# Patient Record
Sex: MALE | Race: WHITE | NOT HISPANIC OR LATINO | Employment: OTHER | URBAN - METROPOLITAN AREA
[De-identification: names, ages, dates, MRNs, and addresses within clinical notes are randomized per-mention and may not be internally consistent; named-entity substitution may affect disease eponyms.]

---

## 2017-02-15 ENCOUNTER — HOSPITAL ENCOUNTER (OUTPATIENT)
Dept: FAMILY MEDICINE CLINIC | Facility: CLINIC | Age: 72
Setting detail: SPECIMEN
Discharge: HOME | End: 2017-02-15
Attending: PREVENTIVE MEDICINE | Admitting: PREVENTIVE MEDICINE

## 2017-02-15 LAB
ALBUMIN SERPL-MCNC: 3.7 G/DL (ref 3.5–4.8)
ALBUMIN/GLOB SERPL: 1.2 {RATIO} (ref 1–1.7)
ALP SERPL-CCNC: 52 IU/L (ref 32–91)
ALT SERPL-CCNC: 22 IU/L (ref 17–63)
ANION GAP SERPL CALC-SCNC: 13.1 MMOL/L (ref 10–20)
AST SERPL-CCNC: 30 IU/L (ref 15–41)
BASOPHILS # BLD AUTO: 0.1 10*3/UL (ref 0–0.2)
BASOPHILS NFR BLD AUTO: 1 % (ref 0–2)
BILIRUB SERPL-MCNC: 0.5 MG/DL (ref 0.3–1.2)
BUN SERPL-MCNC: 14 MG/DL (ref 8–20)
BUN/CREAT SERPL: 15.6 (ref 6.2–20.3)
CALCIUM SERPL-MCNC: 9.6 MG/DL (ref 8.9–10.3)
CHLORIDE SERPL-SCNC: 104 MMOL/L (ref 101–111)
CONV CO2: 28 MMOL/L (ref 22–32)
CONV TOTAL PROTEIN: 6.8 G/DL (ref 6.1–7.9)
CREAT UR-MCNC: 0.9 MG/DL (ref 0.7–1.2)
DIFFERENTIAL METHOD BLD: (no result)
EOSINOPHIL # BLD AUTO: 0.3 10*3/UL (ref 0–0.3)
EOSINOPHIL # BLD AUTO: 5 % (ref 0–3)
ERYTHROCYTE [DISTWIDTH] IN BLOOD BY AUTOMATED COUNT: 14.4 % (ref 11.5–14.5)
GLOBULIN UR ELPH-MCNC: 3.1 G/DL (ref 2.5–3.8)
GLUCOSE SERPL-MCNC: 88 MG/DL (ref 65–99)
HCT VFR BLD AUTO: 41.8 % (ref 40–54)
HGB BLD-MCNC: 14.2 G/DL (ref 14–18)
LYMPHOCYTES # BLD AUTO: 1.4 10*3/UL (ref 0.8–4.8)
LYMPHOCYTES NFR BLD AUTO: 22 % (ref 18–42)
MCH RBC QN AUTO: 30.2 PG (ref 26–32)
MCHC RBC AUTO-ENTMCNC: 33.9 G/DL (ref 32–36)
MCV RBC AUTO: 88.9 FL (ref 80–94)
MONOCYTES # BLD AUTO: 0.9 10*3/UL (ref 0.1–1.3)
MONOCYTES NFR BLD AUTO: 14 % (ref 2–11)
NEUTROPHILS # BLD AUTO: 3.8 10*3/UL (ref 2.3–8.6)
NEUTROPHILS NFR BLD AUTO: 58 % (ref 50–75)
NRBC BLD AUTO-RTO: 0 /100{WBCS}
NRBC/RBC NFR BLD MANUAL: 0 10*3/UL
PLATELET # BLD AUTO: 230 10*3/UL (ref 150–450)
PMV BLD AUTO: 10.9 FL (ref 7.4–10.4)
POTASSIUM SERPL-SCNC: 4.1 MMOL/L (ref 3.6–5.1)
RBC # BLD AUTO: 4.7 10*6/UL (ref 4.6–6)
SODIUM SERPL-SCNC: 141 MMOL/L (ref 136–144)
VIT B12 SERPL-MCNC: 553 PG/ML (ref 180–914)
WBC # BLD AUTO: 6.4 10*3/UL (ref 4.5–11.5)

## 2017-11-17 ENCOUNTER — HOSPITAL ENCOUNTER (OUTPATIENT)
Dept: FAMILY MEDICINE CLINIC | Facility: CLINIC | Age: 72
Setting detail: SPECIMEN
Discharge: HOME | End: 2017-11-17
Attending: PREVENTIVE MEDICINE | Admitting: PREVENTIVE MEDICINE

## 2017-11-17 LAB — PSA SERPL-MCNC: 1.69 NG/ML (ref 0–4)

## 2018-03-20 ENCOUNTER — HOSPITAL ENCOUNTER (OUTPATIENT)
Dept: FAMILY MEDICINE CLINIC | Facility: CLINIC | Age: 73
Setting detail: SPECIMEN
Discharge: HOME | End: 2018-03-20
Attending: PREVENTIVE MEDICINE | Admitting: PREVENTIVE MEDICINE

## 2018-03-20 LAB
ALBUMIN SERPL-MCNC: 3.8 G/DL (ref 3.5–4.8)
ALBUMIN/GLOB SERPL: 1.2 {RATIO} (ref 1–1.7)
ALP SERPL-CCNC: 45 IU/L (ref 32–91)
ALT SERPL-CCNC: 25 IU/L (ref 17–63)
ANION GAP SERPL CALC-SCNC: 12 MMOL/L (ref 10–20)
AST SERPL-CCNC: 27 IU/L (ref 15–41)
BASOPHILS # BLD AUTO: 0.1 10*3/UL (ref 0–0.2)
BASOPHILS NFR BLD AUTO: 1 % (ref 0–2)
BILIRUB SERPL-MCNC: 0.6 MG/DL (ref 0.3–1.2)
BUN SERPL-MCNC: 17 MG/DL (ref 8–20)
BUN/CREAT SERPL: 18.9 (ref 6.2–20.3)
CALCIUM SERPL-MCNC: 9.5 MG/DL (ref 8.9–10.3)
CHLORIDE SERPL-SCNC: 102 MMOL/L (ref 101–111)
CONV CO2: 30 MMOL/L (ref 22–32)
CONV TOTAL PROTEIN: 6.9 G/DL (ref 6.1–7.9)
CREAT UR-MCNC: 0.9 MG/DL (ref 0.7–1.2)
DIFFERENTIAL METHOD BLD: (no result)
EOSINOPHIL # BLD AUTO: 0.2 10*3/UL (ref 0–0.3)
EOSINOPHIL # BLD AUTO: 3 % (ref 0–3)
ERYTHROCYTE [DISTWIDTH] IN BLOOD BY AUTOMATED COUNT: 14.2 % (ref 11.5–14.5)
GLOBULIN UR ELPH-MCNC: 3.1 G/DL (ref 2.5–3.8)
GLUCOSE SERPL-MCNC: 94 MG/DL (ref 65–99)
HCT VFR BLD AUTO: 46.5 % (ref 40–54)
HGB BLD-MCNC: 15.5 G/DL (ref 14–18)
LYMPHOCYTES # BLD AUTO: 1.5 10*3/UL (ref 0.8–4.8)
LYMPHOCYTES NFR BLD AUTO: 19 % (ref 18–42)
MCH RBC QN AUTO: 29.7 PG (ref 26–32)
MCHC RBC AUTO-ENTMCNC: 33.3 G/DL (ref 32–36)
MCV RBC AUTO: 89.2 FL (ref 80–94)
MONOCYTES # BLD AUTO: 1.1 10*3/UL (ref 0.1–1.3)
MONOCYTES NFR BLD AUTO: 14 % (ref 2–11)
NEUTROPHILS # BLD AUTO: 5 10*3/UL (ref 2.3–8.6)
NEUTROPHILS NFR BLD AUTO: 63 % (ref 50–75)
NRBC BLD AUTO-RTO: 0 /100{WBCS}
NRBC/RBC NFR BLD MANUAL: 0 10*3/UL
PLATELET # BLD AUTO: 315 10*3/UL (ref 150–450)
PMV BLD AUTO: 9.8 FL (ref 7.4–10.4)
POTASSIUM SERPL-SCNC: 4 MMOL/L (ref 3.6–5.1)
RBC # BLD AUTO: 5.22 10*6/UL (ref 4.6–6)
SODIUM SERPL-SCNC: 140 MMOL/L (ref 136–144)
WBC # BLD AUTO: 7.9 10*3/UL (ref 4.5–11.5)

## 2018-11-12 ENCOUNTER — HOSPITAL ENCOUNTER (OUTPATIENT)
Dept: FAMILY MEDICINE CLINIC | Facility: CLINIC | Age: 73
Setting detail: SPECIMEN
Discharge: HOME | End: 2018-11-12
Attending: UROLOGY | Admitting: UROLOGY

## 2019-02-11 ENCOUNTER — HOSPITAL ENCOUNTER (OUTPATIENT)
Dept: CARDIOLOGY | Facility: HOSPITAL | Age: 74
Discharge: HOME | End: 2019-02-11
Attending: INTERNAL MEDICINE | Admitting: INTERNAL MEDICINE

## 2019-04-02 ENCOUNTER — HOSPITAL ENCOUNTER (OUTPATIENT)
Dept: PREADMISSION TESTING | Facility: HOSPITAL | Age: 74
Discharge: HOME | End: 2019-04-02
Attending: INTERNAL MEDICINE | Admitting: INTERNAL MEDICINE

## 2019-04-02 LAB
ANION GAP SERPL CALC-SCNC: 14.5 MMOL/L (ref 10–20)
BASOPHILS # BLD AUTO: 0 10*3/UL (ref 0–0.2)
BASOPHILS NFR BLD AUTO: 1 % (ref 0–2)
BUN SERPL-MCNC: 14 MG/DL (ref 8–20)
BUN/CREAT SERPL: 17.5 (ref 6.2–20.3)
CALCIUM SERPL-MCNC: 9.5 MG/DL (ref 8.9–10.3)
CHLORIDE SERPL-SCNC: 101 MMOL/L (ref 101–111)
CONV CO2: 28 MMOL/L (ref 22–32)
CREAT UR-MCNC: 0.8 MG/DL (ref 0.7–1.2)
DIFFERENTIAL METHOD BLD: (no result)
EOSINOPHIL # BLD AUTO: 0.2 10*3/UL (ref 0–0.3)
EOSINOPHIL # BLD AUTO: 2 % (ref 0–3)
ERYTHROCYTE [DISTWIDTH] IN BLOOD BY AUTOMATED COUNT: 14.3 % (ref 11.5–14.5)
GLUCOSE SERPL-MCNC: 101 MG/DL (ref 65–99)
HCT VFR BLD AUTO: 46.1 % (ref 40–54)
HGB BLD-MCNC: 15.2 G/DL (ref 14–18)
INR PPP: 1
LYMPHOCYTES # BLD AUTO: 1.1 10*3/UL (ref 0.8–4.8)
LYMPHOCYTES NFR BLD AUTO: 12 % (ref 18–42)
MCH RBC QN AUTO: 30.2 PG (ref 26–32)
MCHC RBC AUTO-ENTMCNC: 33 G/DL (ref 32–36)
MCV RBC AUTO: 91.6 FL (ref 80–94)
MONOCYTES # BLD AUTO: 1 10*3/UL (ref 0.1–1.3)
MONOCYTES NFR BLD AUTO: 11 % (ref 2–11)
NEUTROPHILS # BLD AUTO: 6.6 10*3/UL (ref 2.3–8.6)
NEUTROPHILS NFR BLD AUTO: 74 % (ref 50–75)
NRBC BLD AUTO-RTO: 0 /100{WBCS}
NRBC/RBC NFR BLD MANUAL: 0 10*3/UL
PLATELET # BLD AUTO: 245 10*3/UL (ref 150–450)
PMV BLD AUTO: 9.5 FL (ref 7.4–10.4)
POTASSIUM SERPL-SCNC: 4.5 MMOL/L (ref 3.6–5.1)
PROTHROMBIN TIME: 10.4 SEC (ref 9.6–11.7)
RBC # BLD AUTO: 5.04 10*6/UL (ref 4.6–6)
SODIUM SERPL-SCNC: 139 MMOL/L (ref 136–144)
WBC # BLD AUTO: 8.9 10*3/UL (ref 4.5–11.5)

## 2019-07-17 ENCOUNTER — OFFICE VISIT (OUTPATIENT)
Dept: PODIATRY | Facility: CLINIC | Age: 74
End: 2019-07-17

## 2019-07-17 ENCOUNTER — OFFICE VISIT (OUTPATIENT)
Dept: CARDIOLOGY | Facility: CLINIC | Age: 74
End: 2019-07-17

## 2019-07-17 VITALS
HEART RATE: 70 BPM | DIASTOLIC BLOOD PRESSURE: 77 MMHG | RESPIRATION RATE: 18 BRPM | OXYGEN SATURATION: 97 % | WEIGHT: 215 LBS | BODY MASS INDEX: 30.1 KG/M2 | HEIGHT: 71 IN | SYSTOLIC BLOOD PRESSURE: 119 MMHG

## 2019-07-17 VITALS — WEIGHT: 219 LBS | HEIGHT: 71 IN | BODY MASS INDEX: 30.66 KG/M2

## 2019-07-17 DIAGNOSIS — I48.20 CHRONIC ATRIAL FIBRILLATION (HCC): Primary | ICD-10-CM

## 2019-07-17 DIAGNOSIS — I10 ESSENTIAL HYPERTENSION: ICD-10-CM

## 2019-07-17 DIAGNOSIS — L60.0 INGROWN NAIL OF GREAT TOE OF LEFT FOOT: Primary | ICD-10-CM

## 2019-07-17 PROCEDURE — 99203 OFFICE O/P NEW LOW 30 MIN: CPT | Performed by: PODIATRIST

## 2019-07-17 PROCEDURE — 99214 OFFICE O/P EST MOD 30 MIN: CPT | Performed by: INTERNAL MEDICINE

## 2019-07-17 RX ORDER — LOSARTAN POTASSIUM 50 MG/1
50 TABLET ORAL DAILY
Refills: 2 | COMMUNITY
Start: 2019-06-20 | End: 2019-12-03 | Stop reason: SDUPTHER

## 2019-07-17 RX ORDER — CHOLECALCIFEROL (VITAMIN D3) 125 MCG
5 CAPSULE ORAL NIGHTLY
COMMUNITY
Start: 2018-03-16

## 2019-07-17 RX ORDER — CHLORAL HYDRATE 500 MG
1000 CAPSULE ORAL
COMMUNITY
End: 2021-01-11

## 2019-07-17 RX ORDER — TRIAMTERENE AND HYDROCHLOROTHIAZIDE 37.5; 25 MG/1; MG/1
TABLET ORAL
COMMUNITY
Start: 2019-04-15 | End: 2019-09-03 | Stop reason: SDUPTHER

## 2019-07-17 RX ORDER — SILDENAFIL CITRATE 20 MG/1
20 TABLET ORAL DAILY
Status: ON HOLD | COMMUNITY
Start: 2017-02-09 | End: 2021-11-11

## 2019-07-17 RX ORDER — DILTIAZEM HYDROCHLORIDE 120 MG/1
120 CAPSULE, EXTENDED RELEASE ORAL DAILY
Refills: 0 | COMMUNITY
Start: 2019-05-24 | End: 2020-01-22

## 2019-07-17 RX ORDER — MULTIVIT-MIN/IRON/FOLIC ACID/K 18-600-40
50000 CAPSULE ORAL WEEKLY
COMMUNITY
Start: 2012-08-20

## 2019-07-17 NOTE — PROGRESS NOTES
07/17/2019  Foot and Ankle Surgery - New Patient   Provider: Dr. Hunter Davila DPM  Location: [facility]    Subjective:  Salinas Dill is a 73 y.o. male.     Chief Complaint   Patient presents with   • Ingrown Toenail     Left foot great toe       HPI: Patient is a 73-year-old male that presents with intermittent discomfort affecting the medial border of the left great toenail.  He states that the symptoms have been present for approximately a month.  He denies any injury to the area.  He has not noticed any redness, drainage, or swelling.  He rates his pain at a 1 out of 10 and states that he is only here because his wife requested that he come.  He is unaware of any other issues.  Today, he states that he has no pain.    Allergies   Allergen Reactions   • Lisinopril Cough       Past Medical History:   Diagnosis Date   • Arthritis    • Bladder infection    • Hyperglycemia    • Hypertension    • Skin mole    • Snoring    • Vitamin D deficiency        Past Surgical History:   Procedure Laterality Date   • COLONOSCOPY     • FINGER SURGERY Right     repair right pointer finger   • TOTAL SHOULDER ARTHROPLASTY      shoulder replacement       Family History   Problem Relation Age of Onset   • Diabetes Mother    • Heart disease Mother    • Hypertension Sister    • Hyperlipidemia Sister    • Kidney disease Sister    • Cancer Sister    • Other Sister         weight disorder   • Diabetes Sister    • Stroke Brother    • Hypertension Other        Social History     Socioeconomic History   • Marital status:      Spouse name: Not on file   • Number of children: Not on file   • Years of education: Not on file   • Highest education level: Not on file   Tobacco Use   • Smoking status: Former Smoker   Substance and Sexual Activity   • Alcohol use: No     Frequency: Never   • Drug use: No   • Sexual activity: Defer        Current Outpatient Medications on File Prior to Visit   Medication Sig Dispense Refill   •  "apixaban (ELIQUIS) 5 MG tablet tablet Take 1 tablet by mouth 2 (Two) Times a Day. 60 tablet 6   • B COMPLEX VITAMINS ER PO VITAMIN B COMPLEX CAPS     • Cholecalciferol (VITAMIN D) 2000 units capsule VITAMIN D 2000 UNIT CAPS     • DILT- MG 24 hr capsule Take 120 mg by mouth Daily.  0   • losartan (COZAAR) 50 MG tablet Take 50 mg by mouth Daily.  2   • melatonin 3 MG tablet MELATONIN 3 MG TABS     • Omega-3 Fatty Acids (FISH OIL) 1000 MG capsule capsule Take  by mouth.     • sildenafil (REVATIO) 20 MG tablet SILDENAFIL CITRATE 20 MG TABS     • triamterene-hydrochlorothiazide (MAXZIDE-25) 37.5-25 MG per tablet        No current facility-administered medications on file prior to visit.        Review of Systems:  General: Denies fever, chills, fatigue, and weakness.  Eyes: Denies vision loss, blurry vision, and excessive redness.  ENT: Denies hearing issues and difficulty swallowing.  Cardiovascular: Denies palpitations, chest pain, or syncopal episodes.  Respiratory: Denies shortness of breath, wheezing, and coughing.  GI: Denies abdominal pain, nausea, and vomiting.   : Denies frequency, hematuria, and urgency.  Musculoskeletal: Denies muscle cramps, joint pains, and stiffness.  Derm:+ Left great toenail pain  Neuro: Denies headaches, numbness, loss of coordination, and tremors.  Psych: Denies anxiety and depression.  Endocrine: Denies temperature intolerance and changes in appetite.  Heme: Denies bleeding disorders or abnormal bruising.     Objective   Ht 180.3 cm (71\")   Wt 99.3 kg (219 lb)   BMI 30.54 kg/m²     General Exam  Appearance: appears stated age and healthy   Orientation: alert and oriented to person, place, and time   Affect: appropriate   Gait: unimpaired     Foot/Ankle Exam  Inspection and Palpation  Ecchymosis - Left: none  Tenderness - Left: none   Swelling - Left: none    Arch - Left: normal  Skin - Left: skin intact   Nails -  Left: normal (Mild discomfort with palpation to the medial " border of the left great toe with no signs of infection or inflammation)    Vascular  Dorsalis Pedis - Left: 3+  Posterior Tibial - Left: 3+  Skin Temperature -  Left: warm    CFT - Left: < 3 seconds    Neurologic  Saphenous Nerve Sensation  - Left: normal  Tibial Nerve Sensation - Left: normal  Superficial Peroneal Nerve Sensation - Left: normal  Deep Peroneal Nerve Sensation - Left: normal  Sural Nerve Sensation - Left: normal  Achilles Reflex - Left: 2+    Muscle Strength  Dorsiflexion - Left: 5  Plantar Flexion - Left: 5  Eversion - Left: 5  Inversion - Left: 5  Great Toe Extension - Left: 5  Great Toe Flexion - Left: 5    Range of Motion  Normal left ankle ROM          Assessment/Plan     Salinas was seen today for ingrown toenail.    Diagnoses and all orders for this visit:    Ingrown nail of great toe of left foot      Patient presents with intermittent discomfort affecting the medial border of the left great toenail.  Today, he has no significant discomfort.  On exam, he has normal features involving the nail plate as well as the toe.  He has no signs of infection or inflammation and only minimal discomfort with increased palpation.  I did discuss that his symptoms could be secondary to an ingrowing nail however I do feel that he should proceed with observation and Epsom salt soaks if needed.  I would like him to monitor his symptoms and call with any increase in pain or further concerns.  I will see him on an as-needed basis at this time.    No orders of the defined types were placed in this encounter.       Note is dictated utilizing voice recognition software. Unfortunately this leads to occasional typographical errors. I apologize in advance if the situation occurs. If questions occur please do not hesitate to call our office.

## 2019-07-17 NOTE — PROGRESS NOTES
Subjective:     Encounter Date:07/17/2019      Patient ID: Salinas Dill is a 73 y.o. male.    Chief Complaint:      Dear Dr. Ferrera,    It is my pleasure seeing patient Salinas Dill  in the office today.  patient is a pleasant 73-year-old male with past medical history significant for history of hypertension and benign prostatic hypertrophy was seen in the office yesterday for routine physical exam for [employer] inpatient noted to be in atrial fibrillation.     patient had no prior history of atrial fibrillation   patient denies any symptoms of palpitations chest pain shortness of breath dizziness or syncope   no orthopnea no PND   no loss of consciousness  Patient stayed in atrial fibrillation   stress test with no evidence of any inducible ischemia   echocardiogram with normal LV systolic function and moderate mitral regurgitation  Failed cardioversion patient is back in atrial fibrillation with controlled ventricular response   continue current dose of anticoagulation   continue current Cardizem for rate control   continued risk factor modification  Risk benefits of long-term Brilinta correlation discussed the patient  Continue risk factor modification   follow-up in office in 6 months        The following portions of the patient's history were reviewed and updated as appropriate: allergies, current medications, past family history, past medical history, past social history, past surgical history and problem list.    Allergies   Allergen Reactions   • Lisinopril Cough         Current Outpatient Medications:   •  apixaban (ELIQUIS) 5 MG tablet tablet, Take 1 tablet by mouth 2 (Two) Times a Day., Disp: 60 tablet, Rfl: 6  •  B COMPLEX VITAMINS ER PO, VITAMIN B COMPLEX CAPS, Disp: , Rfl:   •  Cholecalciferol (VITAMIN D) 2000 units capsule, VITAMIN D 2000 UNIT CAPS, Disp: , Rfl:   •  DILT- MG 24 hr capsule, Take 120 mg by mouth Daily., Disp: , Rfl: 0  •  losartan (COZAAR) 50 MG tablet, Take 50 mg by mouth  Daily., Disp: , Rfl: 2  •  melatonin 3 MG tablet, MELATONIN 3 MG TABS, Disp: , Rfl:   •  Omega-3 Fatty Acids (FISH OIL) 1000 MG capsule capsule, Take  by mouth., Disp: , Rfl:   •  sildenafil (REVATIO) 20 MG tablet, SILDENAFIL CITRATE 20 MG TABS, Disp: , Rfl:   •  triamterene-hydrochlorothiazide (MAXZIDE-25) 37.5-25 MG per tablet, , Disp: , Rfl:     Family History   Problem Relation Age of Onset   • Diabetes Mother    • Heart disease Mother    • Hypertension Sister    • Hyperlipidemia Sister    • Kidney disease Sister    • Cancer Sister    • Other Sister         weight disorder   • Diabetes Sister    • Stroke Brother    • Hypertension Other        Past Medical History:   Diagnosis Date   • Arthritis    • Atrial fibrillation (CMS/HCC)    • Bladder infection    • Hyperglycemia    • Hypertension    • Skin mole    • Snoring    • Vitamin D deficiency        Past Surgical History:   Procedure Laterality Date   • COLONOSCOPY     • FINGER SURGERY Right     repair right pointer finger   • TOTAL SHOULDER ARTHROPLASTY      shoulder replacement       Social History     Socioeconomic History   • Marital status:      Spouse name: Not on file   • Number of children: Not on file   • Years of education: Not on file   • Highest education level: Not on file   Tobacco Use   • Smoking status: Former Smoker   • Smokeless tobacco: Never Used   Substance and Sexual Activity   • Alcohol use: Yes     Frequency: Never     Comment: 1 drink weekly   • Drug use: No   • Sexual activity: Defer       Review of Systems   Constitution: Negative for chills, decreased appetite and malaise/fatigue.   HENT: Negative for congestion.    Eyes: Negative for blurred vision and double vision.   Cardiovascular: Negative for chest pain, dyspnea on exertion, leg swelling, near-syncope and syncope.   Respiratory: Negative for cough and shortness of breath.    Hematologic/Lymphatic: Negative for adenopathy. Does not bruise/bleed easily.   Skin: Negative for  rash.   Gastrointestinal: Negative for bloating and abdominal pain.   Neurological: Negative for dizziness and focal weakness.            Objective:         Vitals:    07/17/19 1339   BP: 119/77   Pulse: 70   Resp: 18   SpO2: 97%       Physical Exam   Constitutional: He is oriented to person, place, and time. He appears well-developed and well-nourished.   HENT:   Head: Normocephalic and atraumatic.   Eyes: Conjunctivae are normal. Pupils are equal, round, and reactive to light.   Neck: Normal range of motion. Neck supple. No thyromegaly present.   Cardiovascular: Normal rate, regular rhythm, S1 normal, S2 normal and intact distal pulses.   Pulmonary/Chest: Effort normal and breath sounds normal.   Abdominal: Soft. Bowel sounds are normal.   Musculoskeletal: He exhibits no edema.   Neurological: He is alert and oriented to person, place, and time.   Skin: Skin is warm.   Nursing note and vitals reviewed.      Lab/EKG/Echo Review:   EKG: unchanged from previous tracings, nonspecific ST and T waves changes, atrial fibrillation, rate 72.

## 2019-07-17 NOTE — PATIENT INSTRUCTIONS
Ingrown Toenail  An ingrown toenail occurs when the corner or sides of a toenail grow into the surrounding skin. This causes discomfort and pain. The big toe is most commonly affected, but any of the toes can be affected. If an ingrown toenail is not treated, it can become infected.  What are the causes?  This condition may be caused by:  · Wearing shoes that are too small or tight.  · An injury, such as stubbing your toe or having your toe stepped on.  · Improper cutting or care of your toenails.  · Having nail or foot abnormalities that were present from birth (congenital abnormalities), such as having a nail that is too big for your toe.    What increases the risk?  The following factors may make you more likely to develop ingrown toenails:  · Age. Nails tend to get thicker with age, so ingrown nails are more common among older people.  · Cutting your toenails incorrectly, such as cutting them very short or cutting them unevenly.    An ingrown toenail is more likely to get infected if you have:  · Diabetes.  · Blood flow (circulation) problems.    What are the signs or symptoms?  Symptoms of an ingrown toenail may include:  · Pain, soreness, or tenderness.  · Redness.  · Swelling.  · Hardening of the skin that surrounds the toenail.    Signs that an ingrown toenail may be infected include:  · Fluid or pus.  · Symptoms that get worse instead of better.    How is this diagnosed?  An ingrown toenail may be diagnosed based on your medical history, your symptoms, and a physical exam. If you have fluid or blood coming from your toenail, a sample may be collected to test for the specific type of bacteria that is causing the infection.  How is this treated?  Treatment depends on how severe your ingrown toenail is. You may be able to care for your toenail at home.  · If you have an infection, you may be prescribed antibiotic medicines.  · If you have fluid or pus draining from your toenail, your health care provider may  drain it.  · If you have trouble walking, you may be given crutches to use.  · If you have a severe or infected ingrown toenail, you may need a procedure to remove part or all of the nail.    Follow these instructions at home:  Foot care  · Do not pick at your toenail or try to remove it yourself.  · Soak your foot in warm, soapy water. Do this for 20 minutes, 3 times a day, or as often as told by your health care provider. This helps to keep your toe clean and keep your skin soft.  · Wear shoes that fit well and are not too tight. Your health care provider may recommend that you wear open-toed shoes while you heal.  · Trim your toenails regularly and carefully. Cut your toenails straight across to prevent injury to the skin at the corners of the toenail. Do not cut your nails in a curved shape.  · Keep your feet clean and dry to help prevent infection.  Medicines  · Take over-the-counter and prescription medicines only as told by your health care provider.  · If you were prescribed an antibiotic, take it as told by your health care provider. Do not stop taking the antibiotic even if you start to feel better.  Activity  · Return to your normal activities as told by your health care provider. Ask your health care provider what activities are safe for you.  · Avoid activities that cause pain.  General instructions  · If your health care provider told you to use crutches to help you move around, use them as instructed.  · Keep all follow-up visits as told by your health care provider. This is important.  Contact a health care provider if:  · You have more redness, swelling, pain, or other symptoms that do not improve with treatment.  · You have fluid, blood, or pus coming from your toenail.  Get help right away if:  · You have a red streak on your skin that starts at your foot and spreads up your leg.  · You have a fever.  Summary  · An ingrown toenail occurs when the corner or sides of a toenail grow into the  surrounding skin. This causes discomfort and pain. The big toe is most commonly affected, but any of the toes can be affected.  · If an ingrown toenail is not treated, it can become infected.  · Fluid or pus draining from your toenail is a sign of infection. Your health care provider may need to drain it. You may be given antibiotics to treat the infection.  · Trimming your toenails regularly and properly can help you prevent an ingrown toenail.  This information is not intended to replace advice given to you by your health care provider. Make sure you discuss any questions you have with your health care provider.  Document Released: 12/15/2001 Document Revised: 09/05/2018 Document Reviewed: 09/05/2018  [employer] Interactive Patient Education © 2019 [employer]

## 2019-09-03 RX ORDER — TRIAMTERENE AND HYDROCHLOROTHIAZIDE 37.5; 25 MG/1; MG/1
TABLET ORAL
Qty: 45 TABLET | Refills: 1 | Status: SHIPPED | OUTPATIENT
Start: 2019-09-03 | End: 2019-12-05 | Stop reason: SDUPTHER

## 2019-11-26 RX ORDER — DILTIAZEM HYDROCHLORIDE 120 MG/1
CAPSULE, COATED, EXTENDED RELEASE ORAL
Qty: 90 CAPSULE | Refills: 3 | Status: SHIPPED | OUTPATIENT
Start: 2019-11-26 | End: 2020-12-16

## 2019-12-03 RX ORDER — LOSARTAN POTASSIUM 50 MG/1
TABLET ORAL
Qty: 30 TABLET | Refills: 2 | Status: SHIPPED | OUTPATIENT
Start: 2019-12-03 | End: 2019-12-05 | Stop reason: SDUPTHER

## 2019-12-04 ENCOUNTER — TRANSCRIBE ORDERS (OUTPATIENT)
Dept: FAMILY MEDICINE CLINIC | Facility: CLINIC | Age: 74
End: 2019-12-04

## 2019-12-04 ENCOUNTER — CLINICAL SUPPORT (OUTPATIENT)
Dept: FAMILY MEDICINE CLINIC | Facility: CLINIC | Age: 74
End: 2019-12-04

## 2019-12-04 DIAGNOSIS — N13.8 ENLARGED PROSTATE WITH URINARY OBSTRUCTION: ICD-10-CM

## 2019-12-04 DIAGNOSIS — N40.1 ENLARGED PROSTATE WITH URINARY OBSTRUCTION: ICD-10-CM

## 2019-12-04 DIAGNOSIS — N13.8 ENLARGED PROSTATE WITH URINARY OBSTRUCTION: Primary | ICD-10-CM

## 2019-12-04 DIAGNOSIS — N40.1 ENLARGED PROSTATE WITH URINARY OBSTRUCTION: Primary | ICD-10-CM

## 2019-12-04 DIAGNOSIS — Z12.5 ENCOUNTER FOR SCREENING FOR MALIGNANT NEOPLASM OF PROSTATE: ICD-10-CM

## 2019-12-04 LAB — PSA SERPL-MCNC: 1.87 NG/ML (ref 0–4)

## 2019-12-04 PROCEDURE — 36415 COLL VENOUS BLD VENIPUNCTURE: CPT | Performed by: PREVENTIVE MEDICINE

## 2019-12-04 PROCEDURE — 84153 ASSAY OF PSA TOTAL: CPT | Performed by: NURSE PRACTITIONER

## 2019-12-04 PROCEDURE — G0103 PSA SCREENING: HCPCS | Performed by: NURSE PRACTITIONER

## 2019-12-06 RX ORDER — TRIAMTERENE AND HYDROCHLOROTHIAZIDE 37.5; 25 MG/1; MG/1
TABLET ORAL
Qty: 45 TABLET | Refills: 1 | Status: SHIPPED | OUTPATIENT
Start: 2019-12-06 | End: 2020-08-10

## 2019-12-06 RX ORDER — LOSARTAN POTASSIUM 50 MG/1
TABLET ORAL
Qty: 30 TABLET | Refills: 2 | Status: SHIPPED | OUTPATIENT
Start: 2019-12-06 | End: 2020-03-23

## 2020-01-22 ENCOUNTER — OFFICE VISIT (OUTPATIENT)
Dept: CARDIOLOGY | Facility: CLINIC | Age: 75
End: 2020-01-22

## 2020-01-22 VITALS
HEIGHT: 71 IN | HEART RATE: 78 BPM | DIASTOLIC BLOOD PRESSURE: 78 MMHG | BODY MASS INDEX: 30.1 KG/M2 | RESPIRATION RATE: 18 BRPM | WEIGHT: 215 LBS | OXYGEN SATURATION: 98 % | SYSTOLIC BLOOD PRESSURE: 133 MMHG

## 2020-01-22 DIAGNOSIS — I48.11 LONGSTANDING PERSISTENT ATRIAL FIBRILLATION (HCC): ICD-10-CM

## 2020-01-22 DIAGNOSIS — I34.0 NONRHEUMATIC MITRAL VALVE REGURGITATION: Primary | ICD-10-CM

## 2020-01-22 DIAGNOSIS — I10 ESSENTIAL HYPERTENSION: ICD-10-CM

## 2020-01-22 PROCEDURE — 99214 OFFICE O/P EST MOD 30 MIN: CPT | Performed by: INTERNAL MEDICINE

## 2020-01-22 PROCEDURE — 93000 ELECTROCARDIOGRAM COMPLETE: CPT | Performed by: INTERNAL MEDICINE

## 2020-01-22 NOTE — PROGRESS NOTES
"Cardiology Office Visit      Encounter Date:  01/22/2020    Patient ID:   Salinas Dill is a 74 y.o. male.    Reason For Followup:  Chronic atrial fibrillation  Mitral regurgitation  Hypertension    Brief Clinical History:  Dear Kortney Garcia MD    I had the pleasure of seeing Salinas Dill today. As you are well aware, this is a 74 y.o. 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     patient had no prior history of atrial fibrillation   patient denies any symptoms of palpitations chest pain shortness of breath dizziness or syncope   no orthopnea no PND   no loss of consciousness  Patient stayed in atrial fibrillation   stress test with no evidence of any inducible ischemia   echocardiogram with normal LV systolic function and moderate mitral regurgitation  Failed cardioversion patient is back in atrial fibrillation with controlled ventricular response    Interval History:  Patient denies any new symptoms of chest pain shortness of breath dizziness or syncope  Compliant with medical therapy  Good functional status      Assessment & Plan    Impressions:  Chronic atrial fibrillation  Mitral regurgitation  Hypertension  Ilan Vascor of 2   stress test with no evidence of any inducible ischemia   echocardiogram with normal LV systolic function and moderate mitral regurgitation  Recommendations:   continue current dose Eliquis for anticoagulation   continue current Cardizem for rate control   continued risk factor modification  Risk benefits of long-term Brilinta correlation discussed the patient  Continue risk factor modification   follow-up in office in 6 months    Objective:    Vitals:  Vitals:    01/22/20 1045   BP: 133/78   BP Location: Left arm   Pulse: 78   Resp: 18   SpO2: 98%   Weight: 97.5 kg (215 lb)   Height: 180.3 cm (71\") "       Physical Exam:    General: Alert, cooperative, no distress, appears stated age  Head:  Normocephalic, atraumatic, mucous membranes moist  Eyes:  Conjunctiva/corneas clear, EOM's intact     Neck:  Supple,  no adenopathy;      Lungs: Clear to auscultation bilaterally, no wheezes rhonchi rales are noted  Chest wall: No tenderness  Heart::  Regular rate and rhythm, S1 and S2 normal, no murmur, rub or gallop  Abdomen: Soft, non-tender, nondistended bowel sounds active  Extremities: No cyanosis, clubbing, or edema  Pulses: 2+ and symmetric all extremities  Skin:  No rashes or lesions  Neuro/psych: A&O x3. CN II through XII are grossly intact with appropriate affect      Allergies:  Allergies   Allergen Reactions   • Lisinopril Cough       Medication Review:     Current Outpatient Medications:   •  apixaban (ELIQUIS) 5 MG tablet tablet, Take 1 tablet by mouth 2 (Two) Times a Day., Disp: 180 tablet, Rfl: 1  •  B COMPLEX VITAMINS ER PO, VITAMIN B COMPLEX CAPS, Disp: , Rfl:   •  Cholecalciferol (VITAMIN D) 2000 units capsule, VITAMIN D 2000 UNIT CAPS, Disp: , Rfl:   •  dilTIAZem CD (CARDIZEM CD) 120 MG 24 hr capsule, TAKE 1 TABLET BY MOUTH DAILY, Disp: 90 capsule, Rfl: 3  •  losartan (COZAAR) 50 MG tablet, TAKE ONE BY MOUTH DAILY, Disp: 30 tablet, Rfl: 2  •  melatonin 3 MG tablet, MELATONIN 3 MG TABS, Disp: , Rfl:   •  Omega-3 Fatty Acids (FISH OIL) 1000 MG capsule capsule, Take  by mouth., Disp: , Rfl:   •  sildenafil (REVATIO) 20 MG tablet, SILDENAFIL CITRATE 20 MG TABS, Disp: , Rfl:   •  triamterene-hydrochlorothiazide (MAXZIDE-25) 37.5-25 MG per tablet, TAKE 1/2 TABLET DAILY (Patient taking differently: Take 1 tablet by mouth Daily.), Disp: 45 tablet, Rfl: 1    Family History:  Family History   Problem Relation Age of Onset   • Diabetes Mother    • Heart disease Mother    • Hypertension Sister    • Hyperlipidemia Sister    • Kidney disease Sister    • Cancer Sister    • Other Sister         weight disorder   •  Diabetes Sister    • Stroke Brother    • Hypertension Other        Past Medical History:  Past Medical History:   Diagnosis Date   • Arthritis    • Atrial fibrillation (CMS/HCC)    • Bladder infection    • Hyperglycemia    • Hypertension    • Skin mole    • Snoring    • Vitamin D deficiency        Past surgical History:  Past Surgical History:   Procedure Laterality Date   • COLONOSCOPY     • FINGER SURGERY Right     repair right pointer finger   • TOTAL SHOULDER ARTHROPLASTY      shoulder replacement       Social History:  Social History     Socioeconomic History   • Marital status:      Spouse name: Not on file   • Number of children: Not on file   • Years of education: Not on file   • Highest education level: Not on file   Tobacco Use   • Smoking status: Former Smoker   • Smokeless tobacco: Never Used   Substance and Sexual Activity   • Alcohol use: Yes     Frequency: Never     Comment: 1 drink weekly   • Drug use: No   • Sexual activity: Defer       Review of Systems:  The following systems were reviewed as they relate to the cardiovascular system: Constitutional, Eyes, ENT, Cardiovascular, Respiratory, Gastrointestinal, Integumentary, Neurological, Psychiatric, Hematologic, Endocrine, Musculoskeletal, and Genitourinary. The pertinent cardiovascular findings are reported above with all other cardiovascular points within those systems being negative.    Diagnostic Study Review:     Current Electrocardiogram:    ECG 12 Lead  Date/Time: 1/22/2020 8:00 PM  Performed by: Renato Knight MD  Authorized by: Renato Knight MD   Comparison: compared with previous ECG   Similar to previous ECG  Rhythm: atrial fibrillation  Rate: normal  BPM: 78  Conduction: conduction normal  QRS axis: normal  Other findings: non-specific ST-T wave changes    Clinical impression: abnormal EKG              NOTE: The following portions of the patient's history were reviewed and updated this visit as appropriate:  allergies, current medications, past family history, past medical history, past social history, past surgical history and problem list.

## 2020-02-27 PROBLEM — R20.2 PARESTHESIA: Status: ACTIVE | Noted: 2017-02-09

## 2020-02-27 PROBLEM — I48.91 ATRIAL FIBRILLATION (HCC): Status: ACTIVE | Noted: 2019-02-05

## 2020-02-27 PROBLEM — N30.90 BLADDER INFECTION: Status: RESOLVED | Noted: 2020-02-27 | Resolved: 2020-02-27

## 2020-02-27 PROBLEM — I10 HYPERTENSION: Status: ACTIVE | Noted: 2020-02-27

## 2020-02-27 PROBLEM — D22.9 SKIN MOLE: Status: ACTIVE | Noted: 2020-02-27

## 2020-02-27 PROBLEM — N52.2: Status: ACTIVE | Noted: 2018-11-12

## 2020-02-27 PROBLEM — N13.8 BENIGN PROSTATIC HYPERPLASIA WITH URINARY OBSTRUCTION: Status: ACTIVE | Noted: 2018-11-12

## 2020-02-27 PROBLEM — M54.50 LOW BACK PAIN: Status: ACTIVE | Noted: 2017-02-09

## 2020-02-27 PROBLEM — N40.1 BENIGN PROSTATIC HYPERPLASIA WITH URINARY OBSTRUCTION: Status: ACTIVE | Noted: 2018-11-12

## 2020-02-28 ENCOUNTER — OFFICE VISIT (OUTPATIENT)
Dept: FAMILY MEDICINE CLINIC | Facility: CLINIC | Age: 75
End: 2020-02-28

## 2020-02-28 VITALS
DIASTOLIC BLOOD PRESSURE: 81 MMHG | HEIGHT: 71 IN | WEIGHT: 221.4 LBS | HEART RATE: 79 BPM | OXYGEN SATURATION: 95 % | RESPIRATION RATE: 16 BRPM | TEMPERATURE: 97.7 F | BODY MASS INDEX: 31 KG/M2 | SYSTOLIC BLOOD PRESSURE: 137 MMHG

## 2020-02-28 DIAGNOSIS — D22.9 SKIN MOLE: ICD-10-CM

## 2020-02-28 DIAGNOSIS — Z91.89 ENCOUNTER FOR HEPATITIS C VIRUS SCREENING TEST FOR HIGH RISK PATIENT: ICD-10-CM

## 2020-02-28 DIAGNOSIS — R06.83 SNORING: ICD-10-CM

## 2020-02-28 DIAGNOSIS — E55.9 VITAMIN D DEFICIENCY: ICD-10-CM

## 2020-02-28 DIAGNOSIS — Z00.01 ENCOUNTER FOR ANNUAL GENERAL MEDICAL EXAMINATION WITH ABNORMAL FINDINGS IN ADULT: Primary | ICD-10-CM

## 2020-02-28 DIAGNOSIS — Z11.59 ENCOUNTER FOR HEPATITIS C VIRUS SCREENING TEST FOR HIGH RISK PATIENT: ICD-10-CM

## 2020-02-28 DIAGNOSIS — E53.8 B12 DEFICIENCY: ICD-10-CM

## 2020-02-28 DIAGNOSIS — Z11.4 SCREENING FOR HIV (HUMAN IMMUNODEFICIENCY VIRUS): ICD-10-CM

## 2020-02-28 DIAGNOSIS — Z12.11 SCREENING FOR MALIGNANT NEOPLASM OF COLON: ICD-10-CM

## 2020-02-28 DIAGNOSIS — Z13.220 SCREENING CHOLESTEROL LEVEL: ICD-10-CM

## 2020-02-28 DIAGNOSIS — I10 ESSENTIAL HYPERTENSION: ICD-10-CM

## 2020-02-28 DIAGNOSIS — I48.91 ATRIAL FIBRILLATION, UNSPECIFIED TYPE (HCC): ICD-10-CM

## 2020-02-28 DIAGNOSIS — R73.9 HYPERGLYCEMIA: ICD-10-CM

## 2020-02-28 DIAGNOSIS — Z13.6 SCREENING FOR AAA (ABDOMINAL AORTIC ANEURYSM): ICD-10-CM

## 2020-02-28 PROCEDURE — 99213 OFFICE O/P EST LOW 20 MIN: CPT | Performed by: PREVENTIVE MEDICINE

## 2020-02-28 PROCEDURE — G0439 PPPS, SUBSEQ VISIT: HCPCS | Performed by: PREVENTIVE MEDICINE

## 2020-02-28 NOTE — PROGRESS NOTES
The ABCs of the Annual Wellness Visit  Subsequent Medicare Wellness Visit    Chief Complaint   Patient presents with   • Medicare Wellness-subsequent     not fasting        Subjective   History of Present Illness:  Salinas Dill is a 74 y.o. male who presents for a Subsequent Medicare Wellness Visit.    HEALTH RISK ASSESSMENT    Recent Hospitalizations:  No hospitalization(s) within the last year.    Current Medical Providers:  Patient Care Team:  Kortney Ferrera MD as PCP - General  Kortney Ferrera MD as PCP - Family Medicine  Renato Knihgt MD as PCP - Claims Attributed    Smoking Status:  Social History     Tobacco Use   Smoking Status Former Smoker   • Packs/day: 1.00   • Types: Cigarettes   • Last attempt to quit:    • Years since quittin.1   Smokeless Tobacco Never Used       Alcohol Consumption:  Social History     Substance and Sexual Activity   Alcohol Use Yes   • Frequency: Never    Comment: 1 drink weekly       Depression Screen:   PHQ-2/PHQ-9 Depression Screening 2020   Little interest or pleasure in doing things 0   Feeling down, depressed, or hopeless 0   Total Score 0       Fall Risk Screen:  ALIE Fall Risk Assessment was completed, and patient is at LOW risk for falls.Assessment completed on:2020    Health Habits and Functional and Cognitive Screening:  Functional & Cognitive Status 2020   Do you have difficulty preparing food and eating? No   Do you have difficulty bathing yourself, getting dressed or grooming yourself? No   Do you have difficulty using the toilet? No   Do you have difficulty moving around from place to place? No   Do you have trouble with steps or getting out of a bed or a chair? No   Current Diet Well Balanced Diet   Dental Exam Up to date   Eye Exam Up to date   Exercise (times per week) 6 times per week   Current Exercise Activities Include Walking   Do you need help using the phone?  No   Are you deaf or do you have serious  difficulty hearing?  No   Do you need help with transportation? No   Do you need help shopping? No   Do you need help preparing meals?  No   Do you need help with housework?  No   Do you need help with laundry? No   Do you need help taking your medications? No   Do you need help managing money? No   Do you ever drive or ride in a car without wearing a seat belt? No   Have you felt unusual stress, anger or loneliness in the last month? No   Who do you live with? Spouse   If you need help, do you have trouble finding someone available to you? No   Have you been bothered in the last four weeks by sexual problems? No   Do you have difficulty concentrating, remembering or making decisions? No         Does the patient have evidence of cognitive impairment? Yes    Asprin use counseling:Taking ASA appropriately as indicated    Age-appropriate Screening Schedule:  Refer to the list below for future screening recommendations based on patient's age, sex and/or medical conditions. Orders for these recommended tests are listed in the plan section. The patient has been provided with a written plan.    Health Maintenance   Topic Date Due   • COLONOSCOPY  06/24/2019   • ZOSTER VACCINE (3 of 3) 01/02/2020   • TDAP/TD VACCINES (1 - Tdap) 10/24/2021 (Originally 12/30/1956)   • PROSTATE CANCER SCREENING  12/04/2020   • INFLUENZA VACCINE  Completed          The following portions of the patient's history were reviewed and updated as appropriate: allergies, current medications, past family history, past medical history, past social history, past surgical history and problem list.    Outpatient Medications Prior to Visit   Medication Sig Dispense Refill   • apixaban (ELIQUIS) 5 MG tablet tablet Take 1 tablet by mouth 2 (Two) Times a Day. 180 tablet 3   • B COMPLEX VITAMINS ER PO VITAMIN B COMPLEX CAPS     • Cholecalciferol (VITAMIN D) 2000 units capsule VITAMIN D 2000 UNIT CAPS     • dilTIAZem CD (CARDIZEM CD) 120 MG 24 hr capsule TAKE 1  TABLET BY MOUTH DAILY 90 capsule 3   • losartan (COZAAR) 50 MG tablet TAKE ONE BY MOUTH DAILY 30 tablet 2   • melatonin 5 MG tablet tablet Take 5 mg by mouth Every Night.     • Naproxen Sod-diphenhydrAMINE (ALEVE PM PO) Take 1 tablet by mouth As Needed.     • Omega-3 Fatty Acids (FISH OIL) 1000 MG capsule capsule Take  by mouth.     • sildenafil (REVATIO) 20 MG tablet SILDENAFIL CITRATE 20 MG TABS     • triamterene-hydrochlorothiazide (MAXZIDE-25) 37.5-25 MG per tablet TAKE 1/2 TABLET DAILY (Patient taking differently: Take 1 tablet by mouth Daily.) 45 tablet 1   • TURMERIC CURCUMIN PO Take 2,182 mg by mouth Daily.       No facility-administered medications prior to visit.        Patient Active Problem List   Diagnosis   • Plantar fasciitis   • Arthritis of both shoulder regions   • Atrial fibrillation (CMS/HCC)   • B12 deficiency   • Benign prostatic hyperplasia with urinary obstruction   • Body mass index (BMI) of 30.0-30.9 in adult   • Drug-induced impotence   • Hyperglycemia   • Hypertension   • Increased frequency of urination   • Low back pain   • Paresthesia   • Presence of artificial shoulder joint   • Snoring   • Vitamin D deficiency   • Skin mole       Advanced Care Planning:  ACP discussion was held with the patient during this visit. Patient has an advance directive that is valid in EMR.     Review of Systems    Compared to one year ago, the patient feels his physical health is the same.  Compared to one year ago, the patient feels his mental health is the same.    Reviewed chart for potential of high risk medication in the elderly: yes  Reviewed chart for potential of harmful drug interactions in the elderly:yes    Objective         Vitals:    02/28/20 0823 02/28/20 0831   BP: 123/75 137/81   BP Location: Right arm Left arm   Patient Position: Sitting Sitting   Cuff Size: Adult Adult   Pulse: 63 79   Resp: 16    Temp: 97.7 °F (36.5 °C)    TempSrc: Oral    SpO2: 95%    Weight: 100 kg (221 lb 6.4 oz)   "  Height: 180.3 cm (71\")    PainSc: 0-No pain        Body mass index is 30.88 kg/m².  Discussed the patient's BMI with him. The BMI is above average; BMI management plan is completed.    Physical Exam          Assessment/Plan   Medicare Risks and Personalized Health Plan  CMS Preventative Services Quick Reference  Obesity/Overweight     The above risks/problems have been discussed with the patient.  Pertinent information has been shared with the patient in the After Visit Summary.  Follow up plans and orders are seen below in the Assessment/Plan Section.    Diagnoses and all orders for this visit:    1. Encounter for annual general medical examination with abnormal findings in adult (Primary)    2. Screening for HIV (human immunodeficiency virus)  -     HIV-1 & HIV-2 Antibodies; Future    3. Encounter for hepatitis C virus screening test for high risk patient  -     Hepatitis C Antibody; Future    4. Screening for malignant neoplasm of colon    5. Screening for AAA (abdominal aortic aneurysm)  -      aaa screen limited; Future    6. Atrial fibrillation, unspecified type (CMS/HCC)  -     CBC Auto Differential; Future  -     Magnesium; Future  -     TSH; Future  -     Vitamin B12; Future    7. B12 deficiency  -     Vitamin B12; Future    8. Body mass index (BMI) of 30.0-30.9 in adult    9. Hyperglycemia  -     Comprehensive Metabolic Panel; Future  -     Hemoglobin A1c; Future    10. Essential hypertension    11. Snoring    12. Skin mole    13. Vitamin D deficiency  -     Vitamin D 25 Hydroxy; Future    14. Screening cholesterol level  -     Lipid Panel; Future    Other orders  -     Cancel: Cologuard - Stool, Per Rectum; Future      Follow Up:  No follow-ups on file.     An After Visit Summary and PPPS were given to the patient.             "

## 2020-02-28 NOTE — PROGRESS NOTES
"Subjective   Salinas Dill is a 74 y.o. male presents for   Chief Complaint   Patient presents with   • Medicare Wellness-subsequent     not fasting        Health Maintenance Due   Topic Date Due   • COLONOSCOPY  06/24/2019   • ZOSTER VACCINE (3 of 3) 01/02/2020   • AAA SCREEN (ONE-TIME)  02/27/2020       Patient has noticed no chest pain, palpitations of SOA.  Is exercising regularly.  Sees Dr. Solis every 6 months for A fib.  Weight is above advised and will try healthy eating habits.  Eye and dental UTD.       Vitals:    02/28/20 0823 02/28/20 0831   BP: 123/75 137/81   BP Location: Right arm Left arm   Patient Position: Sitting Sitting   Cuff Size: Adult Adult   Pulse: 63 79   Resp: 16    Temp: 97.7 °F (36.5 °C)    TempSrc: Oral    SpO2: 95%    Weight: 100 kg (221 lb 6.4 oz)    Height: 180.3 cm (71\")      Body mass index is 30.88 kg/m².    Current Outpatient Medications on File Prior to Visit   Medication Sig Dispense Refill   • apixaban (ELIQUIS) 5 MG tablet tablet Take 1 tablet by mouth 2 (Two) Times a Day. 180 tablet 3   • B COMPLEX VITAMINS ER PO VITAMIN B COMPLEX CAPS     • Cholecalciferol (VITAMIN D) 2000 units capsule VITAMIN D 2000 UNIT CAPS     • dilTIAZem CD (CARDIZEM CD) 120 MG 24 hr capsule TAKE 1 TABLET BY MOUTH DAILY 90 capsule 3   • losartan (COZAAR) 50 MG tablet TAKE ONE BY MOUTH DAILY 30 tablet 2   • melatonin 5 MG tablet tablet Take 5 mg by mouth Every Night.     • Naproxen Sod-diphenhydrAMINE (ALEVE PM PO) Take 1 tablet by mouth As Needed.     • Omega-3 Fatty Acids (FISH OIL) 1000 MG capsule capsule Take  by mouth.     • sildenafil (REVATIO) 20 MG tablet SILDENAFIL CITRATE 20 MG TABS     • triamterene-hydrochlorothiazide (MAXZIDE-25) 37.5-25 MG per tablet TAKE 1/2 TABLET DAILY (Patient taking differently: Take 1 tablet by mouth Daily.) 45 tablet 1   • TURMERIC CURCUMIN PO Take 2,182 mg by mouth Daily.       No current facility-administered medications on file prior to visit.        The following " portions of the patient's history were reviewed and updated as appropriate: allergies, current medications, past family history, past medical history, past social history, past surgical history and problem list.    Review of Systems   Constitutional: Negative.    HENT: Negative.  Negative for sinus pressure and sore throat.    Eyes: Negative.    Respiratory: Negative.  Negative for cough.    Cardiovascular: Negative.    Gastrointestinal: Negative.    Endocrine: Negative.    Genitourinary: Positive for erectile dysfunction.   Musculoskeletal: Negative.    Skin: Negative.    Allergic/Immunologic: Positive for environmental allergies.   Neurological: Negative.    Hematological: Negative.    Psychiatric/Behavioral: Negative.        Objective   Physical Exam   Constitutional: He is oriented to person, place, and time. He appears well-developed and well-nourished.   obese   HENT:   Head: Normocephalic and atraumatic.   Eyes: Pupils are equal, round, and reactive to light. Conjunctivae and EOM are normal.   Neck: Normal range of motion. Neck supple.   Cardiovascular: Normal rate, regular rhythm, normal heart sounds and intact distal pulses.   irreg irreg   Pulmonary/Chest: Effort normal and breath sounds normal.   Abdominal: Soft. Bowel sounds are normal.   Musculoskeletal: Normal range of motion.   Neurological: He is alert and oriented to person, place, and time.   Skin: Skin is warm and dry.   Psychiatric: He has a normal mood and affect.   Nursing note and vitals reviewed.    PHQ-9 Total Score: 0    Assessment/Plan   Salinas was seen today for medicare wellness-subsequent.    Diagnoses and all orders for this visit:    Encounter for annual general medical examination with abnormal findings in adult  Comments:  Knows to use sun screen and wear seatbelt    Screening for HIV (human immunodeficiency virus)  -     HIV-1 & HIV-2 Antibodies; Future    Encounter for hepatitis C virus screening test for high risk patient  -      Hepatitis C Antibody; Future    Screening for malignant neoplasm of colon  Comments:  Will speak with son re: cologuar or colonoscopy    Screening for AAA (abdominal aortic aneurysm)  Comments:  us Aorta ordered as smoked 100 cig life  Orders:  -     [place] aaa screen limited; Future    Atrial fibrillation, unspecified type (CMS/HCC)  Comments:  F/U Dr. Solis  Orders:  -     CBC Auto Differential; Future  -     Magnesium; Future  -     TSH; Future  -     Vitamin B12; Future    B12 deficiency  -     Vitamin B12; Future    Body mass index (BMI) of 30.0-30.9 in adult  Comments:  diet dioscussed    Hyperglycemia  -     Comprehensive Metabolic Panel; Future  -     Hemoglobin A1c; Future    Essential hypertension  Comments:  controlled    Snoring  Comments:  discussed next visit    Skin mole  Comments:  Discussed next visit    Vitamin D deficiency  -     Vitamin D 25 Hydroxy; Future    Screening cholesterol level  -     Lipid Panel; Future        Patient Instructions   Calorie Counting for Weight Loss  Calories are units of energy. Your body needs a certain amount of calories from food to keep you going throughout the day. When you eat more calories than your body needs, your body stores the extra calories as fat. When you eat fewer calories than your body needs, your body burns fat to get the energy it needs.  Calorie counting means keeping track of how many calories you eat and drink each day. Calorie counting can be helpful if you need to lose weight. If you make sure to eat fewer calories than your body needs, you should lose weight. Ask your health care provider what a healthy weight is for you.  For calorie counting to work, you will need to eat the right number of calories in a day in order to lose a healthy amount of weight per week. A dietitian can help you determine how many calories you need in a day and will give you suggestions on how to reach your calorie goal.  · A healthy amount of weight to lose per week is  usually 1-2 lb (0.5-0.9 kg). This usually means that your daily calorie intake should be reduced by 500-750 calories.  · Eating 1,200 - 1,500 calories per day can help most women lose weight.  · Eating 1,500 - 1,800 calories per day can help most men lose weight.  What is my plan?  My goal is to have __________ calories per day.  If I have this many calories per day, I should lose around __________ pounds per week.  What do I need to know about calorie counting?  In order to meet your daily calorie goal, you will need to:  · Find out how many calories are in each food you would like to eat. Try to do this before you eat.  · Decide how much of the food you plan to eat.  · Write down what you ate and how many calories it had. Doing this is called keeping a food log.  To successfully lose weight, it is important to balance calorie counting with a healthy lifestyle that includes regular activity. Aim for 150 minutes of moderate exercise (such as walking) or 75 minutes of vigorous exercise (such as running) each week.  Where do I find calorie information?      The number of calories in a food can be found on a Nutrition Facts label. If a food does not have a Nutrition Facts label, try to look up the calories online or ask your dietitian for help.  Remember that calories are listed per serving. If you choose to have more than one serving of a food, you will have to multiply the calories per serving by the amount of servings you plan to eat. For example, the label on a package of bread might say that a serving size is 1 slice and that there are 90 calories in a serving. If you eat 1 slice, you will have eaten 90 calories. If you eat 2 slices, you will have eaten 180 calories.  How do I keep a food log?  Immediately after each meal, record the following information in your food log:  · What you ate. Don't forget to include toppings, sauces, and other extras on the food.  · How much you ate. This can be measured in cups,  "ounces, or number of items.  · How many calories each food and drink had.  · The total number of calories in the meal.  Keep your food log near you, such as in a small notebook in your pocket, or use a mobile uma or website. Some programs will calculate calories for you and show you how many calories you have left for the day to meet your goal.  What are some calorie counting tips?      · Use your calories on foods and drinks that will fill you up and not leave you hungry:  ? Some examples of foods that fill you up are nuts and nut butters, vegetables, lean proteins, and high-fiber foods like whole grains. High-fiber foods are foods with more than 5 g fiber per serving.  ? Drinks such as sodas, specialty coffee drinks, alcohol, and juices have a lot of calories, yet do not fill you up.  · Eat nutritious foods and avoid empty calories. Empty calories are calories you get from foods or beverages that do not have many vitamins or protein, such as candy, sweets, and soda. It is better to have a nutritious high-calorie food (such as an avocado) than a food with few nutrients (such as a bag of chips).  · Know how many calories are in the foods you eat most often. This will help you calculate calorie counts faster.  · Pay attention to calories in drinks. Low-calorie drinks include water and unsweetened drinks.  · Pay attention to nutrition labels for \"low fat\" or \"fat free\" foods. These foods sometimes have the same amount of calories or more calories than the full fat versions. They also often have added sugar, starch, or salt, to make up for flavor that was removed with the fat.  · Find a way of tracking calories that works for you. Get creative. Try different apps or programs if writing down calories does not work for you.  What are some portion control tips?  · Know how many calories are in a serving. This will help you know how many servings of a certain food you can have.  · Use a measuring cup to measure serving " sizes. You could also try weighing out portions on a kitchen scale. With time, you will be able to estimate serving sizes for some foods.  · Take some time to put servings of different foods on your favorite plates, bowls, and cups so you know what a serving looks like.  · Try not to eat straight from a bag or box. Doing this can lead to overeating. Put the amount you would like to eat in a cup or on a plate to make sure you are eating the right portion.  · Use smaller plates, glasses, and bowls to prevent overeating.  · Try not to multitask (for example, watch TV or use your computer) while eating. If it is time to eat, sit down at a table and enjoy your food. This will help you to know when you are full. It will also help you to be aware of what you are eating and how much you are eating.  What are tips for following this plan?  Reading food labels  · Check the calorie count compared to the serving size. The serving size may be smaller than what you are used to eating.  · Check the source of the calories. Make sure the food you are eating is high in vitamins and protein and low in saturated and trans fats.  Shopping  · Read nutrition labels while you shop. This will help you make healthy decisions before you decide to purchase your food.  · Make a grocery list and stick to it.  Cooking  · Try to cook your favorite foods in a healthier way. For example, try baking instead of frying.  · Use low-fat dairy products.  Meal planning  · Use more fruits and vegetables. Half of your plate should be fruits and vegetables.  · Include lean proteins like poultry and fish.  How do I count calories when eating out?  · Ask for smaller portion sizes.  · Consider sharing an entree and sides instead of getting your own entree.  · If you get your own entree, eat only half. Ask for a box at the beginning of your meal and put the rest of your entree in it so you are not tempted to eat it.  · If calories are listed on the menu, choose  "the lower calorie options.  · Choose dishes that include vegetables, fruits, whole grains, low-fat dairy products, and lean protein.  · Choose items that are boiled, broiled, grilled, or steamed. Stay away from items that are buttered, battered, fried, or served with cream sauce. Items labeled \"crispy\" are usually fried, unless stated otherwise.  · Choose water, low-fat milk, unsweetened iced tea, or other drinks without added sugar. If you want an alcoholic beverage, choose a lower calorie option such as a glass of wine or light beer.  · Ask for dressings, sauces, and syrups on the side. These are usually high in calories, so you should limit the amount you eat.  · If you want a salad, choose a garden salad and ask for grilled meats. Avoid extra toppings like gilliland, cheese, or fried items. Ask for the dressing on the side, or ask for olive oil and vinegar or lemon to use as dressing.  · Estimate how many servings of a food you are given. For example, a serving of cooked rice is ½ cup or about the size of half a baseball. Knowing serving sizes will help you be aware of how much food you are eating at restaurants. The list below tells you how big or small some common portion sizes are based on everyday objects:  ? 1 oz--4 stacked dice.  ? 3 oz--1 deck of cards.  ? 1 tsp--1 die.  ? 1 Tbsp--½ a ping-pong ball.  ? 2 Tbsp--1 ping-pong ball.  ? ½ cup--½ baseball.  ? 1 cup--1 baseball.  Summary  · Calorie counting means keeping track of how many calories you eat and drink each day. If you eat fewer calories than your body needs, you should lose weight.  · A healthy amount of weight to lose per week is usually 1-2 lb (0.5-0.9 kg). This usually means reducing your daily calorie intake by 500-750 calories.  · The number of calories in a food can be found on a Nutrition Facts label. If a food does not have a Nutrition Facts label, try to look up the calories online or ask your dietitian for help.  · Use your calories on foods " and drinks that will fill you up, and not on foods and drinks that will leave you hungry.  · Use smaller plates, glasses, and bowls to prevent overeating.  This information is not intended to replace advice given to you by your health care provider. Make sure you discuss any questions you have with your health care provider.  Document Released: 12/18/2006 Document Revised: 09/06/2019 Document Reviewed: 11/17/2017  [employer] Patient Education © 2019 [employer]

## 2020-02-28 NOTE — PATIENT INSTRUCTIONS

## 2020-03-03 ENCOUNTER — TELEPHONE (OUTPATIENT)
Dept: FAMILY MEDICINE CLINIC | Facility: CLINIC | Age: 75
End: 2020-03-03

## 2020-03-03 ENCOUNTER — CLINICAL SUPPORT (OUTPATIENT)
Dept: FAMILY MEDICINE CLINIC | Facility: CLINIC | Age: 75
End: 2020-03-03

## 2020-03-03 DIAGNOSIS — Z91.89 ENCOUNTER FOR HEPATITIS C VIRUS SCREENING TEST FOR HIGH RISK PATIENT: ICD-10-CM

## 2020-03-03 DIAGNOSIS — R73.9 HYPERGLYCEMIA: ICD-10-CM

## 2020-03-03 DIAGNOSIS — Z11.59 ENCOUNTER FOR HEPATITIS C VIRUS SCREENING TEST FOR HIGH RISK PATIENT: ICD-10-CM

## 2020-03-03 DIAGNOSIS — Z12.11 COLON CANCER SCREENING: Primary | ICD-10-CM

## 2020-03-03 DIAGNOSIS — I48.91 ATRIAL FIBRILLATION, UNSPECIFIED TYPE (HCC): ICD-10-CM

## 2020-03-03 DIAGNOSIS — Z11.4 SCREENING FOR HIV (HUMAN IMMUNODEFICIENCY VIRUS): ICD-10-CM

## 2020-03-03 DIAGNOSIS — E55.9 VITAMIN D DEFICIENCY: ICD-10-CM

## 2020-03-03 DIAGNOSIS — E53.8 B12 DEFICIENCY: ICD-10-CM

## 2020-03-03 DIAGNOSIS — Z13.220 SCREENING CHOLESTEROL LEVEL: ICD-10-CM

## 2020-03-03 DIAGNOSIS — I10 ESSENTIAL HYPERTENSION: ICD-10-CM

## 2020-03-03 LAB — HBA1C MFR BLD: 5.5 % (ref 3.5–5.6)

## 2020-03-03 PROCEDURE — 84443 ASSAY THYROID STIM HORMONE: CPT | Performed by: PREVENTIVE MEDICINE

## 2020-03-03 PROCEDURE — 36415 COLL VENOUS BLD VENIPUNCTURE: CPT | Performed by: PREVENTIVE MEDICINE

## 2020-03-03 PROCEDURE — 80061 LIPID PANEL: CPT | Performed by: PREVENTIVE MEDICINE

## 2020-03-03 PROCEDURE — 82607 VITAMIN B-12: CPT | Performed by: PREVENTIVE MEDICINE

## 2020-03-03 PROCEDURE — 82306 VITAMIN D 25 HYDROXY: CPT | Performed by: PREVENTIVE MEDICINE

## 2020-03-03 PROCEDURE — 83735 ASSAY OF MAGNESIUM: CPT | Performed by: PREVENTIVE MEDICINE

## 2020-03-03 PROCEDURE — 85025 COMPLETE CBC W/AUTO DIFF WBC: CPT | Performed by: PREVENTIVE MEDICINE

## 2020-03-03 PROCEDURE — G0432 EIA HIV-1/HIV-2 SCREEN: HCPCS | Performed by: PREVENTIVE MEDICINE

## 2020-03-03 PROCEDURE — 86803 HEPATITIS C AB TEST: CPT | Performed by: PREVENTIVE MEDICINE

## 2020-03-03 PROCEDURE — 80053 COMPREHEN METABOLIC PANEL: CPT | Performed by: PREVENTIVE MEDICINE

## 2020-03-03 PROCEDURE — 83036 HEMOGLOBIN GLYCOSYLATED A1C: CPT | Performed by: PREVENTIVE MEDICINE

## 2020-03-04 ENCOUNTER — APPOINTMENT (OUTPATIENT)
Dept: ULTRASOUND IMAGING | Facility: HOSPITAL | Age: 75
End: 2020-03-04

## 2020-03-04 LAB
25(OH)D3 SERPL-MCNC: 38.5 NG/ML (ref 30–100)
ALBUMIN SERPL-MCNC: 4.2 G/DL (ref 3.5–5.2)
ALBUMIN/GLOB SERPL: 1.5 G/DL
ALP SERPL-CCNC: 45 U/L (ref 39–117)
ALT SERPL W P-5'-P-CCNC: 16 U/L (ref 1–41)
ANION GAP SERPL CALCULATED.3IONS-SCNC: 11.8 MMOL/L (ref 5–15)
AST SERPL-CCNC: 21 U/L (ref 1–40)
BASOPHILS # BLD AUTO: 0.06 10*3/MM3 (ref 0–0.2)
BASOPHILS NFR BLD AUTO: 0.8 % (ref 0–1.5)
BILIRUB SERPL-MCNC: 0.3 MG/DL (ref 0.2–1.2)
BUN BLD-MCNC: 23 MG/DL (ref 8–23)
BUN/CREAT SERPL: 27.4 (ref 7–25)
CALCIUM SPEC-SCNC: 9.6 MG/DL (ref 8.6–10.5)
CHLORIDE SERPL-SCNC: 100 MMOL/L (ref 98–107)
CHOLEST SERPL-MCNC: 171 MG/DL (ref 0–200)
CO2 SERPL-SCNC: 28.2 MMOL/L (ref 22–29)
CREAT BLD-MCNC: 0.84 MG/DL (ref 0.76–1.27)
DEPRECATED RDW RBC AUTO: 44.1 FL (ref 37–54)
EOSINOPHIL # BLD AUTO: 0.2 10*3/MM3 (ref 0–0.4)
EOSINOPHIL NFR BLD AUTO: 2.6 % (ref 0.3–6.2)
ERYTHROCYTE [DISTWIDTH] IN BLOOD BY AUTOMATED COUNT: 13.1 % (ref 12.3–15.4)
GFR SERPL CREATININE-BSD FRML MDRD: 90 ML/MIN/1.73
GLOBULIN UR ELPH-MCNC: 2.8 GM/DL
GLUCOSE BLD-MCNC: 100 MG/DL (ref 65–99)
HCT VFR BLD AUTO: 43.1 % (ref 37.5–51)
HCV AB SER DONR QL: NORMAL
HDLC SERPL-MCNC: 50 MG/DL (ref 40–60)
HGB BLD-MCNC: 14.4 G/DL (ref 13–17.7)
HIV1+2 AB SER QL: NORMAL
IMM GRANULOCYTES # BLD AUTO: 0.06 10*3/MM3 (ref 0–0.05)
IMM GRANULOCYTES NFR BLD AUTO: 0.8 % (ref 0–0.5)
LDLC SERPL CALC-MCNC: 107 MG/DL (ref 0–100)
LDLC/HDLC SERPL: 2.13 {RATIO}
LYMPHOCYTES # BLD AUTO: 1.5 10*3/MM3 (ref 0.7–3.1)
LYMPHOCYTES NFR BLD AUTO: 19.7 % (ref 19.6–45.3)
MAGNESIUM SERPL-MCNC: 2.2 MG/DL (ref 1.6–2.4)
MCH RBC QN AUTO: 30.6 PG (ref 26.6–33)
MCHC RBC AUTO-ENTMCNC: 33.4 G/DL (ref 31.5–35.7)
MCV RBC AUTO: 91.5 FL (ref 79–97)
MONOCYTES # BLD AUTO: 0.89 10*3/MM3 (ref 0.1–0.9)
MONOCYTES NFR BLD AUTO: 11.7 % (ref 5–12)
NEUTROPHILS # BLD AUTO: 4.9 10*3/MM3 (ref 1.7–7)
NEUTROPHILS NFR BLD AUTO: 64.4 % (ref 42.7–76)
NRBC BLD AUTO-RTO: 0 /100 WBC (ref 0–0.2)
PLATELET # BLD AUTO: 289 10*3/MM3 (ref 140–450)
PMV BLD AUTO: 13.3 FL (ref 6–12)
POTASSIUM BLD-SCNC: 4.4 MMOL/L (ref 3.5–5.2)
PROT SERPL-MCNC: 7 G/DL (ref 6–8.5)
RBC # BLD AUTO: 4.71 10*6/MM3 (ref 4.14–5.8)
SODIUM BLD-SCNC: 140 MMOL/L (ref 136–145)
TRIGL SERPL-MCNC: 72 MG/DL (ref 0–150)
TSH SERPL DL<=0.05 MIU/L-ACNC: 2.85 UIU/ML (ref 0.27–4.2)
VIT B12 BLD-MCNC: 669 PG/ML (ref 211–946)
VLDLC SERPL-MCNC: 14.4 MG/DL (ref 5–40)
WBC NRBC COR # BLD: 7.61 10*3/MM3 (ref 3.4–10.8)

## 2020-03-05 PROBLEM — E66.9 OBESITY (BMI 30-39.9): Status: ACTIVE | Noted: 2018-03-16

## 2020-03-10 ENCOUNTER — HOSPITAL ENCOUNTER (OUTPATIENT)
Dept: ULTRASOUND IMAGING | Facility: HOSPITAL | Age: 75
Discharge: HOME | End: 2020-03-10
Admitting: PREVENTIVE MEDICINE

## 2020-03-10 DIAGNOSIS — Z13.6 SCREENING FOR AAA (ABDOMINAL AORTIC ANEURYSM): ICD-10-CM

## 2020-03-10 PROCEDURE — 76706 US ABDL AORTA SCREEN AAA: CPT

## 2020-03-11 ENCOUNTER — TELEPHONE (OUTPATIENT)
Dept: FAMILY MEDICINE CLINIC | Facility: CLINIC | Age: 75
End: 2020-03-11

## 2020-03-13 DIAGNOSIS — Z12.11 COLON CANCER SCREENING: ICD-10-CM

## 2020-03-23 RX ORDER — LOSARTAN POTASSIUM 50 MG/1
TABLET ORAL
Qty: 30 TABLET | Refills: 2 | Status: SHIPPED | OUTPATIENT
Start: 2020-03-23 | End: 2020-09-22

## 2020-07-14 ENCOUNTER — HOSPITAL ENCOUNTER (OUTPATIENT)
Dept: CARDIOLOGY | Facility: HOSPITAL | Age: 75
Discharge: HOME | End: 2020-07-14
Admitting: INTERNAL MEDICINE

## 2020-07-14 VITALS — WEIGHT: 221 LBS | HEIGHT: 71 IN | BODY MASS INDEX: 30.94 KG/M2

## 2020-07-14 DIAGNOSIS — I34.0 NONRHEUMATIC MITRAL VALVE REGURGITATION: ICD-10-CM

## 2020-07-14 LAB
BH CV ECHO MEAS - ACS: 2 CM
BH CV ECHO MEAS - AO MAX PG (FULL): 1.4 MMHG
BH CV ECHO MEAS - AO MAX PG: 4.4 MMHG
BH CV ECHO MEAS - AO MEAN PG (FULL): 0.62 MMHG
BH CV ECHO MEAS - AO MEAN PG: 2.2 MMHG
BH CV ECHO MEAS - AO ROOT AREA (BSA CORRECTED): 1.5
BH CV ECHO MEAS - AO ROOT AREA: 8.8 CM^2
BH CV ECHO MEAS - AO ROOT DIAM: 3.4 CM
BH CV ECHO MEAS - AO V2 MAX: 104.5 CM/SEC
BH CV ECHO MEAS - AO V2 MEAN: 69.1 CM/SEC
BH CV ECHO MEAS - AO V2 VTI: 19.5 CM
BH CV ECHO MEAS - AVA(I,A): 3.5 CM^2
BH CV ECHO MEAS - AVA(I,D): 3.5 CM^2
BH CV ECHO MEAS - AVA(V,A): 3.4 CM^2
BH CV ECHO MEAS - AVA(V,D): 3.4 CM^2
BH CV ECHO MEAS - BSA(HAYCOCK): 2.3 M^2
BH CV ECHO MEAS - BSA: 2.2 M^2
BH CV ECHO MEAS - BZI_BMI: 30.8 KILOGRAMS/M^2
BH CV ECHO MEAS - BZI_METRIC_HEIGHT: 180.3 CM
BH CV ECHO MEAS - BZI_METRIC_WEIGHT: 100.2 KG
BH CV ECHO MEAS - EDV(CUBED): 117.6 ML
BH CV ECHO MEAS - EDV(MOD-SP4): 107.4 ML
BH CV ECHO MEAS - EDV(TEICH): 112.8 ML
BH CV ECHO MEAS - EF(CUBED): 63.9 %
BH CV ECHO MEAS - EF(MOD-BP): 55 %
BH CV ECHO MEAS - EF(MOD-SP4): 55.6 %
BH CV ECHO MEAS - EF(TEICH): 55.3 %
BH CV ECHO MEAS - ESV(CUBED): 42.5 ML
BH CV ECHO MEAS - ESV(MOD-SP4): 47.7 ML
BH CV ECHO MEAS - ESV(TEICH): 50.5 ML
BH CV ECHO MEAS - FS: 28.8 %
BH CV ECHO MEAS - IVC DIAM: 2.8 CM
BH CV ECHO MEAS - IVS/LVPW: 0.94
BH CV ECHO MEAS - IVSD: 1.1 CM
BH CV ECHO MEAS - LA DIMENSION: 4.6 CM
BH CV ECHO MEAS - LA/AO: 1.4
BH CV ECHO MEAS - LV DIASTOLIC VOL/BSA (35-75): 48.8 ML/M^2
BH CV ECHO MEAS - LV MASS(C)D: 216.7 GRAMS
BH CV ECHO MEAS - LV MASS(C)DI: 98.5 GRAMS/M^2
BH CV ECHO MEAS - LV MAX PG: 2.9 MMHG
BH CV ECHO MEAS - LV MEAN PG: 1.6 MMHG
BH CV ECHO MEAS - LV SYSTOLIC VOL/BSA (12-30): 21.7 ML/M^2
BH CV ECHO MEAS - LV V1 MAX: 85.7 CM/SEC
BH CV ECHO MEAS - LV V1 MEAN: 59.5 CM/SEC
BH CV ECHO MEAS - LV V1 VTI: 16.7 CM
BH CV ECHO MEAS - LVIDD: 4.9 CM
BH CV ECHO MEAS - LVIDS: 3.5 CM
BH CV ECHO MEAS - LVOT AREA: 4.1 CM^2
BH CV ECHO MEAS - LVOT DIAM: 2.3 CM
BH CV ECHO MEAS - LVPWD: 1.2 CM
BH CV ECHO MEAS - MR MAX PG: 117.8 MMHG
BH CV ECHO MEAS - MR MAX VEL: 542.2 CM/SEC
BH CV ECHO MEAS - MV A MAX VEL: 29 CM/SEC
BH CV ECHO MEAS - MV E MAX VEL: 90.7 CM/SEC
BH CV ECHO MEAS - MV E/A: 3.1
BH CV ECHO MEAS - MV MAX PG: 4.1 MMHG
BH CV ECHO MEAS - MV MEAN PG: 1.5 MMHG
BH CV ECHO MEAS - MV V2 MAX: 101 CM/SEC
BH CV ECHO MEAS - MV V2 MEAN: 56.9 CM/SEC
BH CV ECHO MEAS - MV V2 VTI: 23.5 CM
BH CV ECHO MEAS - MVA(VTI): 2.9 CM^2
BH CV ECHO MEAS - PA ACC TIME: 0.08 SEC
BH CV ECHO MEAS - PA MAX PG: 4.3 MMHG
BH CV ECHO MEAS - PA PR(ACCEL): 43 MMHG
BH CV ECHO MEAS - PA V2 MAX: 103.8 CM/SEC
BH CV ECHO MEAS - PI END-D VEL: 168.1 CM/SEC
BH CV ECHO MEAS - RAP SYSTOLE: 10 MMHG
BH CV ECHO MEAS - RVDD(2D): 2.2 CM
BH CV ECHO MEAS - RVDD: 2.2 CM
BH CV ECHO MEAS - RVSP: 37.8 MMHG
BH CV ECHO MEAS - SI(AO): 78.5 ML/M^2
BH CV ECHO MEAS - SI(CUBED): 34.2 ML/M^2
BH CV ECHO MEAS - SI(LVOT): 31.5 ML/M^2
BH CV ECHO MEAS - SI(MOD-SP4): 27.2 ML/M^2
BH CV ECHO MEAS - SI(TEICH): 28.3 ML/M^2
BH CV ECHO MEAS - SV(AO): 172.8 ML
BH CV ECHO MEAS - SV(CUBED): 75.2 ML
BH CV ECHO MEAS - SV(LVOT): 69.3 ML
BH CV ECHO MEAS - SV(MOD-SP4): 59.8 ML
BH CV ECHO MEAS - SV(TEICH): 62.3 ML
BH CV ECHO MEAS - TR MAX VEL: 239.9 CM/SEC
LV EF 2D ECHO EST: 55 %
MAXIMAL PREDICTED HEART RATE: 147 BPM
STRESS TARGET HR: 125 BPM

## 2020-07-14 PROCEDURE — 93306 TTE W/DOPPLER COMPLETE: CPT | Performed by: INTERNAL MEDICINE

## 2020-07-14 PROCEDURE — 93306 TTE W/DOPPLER COMPLETE: CPT

## 2020-07-17 ENCOUNTER — OFFICE VISIT (OUTPATIENT)
Dept: FAMILY MEDICINE CLINIC | Facility: CLINIC | Age: 75
End: 2020-07-17

## 2020-07-17 VITALS
DIASTOLIC BLOOD PRESSURE: 90 MMHG | TEMPERATURE: 98 F | SYSTOLIC BLOOD PRESSURE: 113 MMHG | HEIGHT: 71 IN | WEIGHT: 216 LBS | BODY MASS INDEX: 30.24 KG/M2 | RESPIRATION RATE: 16 BRPM | HEART RATE: 85 BPM | OXYGEN SATURATION: 95 %

## 2020-07-17 DIAGNOSIS — S69.91XA INJURY OF RIGHT WRIST, INITIAL ENCOUNTER: Primary | ICD-10-CM

## 2020-07-17 PROCEDURE — 99213 OFFICE O/P EST LOW 20 MIN: CPT | Performed by: PREVENTIVE MEDICINE

## 2020-07-17 RX ORDER — HYDROCODONE BITARTRATE AND ACETAMINOPHEN 7.5; 325 MG/1; MG/1
1 TABLET ORAL EVERY 6 HOURS PRN
Qty: 10 TABLET | Refills: 0 | Status: SHIPPED | OUTPATIENT
Start: 2020-07-17 | End: 2020-11-09

## 2020-07-17 NOTE — PATIENT INSTRUCTIONS
Wear R thumb spica splint atall times including hygiene.  @ aleve am and pm on fullstomach with 8 ounces fluid.  Hydrocodone if needs atnight

## 2020-07-17 NOTE — PROGRESS NOTES
"Subjective   Salinas Dill is a 74 y.o. male presents for   Chief Complaint   Patient presents with   • Wrist Pain     right        Health Maintenance Due   Topic Date Due   • COLONOSCOPY  06/24/2019   • ZOSTER VACCINE (3 of 3) 01/02/2020       RHD male was walking dog on expandable lease and dog pulled R arm forward.  He developed R shoulder pain and was concerned as he has had R shoulder replacement and went to see shoulder doctor and was told R shoulder ok.  He then noted R wrist pain that had not been main concern originally and his son who is ER physician was visiting and advised splint for wrist and Aleve.  Though last week wrist better so yesterday worked out on rowing machine and spent night up with severe pain R wrist.  No finger numbness, neck or elbow pain.  May have injured wrist long ago with athletics but not sure.       Vitals:    07/17/20 1049 07/17/20 1054   BP: 127/81 113/90   BP Location: Right arm Left arm   Patient Position: Sitting Sitting   Cuff Size: Adult Adult   Pulse: 92 85   Resp: 16    Temp: 98 °F (36.7 °C)    TempSrc: Infrared    SpO2: 95%    Weight: 98 kg (216 lb)    Height: 180.3 cm (71\")      Body mass index is 30.13 kg/m².    Current Outpatient Medications on File Prior to Visit   Medication Sig Dispense Refill   • apixaban (ELIQUIS) 5 MG tablet tablet Take 1 tablet by mouth 2 (Two) Times a Day. 180 tablet 3   • B COMPLEX VITAMINS ER PO VITAMIN B COMPLEX CAPS     • Cholecalciferol (VITAMIN D) 2000 units capsule VITAMIN D 2000 UNIT CAPS     • dilTIAZem CD (CARDIZEM CD) 120 MG 24 hr capsule TAKE 1 TABLET BY MOUTH DAILY 90 capsule 3   • ELDERBERRY PO Take 1 capsule by mouth 2 (two) times a day.     • losartan (COZAAR) 50 MG tablet TAKE ONE BY MOUTH DAILY 30 tablet 2   • melatonin 5 MG tablet tablet Take 5 mg by mouth Every Night.     • Naproxen Sod-diphenhydrAMINE (ALEVE PM PO) Take 1 tablet by mouth As Needed.     • Omega-3 Fatty Acids (FISH OIL) 1000 MG capsule capsule Take  by " mouth.     • sildenafil (REVATIO) 20 MG tablet SILDENAFIL CITRATE 20 MG TABS     • triamterene-hydrochlorothiazide (MAXZIDE-25) 37.5-25 MG per tablet TAKE 1/2 TABLET DAILY (Patient taking differently: Take 1 tablet by mouth Daily.) 45 tablet 1   • TURMERIC CURCUMIN PO Take 2,182 mg by mouth Daily.       No current facility-administered medications on file prior to visit.        The following portions of the patient's history were reviewed and updated as appropriate: allergies, current medications, past family history, past medical history, past social history, past surgical history and problem list.    Review of Systems   Constitutional: Negative.    HENT: Negative.  Negative for sinus pressure and sore throat.    Eyes: Negative.    Respiratory: Negative.  Negative for cough.    Cardiovascular: Negative.    Gastrointestinal: Negative.    Endocrine: Negative.    Genitourinary: Negative.    Musculoskeletal: Positive for arthralgias.   Skin: Negative.    Allergic/Immunologic: Positive for environmental allergies.   Neurological: Negative.    Hematological: Negative.    Psychiatric/Behavioral: Negative.        Objective   Physical Exam   Constitutional: He is oriented to person, place, and time. He appears well-developed and well-nourished.   HENT:   Head: Normocephalic and atraumatic.   Eyes: Pupils are equal, round, and reactive to light. Conjunctivae and EOM are normal.   Neck: Normal range of motion. Neck supple.   Cardiovascular: Normal rate, regular rhythm, normal heart sounds and intact distal pulses.   Pulmonary/Chest: Effort normal and breath sounds normal.   Abdominal: Soft. Bowel sounds are normal.   Musculoskeletal:   Mild swelling R wrist.  Nontender R elbow and R forearm.   strong.  Mild R wrist swelling with tenderness Ulnar styloind and navicular area.  F/E 75%  Suppination 50% pronation full.   Neurological: He is alert and oriented to person, place, and time.   Skin: Skin is warm and dry.    Psychiatric: He has a normal mood and affect.   Nursing note and vitals reviewed.    PHQ-9 Total Score:      Assessment/Plan   Salinas was seen today for wrist pain.    Diagnoses and all orders for this visit:    Injury of right wrist, initial encounter  -     HYDROcodone-acetaminophen (NORCO) 7.5-325 MG per tablet; Take 1 tablet by mouth Every 6 (Six) Hours As Needed for Moderate Pain  (NIGHTLYFOR WRIST PAIN).        Patient Instructions   Wear R thumb spica splint atall times including hygiene.  @ aleve am and pm on fullstomach with 8 ounces fluid.  Hydrocodone if needs atnight

## 2020-07-20 ENCOUNTER — HOSPITAL ENCOUNTER (OUTPATIENT)
Dept: GENERAL RADIOLOGY | Facility: HOSPITAL | Age: 75
Discharge: HOME | End: 2020-07-20
Admitting: PREVENTIVE MEDICINE

## 2020-07-20 DIAGNOSIS — S69.91XA INJURY OF RIGHT WRIST, INITIAL ENCOUNTER: ICD-10-CM

## 2020-07-20 PROCEDURE — 73110 X-RAY EXAM OF WRIST: CPT

## 2020-07-21 ENCOUNTER — TELEPHONE (OUTPATIENT)
Dept: CARDIOLOGY | Facility: CLINIC | Age: 75
End: 2020-07-21

## 2020-07-23 ENCOUNTER — TELEPHONE (OUTPATIENT)
Dept: CARDIOLOGY | Facility: CLINIC | Age: 75
End: 2020-07-23

## 2020-07-23 PROBLEM — M25.531 RIGHT WRIST PAIN: Status: ACTIVE | Noted: 2020-07-23

## 2020-07-23 NOTE — TELEPHONE ENCOUNTER
Pt returned my call and I informed him per Dr. White that his echo showed normal LV systolic function and that the mitral regurgitation looks better than before. The Pt stated understanding and will keep his follow up with Dr. White on 9/16/20.

## 2020-07-24 ENCOUNTER — OFFICE VISIT (OUTPATIENT)
Dept: FAMILY MEDICINE CLINIC | Facility: CLINIC | Age: 75
End: 2020-07-24

## 2020-07-24 VITALS
HEIGHT: 71 IN | WEIGHT: 216.4 LBS | SYSTOLIC BLOOD PRESSURE: 138 MMHG | DIASTOLIC BLOOD PRESSURE: 98 MMHG | BODY MASS INDEX: 30.3 KG/M2 | HEART RATE: 75 BPM | TEMPERATURE: 98.6 F | OXYGEN SATURATION: 95 %

## 2020-07-24 DIAGNOSIS — M25.531 RIGHT WRIST PAIN: Primary | ICD-10-CM

## 2020-07-24 DIAGNOSIS — I10 ESSENTIAL HYPERTENSION: ICD-10-CM

## 2020-07-24 PROCEDURE — 99213 OFFICE O/P EST LOW 20 MIN: CPT | Performed by: PREVENTIVE MEDICINE

## 2020-07-24 NOTE — PATIENT INSTRUCTIONS
Wean splint at night.  Continue during day for strenous activity. Start Flexion/extension 1# weight.  Call if worsens

## 2020-07-24 NOTE — PROGRESS NOTES
"Subjective   Salinas Dill is a 74 y.o. male presents for   Chief Complaint   Patient presents with   • Joint Swelling     wrist pain follow up       Health Maintenance Due   Topic Date Due   • ZOSTER VACCINE (3 of 3) 01/02/2020       Feels splint has really helped.  Only minimal discomfort with ulnar deviation.  1/5 sat most.   and hand sense full and rest ROM full without pain.  Some swelling dorsum L hand but bruise present as well.  Xray report rereviewed and no signs of errosive arthritis       Vitals:    07/24/20 1022 07/24/20 1024   BP: 130/88 138/98   BP Location: Right arm Left arm   Patient Position: Sitting Sitting   Cuff Size: Adult Adult   Pulse: 75    Temp: 98.6 °F (37 °C)    TempSrc: Oral    SpO2: 95%    Weight: 98.2 kg (216 lb 6.4 oz)    Height: 180.3 cm (70.98\")      Body mass index is 30.2 kg/m².    Current Outpatient Medications on File Prior to Visit   Medication Sig Dispense Refill   • apixaban (ELIQUIS) 5 MG tablet tablet Take 1 tablet by mouth 2 (Two) Times a Day. 180 tablet 3   • B COMPLEX VITAMINS ER PO VITAMIN B COMPLEX CAPS     • Cholecalciferol (VITAMIN D) 2000 units capsule VITAMIN D 2000 UNIT CAPS     • dilTIAZem CD (CARDIZEM CD) 120 MG 24 hr capsule TAKE 1 TABLET BY MOUTH DAILY 90 capsule 3   • ELDERBERRY PO Take 1 capsule by mouth 2 (two) times a day.     • HYDROcodone-acetaminophen (NORCO) 7.5-325 MG per tablet Take 1 tablet by mouth Every 6 (Six) Hours As Needed for Moderate Pain  (NIGHTLYFOR WRIST PAIN). 10 tablet 0   • losartan (COZAAR) 50 MG tablet TAKE ONE BY MOUTH DAILY 30 tablet 2   • melatonin 5 MG tablet tablet Take 5 mg by mouth Every Night.     • Naproxen Sod-diphenhydrAMINE (ALEVE PM PO) Take 1 tablet by mouth As Needed.     • Omega-3 Fatty Acids (FISH OIL) 1000 MG capsule capsule Take  by mouth.     • sildenafil (REVATIO) 20 MG tablet SILDENAFIL CITRATE 20 MG TABS     • triamterene-hydrochlorothiazide (MAXZIDE-25) 37.5-25 MG per tablet TAKE 1/2 TABLET DAILY (Patient " taking differently: Take 1 tablet by mouth Daily.) 45 tablet 1   • TURMERIC CURCUMIN PO Take 2,182 mg by mouth Daily.       No current facility-administered medications on file prior to visit.        The following portions of the patient's history were reviewed and updated as appropriate: allergies, current medications, past family history, past medical history, past social history, past surgical history and problem list.    Review of Systems   Constitutional: Negative.    HENT: Negative.  Negative for sinus pressure and sore throat.    Eyes: Negative.    Respiratory: Negative.  Negative for cough.    Cardiovascular: Negative.    Gastrointestinal: Negative.    Endocrine: Negative.    Genitourinary: Negative.    Musculoskeletal: Positive for arthralgias.   Skin: Negative.    Allergic/Immunologic: Positive for environmental allergies.   Neurological: Negative.    Hematological: Negative.    Psychiatric/Behavioral: Negative.        Objective   Physical Exam   Constitutional: He is oriented to person, place, and time. He appears well-developed and well-nourished.   HENT:   Head: Normocephalic and atraumatic.   Eyes: Pupils are equal, round, and reactive to light. Conjunctivae and EOM are normal.   Neck: Normal range of motion. Neck supple.   Musculoskeletal: Normal range of motion.   Pronation and suppination and all ROM against resistence full R=L wrist  and hand sense full.  Bruise dorsum L hand and no erythema or severe swelling   Neurological: He is alert and oriented to person, place, and time.   Skin: Skin is warm and dry.   Psychiatric: He has a normal mood and affect.   Nursing note and vitals reviewed.    PHQ-9 Total Score:      Assessment/Plan   Salinas was seen today for joint swelling.    Diagnoses and all orders for this visit:    Right wrist pain  Comments:  Resolving.  Wean splint and start strengthening.  Recheck 3 weeks    Essential hypertension  Comments:  Patient eating more salt than he should   Will home moniter and call 10 blood pressures in 2 weeks.        Patient Instructions   Wean splint at night.  Continue during day for strenous activity. Start Flexion/extension 1# weight.  Call if worsens

## 2020-08-10 RX ORDER — TRIAMTERENE AND HYDROCHLOROTHIAZIDE 37.5; 25 MG/1; MG/1
1 TABLET ORAL DAILY
Qty: 90 TABLET | Refills: 3 | Status: SHIPPED | OUTPATIENT
Start: 2020-08-10 | End: 2021-08-30

## 2020-08-13 NOTE — PATIENT INSTRUCTIONS
Advise ShinfRix and flu as soon as available    Night splints and elbowpads.  If still swollenb next week will get blood and hand xrays

## 2020-08-14 ENCOUNTER — OFFICE VISIT (OUTPATIENT)
Dept: FAMILY MEDICINE CLINIC | Facility: CLINIC | Age: 75
End: 2020-08-14

## 2020-08-14 VITALS
RESPIRATION RATE: 16 BRPM | BODY MASS INDEX: 30.44 KG/M2 | HEART RATE: 72 BPM | TEMPERATURE: 98.6 F | HEIGHT: 71 IN | WEIGHT: 217.4 LBS | SYSTOLIC BLOOD PRESSURE: 145 MMHG | DIASTOLIC BLOOD PRESSURE: 71 MMHG | OXYGEN SATURATION: 95 %

## 2020-08-14 DIAGNOSIS — M25.522 ELBOW PAIN, LEFT: ICD-10-CM

## 2020-08-14 DIAGNOSIS — M25.531 RIGHT WRIST PAIN: Primary | ICD-10-CM

## 2020-08-14 DIAGNOSIS — R22.30 LOCALIZED SWELLING ON HAND: ICD-10-CM

## 2020-08-14 DIAGNOSIS — R20.2 LEFT HAND PARESTHESIA: ICD-10-CM

## 2020-08-14 PROCEDURE — 99213 OFFICE O/P EST LOW 20 MIN: CPT | Performed by: PREVENTIVE MEDICINE

## 2020-08-14 NOTE — PROGRESS NOTES
"Subjective   Salinas Dill is a 74 y.o. male presents for   Chief Complaint   Patient presents with   • Wrist Pain     left wrist now        Health Maintenance Due   Topic Date Due   • ZOSTER VACCINE (3 of 3) 01/02/2020   • INFLUENZA VACCINE  08/01/2020       R hand and wrist back to normal and no swelling.  Has been exercising with trike and Airdyne and now has L elbow pain swelling L hand radial side primarily and L elbow pain and tingling L4/5 fincgers.  No injury but has been working out more and purchased new [employer] home and working on it.  Better during day and worse at night.  No prior problems or other joint conditions.  No known arthritis in self or family.  No fever or other symptoms       Vitals:    08/14/20 1057 08/14/20 1100   BP: 133/75 145/71   BP Location: Right arm Left arm   Patient Position: Sitting Sitting   Cuff Size: Adult Adult   Pulse: 82 72   Resp: 16    Temp: 98.6 °F (37 °C)    TempSrc: Infrared    SpO2: 95%    Weight: 98.6 kg (217 lb 6.4 oz)    Height: 180.3 cm (70.98\")      Body mass index is 30.34 kg/m².    Current Outpatient Medications on File Prior to Visit   Medication Sig Dispense Refill   • apixaban (ELIQUIS) 5 MG tablet tablet Take 1 tablet by mouth 2 (Two) Times a Day. 180 tablet 3   • B COMPLEX VITAMINS ER PO VITAMIN B COMPLEX CAPS     • Cholecalciferol (VITAMIN D) 2000 units capsule VITAMIN D 2000 UNIT CAPS     • dilTIAZem CD (CARDIZEM CD) 120 MG 24 hr capsule TAKE 1 TABLET BY MOUTH DAILY 90 capsule 3   • ELDERBERRY PO Take 1 capsule by mouth 2 (two) times a day.     • losartan (COZAAR) 50 MG tablet TAKE ONE BY MOUTH DAILY 30 tablet 2   • melatonin 5 MG tablet tablet Take 5 mg by mouth Every Night.     • Naproxen Sod-diphenhydrAMINE (ALEVE PM PO) Take 1 tablet by mouth As Needed.     • Omega-3 Fatty Acids (FISH OIL) 1000 MG capsule capsule Take  by mouth.     • sildenafil (REVATIO) 20 MG tablet SILDENAFIL CITRATE 20 MG TABS     • triamterene-hydrochlorothiazide (MAXZIDE-25) " 37.5-25 MG per tablet Take 1 tablet by mouth Daily. 90 tablet 3   • TURMERIC CURCUMIN PO Take 2,182 mg by mouth Daily.     • HYDROcodone-acetaminophen (NORCO) 7.5-325 MG per tablet Take 1 tablet by mouth Every 6 (Six) Hours As Needed for Moderate Pain  (NIGHTLYFOR WRIST PAIN). 10 tablet 0     No current facility-administered medications on file prior to visit.        The following portions of the patient's history were reviewed and updated as appropriate: allergies, current medications, past family history, past medical history, past social history, past surgical history and problem list.    Review of Systems   Musculoskeletal: Positive for arthralgias, joint swelling and myalgias.   Neurological: Positive for weakness and numbness.       Objective   Physical Exam   Constitutional: He is oriented to person, place, and time. He appears well-developed and well-nourished.   HENT:   Head: Normocephalic and atraumatic.   Eyes: Pupils are equal, round, and reactive to light. Conjunctivae and EOM are normal.   Neck: Normal range of motion. Neck supple.   Cardiovascular: Normal rate and regular rhythm.   Pulmonary/Chest: Effort normal.   Musculoskeletal: Normal range of motion.    strong jose enrique Radial L wrist and hand swelling sts intact presently.  Thumb adduction chanda and tender cubital tunnel FROm L=R elbow.  Finklestein's neg   Neurological: He is alert and oriented to person, place, and time.   Skin: Skin is warm and dry.   Psychiatric: He has a normal mood and affect.   Nursing note and vitals reviewed.    PHQ-9 Total Score:      Assessment/Plan   Salinas was seen today for wrist pain.    Diagnoses and all orders for this visit:    Right wrist pain  Comments:  Resolved  Orders:  -     XR Hand 3+ View Right; Future    Localized swelling on hand  Comments:  overuse probably but if unimproved in 1 week will gt xrays and labs  Orders:  -     XR Hand 3+ View Left; Future  -     Uric acid; Future  -     Rheumatoid factor;  Future  -     C-reactive protein; Future  -     GUI; Future  -     Sedimentation Rate; Future    Elbow pain, left  Comments:  Elbow pad    Left hand paresthesia  Comments:  Night splints        Patient Instructions   Advise ShinfRix and flu as soon as available    Night splints and elbowpads.  If still swollenb next week will get blood and hand xrays

## 2020-08-24 ENCOUNTER — CLINICAL SUPPORT (OUTPATIENT)
Dept: FAMILY MEDICINE CLINIC | Facility: CLINIC | Age: 75
End: 2020-08-24

## 2020-08-24 ENCOUNTER — HOSPITAL ENCOUNTER (OUTPATIENT)
Dept: GENERAL RADIOLOGY | Facility: HOSPITAL | Age: 75
Discharge: HOME | End: 2020-08-24
Admitting: PREVENTIVE MEDICINE

## 2020-08-24 DIAGNOSIS — M25.531 RIGHT WRIST PAIN: ICD-10-CM

## 2020-08-24 DIAGNOSIS — R22.30 LOCALIZED SWELLING ON HAND: ICD-10-CM

## 2020-08-24 LAB
CHROMATIN AB SERPL-ACNC: <10 IU/ML (ref 0–14)
CRP SERPL-MCNC: 0.67 MG/DL (ref 0–0.5)
ERYTHROCYTE [SEDIMENTATION RATE] IN BLOOD: 6 MM/HR (ref 0–20)
URATE SERPL-MCNC: 7 MG/DL (ref 3.4–7)

## 2020-08-24 PROCEDURE — 73130 X-RAY EXAM OF HAND: CPT

## 2020-08-24 PROCEDURE — 36415 COLL VENOUS BLD VENIPUNCTURE: CPT | Performed by: PREVENTIVE MEDICINE

## 2020-08-24 PROCEDURE — 86140 C-REACTIVE PROTEIN: CPT | Performed by: PREVENTIVE MEDICINE

## 2020-08-24 PROCEDURE — 84550 ASSAY OF BLOOD/URIC ACID: CPT | Performed by: PREVENTIVE MEDICINE

## 2020-08-24 PROCEDURE — 86431 RHEUMATOID FACTOR QUANT: CPT | Performed by: PREVENTIVE MEDICINE

## 2020-08-24 PROCEDURE — 85652 RBC SED RATE AUTOMATED: CPT | Performed by: PREVENTIVE MEDICINE

## 2020-08-24 PROCEDURE — 86038 ANTINUCLEAR ANTIBODIES: CPT | Performed by: PREVENTIVE MEDICINE

## 2020-08-24 NOTE — PROGRESS NOTES
Venipuncture Blood Specimen Collection  Venipuncture performed in RIGHT ARM by Kortney Ferrera MD with good hemostasis. Patient tolerated the procedure well without complications.   08/24/20   MD Kortney Lin MD

## 2020-08-26 LAB — ANA SER QL: NEGATIVE

## 2020-08-26 NOTE — PROGRESS NOTES
One of inflammatory tests up slightly Called C-reactive protein-can be up in Acute or chronic inflammation.  Uric acid is 7.0  Which is the ULN at [facility] but if the wrist pain and swelling is felt to be gout, would like it to be 6.0 or lower.  The only way we can prove if swelling is urate crystals in wrist joint is to tap into the wrist joint(which in a small joint is hard to do).  Gout doesn't usually affect upper extremity joints at first and is more common in foot/ankle knee.  He can see rheumatology or orthopedics to see what they think or we can see if it goes away and comes back, may want to try putting him on Allopurinol to see if we can prevent it from recurring.  Finally, the triampterene/HCTZ you are on can cause elevation of uric acid so we can taper off that medicine and watch blood pressure to see if we need to increase diltiazem or Losartan to compensate.  Let me know how you're doing and what your and your son's thoughts are.  Sed rate which is main inflammatory  marker, GUI and Rheumatoid factors all normal

## 2020-08-27 ENCOUNTER — TELEPHONE (OUTPATIENT)
Dept: FAMILY MEDICINE CLINIC | Facility: CLINIC | Age: 75
End: 2020-08-27

## 2020-08-27 NOTE — TELEPHONE ENCOUNTER
PATIENT CALL BACK AND IS GOING TO COME BY AND PICK THIS UP WITH ALL THE INFORMATION ENCLOSED IN IT

## 2020-08-27 NOTE — TELEPHONE ENCOUNTER
Left message to return call     One of inflammatory tests up slightly Called C-reactive protein-can be up in Acute or chronic inflammation.  Uric acid is 7.0  Which is the ULN at [facility] but if the wrist pain and swelling is felt to be gout, would like it to be 6.0 or lower.  The only way we can prove if swelling is urate crystals in wrist joint is to tap into the wrist joint(which in a small joint is hard to do).  Gout doesn't usually affect upper extremity joints at first and is more common in foot/ankle knee.  He can see rheumatology or orthopedics to see what they think or we can see if it goes away and comes back, may want to try putting him on Allopurinol to see if we can prevent it from recurring.  Finally, the triampterene/HCTZ you are on can cause elevation of uric acid so we can taper off that medicine and watch blood pressure to see if we need to increase diltiazem or Losartan to compensate.  Let me know how you're doing and what your and your son's thoughts are.  Sed rate which is main inflammatory   marker, GUI and Rheumatoid factors all normal

## 2020-08-27 NOTE — TELEPHONE ENCOUNTER
----- Message from Kortney Ferrera MD sent at 8/26/2020  4:20 PM EDT -----  One of inflammatory tests up slightly Called C-reactive protein-can be up in Acute or chronic inflammation.  Uric acid is 7.0  Which is the ULN at [facility] but if the wrist pain and swelling is felt to be gout, would like it to be 6.0 or lower.  The only way we can prove if swelling is urate crystals in wrist joint is to tap into the wrist joint(which in a small joint is hard to do).  Gout doesn't usually affect upper extremity joints at first and is more common in foot/ankle knee.  He can see rheumatology or orthopedics to see what they think or we can see if it goes away and comes back, may want to try putting him on Allopurinol to see if we can prevent it from recurring.  Finally, the triampterene/HCTZ you are on can cause elevation of uric acid so we can taper off that medicine and watch blood pressure to see if we need to increase diltiazem or Losartan to compensate.  Let me know how you're doing and what your and your son's thoughts are.  Sed rate which is main inflammatory  marker, GUI and Rheumatoid factors all normal

## 2020-09-16 ENCOUNTER — OFFICE VISIT (OUTPATIENT)
Dept: CARDIOLOGY | Facility: CLINIC | Age: 75
End: 2020-09-16

## 2020-09-16 VITALS
BODY MASS INDEX: 30.38 KG/M2 | DIASTOLIC BLOOD PRESSURE: 83 MMHG | WEIGHT: 217 LBS | SYSTOLIC BLOOD PRESSURE: 129 MMHG | HEART RATE: 83 BPM | HEIGHT: 71 IN | OXYGEN SATURATION: 96 % | RESPIRATION RATE: 18 BRPM

## 2020-09-16 DIAGNOSIS — I10 ESSENTIAL HYPERTENSION: ICD-10-CM

## 2020-09-16 DIAGNOSIS — I48.11 LONGSTANDING PERSISTENT ATRIAL FIBRILLATION (HCC): Primary | ICD-10-CM

## 2020-09-16 PROCEDURE — 93000 ELECTROCARDIOGRAM COMPLETE: CPT | Performed by: INTERNAL MEDICINE

## 2020-09-16 PROCEDURE — 99214 OFFICE O/P EST MOD 30 MIN: CPT | Performed by: INTERNAL MEDICINE

## 2020-09-16 NOTE — PROGRESS NOTES
Cardiology Office Visit      Encounter Date:  09/16/2020    Patient ID:   Salinas Dill is a 74 y.o. male.    Reason For Followup:  Chronic atrial fibrillation  Mitral regurgitation  Hypertension    Brief Clinical History:  Dear Dr. Ferrera, Kortney Infante MD    I had the pleasure of seeing Salinas Dill today. As you are well aware, this is a 74 y.o. 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     patient had no prior history of atrial fibrillation   patient denies any symptoms of palpitations chest pain shortness of breath dizziness or syncope   no orthopnea no PND   no loss of consciousness  Patient stayed in atrial fibrillation   stress test with no evidence of any inducible ischemia   echocardiogram with normal LV systolic function and moderate mitral regurgitation  Failed cardioversion patient is back in atrial fibrillation with controlled ventricular response    Interval History:  Patient denies any new symptoms of chest pain shortness of breath dizziness or syncope  Compliant with medical therapy  Good functional status    Assessment & Plan    Impressions:  Chronic atrial fibrillation/rate controlled  Mitral regurgitation  Hypertension  Ilan Vascor of 2  stress test with no evidence of any inducible ischemia  echocardiogram with normal LV systolic function and moderate mitral regurgitation  Recommendations:    continue current dose Eliquis for anticoagulation  continue current Cardizem for rate control  Rating anticoagulation therapy without any bleeding problems  continued risk factor modification  Risk benefits of long-term anticoagulation therapy discussed the patient  Continue risk factor modification  Recommend a flu vaccine  Recent labs reviewed and discussed with the patient  Lipids at goal  Repeat echocardiogram next office visit to  "assess the mitral regurgitation  follow-up in office in 6 months        Objective:    Vitals:  Vitals:    09/16/20 1404   BP: 129/83   BP Location: Left arm   Pulse: 83   Resp: 18   SpO2: 96%   Weight: 98.4 kg (217 lb)   Height: 180.3 cm (70.98\")       Physical Exam:    General: Alert, cooperative, no distress, appears stated age  Head:  Normocephalic, atraumatic, mucous membranes moist  Eyes:  Conjunctiva/corneas clear, EOM's intact     Neck:  Supple,  no adenopathy;      Lungs: Clear to auscultation bilaterally, no wheezes rhonchi rales are noted  Chest wall: No tenderness  Heart::  Irregular rate and rhythm, S1 and S2 normal, no murmur, rub or gallop  Abdomen: Soft, non-tender, nondistended bowel sounds active  Extremities: No cyanosis, clubbing, or edema  Pulses: 2+ and symmetric all extremities  Skin:  No rashes or lesions  Neuro/psych: A&O x3. CN II through XII are grossly intact with appropriate affect      Allergies:  Allergies   Allergen Reactions   • Lisinopril Cough       Medication Review:     Current Outpatient Medications:   •  apixaban (ELIQUIS) 5 MG tablet tablet, Take 1 tablet by mouth 2 (Two) Times a Day., Disp: 180 tablet, Rfl: 3  •  B COMPLEX VITAMINS ER PO, VITAMIN B COMPLEX CAPS, Disp: , Rfl:   •  Cholecalciferol (VITAMIN D) 2000 units capsule, VITAMIN D 2000 UNIT CAPS, Disp: , Rfl:   •  dilTIAZem CD (CARDIZEM CD) 120 MG 24 hr capsule, TAKE 1 TABLET BY MOUTH DAILY, Disp: 90 capsule, Rfl: 3  •  ELDERBERRY PO, Take 1 capsule by mouth 2 (two) times a day., Disp: , Rfl:   •  HYDROcodone-acetaminophen (NORCO) 7.5-325 MG per tablet, Take 1 tablet by mouth Every 6 (Six) Hours As Needed for Moderate Pain  (NIGHTLYFOR WRIST PAIN)., Disp: 10 tablet, Rfl: 0  •  losartan (COZAAR) 50 MG tablet, TAKE ONE BY MOUTH DAILY, Disp: 30 tablet, Rfl: 2  •  melatonin 5 MG tablet tablet, Take 5 mg by mouth Every Night., Disp: , Rfl:   •  Naproxen Sod-diphenhydrAMINE (ALEVE PM PO), Take 1 tablet by mouth As Needed., " Disp: , Rfl:   •  Omega-3 Fatty Acids (FISH OIL) 1000 MG capsule capsule, Take  by mouth., Disp: , Rfl:   •  sildenafil (REVATIO) 20 MG tablet, SILDENAFIL CITRATE 20 MG TABS, Disp: , Rfl:   •  triamterene-hydrochlorothiazide (MAXZIDE-25) 37.5-25 MG per tablet, Take 1 tablet by mouth Daily., Disp: 90 tablet, Rfl: 3  •  TURMERIC CURCUMIN PO, Take 2,182 mg by mouth Daily., Disp: , Rfl:     Family History:  Family History   Problem Relation Age of Onset   • Diabetes Mother    • Heart disease Mother    • Hypertension Sister    • Hyperlipidemia Sister    • Kidney disease Sister    • Cancer Sister    • Other Sister         weight disorder   • Diabetes Sister    • Stroke Brother    • Hypertension Other        Past Medical History:  Past Medical History:   Diagnosis Date   • Atrial fibrillation (CMS/HCC)    • Bladder infection    • Hyperglycemia    • Hypertension    • Skin mole    • Snoring    • Vitamin D deficiency        Past surgical History:  Past Surgical History:   Procedure Laterality Date   • COLONOSCOPY     • FINGER SURGERY Right     repair right pointer finger   • TOTAL SHOULDER ARTHROPLASTY      shoulder replacement       Social History:  Social History     Socioeconomic History   • Marital status:      Spouse name: Not on file   • Number of children: Not on file   • Years of education: Not on file   • Highest education level: Not on file   Tobacco Use   • Smoking status: Former Smoker     Packs/day: 1.00     Types: Cigarettes     Quit date:      Years since quittin.7   • Smokeless tobacco: Never Used   Substance and Sexual Activity   • Alcohol use: Yes     Frequency: Never     Comment: 1 drink weekly   • Drug use: No   • Sexual activity: Yes     Partners: Female     Birth control/protection: None       Review of Systems:  The following systems were reviewed as they relate to the cardiovascular system: Constitutional, Eyes, ENT, Cardiovascular, Respiratory, Gastrointestinal, Integumentary,  Neurological, Psychiatric, Hematologic, Endocrine, Musculoskeletal, and Genitourinary. The pertinent cardiovascular findings are reported above with all other cardiovascular points within those systems being negative.    Diagnostic Study Review:     Current Electrocardiogram:    ECG 12 Lead    Date/Time: 9/16/2020 2:26 PM  Performed by: Renato Knight MD  Authorized by: Renato Knight MD   Comparison: compared with previous ECG   Similar to previous ECG  Rhythm: atrial fibrillation  Rate: normal  BPM: 84  Conduction: conduction normal  QRS axis: normal  Other findings: non-specific ST-T wave changes    Clinical impression: abnormal EKG              NOTE: The following portions of the patient's history were reviewed and updated this visit as appropriate: allergies, current medications, past family history, past medical history, past social history, past surgical history and problem list.

## 2020-09-22 RX ORDER — LOSARTAN POTASSIUM 50 MG/1
TABLET ORAL
Qty: 30 TABLET | Refills: 2 | Status: SHIPPED | OUTPATIENT
Start: 2020-09-22 | End: 2021-03-01

## 2020-10-09 ENCOUNTER — FLU SHOT (OUTPATIENT)
Dept: FAMILY MEDICINE CLINIC | Facility: CLINIC | Age: 75
End: 2020-10-09

## 2020-10-09 DIAGNOSIS — Z23 NEED FOR INFLUENZA VACCINATION: ICD-10-CM

## 2020-10-09 PROCEDURE — 90694 VACC AIIV4 NO PRSRV 0.5ML IM: CPT | Performed by: PREVENTIVE MEDICINE

## 2020-10-09 PROCEDURE — G0008 ADMIN INFLUENZA VIRUS VAC: HCPCS | Performed by: PREVENTIVE MEDICINE

## 2020-11-09 ENCOUNTER — APPOINTMENT (OUTPATIENT)
Dept: CT IMAGING | Facility: HOSPITAL | Age: 75
End: 2020-11-09

## 2020-11-09 ENCOUNTER — APPOINTMENT (OUTPATIENT)
Dept: GENERAL RADIOLOGY | Facility: HOSPITAL | Age: 75
End: 2020-11-09

## 2020-11-09 ENCOUNTER — HOSPITAL ENCOUNTER (OUTPATIENT)
Facility: HOSPITAL | Age: 75
Setting detail: OBSERVATION
Discharge: HOME | End: 2020-11-10
Attending: FAMILY MEDICINE | Admitting: FAMILY MEDICINE

## 2020-11-09 DIAGNOSIS — S27.0XXA TRAUMATIC PNEUMOTHORAX, INITIAL ENCOUNTER: ICD-10-CM

## 2020-11-09 DIAGNOSIS — S22.42XA CLOSED FRACTURE OF MULTIPLE RIBS OF LEFT SIDE, INITIAL ENCOUNTER: ICD-10-CM

## 2020-11-09 DIAGNOSIS — S27.321A CONTUSION OF LEFT LUNG, INITIAL ENCOUNTER: Primary | ICD-10-CM
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
## 2020-11-09 LAB
ANION GAP SERPL CALCULATED.3IONS-SCNC: 10 MMOL/L (ref 5–15)
BASOPHILS # BLD AUTO: 0.1 10*3/MM3 (ref 0–0.2)
BASOPHILS NFR BLD AUTO: 0.6 % (ref 0–1.5)
BUN SERPL-MCNC: 17 MG/DL (ref 8–23)
BUN/CREAT SERPL: 21.8 (ref 7–25)
CALCIUM SPEC-SCNC: 9.5 MG/DL (ref 8.6–10.5)
CHLORIDE SERPL-SCNC: 100 MMOL/L (ref 98–107)
CO2 SERPL-SCNC: 29 MMOL/L (ref 22–29)
CREAT BLDA-MCNC: 0.6 MG/DL (ref 0.6–1.3)
CREAT SERPL-MCNC: 0.78 MG/DL (ref 0.76–1.27)
DEPRECATED RDW RBC AUTO: 45.1 FL (ref 37–54)
EOSINOPHIL # BLD AUTO: 0.1 10*3/MM3 (ref 0–0.4)
EOSINOPHIL NFR BLD AUTO: 0.7 % (ref 0.3–6.2)
ERYTHROCYTE [DISTWIDTH] IN BLOOD BY AUTOMATED COUNT: 14.3 % (ref 12.3–15.4)
GFR SERPL CREATININE-BSD FRML MDRD: 98 ML/MIN/1.73
GLUCOSE SERPL-MCNC: 118 MG/DL (ref 65–99)
HCT VFR BLD AUTO: 45 % (ref 37.5–51)
HCT VFR BLD AUTO: 45.3 % (ref 37.5–51)
HGB BLD-MCNC: 14.7 G/DL (ref 13–17.7)
HGB BLD-MCNC: 14.8 G/DL (ref 13–17.7)
LYMPHOCYTES # BLD AUTO: 1 10*3/MM3 (ref 0.7–3.1)
LYMPHOCYTES NFR BLD AUTO: 8.5 % (ref 19.6–45.3)
MCH RBC QN AUTO: 29.1 PG (ref 26.6–33)
MCHC RBC AUTO-ENTMCNC: 32.6 G/DL (ref 31.5–35.7)
MCV RBC AUTO: 89.3 FL (ref 79–97)
MONOCYTES # BLD AUTO: 1.4 10*3/MM3 (ref 0.1–0.9)
MONOCYTES NFR BLD AUTO: 11.9 % (ref 5–12)
NEUTROPHILS NFR BLD AUTO: 78.3 % (ref 42.7–76)
NEUTROPHILS NFR BLD AUTO: 9.2 10*3/MM3 (ref 1.7–7)
NRBC BLD AUTO-RTO: 0 /100 WBC (ref 0–0.2)
PLATELET # BLD AUTO: 297 10*3/MM3 (ref 140–450)
PMV BLD AUTO: 9.9 FL (ref 6–12)
POTASSIUM SERPL-SCNC: 3.7 MMOL/L (ref 3.5–5.2)
RBC # BLD AUTO: 5.07 10*6/MM3 (ref 4.14–5.8)
SODIUM SERPL-SCNC: 139 MMOL/L (ref 136–145)
TROPONIN T SERPL-MCNC: <0.01 NG/ML (ref 0–0.03)
WBC # BLD AUTO: 11.7 10*3/MM3 (ref 3.4–10.8)
WHOLE BLOOD HOLD SPECIMEN: NORMAL

## 2020-11-09 PROCEDURE — 99283 EMERGENCY DEPT VISIT LOW MDM: CPT

## 2020-11-09 PROCEDURE — G0378 HOSPITAL OBSERVATION PER HR: HCPCS

## 2020-11-09 PROCEDURE — 70450 CT HEAD/BRAIN W/O DYE: CPT

## 2020-11-09 PROCEDURE — 84484 ASSAY OF TROPONIN QUANT: CPT | Performed by: NURSE PRACTITIONER

## 2020-11-09 PROCEDURE — 99220 PR INITIAL OBSERVATION CARE/DAY 70 MINUTES: CPT | Performed by: FAMILY MEDICINE

## 2020-11-09 PROCEDURE — 0 IOPAMIDOL PER 1 ML: Performed by: NURSE PRACTITIONER

## 2020-11-09 PROCEDURE — 85018 HEMOGLOBIN: CPT | Performed by: FAMILY MEDICINE

## 2020-11-09 PROCEDURE — 85014 HEMATOCRIT: CPT | Performed by: FAMILY MEDICINE

## 2020-11-09 PROCEDURE — 71260 CT THORAX DX C+: CPT

## 2020-11-09 PROCEDURE — 80048 BASIC METABOLIC PNL TOTAL CA: CPT | Performed by: NURSE PRACTITIONER

## 2020-11-09 PROCEDURE — 85025 COMPLETE CBC W/AUTO DIFF WBC: CPT | Performed by: NURSE PRACTITIONER

## 2020-11-09 PROCEDURE — 96375 TX/PRO/DX INJ NEW DRUG ADDON: CPT

## 2020-11-09 PROCEDURE — 71045 X-RAY EXAM CHEST 1 VIEW: CPT

## 2020-11-09 PROCEDURE — 74177 CT ABD & PELVIS W/CONTRAST: CPT

## 2020-11-09 PROCEDURE — 96374 THER/PROPH/DIAG INJ IV PUSH: CPT

## 2020-11-09 PROCEDURE — 82565 ASSAY OF CREATININE: CPT

## 2020-11-09 PROCEDURE — 25010000002 KETOROLAC TROMETHAMINE PER 15 MG: Performed by: FAMILY MEDICINE

## 2020-11-09 PROCEDURE — 93005 ELECTROCARDIOGRAM TRACING: CPT | Performed by: NURSE PRACTITIONER

## 2020-11-09 RX ORDER — POTASSIUM CHLORIDE 20 MEQ/1
40 TABLET, EXTENDED RELEASE ORAL AS NEEDED
Status: DISCONTINUED | OUTPATIENT
Start: 2020-11-09 | End: 2020-11-10 | Stop reason: HOSPADM

## 2020-11-09 RX ORDER — LIDOCAINE 50 MG/G
2 PATCH TOPICAL NIGHTLY
Status: DISCONTINUED | OUTPATIENT
Start: 2020-11-09 | End: 2020-11-10 | Stop reason: HOSPADM

## 2020-11-09 RX ORDER — MAGNESIUM SULFATE HEPTAHYDRATE 40 MG/ML
4 INJECTION, SOLUTION INTRAVENOUS AS NEEDED
Status: DISCONTINUED | OUTPATIENT
Start: 2020-11-09 | End: 2020-11-10 | Stop reason: HOSPADM

## 2020-11-09 RX ORDER — VITAMIN B COMPLEX
1 CAPSULE ORAL DAILY
COMMUNITY

## 2020-11-09 RX ORDER — ACETAMINOPHEN 500 MG
1000 TABLET ORAL EVERY 8 HOURS
Status: DISCONTINUED | OUTPATIENT
Start: 2020-11-09 | End: 2020-11-10 | Stop reason: HOSPADM

## 2020-11-09 RX ORDER — ONDANSETRON 4 MG/1
4 TABLET, FILM COATED ORAL EVERY 6 HOURS PRN
Status: DISCONTINUED | OUTPATIENT
Start: 2020-11-09 | End: 2020-11-10 | Stop reason: HOSPADM

## 2020-11-09 RX ORDER — CHOLECALCIFEROL (VITAMIN D3) 125 MCG
5 CAPSULE ORAL NIGHTLY PRN
Status: DISCONTINUED | OUTPATIENT
Start: 2020-11-09 | End: 2020-11-10 | Stop reason: HOSPADM

## 2020-11-09 RX ORDER — SODIUM CHLORIDE 0.9 % (FLUSH) 0.9 %
10 SYRINGE (ML) INJECTION AS NEEDED
Status: DISCONTINUED | OUTPATIENT
Start: 2020-11-09 | End: 2020-11-10 | Stop reason: HOSPADM

## 2020-11-09 RX ORDER — SODIUM CHLORIDE 0.9 % (FLUSH) 0.9 %
10 SYRINGE (ML) INJECTION EVERY 12 HOURS SCHEDULED
Status: DISCONTINUED | OUTPATIENT
Start: 2020-11-09 | End: 2020-11-10 | Stop reason: HOSPADM

## 2020-11-09 RX ORDER — LOSARTAN POTASSIUM 50 MG/1
50 TABLET ORAL DAILY
Status: DISCONTINUED | OUTPATIENT
Start: 2020-11-10 | End: 2020-11-10 | Stop reason: HOSPADM

## 2020-11-09 RX ORDER — POTASSIUM CHLORIDE 1.5 G/1.77G
40 POWDER, FOR SOLUTION ORAL AS NEEDED
Status: DISCONTINUED | OUTPATIENT
Start: 2020-11-09 | End: 2020-11-10 | Stop reason: HOSPADM

## 2020-11-09 RX ORDER — KETOROLAC TROMETHAMINE 15 MG/ML
15 INJECTION, SOLUTION INTRAMUSCULAR; INTRAVENOUS EVERY 8 HOURS
Status: COMPLETED | OUTPATIENT
Start: 2020-11-09 | End: 2020-11-10

## 2020-11-09 RX ORDER — POTASSIUM CHLORIDE 7.45 MG/ML
10 INJECTION INTRAVENOUS
Status: DISCONTINUED | OUTPATIENT
Start: 2020-11-09 | End: 2020-11-10 | Stop reason: HOSPADM

## 2020-11-09 RX ORDER — TRIAMTERENE AND HYDROCHLOROTHIAZIDE 37.5; 25 MG/1; MG/1
1 TABLET ORAL DAILY
Status: DISCONTINUED | OUTPATIENT
Start: 2020-11-10 | End: 2020-11-10 | Stop reason: HOSPADM

## 2020-11-09 RX ORDER — MAGNESIUM SULFATE HEPTAHYDRATE 40 MG/ML
2 INJECTION, SOLUTION INTRAVENOUS AS NEEDED
Status: DISCONTINUED | OUTPATIENT
Start: 2020-11-09 | End: 2020-11-10 | Stop reason: HOSPADM

## 2020-11-09 RX ORDER — ONDANSETRON 2 MG/ML
4 INJECTION INTRAMUSCULAR; INTRAVENOUS EVERY 6 HOURS PRN
Status: DISCONTINUED | OUTPATIENT
Start: 2020-11-09 | End: 2020-11-10 | Stop reason: HOSPADM

## 2020-11-09 RX ORDER — NITROGLYCERIN 0.4 MG/1
0.4 TABLET SUBLINGUAL
Status: DISCONTINUED | OUTPATIENT
Start: 2020-11-09 | End: 2020-11-10 | Stop reason: HOSPADM

## 2020-11-09 RX ORDER — DILTIAZEM HYDROCHLORIDE 120 MG/1
120 CAPSULE, COATED, EXTENDED RELEASE ORAL
Status: DISCONTINUED | OUTPATIENT
Start: 2020-11-10 | End: 2020-11-10 | Stop reason: HOSPADM

## 2020-11-09 RX ORDER — TRAMADOL HYDROCHLORIDE 50 MG/1
50 TABLET ORAL EVERY 6 HOURS PRN
Status: DISCONTINUED | OUTPATIENT
Start: 2020-11-09 | End: 2020-11-10 | Stop reason: HOSPADM

## 2020-11-09 RX ADMIN — LIDOCAINE 2 PATCH: 50 PATCH TOPICAL at 20:17

## 2020-11-09 RX ADMIN — ACETAMINOPHEN 1000 MG: 500 TABLET, FILM COATED ORAL at 20:17

## 2020-11-09 RX ADMIN — KETOROLAC TROMETHAMINE 15 MG: 15 INJECTION, SOLUTION INTRAMUSCULAR; INTRAVENOUS at 20:17

## 2020-11-09 RX ADMIN — Medication 10 ML: at 20:35

## 2020-11-09 RX ADMIN — IOPAMIDOL 100 ML: 755 INJECTION, SOLUTION INTRAVENOUS at 13:04
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
## 2020-11-10 ENCOUNTER — APPOINTMENT (OUTPATIENT)
Dept: GENERAL RADIOLOGY | Facility: HOSPITAL | Age: 75
End: 2020-11-10

## 2020-11-10 ENCOUNTER — READMISSION MANAGEMENT (OUTPATIENT)
Dept: CALL CENTER | Facility: HOSPITAL | Age: 75
End: 2020-11-10

## 2020-11-10 VITALS
BODY MASS INDEX: 30.03 KG/M2 | TEMPERATURE: 98.6 F | SYSTOLIC BLOOD PRESSURE: 129 MMHG | RESPIRATION RATE: 20 BRPM | DIASTOLIC BLOOD PRESSURE: 65 MMHG | HEART RATE: 79 BPM | HEIGHT: 71 IN | WEIGHT: 214.51 LBS | OXYGEN SATURATION: 98 %

## 2020-11-10 LAB
ANION GAP SERPL CALCULATED.3IONS-SCNC: 10 MMOL/L (ref 5–15)
BASOPHILS # BLD AUTO: 0.1 10*3/MM3 (ref 0–0.2)
BASOPHILS NFR BLD AUTO: 0.8 % (ref 0–1.5)
BUN SERPL-MCNC: 21 MG/DL (ref 8–23)
BUN/CREAT SERPL: 26.6 (ref 7–25)
CALCIUM SPEC-SCNC: 8.9 MG/DL (ref 8.6–10.5)
CHLORIDE SERPL-SCNC: 102 MMOL/L (ref 98–107)
CK SERPL-CCNC: 102 U/L (ref 20–200)
CO2 SERPL-SCNC: 28 MMOL/L (ref 22–29)
CREAT SERPL-MCNC: 0.79 MG/DL (ref 0.76–1.27)
DEPRECATED RDW RBC AUTO: 44.2 FL (ref 37–54)
EOSINOPHIL # BLD AUTO: 0.2 10*3/MM3 (ref 0–0.4)
EOSINOPHIL NFR BLD AUTO: 2.8 % (ref 0.3–6.2)
ERYTHROCYTE [DISTWIDTH] IN BLOOD BY AUTOMATED COUNT: 14.1 % (ref 12.3–15.4)
GFR SERPL CREATININE-BSD FRML MDRD: 96 ML/MIN/1.73
GLUCOSE SERPL-MCNC: 112 MG/DL (ref 65–99)
HCT VFR BLD AUTO: 39.7 % (ref 37.5–51)
HCT VFR BLD AUTO: 43.2 % (ref 37.5–51)
HCT VFR BLD AUTO: 44.7 % (ref 37.5–51)
HGB BLD-MCNC: 13.3 G/DL (ref 13–17.7)
HGB BLD-MCNC: 14.3 G/DL (ref 13–17.7)
HGB BLD-MCNC: 14.7 G/DL (ref 13–17.7)
LYMPHOCYTES # BLD AUTO: 1 10*3/MM3 (ref 0.7–3.1)
LYMPHOCYTES NFR BLD AUTO: 13.3 % (ref 19.6–45.3)
MAGNESIUM SERPL-MCNC: 2.1 MG/DL (ref 1.6–2.4)
MCH RBC QN AUTO: 29.8 PG (ref 26.6–33)
MCHC RBC AUTO-ENTMCNC: 33.4 G/DL (ref 31.5–35.7)
MCV RBC AUTO: 89.4 FL (ref 79–97)
MONOCYTES # BLD AUTO: 1.1 10*3/MM3 (ref 0.1–0.9)
MONOCYTES NFR BLD AUTO: 14.5 % (ref 5–12)
NEUTROPHILS NFR BLD AUTO: 5.3 10*3/MM3 (ref 1.7–7)
NEUTROPHILS NFR BLD AUTO: 68.6 % (ref 42.7–76)
NRBC BLD AUTO-RTO: 0.2 /100 WBC (ref 0–0.2)
PLATELET # BLD AUTO: 257 10*3/MM3 (ref 140–450)
PMV BLD AUTO: 9.2 FL (ref 6–12)
POTASSIUM SERPL-SCNC: 3.4 MMOL/L (ref 3.5–5.2)
QT INTERVAL: 396 MS
RBC # BLD AUTO: 4.44 10*6/MM3 (ref 4.14–5.8)
SODIUM SERPL-SCNC: 140 MMOL/L (ref 136–145)
WBC # BLD AUTO: 7.8 10*3/MM3 (ref 3.4–10.8)

## 2020-11-10 PROCEDURE — 85025 COMPLETE CBC W/AUTO DIFF WBC: CPT | Performed by: FAMILY MEDICINE

## 2020-11-10 PROCEDURE — 82550 ASSAY OF CK (CPK): CPT | Performed by: FAMILY MEDICINE

## 2020-11-10 PROCEDURE — 71046 X-RAY EXAM CHEST 2 VIEWS: CPT

## 2020-11-10 PROCEDURE — 99217 PR OBSERVATION CARE DISCHARGE MANAGEMENT: CPT | Performed by: FAMILY MEDICINE

## 2020-11-10 PROCEDURE — 80048 BASIC METABOLIC PNL TOTAL CA: CPT | Performed by: FAMILY MEDICINE

## 2020-11-10 PROCEDURE — 83735 ASSAY OF MAGNESIUM: CPT | Performed by: FAMILY MEDICINE

## 2020-11-10 PROCEDURE — 85018 HEMOGLOBIN: CPT | Performed by: FAMILY MEDICINE

## 2020-11-10 PROCEDURE — 85014 HEMATOCRIT: CPT | Performed by: FAMILY MEDICINE

## 2020-11-10 PROCEDURE — G0378 HOSPITAL OBSERVATION PER HR: HCPCS

## 2020-11-10 PROCEDURE — 96376 TX/PRO/DX INJ SAME DRUG ADON: CPT

## 2020-11-10 PROCEDURE — 25010000002 KETOROLAC TROMETHAMINE PER 15 MG: Performed by: FAMILY MEDICINE

## 2020-11-10 RX ORDER — LIDOCAINE 50 MG/G
2 PATCH TOPICAL NIGHTLY
Qty: 30 PATCH | Refills: 0 | Status: SHIPPED | OUTPATIENT
Start: 2020-11-10 | End: 2021-01-11

## 2020-11-10 RX ADMIN — POTASSIUM CHLORIDE 40 MEQ: 1500 TABLET, EXTENDED RELEASE ORAL at 04:37

## 2020-11-10 RX ADMIN — TRIAMTERENE AND HYDROCHLOROTHIAZIDE 1 TABLET: 37.5; 25 TABLET ORAL at 08:18

## 2020-11-10 RX ADMIN — KETOROLAC TROMETHAMINE 15 MG: 15 INJECTION, SOLUTION INTRAMUSCULAR; INTRAVENOUS at 04:37

## 2020-11-10 RX ADMIN — Medication 10 ML: at 08:20

## 2020-11-10 RX ADMIN — LOSARTAN POTASSIUM 50 MG: 50 TABLET, FILM COATED ORAL at 08:20

## 2020-11-10 RX ADMIN — ACETAMINOPHEN 1000 MG: 500 TABLET, FILM COATED ORAL at 04:37

## 2020-11-10 RX ADMIN — POTASSIUM CHLORIDE 40 MEQ: 1500 TABLET, EXTENDED RELEASE ORAL at 08:18

## 2020-11-10 RX ADMIN — DILTIAZEM HYDROCHLORIDE 120 MG: 120 CAPSULE, COATED, EXTENDED RELEASE ORAL at 08:18

## 2020-11-10 RX ADMIN — ACETAMINOPHEN 1000 MG: 500 TABLET, FILM COATED ORAL at 12:00

## 2020-11-10 RX ADMIN — KETOROLAC TROMETHAMINE 15 MG: 15 INJECTION, SOLUTION INTRAMUSCULAR; INTRAVENOUS at 11:59
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
## 2020-11-10 NOTE — PLAN OF CARE
Goal Outcome Evaluation:  Plan of Care Reviewed With: patient  Progress: no change  Outcome Summary: admitted from ED right before shift change. VSS. resting comfortably. KPA consulted. 2L NC started overnight for pneumothorax management. CXR ordered for this am. pt used IS overnight and tolerated well. will continue to monitor.
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
## 2020-11-11 ENCOUNTER — TELEPHONE (OUTPATIENT)
Dept: FAMILY MEDICINE CLINIC | Facility: CLINIC | Age: 75
End: 2020-11-11

## 2020-11-11 ENCOUNTER — TRANSITIONAL CARE MANAGEMENT TELEPHONE ENCOUNTER (OUTPATIENT)
Dept: CALL CENTER | Facility: HOSPITAL | Age: 75
End: 2020-11-11

## 2020-11-11 NOTE — PROGRESS NOTES
Case Management Discharge Note                Selected Continued Care - Discharged on 11/10/2020 Admission date: 11/9/2020 - Discharge disposition: Home or Self Care                 Final Discharge Disposition Code: 01 - home or self-care

## 2020-11-11 NOTE — OUTREACH NOTE
Prep Survey      Responses   [facility] facility patient discharged from?  [facility]   Is LACE score < 7 ?  Yes   Eligibility  [facility]   Date of Admission  11/09/20   Date of Discharge  11/10/20   Discharge Disposition  Home or Self Care   Discharge diagnosis  Rib fractures/traumatic pneumothorax   Does the patient have one of the following disease processes/diagnoses(primary or secondary)?  Other   Does the patient have Home health ordered?  No   Is there a DME ordered?  No   Prep survey completed?  Yes          Ila Alexander RN

## 2020-11-12 ENCOUNTER — TELEPHONE (OUTPATIENT)
Dept: FAMILY MEDICINE CLINIC | Facility: CLINIC | Age: 75
End: 2020-11-12

## 2020-11-12 NOTE — TELEPHONE ENCOUNTER
PATIENT CALLED STATING THAT HE HAD SOMETHING QUESTIONS REGARDING A PAIN MEDICATION THAT HE WAS GIVEN WHILE AT THE HOSPITAL AND ALSO ABOUT A FOLLOW UP FOR A LUNG DOCTOR.    PLEASE ADVISE  [phone]
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
## 2020-11-12 NOTE — TELEPHONE ENCOUNTER
He states the tylenol 1000 mg is not cutting the pain. But is son told him to take 200 mg ibuprofen. He states back on the eliquis tonight. He wants to know what to do. He also wants to know if he needs to do the xray before his appt with us or wait until he sees the pulmonologist on the 25th please advise.

## 2020-11-16 ENCOUNTER — TELEPHONE (OUTPATIENT)
Dept: FAMILY MEDICINE CLINIC | Facility: CLINIC | Age: 75
End: 2020-11-16

## 2020-11-16 ENCOUNTER — HOSPITAL ENCOUNTER (OUTPATIENT)
Dept: GENERAL RADIOLOGY | Facility: HOSPITAL | Age: 75
Discharge: HOME | End: 2020-11-16
Admitting: FAMILY MEDICINE

## 2020-11-16 DIAGNOSIS — S27.321A CONTUSION OF LEFT LUNG, INITIAL ENCOUNTER: ICD-10-CM

## 2020-11-16 PROCEDURE — 71046 X-RAY EXAM CHEST 2 VIEWS: CPT

## 2020-11-16 NOTE — PROGRESS NOTES
L pneumothorax has resolved and still has small l pleural effusion-keep followup as planned.  Hope he is resting better and using incentive spirometer

## 2020-11-16 NOTE — TELEPHONE ENCOUNTER
HUB TO READ:    L pneumothorax has resolved and still has small l pleural effusion-keep followup as planned.  Hope he is resting better and using incentive spirometer

## 2020-11-16 NOTE — TELEPHONE ENCOUNTER
----- Message from Kortney Ferrera MD sent at 11/16/2020 11:11 AM EST -----  L pneumothorax has resolved and still has small l pleural effusion-keep followup as planned.  Hope he is resting better and using incentive spirometer

## 2020-11-17 ENCOUNTER — OFFICE VISIT (OUTPATIENT)
Dept: FAMILY MEDICINE CLINIC | Facility: CLINIC | Age: 75
End: 2020-11-17

## 2020-11-17 VITALS
TEMPERATURE: 98 F | OXYGEN SATURATION: 95 % | HEART RATE: 79 BPM | BODY MASS INDEX: 31.19 KG/M2 | RESPIRATION RATE: 16 BRPM | SYSTOLIC BLOOD PRESSURE: 123 MMHG | WEIGHT: 222.8 LBS | DIASTOLIC BLOOD PRESSURE: 82 MMHG | HEIGHT: 71 IN

## 2020-11-17 DIAGNOSIS — I48.11 LONGSTANDING PERSISTENT ATRIAL FIBRILLATION (HCC): ICD-10-CM

## 2020-11-17 DIAGNOSIS — S27.321A CONTUSION OF LEFT LUNG, INITIAL ENCOUNTER: Primary | ICD-10-CM

## 2020-11-17 PROCEDURE — 99496 TRANSJ CARE MGMT HIGH F2F 7D: CPT | Performed by: PREVENTIVE MEDICINE

## 2020-11-17 RX ORDER — ACETAMINOPHEN 500 MG
500 TABLET ORAL EVERY 8 HOURS PRN
COMMUNITY
End: 2021-01-11
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
## 2020-11-25 ENCOUNTER — OFFICE VISIT (OUTPATIENT)
Dept: PULMONOLOGY | Facility: HOSPITAL | Age: 75
End: 2020-11-25

## 2020-11-25 VITALS
OXYGEN SATURATION: 96 % | SYSTOLIC BLOOD PRESSURE: 138 MMHG | DIASTOLIC BLOOD PRESSURE: 82 MMHG | RESPIRATION RATE: 17 BRPM | WEIGHT: 221 LBS | BODY MASS INDEX: 30.94 KG/M2 | HEART RATE: 92 BPM | HEIGHT: 71 IN | TEMPERATURE: 98.4 F

## 2020-11-25 DIAGNOSIS — S27.0XXD TRAUMATIC PNEUMOTHORAX, SUBSEQUENT ENCOUNTER: Primary | ICD-10-CM

## 2020-11-25 DIAGNOSIS — R09.1 PLEURISY: ICD-10-CM

## 2020-11-25 PROBLEM — S27.0XXA TRAUMATIC PNEUMOTHORAX: Status: ACTIVE | Noted: 2020-11-25

## 2020-11-25 PROCEDURE — G0463 HOSPITAL OUTPT CLINIC VISIT: HCPCS
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
## 2020-12-01 ENCOUNTER — TELEPHONE (OUTPATIENT)
Dept: FAMILY MEDICINE CLINIC | Facility: CLINIC | Age: 75
End: 2020-12-01

## 2020-12-01 NOTE — TELEPHONE ENCOUNTER
PATIENT STATES HIS PULMUNOLOGIST RELEASED HIM FROM HIS CARE AND TOLD HIM TO FOLLOW UP WITH HIS PCP. HE STATES HE IS DOING FINE. HIS BROKEN RIBS ARE HEALING, PUNCTURED LUNG HAS SEALED AND NO NEW PAIN. HE WOULD LIKE TO KNOW WHEN HE NEEDS TO FOLLOW UP WITH PCP.    PLEASE ADVISE  [phone]

## 2020-12-03 ENCOUNTER — TRANSCRIBE ORDERS (OUTPATIENT)
Dept: FAMILY MEDICINE CLINIC | Facility: CLINIC | Age: 75
End: 2020-12-03

## 2020-12-03 ENCOUNTER — CLINICAL SUPPORT (OUTPATIENT)
Dept: FAMILY MEDICINE CLINIC | Facility: CLINIC | Age: 75
End: 2020-12-03

## 2020-12-03 DIAGNOSIS — N40.1 BENIGN PROSTATIC HYPERPLASIA WITH URINARY OBSTRUCTION: Primary | ICD-10-CM

## 2020-12-03 DIAGNOSIS — N13.8 BENIGN PROSTATIC HYPERPLASIA WITH URINARY OBSTRUCTION: ICD-10-CM

## 2020-12-03 DIAGNOSIS — N13.8 BENIGN PROSTATIC HYPERPLASIA WITH URINARY OBSTRUCTION: Primary | ICD-10-CM

## 2020-12-03 DIAGNOSIS — N40.1 ENLARGED PROSTATE WITH URINARY OBSTRUCTION: ICD-10-CM

## 2020-12-03 DIAGNOSIS — N13.8 ENLARGED PROSTATE WITH URINARY OBSTRUCTION: ICD-10-CM

## 2020-12-03 DIAGNOSIS — N40.1 BENIGN PROSTATIC HYPERPLASIA WITH URINARY OBSTRUCTION: ICD-10-CM

## 2020-12-03 DIAGNOSIS — Z12.5 ENCOUNTER FOR SCREENING FOR MALIGNANT NEOPLASM OF PROSTATE: ICD-10-CM

## 2020-12-03 LAB — PSA SERPL-MCNC: 1.84 NG/ML (ref 0–4)

## 2020-12-03 PROCEDURE — G0103 PSA SCREENING: HCPCS | Performed by: UROLOGY

## 2020-12-03 PROCEDURE — 36415 COLL VENOUS BLD VENIPUNCTURE: CPT | Performed by: PREVENTIVE MEDICINE

## 2020-12-03 PROCEDURE — 84153 ASSAY OF PSA TOTAL: CPT | Performed by: UROLOGY

## 2020-12-03 NOTE — PROGRESS NOTES
Venipuncture Blood Specimen Collection  Venipuncture performed in left hand by Kortney Ferrera MD with good hemostasis. Patient tolerated the procedure well without complications.   12/03/20   MD Kortney Lin MD

## 2020-12-16 RX ORDER — DILTIAZEM HYDROCHLORIDE 120 MG/1
CAPSULE, COATED, EXTENDED RELEASE ORAL
Qty: 90 CAPSULE | Refills: 3 | Status: SHIPPED | OUTPATIENT
Start: 2020-12-16 | End: 2021-03-15

## 2020-12-29 RX ORDER — APIXABAN 5 MG/1
TABLET, FILM COATED ORAL
Qty: 180 TABLET | Refills: 3 | Status: SHIPPED | OUTPATIENT
Start: 2020-12-29 | End: 2021-02-26

## 2021-01-11 ENCOUNTER — OFFICE VISIT (OUTPATIENT)
Dept: FAMILY MEDICINE CLINIC | Facility: CLINIC | Age: 76
End: 2021-01-11

## 2021-01-11 VITALS
BODY MASS INDEX: 31.14 KG/M2 | HEIGHT: 71 IN | DIASTOLIC BLOOD PRESSURE: 68 MMHG | WEIGHT: 222.4 LBS | TEMPERATURE: 97.3 F | OXYGEN SATURATION: 96 % | SYSTOLIC BLOOD PRESSURE: 129 MMHG | HEART RATE: 66 BPM | RESPIRATION RATE: 16 BRPM

## 2021-01-11 DIAGNOSIS — S27.321A CONTUSION OF LEFT LUNG, INITIAL ENCOUNTER: Primary | ICD-10-CM

## 2021-01-11 DIAGNOSIS — I10 ESSENTIAL HYPERTENSION: ICD-10-CM

## 2021-01-11 PROCEDURE — 99212 OFFICE O/P EST SF 10 MIN: CPT | Performed by: PREVENTIVE MEDICINE

## 2021-01-11 RX ORDER — TURMERIC ROOT EXTRACT 500 MG
500 TABLET ORAL DAILY
COMMUNITY
End: 2021-03-17

## 2021-01-11 RX ORDER — NAPROXEN SODIUM 220 MG
220 TABLET ORAL 2 TIMES DAILY PRN
COMMUNITY
End: 2021-02-26
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
## 2021-03-01 RX ORDER — LOSARTAN POTASSIUM 50 MG/1
TABLET ORAL
Qty: 30 TABLET | Refills: 2 | Status: SHIPPED | OUTPATIENT
Start: 2021-03-01 | End: 2021-06-01

## 2021-03-15 RX ORDER — DILTIAZEM HYDROCHLORIDE 120 MG/1
CAPSULE, COATED, EXTENDED RELEASE ORAL
Qty: 90 CAPSULE | Refills: 2 | Status: ON HOLD | OUTPATIENT
Start: 2021-03-15 | End: 2021-10-13

## 2021-03-17 ENCOUNTER — OFFICE VISIT (OUTPATIENT)
Dept: CARDIOLOGY | Facility: CLINIC | Age: 76
End: 2021-03-17

## 2021-03-17 VITALS
HEIGHT: 71 IN | OXYGEN SATURATION: 98 % | HEART RATE: 70 BPM | BODY MASS INDEX: 31.08 KG/M2 | RESPIRATION RATE: 18 BRPM | DIASTOLIC BLOOD PRESSURE: 77 MMHG | SYSTOLIC BLOOD PRESSURE: 137 MMHG | WEIGHT: 222 LBS

## 2021-03-17 DIAGNOSIS — I34.0 NONRHEUMATIC MITRAL VALVE REGURGITATION: ICD-10-CM

## 2021-03-17 DIAGNOSIS — I10 ESSENTIAL HYPERTENSION: ICD-10-CM

## 2021-03-17 DIAGNOSIS — I48.11 LONGSTANDING PERSISTENT ATRIAL FIBRILLATION (HCC): Primary | ICD-10-CM

## 2021-03-17 PROCEDURE — 93000 ELECTROCARDIOGRAM COMPLETE: CPT | Performed by: INTERNAL MEDICINE

## 2021-03-17 PROCEDURE — 99214 OFFICE O/P EST MOD 30 MIN: CPT | Performed by: INTERNAL MEDICINE
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
## 2021-06-01 RX ORDER — LOSARTAN POTASSIUM 50 MG/1
TABLET ORAL
Qty: 30 TABLET | Refills: 1 | Status: SHIPPED | OUTPATIENT
Start: 2021-06-01 | End: 2021-09-09

## 2021-07-09 PROBLEM — E78.5 DYSLIPIDEMIA: Status: ACTIVE | Noted: 2021-07-09

## 2021-07-12 ENCOUNTER — OFFICE VISIT (OUTPATIENT)
Dept: FAMILY MEDICINE CLINIC | Facility: CLINIC | Age: 76
End: 2021-07-12

## 2021-07-12 VITALS
HEART RATE: 82 BPM | DIASTOLIC BLOOD PRESSURE: 74 MMHG | SYSTOLIC BLOOD PRESSURE: 122 MMHG | TEMPERATURE: 98 F | WEIGHT: 224.4 LBS | BODY MASS INDEX: 31.42 KG/M2 | HEIGHT: 71 IN | OXYGEN SATURATION: 95 %

## 2021-07-12 DIAGNOSIS — D22.9 SKIN MOLE: ICD-10-CM

## 2021-07-12 DIAGNOSIS — S27.321A CONTUSION OF LEFT LUNG, INITIAL ENCOUNTER: ICD-10-CM

## 2021-07-12 DIAGNOSIS — E78.5 DYSLIPIDEMIA: ICD-10-CM

## 2021-07-12 DIAGNOSIS — R20.2 LEFT HAND PARESTHESIA: ICD-10-CM

## 2021-07-12 DIAGNOSIS — I10 ESSENTIAL HYPERTENSION: ICD-10-CM

## 2021-07-12 DIAGNOSIS — R06.83 SNORING: ICD-10-CM

## 2021-07-12 DIAGNOSIS — E55.9 VITAMIN D DEFICIENCY: ICD-10-CM

## 2021-07-12 DIAGNOSIS — I48.11 LONGSTANDING PERSISTENT ATRIAL FIBRILLATION (HCC): ICD-10-CM

## 2021-07-12 DIAGNOSIS — N40.1 BENIGN PROSTATIC HYPERPLASIA WITH URINARY OBSTRUCTION: ICD-10-CM

## 2021-07-12 DIAGNOSIS — Z00.01 ENCOUNTER FOR ANNUAL GENERAL MEDICAL EXAMINATION WITH ABNORMAL FINDINGS IN ADULT: Primary | ICD-10-CM

## 2021-07-12 DIAGNOSIS — E66.09 CLASS 1 OBESITY DUE TO EXCESS CALORIES WITH SERIOUS COMORBIDITY AND BODY MASS INDEX (BMI) OF 31.0 TO 31.9 IN ADULT: ICD-10-CM

## 2021-07-12 DIAGNOSIS — N13.8 BENIGN PROSTATIC HYPERPLASIA WITH URINARY OBSTRUCTION: ICD-10-CM

## 2021-07-12 DIAGNOSIS — E53.8 B12 DEFICIENCY: ICD-10-CM

## 2021-07-12 DIAGNOSIS — R73.9 HYPERGLYCEMIA: ICD-10-CM

## 2021-07-12 PROBLEM — E66.811 CLASS 1 OBESITY DUE TO EXCESS CALORIES WITH SERIOUS COMORBIDITY AND BODY MASS INDEX (BMI) OF 31.0 TO 31.9 IN ADULT: Status: ACTIVE | Noted: 2021-07-12

## 2021-07-12 PROBLEM — E66.9 OBESITY: Status: ACTIVE | Noted: 2021-07-12

## 2021-07-12 PROCEDURE — 99213 OFFICE O/P EST LOW 20 MIN: CPT | Performed by: PREVENTIVE MEDICINE

## 2021-07-12 PROCEDURE — G0439 PPPS, SUBSEQ VISIT: HCPCS | Performed by: PREVENTIVE MEDICINE

## 2021-07-12 PROCEDURE — 36415 COLL VENOUS BLD VENIPUNCTURE: CPT | Performed by: PREVENTIVE MEDICINE

## 2021-07-12 RX ORDER — VIT C/B6/B5/MAGNESIUM/HERB 173 50-5-6-5MG
500 CAPSULE ORAL DAILY
COMMUNITY
End: 2021-11-17 | Stop reason: HOSPADM

## 2021-07-12 RX ORDER — NAPROXEN SODIUM 220 MG
220 TABLET ORAL 2 TIMES DAILY PRN
COMMUNITY
End: 2021-10-13 | Stop reason: HOSPADM

## 2021-07-12 NOTE — PROGRESS NOTES
The ABCs of the Annual Wellness Visit  Subsequent Medicare Wellness Visit    Chief Complaint   Patient presents with   • Medicare Wellness-subsequent   • Hyperlipidemia   Patient also follows up for multiple chronic health conditions most of which are stable.  He does not feel as though he is gaining his strength back from when he was injured last year but otherwise he seems to be doing well does follow-up with cardiologist for his atrial fibrillation we will try to watch his portion size and increase his walking he has had his Covid vaccination.    Subjective   History of Present Illness:  Salinas Dill is a 75 y.o. male who presents for a Subsequent Medicare Wellness Visit.    HEALTH RISK ASSESSMENT    Recent Hospitalizations:  Recently treated at the following:  [facility]     Current Medical Providers:  Patient Care Team:  Kortney Ferrera MD as PCP - General  Kortney Ferrera MD as PCP - Family Medicine    Smoking Status:  Social History     Tobacco Use   Smoking Status Former Smoker   • Packs/day: 1.00   • Years: 12.00   • Pack years: 12.00   • Types: Cigarettes   • Quit date:    • Years since quittin.5   Smokeless Tobacco Never Used       Alcohol Consumption:  Social History     Substance and Sexual Activity   Alcohol Use Yes    Comment: 1 drink weekly       Depression Screen:   PHQ-2/PHQ-9 Depression Screening 2021   Little interest or pleasure in doing things 0   Feeling down, depressed, or hopeless 0   Total Score 0       Fall Risk Screen:  STERIVERA Fall Risk Assessment was completed, and patient is at LOW risk for falls.Assessment completed on:2021    Health Habits and Functional and Cognitive Screening:  Functional & Cognitive Status 2021   Do you have difficulty preparing food and eating? No   Do you have difficulty bathing yourself, getting dressed or grooming yourself? No   Do you have difficulty using the toilet? No   Do you have difficulty moving around from  place to place? No   Do you have trouble with steps or getting out of a bed or a chair? No   Current Diet Well Balanced Diet   Dental Exam Up to date   Eye Exam Up to date   Exercise (times per week) 6 times per week   Current Exercises Include Walking   Current Exercise Activities Include -   Do you need help using the phone?  No   Are you deaf or do you have serious difficulty hearing?  No   Do you need help with transportation? No   Do you need help shopping? No   Do you need help preparing meals?  No   Do you need help with housework?  No   Do you need help with laundry? No   Do you need help taking your medications? No   Do you need help managing money? No   Do you ever drive or ride in a car without wearing a seat belt? No   Have you felt unusual stress, anger or loneliness in the last month? -   Who do you live with? -   If you need help, do you have trouble finding someone available to you? -   Have you been bothered in the last four weeks by sexual problems? -   Do you have difficulty concentrating, remembering or making decisions? -         Does the patient have evidence of cognitive impairment? No    Asprin use counseling:Taking ASA appropriately as indicated    Age-appropriate Screening Schedule:  Refer to the list below for future screening recommendations based on patient's age, sex and/or medical conditions. Orders for these recommended tests are listed in the plan section. The patient has been provided with a written plan.    Health Maintenance   Topic Date Due   • TDAP/TD VACCINES (2 - Tdap) 10/24/2021 (Originally 10/23/2021)   • INFLUENZA VACCINE  08/01/2021   • PROSTATE CANCER SCREENING  12/03/2021   • ZOSTER VACCINE  Completed          The following portions of the patient's history were reviewed and updated as appropriate: allergies, current medications, past family history, past medical history, past social history, past surgical history and problem list.    Outpatient Medications Prior to  Visit   Medication Sig Dispense Refill   • apixaban (Eliquis) 5 MG tablet tablet Take 1 tablet by mouth 2 (Two) Times a Day. 180 tablet 3   • B Complex Vitamins (vitamin b complex) capsule capsule Take 1 capsule by mouth Daily.     • Cholecalciferol (VITAMIN D) 2000 units capsule Take 2,000 Units by mouth Daily.     • dilTIAZem CD (CARDIZEM CD) 120 MG 24 hr capsule TAKE 1 TABLET BY MOUTH DAILY 90 capsule 2   • ELDERBERRY PO Take 1 capsule by mouth 2 (two) times a day. Elderberry 158mg     • losartan (COZAAR) 50 MG tablet TAKE ONE BY MOUTH DAILY 30 tablet 1   • melatonin 5 MG tablet tablet Take 5 mg by mouth Every Night.     • naproxen sodium (ALEVE) 220 MG tablet Take 220 mg by mouth 2 (Two) Times a Day As Needed.     • sildenafil (REVATIO) 20 MG tablet Take 20 mg by mouth Daily.     • triamterene-hydrochlorothiazide (MAXZIDE-25) 37.5-25 MG per tablet Take 1 tablet by mouth Daily. 90 tablet 3   • Turmeric 500 MG capsule Take  by mouth.       No facility-administered medications prior to visit.       Patient Active Problem List   Diagnosis   • Plantar fasciitis   • Atrial fibrillation (CMS/HCC)   • B12 deficiency   • Benign prostatic hyperplasia with urinary obstruction   • Drug-induced impotence   • Hyperglycemia   • Hypertension   • Increased frequency of urination   • Low back pain   • Presence of artificial shoulder joint   • Snoring   • Vitamin D deficiency   • Skin mole   • Right wrist pain   • Localized swelling on hand   • Elbow pain, left   • Contusion of left lung   • Nuclear senile cataract   • Presbyopia   • Traumatic pneumothorax   • Pleurisy   • Dyslipidemia   • Obesity   • Class 1 obesity due to excess calories with serious comorbidity and body mass index (BMI) of 31.0 to 31.9 in adult       Advanced Care Planning:  ACP discussion was held with the patient during this visit. Patient has an advance directive in EMR which is still valid.     Review of Systems   Constitutional: Negative.    HENT:  "Negative.    Eyes: Negative.    Respiratory: Negative.    Cardiovascular: Negative.    Gastrointestinal: Negative.    Endocrine: Negative.    Genitourinary: Negative.    Musculoskeletal: Negative.    Skin: Negative.    Allergic/Immunologic: Negative.    Neurological: Negative.    Hematological: Negative.    Psychiatric/Behavioral: Negative.        Compared to one year ago, the patient feels his physical health is worse.  Compared to one year ago, the patient feels his mental health is better.    Reviewed chart for potential of high risk medication in the elderly: yes  Reviewed chart for potential of harmful drug interactions in the elderly:yes    Objective         Vitals:    07/12/21 0830 07/12/21 0834 07/12/21 0835   BP: 140/98 136/81 122/74   BP Location: Right arm Left arm Left arm   Patient Position: Sitting Sitting Standing   Cuff Size: Adult Adult Adult   Pulse: 82     Temp: 98 °F (36.7 °C)     SpO2: 95%     Weight: 102 kg (224 lb 6.4 oz)     Height: 180.3 cm (70.98\")     PainSc: 0-No pain         Body mass index is 31.31 kg/m².  Discussed the patient's BMI with him. The BMI is above average; BMI management plan is completed.    Physical Exam  Vitals reviewed.   Constitutional:       General: He is not in acute distress.     Appearance: He is well-developed. He is obese. He is not ill-appearing or toxic-appearing.   HENT:      Head: Normocephalic and atraumatic.      Right Ear: Tympanic membrane, ear canal and external ear normal.      Left Ear: Tympanic membrane, ear canal and external ear normal.      Nose: Nose normal.   Eyes:      Extraocular Movements: Extraocular movements intact.      Conjunctiva/sclera: Conjunctivae normal.      Pupils: Pupils are equal, round, and reactive to light.   Cardiovascular:      Rate and Rhythm: Normal rate and regular rhythm.      Heart sounds: Normal heart sounds.   Pulmonary:      Effort: Pulmonary effort is normal.      Breath sounds: Normal breath sounds.   Abdominal: "      General: Bowel sounds are normal. There is no distension.      Palpations: Abdomen is soft. There is no mass.      Tenderness: There is no abdominal tenderness.   Musculoskeletal:         General: Normal range of motion.      Cervical back: Neck supple.   Skin:     General: Skin is warm.   Neurological:      General: No focal deficit present.      Mental Status: He is alert and oriented to person, place, and time.   Psychiatric:         Mood and Affect: Mood normal.         Behavior: Behavior normal.               Assessment/Plan   Medicare Risks and Personalized Health Plan  CMS Preventative Services Quick Reference  Obesity/Overweight     The above risks/problems have been discussed with the patient.  Pertinent information has been shared with the patient in the After Visit Summary.  Follow up plans and orders are seen below in the Assessment/Plan Section.    Diagnoses and all orders for this visit:    1. Encounter for annual general medical examination with abnormal findings in adult (Primary)    2. Longstanding persistent atrial fibrillation (CMS/[facility])  Comments:  Patient has not had any syncope lightheadedness does not really notice the irregular pulse.  Follows with Dr. Matute.  Orders:  -     Magnesium  -     TSH    3. B12 deficiency  -     CBC Auto Differential  -     Vitamin B12    4. Benign prostatic hyperplasia with urinary obstruction  Comments:  Urine is passing well no problems with erection or ejaculation    5. Hyperglycemia  Comments:  Trying to eat less carbohydrates  Orders:  -     Comprehensive Metabolic Panel  -     Hemoglobin A1c    6. Essential hypertension  Comments:  Patient states blood pressure is under good control.  No headache or chest pain    7. Vitamin D deficiency  -     Vitamin D 25 Hydroxy    8. Skin mole  Comments:  Unsure where mold was.  He will ask his wife to see if she can remember.    9. Left hand paresthesia  Comments:  Left hand paresthesias gone    10. Contusion of  left lung, initial encounter  Comments:  Totally resolved.    11. Snoring  Comments:  Not a problem per wife.    12. Dyslipidemia  Comments:  Trying to eat less saturated fats  Orders:  -     Lipid Panel    13. Class 1 obesity due to excess calories with serious comorbidity and body mass index (BMI) of 31.0 to 31.9 in adult      Follow Up:  Return in about 6 months (around 1/12/2022).     An After Visit Summary and PPPS were given to the patient.

## 2021-07-13 ENCOUNTER — CLINICAL SUPPORT (OUTPATIENT)
Dept: FAMILY MEDICINE CLINIC | Facility: CLINIC | Age: 76
End: 2021-07-13

## 2021-07-13 DIAGNOSIS — E78.5 DYSLIPIDEMIA: ICD-10-CM

## 2021-07-13 DIAGNOSIS — I48.11 LONGSTANDING PERSISTENT ATRIAL FIBRILLATION (HCC): ICD-10-CM

## 2021-07-13 DIAGNOSIS — E53.8 B12 DEFICIENCY: ICD-10-CM

## 2021-07-13 LAB
25(OH)D3 SERPL-MCNC: 53.3 NG/ML (ref 30–100)
ALBUMIN SERPL-MCNC: 4.1 G/DL (ref 3.5–5.2)
ALBUMIN/GLOB SERPL: 1.5 G/DL
ALP SERPL-CCNC: 54 U/L (ref 39–117)
ALT SERPL W P-5'-P-CCNC: 18 U/L (ref 1–41)
ANION GAP SERPL CALCULATED.3IONS-SCNC: 10.2 MMOL/L (ref 5–15)
AST SERPL-CCNC: 24 U/L (ref 1–40)
BASOPHILS # BLD AUTO: 0.05 10*3/MM3 (ref 0–0.2)
BASOPHILS NFR BLD AUTO: 0.7 % (ref 0–1.5)
BILIRUB SERPL-MCNC: 0.5 MG/DL (ref 0–1.2)
BUN SERPL-MCNC: 18 MG/DL (ref 8–23)
BUN/CREAT SERPL: 23.7 (ref 7–25)
CALCIUM SPEC-SCNC: 9.2 MG/DL (ref 8.6–10.5)
CHLORIDE SERPL-SCNC: 105 MMOL/L (ref 98–107)
CHOLEST SERPL-MCNC: 168 MG/DL (ref 0–200)
CO2 SERPL-SCNC: 25.8 MMOL/L (ref 22–29)
CREAT SERPL-MCNC: 0.76 MG/DL (ref 0.76–1.27)
DEPRECATED RDW RBC AUTO: 42 FL (ref 37–54)
EOSINOPHIL # BLD AUTO: 0.23 10*3/MM3 (ref 0–0.4)
EOSINOPHIL NFR BLD AUTO: 3.3 % (ref 0.3–6.2)
ERYTHROCYTE [DISTWIDTH] IN BLOOD BY AUTOMATED COUNT: 13.2 % (ref 12.3–15.4)
GFR SERPL CREATININE-BSD FRML MDRD: 100 ML/MIN/1.73
GLOBULIN UR ELPH-MCNC: 2.8 GM/DL
GLUCOSE SERPL-MCNC: 95 MG/DL (ref 65–99)
HCT VFR BLD AUTO: 44.2 % (ref 37.5–51)
HDLC SERPL-MCNC: 47 MG/DL (ref 40–60)
HGB BLD-MCNC: 14.8 G/DL (ref 13–17.7)
LDLC SERPL CALC-MCNC: 108 MG/DL (ref 0–100)
LDLC/HDLC SERPL: 2.3 {RATIO}
LYMPHOCYTES # BLD AUTO: 1.02 10*3/MM3 (ref 0.7–3.1)
LYMPHOCYTES NFR BLD AUTO: 14.4 % (ref 19.6–45.3)
MAGNESIUM SERPL-MCNC: 2.1 MG/DL (ref 1.6–2.4)
MCH RBC QN AUTO: 29.8 PG (ref 26.6–33)
MCHC RBC AUTO-ENTMCNC: 33.5 G/DL (ref 31.5–35.7)
MCV RBC AUTO: 88.9 FL (ref 79–97)
MONOCYTES # BLD AUTO: 0.87 10*3/MM3 (ref 0.1–0.9)
MONOCYTES NFR BLD AUTO: 12.3 % (ref 5–12)
NEUTROPHILS NFR BLD AUTO: 4.87 10*3/MM3 (ref 1.7–7)
NEUTROPHILS NFR BLD AUTO: 69 % (ref 42.7–76)
PLATELET # BLD AUTO: 267 10*3/MM3 (ref 140–450)
PMV BLD AUTO: 12.9 FL (ref 6–12)
POTASSIUM SERPL-SCNC: 3.6 MMOL/L (ref 3.5–5.2)
PROT SERPL-MCNC: 6.9 G/DL (ref 6–8.5)
RBC # BLD AUTO: 4.97 10*6/MM3 (ref 4.14–5.8)
SODIUM SERPL-SCNC: 141 MMOL/L (ref 136–145)
TRIGL SERPL-MCNC: 65 MG/DL (ref 0–150)
TSH SERPL DL<=0.05 MIU/L-ACNC: 3.23 UIU/ML (ref 0.27–4.2)
VIT B12 BLD-MCNC: 672 PG/ML (ref 211–946)
VLDLC SERPL-MCNC: 13 MG/DL (ref 5–40)
WBC # BLD AUTO: 7.06 10*3/MM3 (ref 3.4–10.8)

## 2021-07-13 PROCEDURE — 36415 COLL VENOUS BLD VENIPUNCTURE: CPT | Performed by: PREVENTIVE MEDICINE

## 2021-07-13 PROCEDURE — 84443 ASSAY THYROID STIM HORMONE: CPT | Performed by: PREVENTIVE MEDICINE

## 2021-07-13 PROCEDURE — 83036 HEMOGLOBIN GLYCOSYLATED A1C: CPT | Performed by: PREVENTIVE MEDICINE

## 2021-07-13 PROCEDURE — 80053 COMPREHEN METABOLIC PANEL: CPT | Performed by: PREVENTIVE MEDICINE

## 2021-07-13 PROCEDURE — 85025 COMPLETE CBC W/AUTO DIFF WBC: CPT | Performed by: PREVENTIVE MEDICINE

## 2021-07-13 PROCEDURE — 83735 ASSAY OF MAGNESIUM: CPT | Performed by: PREVENTIVE MEDICINE

## 2021-07-13 PROCEDURE — 80061 LIPID PANEL: CPT | Performed by: PREVENTIVE MEDICINE

## 2021-07-13 PROCEDURE — 82306 VITAMIN D 25 HYDROXY: CPT | Performed by: PREVENTIVE MEDICINE

## 2021-07-13 PROCEDURE — 82607 VITAMIN B-12: CPT | Performed by: PREVENTIVE MEDICINE

## 2021-07-13 NOTE — PROGRESS NOTES
Venipuncture Blood Specimen Collection  Venipuncture performed in left hand by Brenda Mckeon MA with good hemostasis. Patient tolerated the procedure well without complications.   07/13/21   PHU Sebastian MD

## 2021-07-14 LAB — HBA1C MFR BLD: 5.8 % (ref 3.5–5.6)

## 2021-07-14 NOTE — PROGRESS NOTES
Glucose 95 with A1C showing excess risk for DM at 5.8-so watch carf bs and continue exercising  Bad cholesterol 108 and goal is below 100.  Can he do more to watch sat fats or  does he want to consider a  statin-let me know

## 2021-07-15 ENCOUNTER — TELEPHONE (OUTPATIENT)
Dept: FAMILY MEDICINE CLINIC | Facility: CLINIC | Age: 76
End: 2021-07-15

## 2021-07-15 NOTE — TELEPHONE ENCOUNTER
I called and spoke with wife. She verbalized understanding and will relay message to pt. She said that they will discuss the topic os a statin and will get back with us at a later time.

## 2021-07-15 NOTE — TELEPHONE ENCOUNTER
HUB TO READ:      Glucose 95 with A1C showing excess risk for DM at 5.8-so watch carf bs and continue exercising  Bad cholesterol 108 and goal is below 100.  Can he do more to watch sat fats or  does he want to consider a  statin-let me know

## 2021-08-30 RX ORDER — TRIAMTERENE AND HYDROCHLOROTHIAZIDE 37.5; 25 MG/1; MG/1
1 TABLET ORAL DAILY
Qty: 90 TABLET | Refills: 2 | Status: ON HOLD | OUTPATIENT
Start: 2021-08-30 | End: 2021-11-11 | Stop reason: SDDI

## 2021-09-09 RX ORDER — LOSARTAN POTASSIUM 50 MG/1
TABLET ORAL
Qty: 30 TABLET | Refills: 0 | Status: SHIPPED | OUTPATIENT
Start: 2021-09-09 | End: 2021-10-08

## 2021-09-15 ENCOUNTER — OFFICE VISIT (OUTPATIENT)
Dept: CARDIOLOGY | Facility: CLINIC | Age: 76
End: 2021-09-15

## 2021-09-15 VITALS
DIASTOLIC BLOOD PRESSURE: 71 MMHG | SYSTOLIC BLOOD PRESSURE: 124 MMHG | WEIGHT: 215 LBS | OXYGEN SATURATION: 97 % | HEART RATE: 86 BPM | BODY MASS INDEX: 30 KG/M2

## 2021-09-15 DIAGNOSIS — E78.5 DYSLIPIDEMIA: ICD-10-CM

## 2021-09-15 DIAGNOSIS — I10 ESSENTIAL HYPERTENSION: ICD-10-CM

## 2021-09-15 DIAGNOSIS — I48.11 LONGSTANDING PERSISTENT ATRIAL FIBRILLATION (HCC): Primary | ICD-10-CM

## 2021-09-15 DIAGNOSIS — I34.0 NONRHEUMATIC MITRAL VALVE REGURGITATION: ICD-10-CM

## 2021-09-15 PROCEDURE — 99214 OFFICE O/P EST MOD 30 MIN: CPT | Performed by: INTERNAL MEDICINE

## 2021-09-15 PROCEDURE — 93000 ELECTROCARDIOGRAM COMPLETE: CPT | Performed by: INTERNAL MEDICINE

## 2021-09-15 NOTE — PROGRESS NOTES
Cardiology Office Visit      Encounter Date:  09/15/2021    Patient ID:   Salinas Dill is a 75 y.o. male.    Reason For Followup:  Chronic atrial fibrillation  Mitral regurgitation  Hypertension    Brief Clinical History:  Dear Kortney Garcia MD    I had the pleasure of seeing Salinas Dill today. As you are well aware, this is a 75 y.o. 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     patient had no prior history of atrial fibrillation   patient denies any symptoms of palpitations chest pain shortness of breath dizziness or syncope   no orthopnea no PND   no loss of consciousness  Patient stayed in atrial fibrillation   stress test with no evidence of any inducible ischemia   echocardiogram with normal LV systolic function and moderate mitral regurgitation  Failed cardioversion patient is back in atrial fibrillation with controlled ventricular response    Interval History:  Patient denies any new symptoms of chest pain shortness of breath dizziness or syncope  Compliant with medical therapy  Good functional status    Assessment & Plan    Impressions:  Chronic atrial fibrillation/rate controlled  Mitral regurgitation  Hypertension  Ilan Vascor of 2  stress test with no evidence of any inducible ischemia  echocardiogram with normal LV systolic function and moderate mitral regurgitation      Recommendations:      continue current dose Eliquis for anticoagulation  continue current Cardizem for rate control  Rating anticoagulation therapy without any bleeding problems  continued risk factor modification  Risk benefits of long-term anticoagulation therapy discussed the patient  Continue risk factor modification  Recent labs reviewed and discussed with the patient  Lipids at goal  Repeat echocardiogram before next office visit to assess the  mitral regurgitation  follow-up in office in 6 months    Objective:    Vitals:  Vitals:    09/15/21 1421   BP: 124/71   Pulse: 86   SpO2: 97%   Weight: 97.5 kg (215 lb)       Physical Exam:    General: Alert, cooperative, no distress, appears stated age  Head:  Normocephalic, atraumatic, mucous membranes moist  Eyes:  Conjunctiva/corneas clear, EOM's intact     Neck:  Supple,  no adenopathy;      Lungs: Clear to auscultation bilaterally, no wheezes rhonchi rales are noted  Chest wall: No tenderness  Heart::  Regular rate and rhythm, S1 and S2 normal, no murmur, rub or gallop  Abdomen: Soft, non-tender, nondistended bowel sounds active  Extremities: No cyanosis, clubbing, or edema  Pulses: 2+ and symmetric all extremities  Skin:  No rashes or lesions  Neuro/psych: A&O x3. CN II through XII are grossly intact with appropriate affect      Allergies:  Allergies   Allergen Reactions   • Lisinopril Cough       Medication Review:     Current Outpatient Medications:   •  apixaban (Eliquis) 5 MG tablet tablet, Take 1 tablet by mouth 2 (Two) Times a Day., Disp: 180 tablet, Rfl: 3  •  B Complex Vitamins (vitamin b complex) capsule capsule, Take 1 capsule by mouth Daily., Disp: , Rfl:   •  Cholecalciferol (VITAMIN D) 2000 units capsule, Take 2,000 Units by mouth Daily., Disp: , Rfl:   •  dilTIAZem CD (CARDIZEM CD) 120 MG 24 hr capsule, TAKE 1 TABLET BY MOUTH DAILY, Disp: 90 capsule, Rfl: 2  •  ELDERBERRY PO, Take 1 capsule by mouth 2 (two) times a day. Elderberry 158mg, Disp: , Rfl:   •  losartan (COZAAR) 50 MG tablet, TAKE ONE BY MOUTH DAILY, Disp: 30 tablet, Rfl: 0  •  melatonin 5 MG tablet tablet, Take 5 mg by mouth Every Night., Disp: , Rfl:   •  naproxen sodium (ALEVE) 220 MG tablet, Take 220 mg by mouth 2 (Two) Times a Day As Needed., Disp: , Rfl:   •  sildenafil (REVATIO) 20 MG tablet, Take 20 mg by mouth Daily., Disp: , Rfl:   •  triamterene-hydrochlorothiazide (MAXZIDE-25) 37.5-25 MG per tablet, TAKE 1 TABLET BY MOUTH  DAILY., Disp: 90 tablet, Rfl: 2  •  Turmeric 500 MG capsule, Take  by mouth., Disp: , Rfl:     Family History:  Family History   Problem Relation Age of Onset   • Diabetes Mother    • Heart disease Mother    • Hypertension Sister    • Hyperlipidemia Sister    • Kidney disease Sister    • Cancer Sister    • Other Sister         weight disorder   • Diabetes Sister    • Stroke Brother    • Hypertension Other        Past Medical History:  Past Medical History:   Diagnosis Date   • Atrial fibrillation (CMS/HCC)    • Bladder infection    • Contusion of left lung 2020   • Hyperglycemia    • Hypertension    • Skin mole    • Snoring    • Vitamin D deficiency        Past surgical History:  Past Surgical History:   Procedure Laterality Date   • COLONOSCOPY     • FINGER SURGERY Right     repair right pointer finger   • TOTAL SHOULDER ARTHROPLASTY      shoulder replacement       Social History:  Social History     Socioeconomic History   • Marital status:      Spouse name: Not on file   • Number of children: Not on file   • Years of education: Not on file   • Highest education level: Not on file   Tobacco Use   • Smoking status: Former Smoker     Packs/day: 1.00     Years: 12.00     Pack years: 12.00     Types: Cigarettes     Quit date:      Years since quittin.7   • Smokeless tobacco: Never Used   Vaping Use   • Vaping Use: Never used   Substance and Sexual Activity   • Alcohol use: Yes     Comment: 1 drink weekly   • Drug use: No   • Sexual activity: Yes     Partners: Female     Birth control/protection: None       Review of Systems:  The following systems were reviewed as they relate to the cardiovascular system: Constitutional, Eyes, ENT, Cardiovascular, Respiratory, Gastrointestinal, Integumentary, Neurological, Psychiatric, Hematologic, Endocrine, Musculoskeletal, and Genitourinary. The pertinent cardiovascular findings are reported above with all other cardiovascular points within those systems being  negative.    Diagnostic Study Review:     Current Electrocardiogram:    ECG 12 Lead    Date/Time: 9/15/2021 2:49 PM  Performed by: Renato Knight MD  Authorized by: Renato Knight MD   Comparison: compared with previous ECG   Similar to previous ECG  Rhythm: atrial fibrillation  Rate: normal  BPM: 86  Conduction: conduction normal  QRS axis: normal  Other findings: non-specific ST-T wave changes    Clinical impression: abnormal EKG              NOTE: The following portions of the patient's history were reviewed and updated this visit as appropriate: allergies, current medications, past family history, past medical history, past social history, past surgical history and problem list.

## 2021-09-27 ENCOUNTER — TRANSCRIBE ORDERS (OUTPATIENT)
Dept: ADMINISTRATIVE | Facility: HOSPITAL | Age: 76
End: 2021-09-27

## 2021-09-27 DIAGNOSIS — Z13.6 ENCOUNTER FOR SCREENING FOR VASCULAR DISEASE: Primary | ICD-10-CM

## 2021-09-29 ENCOUNTER — HOSPITAL ENCOUNTER (OUTPATIENT)
Dept: CT IMAGING | Facility: HOSPITAL | Age: 76
Discharge: HOME | End: 2021-09-29

## 2021-09-29 ENCOUNTER — HOSPITAL ENCOUNTER (OUTPATIENT)
Dept: CARDIOLOGY | Facility: HOSPITAL | Age: 76
Discharge: HOME | End: 2021-09-29

## 2021-09-29 DIAGNOSIS — Z13.6 ENCOUNTER FOR SCREENING FOR VASCULAR DISEASE: ICD-10-CM

## 2021-09-29 PROCEDURE — 93799 UNLISTED CV SVC/PROCEDURE: CPT

## 2021-09-29 PROCEDURE — 75571 CT HRT W/O DYE W/CA TEST: CPT

## 2021-09-30 ENCOUNTER — TELEPHONE (OUTPATIENT)
Dept: FAMILY MEDICINE CLINIC | Facility: CLINIC | Age: 76
End: 2021-09-30

## 2021-09-30 NOTE — PROGRESS NOTES
Extensive plaque on calcium scoring-when does he see cardiology again,  Any chest pain?  Last stress test 2/2019

## 2021-10-01 LAB
BH CV XLRA MEAS - MID AO DIAM: 1.7 CM
BH CV XLRA MEAS - PAD LEFT ABI PT: 1.24
BH CV XLRA MEAS - PAD LEFT ARM: 139 MMHG
BH CV XLRA MEAS - PAD LEFT LEG PT: 175 MMHG
BH CV XLRA MEAS - PAD RIGHT ABI PT: 1.26
BH CV XLRA MEAS - PAD RIGHT ARM: 141 MMHG
BH CV XLRA MEAS - PAD RIGHT LEG PT: 177 MMHG
BH CV XLRA MEAS LEFT DIST CCA EDV: 20.5 CM/SEC
BH CV XLRA MEAS LEFT DIST CCA PSV: 72.7 CM/SEC
BH CV XLRA MEAS LEFT ICA/CCA RATIO: 1
BH CV XLRA MEAS LEFT MID CCA PSV: 73 CM/SEC
BH CV XLRA MEAS LEFT MID ICA PSV: 74 CM/SEC
BH CV XLRA MEAS LEFT PROX ICA EDV: 28 CM/SEC
BH CV XLRA MEAS LEFT PROX ICA PSV: 73.9 CM/SEC
BH CV XLRA MEAS RIGHT DIST CCA EDV: 24.2 CM/SEC
BH CV XLRA MEAS RIGHT DIST CCA PSV: 75.2 CM/SEC
BH CV XLRA MEAS RIGHT ICA/CCA RATIO: 1
BH CV XLRA MEAS RIGHT MID CCA PSV: 75 CM/SEC
BH CV XLRA MEAS RIGHT MID ICA PSV: 79 CM/SEC
BH CV XLRA MEAS RIGHT PROX ICA EDV: -27.3 CM/SEC
BH CV XLRA MEAS RIGHT PROX ICA PSV: -78.9 CM/SEC
MAXIMAL PREDICTED HEART RATE: 146 BPM
STRESS TARGET HR: 124 BPM

## 2021-10-01 NOTE — TELEPHONE ENCOUNTER
Patient states that Dr. Knight order this and he follows up in March with him. He is not having any chest pain, but will check with his office to see if he needs to do anything.

## 2021-10-04 ENCOUNTER — TELEPHONE (OUTPATIENT)
Dept: FAMILY MEDICINE CLINIC | Facility: CLINIC | Age: 76
End: 2021-10-04

## 2021-10-04 ENCOUNTER — TELEPHONE (OUTPATIENT)
Dept: CARDIOLOGY | Facility: CLINIC | Age: 76
End: 2021-10-04

## 2021-10-04 DIAGNOSIS — I34.0 NONRHEUMATIC MITRAL VALVE REGURGITATION: ICD-10-CM

## 2021-10-04 DIAGNOSIS — I10 ESSENTIAL HYPERTENSION: ICD-10-CM

## 2021-10-04 DIAGNOSIS — Z13.6 ENCOUNTER FOR SCREENING FOR VASCULAR DISEASE: Primary | ICD-10-CM

## 2021-10-04 DIAGNOSIS — R93.1 ELEVATED CORONARY ARTERY CALCIUM SCORE: ICD-10-CM

## 2021-10-04 DIAGNOSIS — I48.11 LONGSTANDING PERSISTENT ATRIAL FIBRILLATION (HCC): ICD-10-CM

## 2021-10-04 NOTE — TELEPHONE ENCOUNTER
Called pt, he agreed to cath.  Told him I would let Dr. Solis know so he could put the order in and Katarzyna will be calling him with all the info to set this up.     ----- Message from Renato Knight MD sent at 10/4/2021 11:01 AM EDT -----  I tried to reach patient multiple times to talk to him about his calcium score  It is reasonable to schedule patient for a cardiac catheterization with what I have seen I know him very well  If patient is agreeable for cardiac catheterization I will go and schedule for outpatient cath  If he likes to talk to me about days he can call anytime this week when I am in the office  ----- Message -----  From: Patrica Cavanaugh MA  Sent: 10/1/2021  10:43 AM EDT  To: Renato Knight MD    Pt called and wanted to ask if you would review his recent Cardiac CT calcium score test that's in his chart.  His PCP called him with the results and said he possibly needed to see you sooner than his original f/u appt in March so patient is wondering if he does need a sooner appt.  Please advise.

## 2021-10-06 PROBLEM — Z13.6 ENCOUNTER FOR SCREENING FOR VASCULAR DISEASE: Status: ACTIVE | Noted: 2021-10-06

## 2021-10-06 PROBLEM — R93.1 ELEVATED CORONARY ARTERY CALCIUM SCORE: Status: ACTIVE | Noted: 2021-10-06

## 2021-10-08 RX ORDER — LOSARTAN POTASSIUM 50 MG/1
TABLET ORAL
Qty: 30 TABLET | Refills: 0 | Status: SHIPPED | OUTPATIENT
Start: 2021-10-08 | End: 2021-11-17 | Stop reason: HOSPADM

## 2021-10-11 ENCOUNTER — HOSPITAL ENCOUNTER (OUTPATIENT)
Dept: GENERAL RADIOLOGY | Facility: HOSPITAL | Age: 76
Discharge: HOME | End: 2021-10-11

## 2021-10-11 ENCOUNTER — LAB (OUTPATIENT)
Dept: LAB | Facility: HOSPITAL | Age: 76
End: 2021-10-11

## 2021-10-11 DIAGNOSIS — I34.0 NONRHEUMATIC MITRAL VALVE REGURGITATION: ICD-10-CM

## 2021-10-11 DIAGNOSIS — Z13.6 ENCOUNTER FOR SCREENING FOR VASCULAR DISEASE: ICD-10-CM

## 2021-10-11 DIAGNOSIS — I10 ESSENTIAL HYPERTENSION: ICD-10-CM

## 2021-10-11 DIAGNOSIS — I48.11 LONGSTANDING PERSISTENT ATRIAL FIBRILLATION (HCC): ICD-10-CM

## 2021-10-11 DIAGNOSIS — R93.1 ELEVATED CORONARY ARTERY CALCIUM SCORE: ICD-10-CM

## 2021-10-11 LAB
ANION GAP SERPL CALCULATED.3IONS-SCNC: 11.3 MMOL/L (ref 5–15)
BASOPHILS # BLD AUTO: 0.06 10*3/MM3 (ref 0–0.2)
BASOPHILS NFR BLD AUTO: 0.8 % (ref 0–1.5)
BUN SERPL-MCNC: 21 MG/DL (ref 8–23)
BUN/CREAT SERPL: 22.6 (ref 7–25)
CALCIUM SPEC-SCNC: 9.7 MG/DL (ref 8.6–10.5)
CHLORIDE SERPL-SCNC: 102 MMOL/L (ref 98–107)
CO2 SERPL-SCNC: 24.7 MMOL/L (ref 22–29)
CREAT SERPL-MCNC: 0.93 MG/DL (ref 0.76–1.27)
DEPRECATED RDW RBC AUTO: 45.8 FL (ref 37–54)
EOSINOPHIL # BLD AUTO: 0.2 10*3/MM3 (ref 0–0.4)
EOSINOPHIL NFR BLD AUTO: 2.7 % (ref 0.3–6.2)
ERYTHROCYTE [DISTWIDTH] IN BLOOD BY AUTOMATED COUNT: 13.2 % (ref 12.3–15.4)
GFR SERPL CREATININE-BSD FRML MDRD: 79 ML/MIN/1.73
GLUCOSE SERPL-MCNC: 105 MG/DL (ref 65–99)
HCT VFR BLD AUTO: 46.1 % (ref 37.5–51)
HGB BLD-MCNC: 14.5 G/DL (ref 13–17.7)
IMM GRANULOCYTES # BLD AUTO: 0.02 10*3/MM3 (ref 0–0.05)
IMM GRANULOCYTES NFR BLD AUTO: 0.3 % (ref 0–0.5)
INR PPP: 0.96 (ref 0.93–1.1)
LYMPHOCYTES # BLD AUTO: 1.13 10*3/MM3 (ref 0.7–3.1)
LYMPHOCYTES NFR BLD AUTO: 15.4 % (ref 19.6–45.3)
MCH RBC QN AUTO: 29.2 PG (ref 26.6–33)
MCHC RBC AUTO-ENTMCNC: 31.5 G/DL (ref 31.5–35.7)
MCV RBC AUTO: 92.9 FL (ref 79–97)
MONOCYTES # BLD AUTO: 0.85 10*3/MM3 (ref 0.1–0.9)
MONOCYTES NFR BLD AUTO: 11.5 % (ref 5–12)
NEUTROPHILS NFR BLD AUTO: 5.1 10*3/MM3 (ref 1.7–7)
NEUTROPHILS NFR BLD AUTO: 69.3 % (ref 42.7–76)
NRBC BLD AUTO-RTO: 0 /100 WBC (ref 0–0.2)
PLATELET # BLD AUTO: 260 10*3/MM3 (ref 140–450)
PMV BLD AUTO: 12.5 FL (ref 6–12)
POTASSIUM SERPL-SCNC: 4 MMOL/L (ref 3.5–5.2)
PROTHROMBIN TIME: 10.7 SECONDS (ref 9.6–11.7)
RBC # BLD AUTO: 4.96 10*6/MM3 (ref 4.14–5.8)
SARS-COV-2 ORF1AB RESP QL NAA+PROBE: NOT DETECTED
SODIUM SERPL-SCNC: 138 MMOL/L (ref 136–145)
WBC # BLD AUTO: 7.36 10*3/MM3 (ref 3.4–10.8)

## 2021-10-11 PROCEDURE — 36415 COLL VENOUS BLD VENIPUNCTURE: CPT

## 2021-10-11 PROCEDURE — 85025 COMPLETE CBC W/AUTO DIFF WBC: CPT

## 2021-10-11 PROCEDURE — U0005 INFEC AGEN DETEC AMPLI PROBE: HCPCS

## 2021-10-11 PROCEDURE — 71046 X-RAY EXAM CHEST 2 VIEWS: CPT

## 2021-10-11 PROCEDURE — U0004 COV-19 TEST NON-CDC HGH THRU: HCPCS

## 2021-10-11 PROCEDURE — C9803 HOPD COVID-19 SPEC COLLECT: HCPCS

## 2021-10-11 PROCEDURE — 80048 BASIC METABOLIC PNL TOTAL CA: CPT

## 2021-10-11 PROCEDURE — 85610 PROTHROMBIN TIME: CPT

## 2021-10-13 ENCOUNTER — APPOINTMENT (OUTPATIENT)
Dept: CARDIOLOGY | Facility: HOSPITAL | Age: 76
End: 2021-10-13

## 2021-10-13 ENCOUNTER — HOSPITAL ENCOUNTER (OUTPATIENT)
Facility: HOSPITAL | Age: 76
Setting detail: HOSPITAL OUTPATIENT SURGERY
Discharge: HOME | End: 2021-10-13
Attending: INTERNAL MEDICINE | Admitting: INTERNAL MEDICINE

## 2021-10-13 VITALS — SYSTOLIC BLOOD PRESSURE: 122 MMHG | DIASTOLIC BLOOD PRESSURE: 77 MMHG | OXYGEN SATURATION: 97 % | HEART RATE: 70 BPM

## 2021-10-13 DIAGNOSIS — I34.0 NONRHEUMATIC MITRAL VALVE REGURGITATION: ICD-10-CM

## 2021-10-13 DIAGNOSIS — R93.1 ELEVATED CORONARY ARTERY CALCIUM SCORE: ICD-10-CM

## 2021-10-13 DIAGNOSIS — I48.11 LONGSTANDING PERSISTENT ATRIAL FIBRILLATION (HCC): ICD-10-CM

## 2021-10-13 DIAGNOSIS — I10 ESSENTIAL HYPERTENSION: ICD-10-CM

## 2021-10-13 DIAGNOSIS — Z13.6 ENCOUNTER FOR SCREENING FOR VASCULAR DISEASE: ICD-10-CM

## 2021-10-13 LAB
ACT BLD: 131 SECONDS (ref 89–137)
ACT BLD: 175 SECONDS (ref 89–137)
BH CV XLRA MEAS - DIST GSV CALF DIST RIGHT: 0.13 CM
BH CV XLRA MEAS - DIST GSV THIGH DIST LEFT: 0.27 CM
BH CV XLRA MEAS - DIST GSV THIGH DIST RIGHT: 0.29 CM
BH CV XLRA MEAS - MID GSV CALF LEFT: 0.13 CM
BH CV XLRA MEAS - MID GSV CALF RIGHT: 0.14 CM
BH CV XLRA MEAS - MID GSV THIGH  LEFT: 0.21 CM
BH CV XLRA MEAS - MID GSV THIGH  RIGHT: 0.24 CM
BH CV XLRA MEAS - PROX GSV CALF DIST LEFT: 0.18 CM
BH CV XLRA MEAS - PROX GSV CALF DIST RIGHT: 0.22 CM
BH CV XLRA MEAS - PROX GSV THIGH  LEFT: 0.24 CM
BH CV XLRA MEAS - PROX GSV THIGH  RIGHT: 0.25 CM
BH CV XLRA MEAS LEFT CCA RATIO VEL: 134 CM/SEC
BH CV XLRA MEAS LEFT DIST CCA EDV: 23 CM/SEC
BH CV XLRA MEAS LEFT DIST CCA PSV: 69 CM/SEC
BH CV XLRA MEAS LEFT DIST ICA EDV: -33.5 CM/SEC
BH CV XLRA MEAS LEFT DIST ICA PSV: -86.4 CM/SEC
BH CV XLRA MEAS LEFT ICA RATIO VEL: -86.4 CM/SEC
BH CV XLRA MEAS LEFT ICA/CCA RATIO: -0.64
BH CV XLRA MEAS LEFT PROX CCA EDV: 28.1 CM/SEC
BH CV XLRA MEAS LEFT PROX CCA PSV: 134 CM/SEC
BH CV XLRA MEAS LEFT PROX ECA PSV: -95.7 CM/SEC
BH CV XLRA MEAS LEFT PROX ICA EDV: -26.7 CM/SEC
BH CV XLRA MEAS LEFT PROX ICA PSV: -85.7 CM/SEC
BH CV XLRA MEAS LEFT PROX SCLA PSV: 146 CM/SEC
BH CV XLRA MEAS LEFT VERTEBRAL A EDV: -15.5 CM/SEC
BH CV XLRA MEAS LEFT VERTEBRAL A PSV: -45.4 CM/SEC
BH CV XLRA MEAS RIGHT CCA RATIO VEL: 86.4 CM/SEC
BH CV XLRA MEAS RIGHT DIST CCA EDV: 19.3 CM/SEC
BH CV XLRA MEAS RIGHT DIST CCA PSV: 77 CM/SEC
BH CV XLRA MEAS RIGHT DIST ICA EDV: -36.7 CM/SEC
BH CV XLRA MEAS RIGHT DIST ICA PSV: -96.3 CM/SEC
BH CV XLRA MEAS RIGHT ICA RATIO VEL: -96.3 CM/SEC
BH CV XLRA MEAS RIGHT ICA/CCA RATIO: -1.1
BH CV XLRA MEAS RIGHT PROX CCA EDV: 18 CM/SEC
BH CV XLRA MEAS RIGHT PROX CCA PSV: 86.4 CM/SEC
BH CV XLRA MEAS RIGHT PROX ECA PSV: -113 CM/SEC
BH CV XLRA MEAS RIGHT PROX ICA EDV: -23 CM/SEC
BH CV XLRA MEAS RIGHT PROX ICA PSV: -90.1 CM/SEC
BH CV XLRA MEAS RIGHT PROX SCLA PSV: 91 CM/SEC
BH CV XLRA MEAS RIGHT VERTEBRAL A EDV: 13 CM/SEC
BH CV XLRA MEAS RIGHT VERTEBRAL A PSV: 40.4 CM/SEC
LEFT ARM BP: NORMAL MMHG
MAXIMAL PREDICTED HEART RATE: 146 BPM
MAXIMAL PREDICTED HEART RATE: 146 BPM
RIGHT ARM BP: NORMAL MMHG
STRESS TARGET HR: 124 BPM
STRESS TARGET HR: 124 BPM

## 2021-10-13 PROCEDURE — 0 IOPAMIDOL PER 1 ML: Performed by: INTERNAL MEDICINE

## 2021-10-13 PROCEDURE — C1769 GUIDE WIRE: HCPCS | Performed by: INTERNAL MEDICINE

## 2021-10-13 PROCEDURE — 99152 MOD SED SAME PHYS/QHP 5/>YRS: CPT | Performed by: INTERNAL MEDICINE

## 2021-10-13 PROCEDURE — C1887 CATHETER, GUIDING: HCPCS | Performed by: INTERNAL MEDICINE

## 2021-10-13 PROCEDURE — 93880 EXTRACRANIAL BILAT STUDY: CPT

## 2021-10-13 PROCEDURE — 93571 IV DOP VEL&/PRESS C FLO 1ST: CPT | Performed by: INTERNAL MEDICINE

## 2021-10-13 PROCEDURE — C1894 INTRO/SHEATH, NON-LASER: HCPCS | Performed by: INTERNAL MEDICINE

## 2021-10-13 PROCEDURE — 25010000002 HEPARIN (PORCINE) PER 1000 UNITS: Performed by: INTERNAL MEDICINE

## 2021-10-13 PROCEDURE — 25010000002 MIDAZOLAM PER 1 MG: Performed by: INTERNAL MEDICINE

## 2021-10-13 PROCEDURE — 99204 OFFICE O/P NEW MOD 45 MIN: CPT | Performed by: THORACIC SURGERY (CARDIOTHORACIC VASCULAR SURGERY)

## 2021-10-13 PROCEDURE — 99153 MOD SED SAME PHYS/QHP EA: CPT | Performed by: INTERNAL MEDICINE

## 2021-10-13 PROCEDURE — 85347 COAGULATION TIME ACTIVATED: CPT

## 2021-10-13 PROCEDURE — 93458 L HRT ARTERY/VENTRICLE ANGIO: CPT | Performed by: INTERNAL MEDICINE

## 2021-10-13 PROCEDURE — 93572 IV DOP VEL&/PRESS C FLO EA: CPT | Performed by: INTERNAL MEDICINE

## 2021-10-13 PROCEDURE — 25010000002 FENTANYL CITRATE (PF) 100 MCG/2ML SOLUTION: Performed by: INTERNAL MEDICINE

## 2021-10-13 PROCEDURE — 93970 EXTREMITY STUDY: CPT

## 2021-10-13 RX ORDER — LIDOCAINE HYDROCHLORIDE 20 MG/ML
INJECTION, SOLUTION INFILTRATION; PERINEURAL AS NEEDED
Status: DISCONTINUED | OUTPATIENT
Start: 2021-10-13 | End: 2021-10-13 | Stop reason: HOSPADM

## 2021-10-13 RX ORDER — HEPARIN SODIUM 1000 [USP'U]/ML
INJECTION, SOLUTION INTRAVENOUS; SUBCUTANEOUS AS NEEDED
Status: DISCONTINUED | OUTPATIENT
Start: 2021-10-13 | End: 2021-10-13 | Stop reason: HOSPADM

## 2021-10-13 RX ORDER — SODIUM CHLORIDE 9 MG/ML
250 INJECTION, SOLUTION INTRAVENOUS ONCE AS NEEDED
Status: DISCONTINUED | OUTPATIENT
Start: 2021-10-13 | End: 2021-10-13 | Stop reason: HOSPADM

## 2021-10-13 RX ORDER — ASPIRIN 81 MG/1
81 TABLET ORAL DAILY
Status: DISCONTINUED | OUTPATIENT
Start: 2021-10-13 | End: 2021-10-13 | Stop reason: HOSPADM

## 2021-10-13 RX ORDER — ASPIRIN 81 MG/1
81 TABLET ORAL DAILY
Qty: 30 TABLET | Refills: 2 | Status: SHIPPED | OUTPATIENT
Start: 2021-10-13

## 2021-10-13 RX ORDER — ONDANSETRON 4 MG/1
4 TABLET, FILM COATED ORAL EVERY 6 HOURS PRN
Status: DISCONTINUED | OUTPATIENT
Start: 2021-10-13 | End: 2021-10-13 | Stop reason: HOSPADM

## 2021-10-13 RX ORDER — ACETAMINOPHEN 325 MG/1
650 TABLET ORAL EVERY 4 HOURS PRN
Status: DISCONTINUED | OUTPATIENT
Start: 2021-10-13 | End: 2021-10-13 | Stop reason: HOSPADM

## 2021-10-13 RX ORDER — SODIUM CHLORIDE 9 MG/ML
30 INJECTION, SOLUTION INTRAVENOUS CONTINUOUS
Status: DISCONTINUED | OUTPATIENT
Start: 2021-10-13 | End: 2021-10-13 | Stop reason: HOSPADM

## 2021-10-13 RX ORDER — MIDAZOLAM HYDROCHLORIDE 1 MG/ML
INJECTION INTRAMUSCULAR; INTRAVENOUS AS NEEDED
Status: DISCONTINUED | OUTPATIENT
Start: 2021-10-13 | End: 2021-10-13 | Stop reason: HOSPADM

## 2021-10-13 RX ORDER — NITROGLYCERIN 5 MG/ML
INJECTION, SOLUTION INTRAVENOUS AS NEEDED
Status: DISCONTINUED | OUTPATIENT
Start: 2021-10-13 | End: 2021-10-13 | Stop reason: HOSPADM

## 2021-10-13 RX ORDER — FENTANYL CITRATE 50 UG/ML
INJECTION, SOLUTION INTRAMUSCULAR; INTRAVENOUS AS NEEDED
Status: DISCONTINUED | OUTPATIENT
Start: 2021-10-13 | End: 2021-10-13 | Stop reason: HOSPADM

## 2021-10-13 RX ORDER — DILTIAZEM HYDROCHLORIDE 120 MG/1
120 CAPSULE, COATED, EXTENDED RELEASE ORAL DAILY
COMMUNITY
End: 2021-11-17 | Stop reason: HOSPADM

## 2021-10-13 RX ORDER — ONDANSETRON 2 MG/ML
4 INJECTION INTRAMUSCULAR; INTRAVENOUS EVERY 6 HOURS PRN
Status: DISCONTINUED | OUTPATIENT
Start: 2021-10-13 | End: 2021-10-13 | Stop reason: HOSPADM

## 2021-10-13 RX ADMIN — SODIUM CHLORIDE 30 ML/HR: 9 INJECTION, SOLUTION INTRAVENOUS at 06:37

## 2021-10-13 NOTE — DISCHARGE INSTR - ACTIVITY
Post Cath Instructions      Call Dr. Solis’s office to schedule a follow up appointment in 2-3 weeks at [phone]  Specific Physician Instructions: ***    Drink plenty of fluids for the next 24 hours.  This helps to eliminate the dye used in your procedure through urination.  You may resume a normal diet; however, try to avoid foods that would cause gas or constipation.    Sedative medication given to you during your catheterization may decrease your judgement and reaction time for up to 24-48 hours.  Therefore:  DO NOT drive or operate hazardous machinery (48 hours)  DO NOT consume alcoholic beverages  DO NOT make any important/legal decisions  Have someone stay with you for at least 24 hours    To allow proper healing and prevent bleeding, the following activities are to be strictly avoided for the next 24-48 hours:  Excessive bending at wound site  Straining (anything that would tense up muscles around the affected puncture site)  Lifting objects greater than 5 pounds, pushing, or pulling for 5 days  For Groin Cases:  Refrain from sexual activity  Refrain from running or vigorous walking  No prolonged sitting or standing  Limit stair climbing as much as possible    Keep the puncture site clean and dry.  You may remove the dressing tomorrow and replace it with a band-aid for at least one additional day.  Gently clean the site with mild soap and water.  No scrubbing/rubbing and lightly pat the area dry.  Showers are acceptable; however, avoid submerging in water (tub baths, hot tubs, swimming pools, dishwater, etc…) for at least one week.  The site should be completely healed before resuming these activities to reduce the risk of infection.  Check the site often.  Watch for signs and symptoms of infection and notify your physician if any of the following occur:  Bleeding or an increase in swelling at the puncture site  Fever  Increased soreness around puncture site  Foul odor or significant drainage from the  puncture site  Swelling, redness, or warmth at the puncture site    **A bruise or small “pea sized” lump under the skin at the puncture site is not unusual.  This should disappear within 3-4 weeks.**  CONTACT YOUR PHYSICIAN OR CALL 911 IF YOU EXPERIENCE ANY OF THE FOLLOWING:  Increased angina (chest pain) or frequent sensations of pressure, burning, pain, or other discomfort in the chest, arm, jaws, or stomach  Lightheadedness, dizziness, faint feeling, sweating, or difficulty breathing  Odd sensation changes like numbness, tingling, coldness, or pain in the arm or leg in which the catheter was inserted  Limb in which the catheter was inserted becomes pale/bluish in color    IMPORTANT:  Although this occurs very rarely, if you should develop bright red or excessive bleeding, feel a “pop” inside at the insertion site, or notice a sudden increase in swelling larger than a walnut, you should call 911.  Hold continuous firm pressure to the access site until emergency personnel arrive.  It is best if someone else can do this for you.

## 2021-10-13 NOTE — CONSULTS
Patient Care Team:  Kortney Ferrera MD as PCP - General  Kortney Ferrera MD as PCP - Family Medicine  Referring Provider:  Dr. Knight  Reason for consultation:  CAD    Chief complaint:  High cardiac calcium score    Subjective     History of Present Illness:  76 y/o gentleman reports he had a vascular screening package completed and his cardiac calcium score was 1098.  His PMHx is significant for persistent atrial fib, Eliquis use, BPH, HTN, dyslipidemia, and strong family hx of CAD.  He denies any c/o CP, SOA, lightheadedness, dizziness, or leg edema.  He does report fatigue.  He goes to the gym 3-4 times/week.  Rides his bike and actively takes care of 30 acres.  Dr. Knight recommended cardiac cath based on calcium scoring.  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  Left main 50% stenosis, LAD 70% mid, D1 99%, LCx 80% ostial, ONM 90%, RI 70%, D2 80%, RCA has PLB branch 90% and PDA 70%.  IFR mid LAD .89, IFR RI .88, and IFR left main .94.  Echo from July 2020, showed EF 55% and mild to mod MR.  Dr. Hernandez was asked to evaluate pt for surgical intervention.    Review of Systems   Constitutional: Positive for fatigue.   Respiratory: Negative for shortness of breath.    Cardiovascular: Negative for chest pain, palpitations and leg swelling.   Gastrointestinal: Negative for abdominal pain, constipation, diarrhea, nausea and vomiting.   Skin: Negative for rash and wound.   Allergic/Immunologic: Negative for immunocompromised state.   Neurological: Negative for dizziness, weakness and light-headedness.   Hematological: Does not bruise/bleed easily.   Psychiatric/Behavioral: The patient is not nervous/anxious.         Past Medical History:   Diagnosis Date    Atrial fibrillation (HCC)     Bladder infection     Contusion of left lung 11/9/2020    Hyperglycemia     Hypertension     Skin mole     Snoring     Vitamin D deficiency      Past Surgical History:   Procedure  Laterality Date    COLONOSCOPY      FINGER SURGERY Right     repair right pointer finger    TOTAL SHOULDER ARTHROPLASTY      shoulder replacement     Family History   Problem Relation Age of Onset    Diabetes Mother     Heart disease Mother     Hypertension Sister     Hyperlipidemia Sister     Kidney disease Sister     Cancer Sister     Other Sister         weight disorder    Diabetes Sister     Stroke Brother     Hypertension Other      Social History     Tobacco Use    Smoking status: Former Smoker     Packs/day: 1.00     Years: 12.00     Pack years: 12.00     Types: Cigarettes     Quit date:      Years since quittin.8    Smokeless tobacco: Never Used   Vaping Use    Vaping Use: Never used   Substance Use Topics    Alcohol use: Yes     Alcohol/week: 1.0 standard drink     Types: 1 Shots of liquor per week     Comment: 1 drink weekly    Drug use: No     Medications Prior to Admission   Medication Sig Dispense Refill Last Dose    apixaban (Eliquis) 5 MG tablet tablet Take 1 tablet by mouth 2 (Two) Times a Day. 180 tablet 3 10/10/2021 at Unknown time    B Complex Vitamins (vitamin b complex) capsule capsule Take 1 capsule by mouth Daily.   10/13/2021 at Unknown time    Cholecalciferol (VITAMIN D) 2000 units capsule Take 2,000 Units by mouth Daily.   10/13/2021 at Unknown time    dilTIAZem CD (CARDIZEM CD) 120 MG 24 hr capsule Take 120 mg by mouth Daily.   10/13/2021 at Unknown time    ELDERBERRY PO Take 1 capsule by mouth 2 (two) times a day. Elderberry 158mg   10/13/2021 at Unknown time    losartan (COZAAR) 50 MG tablet TAKE ONE BY MOUTH DAILY 30 tablet 0 10/13/2021 at Unknown time    melatonin 5 MG tablet tablet Take 5 mg by mouth Every Night.   10/12/2021 at Unknown time    naproxen sodium (ALEVE) 220 MG tablet Take 220 mg by mouth 2 (Two) Times a Day As Needed.   10/13/2021 at Unknown time    sildenafil (REVATIO) 20 MG tablet Take 20 mg by mouth Daily.   10/12/2021 at Unknown time     triamterene-hydrochlorothiazide (MAXZIDE-25) 37.5-25 MG per tablet TAKE 1 TABLET BY MOUTH DAILY. 90 tablet 2 10/12/2021 at Unknown time    Turmeric 500 MG capsule Take 500 mg by mouth Daily.   10/13/2021 at Unknown time     aspirin, 81 mg, Oral, Daily      Allergies:  Lisinopril    Objective      Vital Signs  Heart Rate:  [62-74] 66  BP: (105-150)/() 138/51           Physical Exam  Vitals and nursing note reviewed.   Constitutional:       General: He is awake.      Appearance: Normal appearance. He is well-developed, well-groomed and normal weight.      Comments: Wife at bedside   HENT:      Head: Normocephalic and atraumatic.      Nose: Nose normal.      Mouth/Throat:      Lips: Pink.      Mouth: Mucous membranes are moist.      Pharynx: Uvula midline.   Eyes:      General: Lids are normal. No scleral icterus.     Extraocular Movements: Extraocular movements intact.      Conjunctiva/sclera: Conjunctivae normal.      Pupils: Pupils are equal, round, and reactive to light.   Neck:      Thyroid: No thyroid mass or thyromegaly.      Vascular: Normal carotid pulses. No carotid bruit, hepatojugular reflux or JVD.      Trachea: Trachea normal.   Cardiovascular:      Rate and Rhythm: Normal rate. Rhythm irregularly irregular.      Pulses:           Carotid pulses are 2+ on the right side and 2+ on the left side.       Radial pulses are 2+ on the right side and 2+ on the left side.        Femoral pulses are 2+ on the right side and 2+ on the left side.       Popliteal pulses are 2+ on the right side and 2+ on the left side.        Dorsalis pedis pulses are 2+ on the right side and 2+ on the left side.        Posterior tibial pulses are 2+ on the right side and 2+ on the left side.      Heart sounds: Murmur heard.    Systolic murmur is present with a grade of 1/6.       Comments: Tele:  afib 60s  Pulmonary:      Effort: Pulmonary effort is normal.      Breath sounds: Normal breath sounds.      Comments: On room  air  Chest:   Breasts:      Right: No supraclavicular adenopathy.      Left: No supraclavicular adenopathy.       Abdominal:      General: Abdomen is flat. Bowel sounds are normal. There is no distension or abdominal bruit.      Palpations: Abdomen is soft.      Tenderness: There is no abdominal tenderness.   Musculoskeletal:      Cervical back: Neck supple.      Right lower leg: No edema.      Left lower leg: No edema.      Comments: Gait steady and strong without use of assistive devices   Lymphadenopathy:      Cervical: No cervical adenopathy.      Upper Body:      Right upper body: No supraclavicular adenopathy.      Left upper body: No supraclavicular adenopathy.   Skin:     General: Skin is warm and dry.      Capillary Refill: Capillary refill takes less than 2 seconds.      Findings: No erythema or rash.      Nails: There is no clubbing.   Neurological:      Mental Status: He is alert and oriented to person, place, and time.      GCS: GCS eye subscore is 4. GCS verbal subscore is 5. GCS motor subscore is 6.   Psychiatric:         Attention and Perception: Attention and perception normal.         Mood and Affect: Mood and affect normal.         Speech: Speech normal.         Behavior: Behavior normal. Behavior is cooperative.         Thought Content: Thought content normal.         Cognition and Memory: Cognition and memory normal.         Judgment: Judgment normal.         Results Review:   Lab Results (last 24 hours)       Procedure Component Value Units Date/Time    POC Activated Clotting Time [[id number]]  (Normal) Collected: 10/13/21 1013    Specimen: Arterial Blood Updated: 10/13/21 1017     Activated Clotting Time  131 Seconds      Comment: Serial Number: [id number]:  [id number]       POC Activated Clotting Time [[id number]]  (Abnormal) Collected: 10/13/21 0839    Specimen: Arterial Blood Updated: 10/13/21 0848     Activated Clotting Time  175 Seconds      Comment: Serial Number: [id number]:  [id number]                    Assessment/Plan       Atrial fibrillation (HCC)    Hypertension    Nonrheumatic mitral valve regurgitation    Encounter for screening for vascular disease    Elevated coronary artery calcium score      Assessment & Plan    MV CAD, 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  Pt is on Eliquis currently.  Dr. Hernandez to evaluate studies and d/w Dr. Knight.  If Dr. Hernandez does not see pt today before discharge, pt has an appt to be seen in office tomorrow at 11:15 am.  Pt/wife are aware and in agreement about appt.    Thank you for allowing us to participate in the care of this patient.      Ning Ruiz, KWASI  10/13/21  15:49 EDT    **all problems new to this examiner  **EKG and CXR independently reviewed and interpreted  Addendum  Patient was seen and examined by me, I agree with above note, I review the cardiac cath and interpreted the results myself I discussed the findings and options with the patient and wife.  He has multivessel coronary artery disease and persistent atrial fibrillation.  It is questionable moderate or more mitral regurgitation.  An echocardiogram is pending.  I recommend CABG with JAMSHID, Maze procedure and possible mitral valve repair based on the findings on the echocardiogram.  I discussed the risks (STS calculated), benefits and potential surgery and he wishes to proceed.  We will perform surgery as an outpatient.  He will need to have the Eliquis discontinued for 5 to 7 days before surgery and most likely a Lovenox bridge.  Johnathan Hernandez MD

## 2021-10-14 ENCOUNTER — PREP FOR SURGERY (OUTPATIENT)
Dept: OTHER | Facility: HOSPITAL | Age: 76
End: 2021-10-14

## 2021-10-14 DIAGNOSIS — I10 ESSENTIAL HYPERTENSION: ICD-10-CM

## 2021-10-14 DIAGNOSIS — E11.9 TYPE 2 DIABETES MELLITUS WITHOUT COMPLICATIONS (HCC): ICD-10-CM

## 2021-10-14 DIAGNOSIS — I48.11 LONGSTANDING PERSISTENT ATRIAL FIBRILLATION (HCC): Primary | ICD-10-CM

## 2021-10-14 DIAGNOSIS — R06.00 DYSPNEA, UNSPECIFIED: ICD-10-CM

## 2021-10-14 DIAGNOSIS — I11.0 HYPERTENSIVE HEART DISEASE WITH HEART FAILURE (HCC): ICD-10-CM

## 2021-10-14 DIAGNOSIS — I65.23 OCCLUSION AND STENOSIS OF BILATERAL CAROTID ARTERIES: ICD-10-CM

## 2021-10-14 DIAGNOSIS — R79.1 ABNORMAL COAGULATION PROFILE: ICD-10-CM

## 2021-10-14 DIAGNOSIS — I25.10 CORONARY ARTERY DISEASE INVOLVING NATIVE CORONARY ARTERY OF NATIVE HEART WITHOUT ANGINA PECTORIS: Primary | ICD-10-CM

## 2021-10-14 DIAGNOSIS — I34.0 NONRHEUMATIC MITRAL VALVE REGURGITATION: ICD-10-CM

## 2021-10-14 RX ORDER — CHLORHEXIDINE GLUCONATE 0.12 MG/ML
15 RINSE ORAL EVERY 12 HOURS
Status: CANCELLED | OUTPATIENT
Start: 2021-10-14 | End: 2021-10-15

## 2021-10-14 RX ORDER — CHLORHEXIDINE GLUCONATE 500 MG/1
1 CLOTH TOPICAL EVERY 12 HOURS PRN
Status: CANCELLED | OUTPATIENT
Start: 2021-10-14

## 2021-10-14 RX ORDER — CHLORHEXIDINE GLUCONATE 0.12 MG/ML
15 RINSE ORAL ONCE
Status: CANCELLED | OUTPATIENT
Start: 2021-10-14 | End: 2021-10-14

## 2021-10-15 PROBLEM — I25.10 CORONARY ARTERY DISEASE INVOLVING NATIVE CORONARY ARTERY OF NATIVE HEART WITHOUT ANGINA PECTORIS: Status: ACTIVE | Noted: 2021-10-15

## 2021-10-16 PROBLEM — S27.0XXA TRAUMATIC PNEUMOTHORAX: Status: RESOLVED | Noted: 2020-11-25 | Resolved: 2021-10-16

## 2021-10-16 PROBLEM — R09.1 PLEURISY: Status: RESOLVED | Noted: 2020-11-25 | Resolved: 2021-10-16

## 2021-10-16 NOTE — PATIENT INSTRUCTIONS
Health Maintenance Due   Topic Date Due   • URINE MICROALBUMIN  Never done   • COVID-19 Vaccine (1) Never done   • DIABETIC FOOT EXAM  Never done   • INFLUENZA VACCINE  08/01/2021     Ask about Mitral valve leaking as well.    Arthriutis strenth Tylenol 3 times/day.      TENS Unit for pain (shaquile o'tremayne)

## 2021-10-18 ENCOUNTER — OFFICE VISIT (OUTPATIENT)
Dept: FAMILY MEDICINE CLINIC | Facility: CLINIC | Age: 76
End: 2021-10-18

## 2021-10-18 ENCOUNTER — TELEPHONE (OUTPATIENT)
Dept: FAMILY MEDICINE CLINIC | Facility: CLINIC | Age: 76
End: 2021-10-18

## 2021-10-18 ENCOUNTER — HOSPITAL ENCOUNTER (OUTPATIENT)
Dept: CT IMAGING | Facility: HOSPITAL | Age: 76
Discharge: HOME | End: 2021-10-18
Admitting: PREVENTIVE MEDICINE

## 2021-10-18 VITALS
OXYGEN SATURATION: 84 % | DIASTOLIC BLOOD PRESSURE: 86 MMHG | HEART RATE: 84 BPM | SYSTOLIC BLOOD PRESSURE: 154 MMHG | HEIGHT: 71 IN | WEIGHT: 222.4 LBS | BODY MASS INDEX: 31.14 KG/M2 | TEMPERATURE: 98.8 F

## 2021-10-18 DIAGNOSIS — M54.50 ACUTE LEFT-SIDED LOW BACK PAIN WITHOUT SCIATICA: Primary | ICD-10-CM

## 2021-10-18 DIAGNOSIS — M54.50 ACUTE LEFT-SIDED LOW BACK PAIN WITHOUT SCIATICA: ICD-10-CM

## 2021-10-18 DIAGNOSIS — I25.10 CORONARY ARTERY DISEASE INVOLVING NATIVE CORONARY ARTERY OF NATIVE HEART WITHOUT ANGINA PECTORIS: ICD-10-CM

## 2021-10-18 DIAGNOSIS — M54.50 LOW BACK PAIN POTENTIALLY ASSOCIATED WITH RADICULOPATHY: Primary | ICD-10-CM

## 2021-10-18 DIAGNOSIS — Z23 IMMUNIZATION DUE: ICD-10-CM

## 2021-10-18 PROBLEM — S27.321A CONTUSION OF LEFT LUNG: Status: RESOLVED | Noted: 2020-11-09 | Resolved: 2021-10-18

## 2021-10-18 LAB
BILIRUB BLD-MCNC: NEGATIVE MG/DL
CLARITY, POC: CLEAR
COLOR UR: YELLOW
EXPIRATION DATE: NORMAL
GLUCOSE UR STRIP-MCNC: NEGATIVE MG/DL
KETONES UR QL: NEGATIVE
LEUKOCYTE EST, POC: NEGATIVE
Lab: NORMAL
NITRITE UR-MCNC: NEGATIVE MG/ML
PH UR: 6 [PH] (ref 5–8)
PROT UR STRIP-MCNC: NEGATIVE MG/DL
RBC # UR STRIP: NEGATIVE /UL
SP GR UR: 1.01 (ref 1–1.03)
UROBILINOGEN UR QL: NORMAL

## 2021-10-18 PROCEDURE — G0008 ADMIN INFLUENZA VIRUS VAC: HCPCS | Performed by: PREVENTIVE MEDICINE

## 2021-10-18 PROCEDURE — 81003 URINALYSIS AUTO W/O SCOPE: CPT | Performed by: PREVENTIVE MEDICINE

## 2021-10-18 PROCEDURE — 72131 CT LUMBAR SPINE W/O DYE: CPT

## 2021-10-18 PROCEDURE — 99213 OFFICE O/P EST LOW 20 MIN: CPT | Performed by: PREVENTIVE MEDICINE

## 2021-10-18 PROCEDURE — 90662 IIV NO PRSV INCREASED AG IM: CPT | Performed by: PREVENTIVE MEDICINE

## 2021-10-18 NOTE — TELEPHONE ENCOUNTER
----- Message from Kortney Ferrera MD sent at 10/18/2021  3:49 PM EDT -----  Kidneys looked fine on CT but curve at L3/L4 showing moderate to severe canal stenosis at L3/L$ with foraminal narrowing at that level - would consider MRI  of LS spine to further deliniate-order placed.
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
## 2021-10-18 NOTE — PROGRESS NOTES
Kidneys looked fine on CT but curve at L3/L4 showing moderate to severe canal stenosis at L3/L$ with foraminal narrowing at that level - would consider MRI  of LS spine to further deliniate-order placed.

## 2021-10-18 NOTE — TELEPHONE ENCOUNTER
HUB TO READ    ----- Message from Kortney Ferrera MD sent at 10/18/2021  3:45 PM EDT -----    Urine looks good    Kidneys looked fine on CT but curve at L3/L4 showing moderate to severe canal stenosis at L3/L$ with foraminal narrowing at that level - would consider MRI  of LS spine to further deliniate-order placed.

## 2021-10-19 ENCOUNTER — TELEPHONE (OUTPATIENT)
Dept: CARDIOLOGY | Facility: CLINIC | Age: 76
End: 2021-10-19

## 2021-10-19 ENCOUNTER — TELEPHONE (OUTPATIENT)
Dept: CARDIAC SURGERY | Facility: CLINIC | Age: 76
End: 2021-10-19

## 2021-10-19 NOTE — TELEPHONE ENCOUNTER
Called and LVM regarding Dr Knight's restrictions.    ----------------------  Renato Knight MD Trent, Melissa, MA    He can do regular walking   Nothing too strenuous / he does have some significant tight lesions             Previous Messages       ----- Message -----   From: Rosalina Hinojosa MA   Sent: 10/19/2021  10:16 AM EDT   To: Renato Knight MD   Subject: Restrictions                                     Patient called wants to know if he has any restrictions before his open heart surgery on 11/11/21.     Please advise

## 2021-10-19 NOTE — TELEPHONE ENCOUNTER
Spoke to patient with PAT and surgery times.PAT is on 11-8-221 at 0800 with any testing to follow.  Surgery is on 11- at 0730 with an 0500 arrival time.  His last dose of ELIQUIS will be on 11-4-2021. He expressed a verbal understanding of these instructions.  He was instructed to call the office with any further questions.

## 2021-10-20 ENCOUNTER — HOSPITAL ENCOUNTER (OUTPATIENT)
Dept: CARDIOLOGY | Facility: HOSPITAL | Age: 76
Discharge: HOME | End: 2021-10-20
Admitting: INTERNAL MEDICINE

## 2021-10-20 VITALS
SYSTOLIC BLOOD PRESSURE: 158 MMHG | HEIGHT: 71 IN | DIASTOLIC BLOOD PRESSURE: 95 MMHG | HEART RATE: 79 BPM | WEIGHT: 222 LBS | BODY MASS INDEX: 31.08 KG/M2

## 2021-10-20 DIAGNOSIS — I48.11 LONGSTANDING PERSISTENT ATRIAL FIBRILLATION (HCC): ICD-10-CM

## 2021-10-20 DIAGNOSIS — I34.0 NONRHEUMATIC MITRAL VALVE REGURGITATION: ICD-10-CM

## 2021-10-20 PROCEDURE — 93306 TTE W/DOPPLER COMPLETE: CPT

## 2021-10-20 PROCEDURE — 93306 TTE W/DOPPLER COMPLETE: CPT | Performed by: INTERNAL MEDICINE

## 2021-10-21 LAB
ASCENDING AORTA: 4.2 CM
BH CV ECHO MEAS - ACS: 2.4 CM
BH CV ECHO MEAS - AO MAX PG (FULL): 0.05 MMHG
BH CV ECHO MEAS - AO MAX PG: 3.3 MMHG
BH CV ECHO MEAS - AO MEAN PG (FULL): -0.07 MMHG
BH CV ECHO MEAS - AO MEAN PG: 1.9 MMHG
BH CV ECHO MEAS - AO ROOT AREA (BSA CORRECTED): 1.9
BH CV ECHO MEAS - AO ROOT AREA: 13.8 CM^2
BH CV ECHO MEAS - AO ROOT DIAM: 4.2 CM
BH CV ECHO MEAS - AO V2 MAX: 90.7 CM/SEC
BH CV ECHO MEAS - AO V2 MEAN: 66.1 CM/SEC
BH CV ECHO MEAS - AO V2 VTI: 16.8 CM
BH CV ECHO MEAS - ASC AORTA: 4.2 CM
BH CV ECHO MEAS - AVA(I,A): 4.5 CM^2
BH CV ECHO MEAS - AVA(I,D): 4.5 CM^2
BH CV ECHO MEAS - AVA(V,A): 4.4 CM^2
BH CV ECHO MEAS - AVA(V,D): 4.4 CM^2
BH CV ECHO MEAS - BSA(HAYCOCK): 2.3 M^2
BH CV ECHO MEAS - BSA: 2.2 M^2
BH CV ECHO MEAS - BZI_BMI: 31 KILOGRAMS/M^2
BH CV ECHO MEAS - BZI_METRIC_HEIGHT: 180.3 CM
BH CV ECHO MEAS - BZI_METRIC_WEIGHT: 100.7 KG
BH CV ECHO MEAS - EDV(CUBED): 137.1 ML
BH CV ECHO MEAS - EDV(MOD-SP2): 114.6 ML
BH CV ECHO MEAS - EDV(MOD-SP4): 102.7 ML
BH CV ECHO MEAS - EDV(TEICH): 127 ML
BH CV ECHO MEAS - EF(CUBED): 62.2 %
BH CV ECHO MEAS - EF(MOD-BP): 50 %
BH CV ECHO MEAS - EF(MOD-SP2): 47.5 %
BH CV ECHO MEAS - EF(MOD-SP4): 52.2 %
BH CV ECHO MEAS - EF(TEICH): 53.4 %
BH CV ECHO MEAS - ESV(CUBED): 51.9 ML
BH CV ECHO MEAS - ESV(MOD-SP2): 60.2 ML
BH CV ECHO MEAS - ESV(MOD-SP4): 49.1 ML
BH CV ECHO MEAS - ESV(TEICH): 59.2 ML
BH CV ECHO MEAS - FS: 27.7 %
BH CV ECHO MEAS - IVS/LVPW: 1.1
BH CV ECHO MEAS - IVSD: 1.3 CM
BH CV ECHO MEAS - LA DIMENSION(2D): 5.1 CM
BH CV ECHO MEAS - LA DIMENSION: 5.1 CM
BH CV ECHO MEAS - LA/AO: 1.2
BH CV ECHO MEAS - LAT PEAK E' VEL: 13 CM/SEC
BH CV ECHO MEAS - LV DIASTOLIC VOL/BSA (35-75): 46.6 ML/M^2
BH CV ECHO MEAS - LV IVRT: 0.07 SEC
BH CV ECHO MEAS - LV MASS(C)D: 242.7 GRAMS
BH CV ECHO MEAS - LV MASS(C)DI: 110.1 GRAMS/M^2
BH CV ECHO MEAS - LV MAX PG: 3.3 MMHG
BH CV ECHO MEAS - LV MEAN PG: 2 MMHG
BH CV ECHO MEAS - LV SYSTOLIC VOL/BSA (12-30): 22.3 ML/M^2
BH CV ECHO MEAS - LV V1 MAX: 90.1 CM/SEC
BH CV ECHO MEAS - LV V1 MEAN: 68.1 CM/SEC
BH CV ECHO MEAS - LV V1 VTI: 17.1 CM
BH CV ECHO MEAS - LVIDD: 5.2 CM
BH CV ECHO MEAS - LVIDS: 3.7 CM
BH CV ECHO MEAS - LVOT AREA: 4.4 CM^2
BH CV ECHO MEAS - LVOT DIAM: 2.4 CM
BH CV ECHO MEAS - LVPWD: 1.1 CM
BH CV ECHO MEAS - MED PEAK E' VEL: 13 CM/SEC
BH CV ECHO MEAS - MV E MAX VEL: 81.1 CM/SEC
BH CV ECHO MEAS - MV MAX PG: 4.6 MMHG
BH CV ECHO MEAS - MV MEAN PG: 1.4 MMHG
BH CV ECHO MEAS - MV P1/2T: 37 MSEC
BH CV ECHO MEAS - MV V2 MAX: 107 CM/SEC
BH CV ECHO MEAS - MV V2 MEAN: 51.3 CM/SEC
BH CV ECHO MEAS - MV V2 VTI: 20.8 CM
BH CV ECHO MEAS - MVA(P1/2T): 6 CM2
BH CV ECHO MEAS - MVA(VTI): 3.6 CM^2
BH CV ECHO MEAS - PA ACC SLOPE: 567 CM/SEC2
BH CV ECHO MEAS - PA ACC TIME: 0.09 SEC
BH CV ECHO MEAS - PA MAX PG (FULL): 1.5 MMHG
BH CV ECHO MEAS - PA MAX PG: 2.6 MMHG
BH CV ECHO MEAS - PA PR(ACCEL): 37.4 MMHG
BH CV ECHO MEAS - PA V2 MAX: 81.3 CM/SEC
BH CV ECHO MEAS - RAP SYSTOLE: 8 MMHG
BH CV ECHO MEAS - RV MAX PG: 1.2 MMHG
BH CV ECHO MEAS - RV V1 MAX: 54.5 CM/SEC
BH CV ECHO MEAS - RVSP: 33 MMHG
BH CV ECHO MEAS - SI(AO): 105.4 ML/M^2
BH CV ECHO MEAS - SI(CUBED): 38.7 ML/M^2
BH CV ECHO MEAS - SI(LVOT): 34.1 ML/M^2
BH CV ECHO MEAS - SI(MOD-SP2): 24.7 ML/M^2
BH CV ECHO MEAS - SI(MOD-SP4): 24.3 ML/M^2
BH CV ECHO MEAS - SI(TEICH): 30.8 ML/M^2
BH CV ECHO MEAS - SUP REN AO DIAM: 2.6 CM
BH CV ECHO MEAS - SV(AO): 232.3 ML
BH CV ECHO MEAS - SV(CUBED): 85.3 ML
BH CV ECHO MEAS - SV(LVOT): 75.2 ML
BH CV ECHO MEAS - SV(MOD-SP2): 54.4 ML
BH CV ECHO MEAS - SV(MOD-SP4): 53.6 ML
BH CV ECHO MEAS - SV(TEICH): 67.8 ML
BH CV ECHO MEAS - TAPSE (>1.6): 2.4 CM
BH CV ECHO MEAS - TR MAX PG: 25 MMHG
BH CV ECHO MEAS - TR MAX VEL: 250.1 CM/SEC
BH CV ECHO MEASUREMENTS AVERAGE E/E' RATIO: 6.24
BH CV VAS BP LEFT ARM: NORMAL MMHG
BH CV XLRA - RV BASE: 4.2 CM
BH CV XLRA - RV MID: 2.7 CM
BH CV XLRA - TDI S': 11 CM/SEC
IVRT: 67 MSEC
LEFT ATRIUM VOLUME INDEX: 63 ML/M2
LEFT ATRIUM VOLUME: 139 CM3
LV EF 2D ECHO EST: 55 %

## 2021-10-22 ENCOUNTER — HOSPITAL ENCOUNTER (OUTPATIENT)
Dept: MRI IMAGING | Facility: HOSPITAL | Age: 76
Discharge: HOME | End: 2021-10-22
Admitting: PREVENTIVE MEDICINE

## 2021-10-22 DIAGNOSIS — M54.50 LOW BACK PAIN POTENTIALLY ASSOCIATED WITH RADICULOPATHY: ICD-10-CM

## 2021-10-22 PROCEDURE — 72148 MRI LUMBAR SPINE W/O DYE: CPT

## 2021-11-09 ENCOUNTER — HOSPITAL ENCOUNTER (OUTPATIENT)
Dept: RESPIRATORY THERAPY | Facility: HOSPITAL | Age: 76
Discharge: HOME | End: 2021-11-09

## 2021-11-09 ENCOUNTER — HOSPITAL ENCOUNTER (OUTPATIENT)
Dept: CARDIOLOGY | Facility: HOSPITAL | Age: 76
Discharge: HOME | End: 2021-11-09

## 2021-11-09 ENCOUNTER — HOSPITAL ENCOUNTER (OUTPATIENT)
Dept: GENERAL RADIOLOGY | Facility: HOSPITAL | Age: 76
Discharge: HOME | End: 2021-11-09

## 2021-11-09 ENCOUNTER — PRE-ADMISSION TESTING (OUTPATIENT)
Dept: PREADMISSION TESTING | Facility: HOSPITAL | Age: 76
End: 2021-11-09

## 2021-11-09 ENCOUNTER — LAB (OUTPATIENT)
Dept: LAB | Facility: HOSPITAL | Age: 76
End: 2021-11-09

## 2021-11-09 VITALS
HEIGHT: 72 IN | RESPIRATION RATE: 12 BRPM | OXYGEN SATURATION: 96 % | SYSTOLIC BLOOD PRESSURE: 134 MMHG | DIASTOLIC BLOOD PRESSURE: 87 MMHG | TEMPERATURE: 97.7 F | WEIGHT: 221.5 LBS | HEART RATE: 87 BPM | BODY MASS INDEX: 30 KG/M2

## 2021-11-09 DIAGNOSIS — I25.10 CORONARY ARTERY DISEASE INVOLVING NATIVE CORONARY ARTERY OF NATIVE HEART WITHOUT ANGINA PECTORIS: ICD-10-CM

## 2021-11-09 DIAGNOSIS — R79.1 ABNORMAL COAGULATION PROFILE: ICD-10-CM

## 2021-11-09 DIAGNOSIS — E11.9 TYPE 2 DIABETES MELLITUS WITHOUT COMPLICATIONS (HCC): ICD-10-CM

## 2021-11-09 DIAGNOSIS — I11.0 HYPERTENSIVE HEART DISEASE WITH HEART FAILURE (HCC): ICD-10-CM

## 2021-11-09 LAB
ABO GROUP BLD: NORMAL
ALBUMIN SERPL-MCNC: 4 G/DL (ref 3.5–5.2)
ALBUMIN/GLOB SERPL: 1.5 G/DL
ALP SERPL-CCNC: 60 U/L (ref 39–117)
ALT SERPL W P-5'-P-CCNC: 20 U/L (ref 1–41)
ANION GAP SERPL CALCULATED.3IONS-SCNC: 11 MMOL/L (ref 5–15)
APTT PPP: 24.5 SECONDS (ref 24–31)
ARTERIAL PATENCY WRIST A: POSITIVE
AST SERPL-CCNC: 26 U/L (ref 1–40)
ATMOSPHERIC PRESS: ABNORMAL MM[HG]
BASE EXCESS BLDA CALC-SCNC: 3 MMOL/L (ref 0–3)
BASOPHILS # BLD AUTO: 0.1 10*3/MM3 (ref 0–0.2)
BASOPHILS NFR BLD AUTO: 0.7 % (ref 0–1.5)
BDY SITE: ABNORMAL
BILIRUB SERPL-MCNC: 0.3 MG/DL (ref 0–1.2)
BILIRUB UR QL STRIP: NEGATIVE
BLD GP AB SCN SERPL QL: NEGATIVE
BUN SERPL-MCNC: 22 MG/DL (ref 8–23)
BUN/CREAT SERPL: 25.9 (ref 7–25)
CALCIUM SPEC-SCNC: 9.6 MG/DL (ref 8.6–10.5)
CHLORIDE SERPL-SCNC: 104 MMOL/L (ref 98–107)
CHOLEST SERPL-MCNC: 157 MG/DL (ref 0–200)
CLARITY UR: CLEAR
CLOSE TME COLL+ADP + EPINEP PNL BLD: 96 % (ref 86–100)
CO2 BLDA-SCNC: 28.2 MMOL/L (ref 22–29)
CO2 SERPL-SCNC: 29 MMOL/L (ref 22–29)
COLOR UR: YELLOW
CREAT SERPL-MCNC: 0.85 MG/DL (ref 0.76–1.27)
DEPRECATED RDW RBC AUTO: 45.5 FL (ref 37–54)
EOSINOPHIL # BLD AUTO: 0.2 10*3/MM3 (ref 0–0.4)
EOSINOPHIL NFR BLD AUTO: 2.5 % (ref 0.3–6.2)
ERYTHROCYTE [DISTWIDTH] IN BLOOD BY AUTOMATED COUNT: 14.4 % (ref 12.3–15.4)
GFR SERPL CREATININE-BSD FRML MDRD: 88 ML/MIN/1.73
GLOBULIN UR ELPH-MCNC: 2.6 GM/DL
GLUCOSE SERPL-MCNC: 91 MG/DL (ref 65–99)
GLUCOSE UR STRIP-MCNC: NEGATIVE MG/DL
HBA1C MFR BLD: 5.6 % (ref 3.5–5.6)
HCO3 BLDA-SCNC: 27 MMOL/L (ref 21–28)
HCT VFR BLD AUTO: 41.6 % (ref 37.5–51)
HDLC SERPL-MCNC: 42 MG/DL (ref 40–60)
HEMODILUTION: NO
HGB BLD-MCNC: 14.4 G/DL (ref 13–17.7)
HGB UR QL STRIP.AUTO: NEGATIVE
INHALED O2 CONCENTRATION: 21 %
INR PPP: 0.98 (ref 0.93–1.1)
KETONES UR QL STRIP: NEGATIVE
LDLC SERPL CALC-MCNC: 101 MG/DL (ref 0–100)
LDLC/HDLC SERPL: 2.41 {RATIO}
LEUKOCYTE ESTERASE UR QL STRIP.AUTO: NEGATIVE
LYMPHOCYTES # BLD AUTO: 0.9 10*3/MM3 (ref 0.7–3.1)
LYMPHOCYTES NFR BLD AUTO: 11.6 % (ref 19.6–45.3)
MAGNESIUM SERPL-MCNC: 2.1 MG/DL (ref 1.6–2.4)
MCH RBC QN AUTO: 30.8 PG (ref 26.6–33)
MCHC RBC AUTO-ENTMCNC: 34.5 G/DL (ref 31.5–35.7)
MCV RBC AUTO: 89.3 FL (ref 79–97)
MODALITY: ABNORMAL
MONOCYTES # BLD AUTO: 1 10*3/MM3 (ref 0.1–0.9)
MONOCYTES NFR BLD AUTO: 13.2 % (ref 5–12)
MRSA DNA SPEC QL NAA+PROBE: NORMAL
NEUTROPHILS NFR BLD AUTO: 5.5 10*3/MM3 (ref 1.7–7)
NEUTROPHILS NFR BLD AUTO: 72 % (ref 42.7–76)
NITRITE UR QL STRIP: NEGATIVE
NRBC BLD AUTO-RTO: 0.1 /100 WBC (ref 0–0.2)
NT-PROBNP SERPL-MCNC: 331.8 PG/ML (ref 0–900)
PCO2 BLDA: 38.6 MM HG (ref 35–48)
PH BLDA: 7.45 PH UNITS (ref 7.35–7.45)
PH UR STRIP.AUTO: 7 [PH] (ref 5–8)
PLATELET # BLD AUTO: 249 10*3/MM3 (ref 140–450)
PMV BLD AUTO: 9.2 FL (ref 6–12)
PO2 BLDA: 83.6 MM HG (ref 83–108)
POTASSIUM SERPL-SCNC: 4.7 MMOL/L (ref 3.5–5.2)
PROT SERPL-MCNC: 6.6 G/DL (ref 6–8.5)
PROT UR QL STRIP: NEGATIVE
PROTHROMBIN TIME: 10.9 SECONDS (ref 9.6–11.7)
RBC # BLD AUTO: 4.66 10*6/MM3 (ref 4.14–5.8)
RH BLD: NEGATIVE
SAO2 % BLDCOA: 96.8 % (ref 94–98)
SARS-COV-2 ORF1AB RESP QL NAA+PROBE: NOT DETECTED
SODIUM SERPL-SCNC: 144 MMOL/L (ref 136–145)
SP GR UR STRIP: 1.02 (ref 1–1.03)
T&S EXPIRATION DATE: NORMAL
TRIGL SERPL-MCNC: 69 MG/DL (ref 0–150)
UROBILINOGEN UR QL STRIP: NORMAL
VLDLC SERPL-MCNC: 14 MG/DL (ref 5–40)
WBC # BLD AUTO: 7.6 10*3/MM3 (ref 3.4–10.8)

## 2021-11-09 PROCEDURE — 86900 BLOOD TYPING SEROLOGIC ABO: CPT

## 2021-11-09 PROCEDURE — 85730 THROMBOPLASTIN TIME PARTIAL: CPT

## 2021-11-09 PROCEDURE — 36600 WITHDRAWAL OF ARTERIAL BLOOD: CPT | Performed by: THORACIC SURGERY (CARDIOTHORACIC VASCULAR SURGERY)

## 2021-11-09 PROCEDURE — 80053 COMPREHEN METABOLIC PANEL: CPT

## 2021-11-09 PROCEDURE — U0005 INFEC AGEN DETEC AMPLI PROBE: HCPCS

## 2021-11-09 PROCEDURE — 86850 RBC ANTIBODY SCREEN: CPT

## 2021-11-09 PROCEDURE — 85576 BLOOD PLATELET AGGREGATION: CPT

## 2021-11-09 PROCEDURE — 83735 ASSAY OF MAGNESIUM: CPT

## 2021-11-09 PROCEDURE — 80061 LIPID PANEL: CPT

## 2021-11-09 PROCEDURE — 36415 COLL VENOUS BLD VENIPUNCTURE: CPT

## 2021-11-09 PROCEDURE — 87641 MR-STAPH DNA AMP PROBE: CPT

## 2021-11-09 PROCEDURE — 82803 BLOOD GASES ANY COMBINATION: CPT | Performed by: THORACIC SURGERY (CARDIOTHORACIC VASCULAR SURGERY)

## 2021-11-09 PROCEDURE — 86923 COMPATIBILITY TEST ELECTRIC: CPT

## 2021-11-09 PROCEDURE — 85610 PROTHROMBIN TIME: CPT

## 2021-11-09 PROCEDURE — 83036 HEMOGLOBIN GLYCOSYLATED A1C: CPT

## 2021-11-09 PROCEDURE — 93010 ELECTROCARDIOGRAM REPORT: CPT | Performed by: INTERNAL MEDICINE

## 2021-11-09 PROCEDURE — 81003 URINALYSIS AUTO W/O SCOPE: CPT

## 2021-11-09 PROCEDURE — C9803 HOPD COVID-19 SPEC COLLECT: HCPCS

## 2021-11-09 PROCEDURE — 93005 ELECTROCARDIOGRAM TRACING: CPT | Performed by: NURSE PRACTITIONER

## 2021-11-09 PROCEDURE — 83880 ASSAY OF NATRIURETIC PEPTIDE: CPT

## 2021-11-09 PROCEDURE — 86901 BLOOD TYPING SEROLOGIC RH(D): CPT

## 2021-11-09 PROCEDURE — 85025 COMPLETE CBC W/AUTO DIFF WBC: CPT

## 2021-11-09 PROCEDURE — U0004 COV-19 TEST NON-CDC HGH THRU: HCPCS

## 2021-11-09 PROCEDURE — 71046 X-RAY EXAM CHEST 2 VIEWS: CPT

## 2021-11-09 PROCEDURE — 94799 UNLISTED PULMONARY SVC/PX: CPT

## 2021-11-10 ENCOUNTER — ANESTHESIA EVENT (OUTPATIENT)
Dept: PERIOP | Facility: HOSPITAL | Age: 76
End: 2021-11-10

## 2021-11-10 LAB — QT INTERVAL: 404 MS

## 2021-11-11 ENCOUNTER — APPOINTMENT (OUTPATIENT)
Dept: GENERAL RADIOLOGY | Facility: HOSPITAL | Age: 76
End: 2021-11-11

## 2021-11-11 ENCOUNTER — HOSPITAL ENCOUNTER (INPATIENT)
Facility: HOSPITAL | Age: 76
LOS: 6 days | Discharge: HOME HEALTH | End: 2021-11-17
Attending: THORACIC SURGERY (CARDIOTHORACIC VASCULAR SURGERY) | Admitting: THORACIC SURGERY (CARDIOTHORACIC VASCULAR SURGERY)

## 2021-11-11 ENCOUNTER — ANESTHESIA (OUTPATIENT)
Dept: PERIOP | Facility: HOSPITAL | Age: 76
End: 2021-11-11

## 2021-11-11 DIAGNOSIS — Z98.890 S/P MAZE OPERATION FOR ATRIAL FIBRILLATION: Primary | ICD-10-CM

## 2021-11-11 DIAGNOSIS — Z98.890 S/P MVR (MITRAL VALVE REPAIR): ICD-10-CM

## 2021-11-11 DIAGNOSIS — Z95.1 S/P CABG X 3: ICD-10-CM

## 2021-11-11 DIAGNOSIS — I25.10 CORONARY ARTERY DISEASE INVOLVING NATIVE CORONARY ARTERY OF NATIVE HEART WITHOUT ANGINA PECTORIS: ICD-10-CM

## 2021-11-11 DIAGNOSIS — Z86.79 S/P MAZE OPERATION FOR ATRIAL FIBRILLATION: Primary | ICD-10-CM

## 2021-11-11 LAB
ACT BLD: 103 SECONDS (ref 89–137)
ACT BLD: 120 SECONDS (ref 89–137)
ACT BLD: 340 SECONDS (ref 89–137)
ACT BLD: 367 SECONDS (ref 89–137)
ACT BLD: 378 SECONDS (ref 89–137)
ACT BLD: 444 SECONDS (ref 89–137)
ACT BLD: 450 SECONDS (ref 89–137)
ALBUMIN SERPL-MCNC: 4 G/DL (ref 3.5–5.2)
ANION GAP SERPL CALCULATED.3IONS-SCNC: 12 MMOL/L (ref 5–15)
APTT PPP: 25.6 SECONDS (ref 24–31)
ARTERIAL PATENCY WRIST A: ABNORMAL
ATMOSPHERIC PRESS: ABNORMAL MM[HG]
BASE DEFICIT: -2.8 MEQ/LITER
BASE DEFICIT: ABNORMAL
BASE EXCESS BLDA CALC-SCNC: -0.3 MMOL/L (ref 0–3)
BASE EXCESS BLDA CALC-SCNC: -0.9 MMOL/L (ref 0–3)
BASE EXCESS BLDA CALC-SCNC: -1.2 MMOL/L (ref 0–3)
BASE EXCESS BLDA CALC-SCNC: -1.9 MMOL/L (ref 0–3)
BASE EXCESS BLDA CALC-SCNC: -2.7 MMOL/L (ref 0–3)
BASE EXCESS BLDA CALC-SCNC: 1 MMOL/L (ref 0–3)
BASE EXCESS BLDA CALC-SCNC: 2 MMOL/L (ref 0–3)
BASE EXCESS BLDA CALC-SCNC: 3 MMOL/L (ref 0–3)
BASE EXCESS BLDA CALC-SCNC: 3 MMOL/L (ref 0–3)
BASE EXCESS BLDA CALC-SCNC: 5 MMOL/L (ref 0–3)
BASE EXCESS BLDV CALC-SCNC: ABNORMAL MMOL/L
BDY SITE: ABNORMAL
BH BB BLOOD EXPIRATION DATE: NORMAL
BH BB BLOOD EXPIRATION DATE: NORMAL
BH BB BLOOD TYPE BARCODE: 600
BH BB BLOOD TYPE BARCODE: 600
BH BB DISPENSE STATUS: NORMAL
BH BB DISPENSE STATUS: NORMAL
BH BB PRODUCT CODE: NORMAL
BH BB PRODUCT CODE: NORMAL
BH BB UNIT NUMBER: NORMAL
BH BB UNIT NUMBER: NORMAL
BUN SERPL-MCNC: 22 MG/DL (ref 8–23)
BUN/CREAT SERPL: 25.3 (ref 7–25)
CA-I BLDA-SCNC: 1.03 MMOL/L (ref 1.12–1.32)
CA-I BLDA-SCNC: 1.11 MMOL/L (ref 1.12–1.32)
CA-I BLDA-SCNC: 1.13 MMOL/L (ref 1.12–1.32)
CA-I BLDA-SCNC: 1.14 MMOL/L (ref 1.12–1.32)
CA-I BLDA-SCNC: 1.17 MMOL/L (ref 1.15–1.33)
CA-I BLDA-SCNC: 1.18 MMOL/L (ref 1.15–1.33)
CA-I BLDA-SCNC: 1.2 MMOL/L (ref 1.12–1.32)
CA-I BLDA-SCNC: 1.24 MMOL/L (ref 1.15–1.33)
CA-I BLDA-SCNC: 1.27 MMOL/L (ref 1.15–1.33)
CA-I BLDA-SCNC: 1.57 MMOL/L (ref 1.12–1.32)
CA-I SERPL ISE-MCNC: 1.33 MMOL/L (ref 1.2–1.3)
CALCIUM SPEC-SCNC: 9.1 MG/DL (ref 8.6–10.5)
CHLORIDE SERPL-SCNC: 109 MMOL/L (ref 98–107)
CO2 BLDA-SCNC: 23.9 MMOL/L (ref 22–29)
CO2 BLDA-SCNC: 24 MMOL/L (ref 22–29)
CO2 BLDA-SCNC: 25.4 MMOL/L (ref 22–29)
CO2 BLDA-SCNC: 26.2 MMOL/L (ref 22–29)
CO2 BLDA-SCNC: 26.4 MMOL/L (ref 22–29)
CO2 BLDA-SCNC: 29 MMOL/L (ref 23–27)
CO2 BLDA-SCNC: 31 MMOL/L (ref 23–27)
CO2 BLDA-SCNC: 32 MMOL/L (ref 23–27)
CO2 CONTENT VENOUS: ABNORMAL
CO2 SERPL-SCNC: 22 MMOL/L (ref 22–29)
CREAT SERPL-MCNC: 0.87 MG/DL (ref 0.76–1.27)
CROSSMATCH INTERPRETATION: NORMAL
CROSSMATCH INTERPRETATION: NORMAL
DEPRECATED RDW RBC AUTO: 42.9 FL (ref 37–54)
ERYTHROCYTE [DISTWIDTH] IN BLOOD BY AUTOMATED COUNT: 13.8 % (ref 12.3–15.4)
GFR SERPL CREATININE-BSD FRML MDRD: 86 ML/MIN/1.73
GLUCOSE BLDC GLUCOMTR-MCNC: 110 MG/DL (ref 70–105)
GLUCOSE BLDC GLUCOMTR-MCNC: 125 MG/DL (ref 70–105)
GLUCOSE BLDC GLUCOMTR-MCNC: 127 MG/DL (ref 70–105)
GLUCOSE BLDC GLUCOMTR-MCNC: 129 MG/DL (ref 74–100)
GLUCOSE BLDC GLUCOMTR-MCNC: 129 MG/DL (ref 74–100)
GLUCOSE BLDC GLUCOMTR-MCNC: 130 MG/DL (ref 70–105)
GLUCOSE BLDC GLUCOMTR-MCNC: 134 MG/DL (ref 74–100)
GLUCOSE BLDC GLUCOMTR-MCNC: 134 MG/DL (ref 74–100)
GLUCOSE BLDC GLUCOMTR-MCNC: 135 MG/DL (ref 70–105)
GLUCOSE BLDC GLUCOMTR-MCNC: 136 MG/DL (ref 74–100)
GLUCOSE BLDC GLUCOMTR-MCNC: 136 MG/DL (ref 74–100)
GLUCOSE BLDC GLUCOMTR-MCNC: 146 MG/DL (ref 70–105)
GLUCOSE BLDC GLUCOMTR-MCNC: 162 MG/DL (ref 74–100)
GLUCOSE BLDC GLUCOMTR-MCNC: 162 MG/DL (ref 74–100)
GLUCOSE BLDC GLUCOMTR-MCNC: 165 MG/DL (ref 70–105)
GLUCOSE BLDC GLUCOMTR-MCNC: 186 MG/DL (ref 70–105)
GLUCOSE BLDC GLUCOMTR-MCNC: 192 MG/DL (ref 70–105)
GLUCOSE BLDC GLUCOMTR-MCNC: 196 MG/DL (ref 70–105)
GLUCOSE BLDC GLUCOMTR-MCNC: 98 MG/DL (ref 70–105)
GLUCOSE SERPL-MCNC: 116 MG/DL (ref 65–99)
HCO3 BLDA-SCNC: 22.9 MMOL/L (ref 21–28)
HCO3 BLDA-SCNC: 24.1 MMOL/L (ref 21–28)
HCO3 BLDA-SCNC: 24.9 MMOL/L (ref 21–28)
HCO3 BLDA-SCNC: 25 MMOL/L (ref 21–28)
HCO3 BLDA-SCNC: 27 MMOL/L (ref 22–26)
HCO3 BLDA-SCNC: 29 MMOL/L (ref 22–26)
HCO3 BLDA-SCNC: 29 MMOL/L (ref 22–26)
HCO3 BLDA-SCNC: 29.2 MMOL/L (ref 22–26)
HCO3 BLDA-SCNC: 30.3 MMOL/L (ref 22–26)
HCO3 BLDV-SCNC: 27.2 MMOL/L (ref 23–28)
HCO3 MIXED: 22.6 MMOL/L (ref 21–29)
HCT VFR BLD AUTO: 38.6 % (ref 37.5–51)
HCT VFR BLDA CALC: 32 % (ref 38–51)
HCT VFR BLDA CALC: 32 % (ref 38–51)
HCT VFR BLDA CALC: 35 % (ref 38–51)
HCT VFR BLDA CALC: 35 % (ref 38–51)
HCT VFR BLDA CALC: 36 % (ref 38–51)
HCT VFR BLDA CALC: 36 % (ref 38–51)
HCT VFR BLDA CALC: 37 % (ref 38–51)
HCT VFR BLDA CALC: 40 % (ref 38–51)
HEMODILUTION: YES
HGB BLD-MCNC: 12.8 G/DL (ref 13–17.7)
HGB BLDA-MCNC: 10.9 G/DL (ref 12–17)
HGB BLDA-MCNC: 10.9 G/DL (ref 12–17)
HGB BLDA-MCNC: 11.9 G/DL (ref 12–17)
HGB BLDA-MCNC: 11.9 G/DL (ref 12–17)
HGB BLDA-MCNC: 12.2 G/DL (ref 12–17)
HGB BLDA-MCNC: 12.2 G/DL (ref 12–17)
HGB BLDA-MCNC: 12.6 G/DL (ref 12–17)
HGB BLDA-MCNC: 12.6 G/DL (ref 12–17)
HGB BLDA-MCNC: 12.7 G/DL (ref 12–17)
HGB BLDA-MCNC: 13.5 G/DL (ref 12–17)
INHALED O2 CONCENTRATION: 40 %
INHALED O2 CONCENTRATION: 70 %
INHALED O2 CONCENTRATION: <21 %
INR PPP: 1.08 (ref 0.93–1.1)
LYMPHOCYTES # BLD MANUAL: 0.43 10*3/MM3 (ref 0.7–3.1)
LYMPHOCYTES NFR BLD MANUAL: 2 % (ref 19.6–45.3)
LYMPHOCYTES NFR BLD MANUAL: 6 % (ref 5–12)
MCH RBC QN AUTO: 30.2 PG (ref 26.6–33)
MCHC RBC AUTO-ENTMCNC: 33.3 G/DL (ref 31.5–35.7)
MCV RBC AUTO: 90.7 FL (ref 79–97)
MODALITY: ABNORMAL
MONOCYTES # BLD AUTO: 1.3 10*3/MM3 (ref 0.1–0.9)
NEUTROPHILS # BLD AUTO: 19.87 10*3/MM3 (ref 1.7–7)
NEUTROPHILS NFR BLD MANUAL: 81 % (ref 42.7–76)
NEUTS BAND NFR BLD MANUAL: 11 % (ref 0–5)
O2 SATURATION MIXED: 72 %
PCO2 BLDA: 38.3 MM HG (ref 35–48)
PCO2 BLDA: 41.5 MM HG (ref 35–48)
PCO2 BLDA: 41.9 MM HG (ref 35–48)
PCO2 BLDA: 45.5 MM HG (ref 35–48)
PCO2 BLDA: 50.4 MM HG (ref 35–45)
PCO2 BLDA: 53.8 MM HG (ref 35–45)
PCO2 BLDA: 55.7 MM HG (ref 35–45)
PCO2 BLDA: 57.4 MM HG (ref 35–45)
PCO2 BLDA: 62 MM HG (ref 35–45)
PCO2 BLDV: 55.2 MM HG (ref 41–51)
PCO2 MIXED: 40.3 MMHG (ref 35–51)
PEEP RESPIRATORY: 5 CM[H2O]
PEEP RESPIRATORY: 8 CM[H2O]
PH BLDA: 7.28 PH UNITS (ref 7.35–7.45)
PH BLDA: 7.31 PH UNITS (ref 7.35–7.45)
PH BLDA: 7.31 PH UNITS (ref 7.35–7.45)
PH BLDA: 7.32 PH UNITS (ref 7.35–7.45)
PH BLDA: 7.35 PH UNITS (ref 7.35–7.45)
PH BLDA: 7.37 PH UNITS (ref 7.35–7.45)
PH BLDA: 7.38 PH UNITS (ref 7.35–7.45)
PH BLDA: 7.38 PH UNITS (ref 7.35–7.45)
PH BLDA: 7.39 PH UNITS (ref 7.35–7.45)
PH BLDV: 7.3 PH UNITS (ref 7.31–7.41)
PH MIXED: 7.36 PH UNITS (ref 7.32–7.45)
PHOSPHATE SERPL-MCNC: 2.1 MG/DL (ref 2.5–4.5)
PLATELET # BLD AUTO: 160 10*3/MM3 (ref 140–450)
PMV BLD AUTO: 9.2 FL (ref 6–12)
PO2 BLDA: 110.6 MM HG (ref 83–108)
PO2 BLDA: 166.9 MM HG (ref 83–108)
PO2 BLDA: 231.3 MM HG (ref 83–108)
PO2 BLDA: 364 MM HG (ref 80–105)
PO2 BLDA: 373 MM HG (ref 80–105)
PO2 BLDA: 401 MM HG (ref 80–105)
PO2 BLDA: 457 MM HG (ref 80–105)
PO2 BLDA: 463 MM HG (ref 80–105)
PO2 BLDA: 95.2 MM HG (ref 83–108)
PO2 BLDV: 41 MM HG (ref 35–42)
PO2 MIXED: 39.5 MMHG
POTASSIUM BLDA-SCNC: 3.4 MMOL/L (ref 3.5–4.9)
POTASSIUM BLDA-SCNC: 3.5 MMOL/L (ref 3.5–4.5)
POTASSIUM BLDA-SCNC: 3.5 MMOL/L (ref 3.5–4.9)
POTASSIUM BLDA-SCNC: 3.6 MMOL/L (ref 3.5–4.5)
POTASSIUM BLDA-SCNC: 3.7 MMOL/L (ref 3.5–4.5)
POTASSIUM BLDA-SCNC: 3.7 MMOL/L (ref 3.5–4.5)
POTASSIUM BLDA-SCNC: 3.9 MMOL/L (ref 3.5–4.9)
POTASSIUM BLDA-SCNC: 4.2 MMOL/L (ref 3.5–4.9)
POTASSIUM BLDA-SCNC: 4.2 MMOL/L (ref 3.5–4.9)
POTASSIUM BLDA-SCNC: 4.4 MMOL/L (ref 3.5–4.9)
POTASSIUM SERPL-SCNC: 3.7 MMOL/L (ref 3.5–5.2)
PROTHROMBIN TIME: 11.9 SECONDS (ref 9.6–11.7)
PSV: 10 CMH2O
RBC # BLD AUTO: 4.25 10*6/MM3 (ref 4.14–5.8)
RBC MORPH BLD: NORMAL
RESPIRATORY RATE: 12
RESPIRATORY RATE: 12
SAO2 % BLDCOA: 100 % (ref 95–98)
SAO2 % BLDCOA: 96.9 % (ref 94–98)
SAO2 % BLDCOA: 98.3 % (ref 94–98)
SAO2 % BLDCOA: 99.5 % (ref 94–98)
SAO2 % BLDCOA: 99.8 % (ref 94–98)
SAO2 % BLDCOV: 29 % (ref 95–99)
SCAN SLIDE: NORMAL
SET MECH RESP RATE: 12
SMALL PLATELETS BLD QL SMEAR: ADEQUATE
SODIUM BLD-SCNC: 139 MMOL/L (ref 138–146)
SODIUM BLD-SCNC: 139 MMOL/L (ref 138–146)
SODIUM BLD-SCNC: 141 MMOL/L (ref 138–146)
SODIUM BLD-SCNC: 141 MMOL/L (ref 138–146)
SODIUM BLD-SCNC: 142 MMOL/L (ref 138–146)
SODIUM BLD-SCNC: 142 MMOL/L (ref 138–146)
SODIUM BLD-SCNC: 144 MMOL/L (ref 138–146)
SODIUM BLD-SCNC: 145 MMOL/L (ref 138–146)
SODIUM BLD-SCNC: 146 MMOL/L (ref 138–146)
SODIUM BLD-SCNC: 146 MMOL/L (ref 138–146)
SODIUM SERPL-SCNC: 143 MMOL/L (ref 136–145)
UNIT  ABO: NORMAL
UNIT  ABO: NORMAL
UNIT  RH: NORMAL
UNIT  RH: NORMAL
VENTILATOR MODE: ABNORMAL
VT ON VENT VENT: 800 ML
WBC # BLD AUTO: 21.6 10*3/MM3 (ref 3.4–10.8)
WBC MORPH BLD: NORMAL

## 2021-11-11 PROCEDURE — 80051 ELECTROLYTE PANEL: CPT

## 2021-11-11 PROCEDURE — 94002 VENT MGMT INPAT INIT DAY: CPT

## 2021-11-11 PROCEDURE — 93318 ECHO TRANSESOPHAGEAL INTRAOP: CPT | Performed by: ANESTHESIOLOGY

## 2021-11-11 PROCEDURE — 33259 ABLATE ATRIA W/BYPASS ADD-ON: CPT | Performed by: PHYSICIAN ASSISTANT

## 2021-11-11 PROCEDURE — 25010000002 PROTAMINE SULFATE PER 10 MG: Performed by: ANESTHESIOLOGY

## 2021-11-11 PROCEDURE — 94799 UNLISTED PULMONARY SVC/PX: CPT

## 2021-11-11 PROCEDURE — 0 MILRINONE LACTATE IN DEXTROSE 20-5 MG/100ML-% SOLUTION: Performed by: ANESTHESIOLOGY

## 2021-11-11 PROCEDURE — 33533 CABG ARTERIAL SINGLE: CPT | Performed by: PHYSICIAN ASSISTANT

## 2021-11-11 PROCEDURE — 84295 ASSAY OF SERUM SODIUM: CPT

## 2021-11-11 PROCEDURE — 33426 REPAIR OF MITRAL VALVE: CPT | Performed by: PHYSICIAN ASSISTANT

## 2021-11-11 PROCEDURE — 02UG0JZ SUPPLEMENT MITRAL VALVE WITH SYNTHETIC SUBSTITUTE, OPEN APPROACH: ICD-10-PCS | Performed by: THORACIC SURGERY (CARDIOTHORACIC VASCULAR SURGERY)

## 2021-11-11 PROCEDURE — 02B70ZK EXCISION OF LEFT ATRIAL APPENDAGE, OPEN APPROACH: ICD-10-PCS | Performed by: THORACIC SURGERY (CARDIOTHORACIC VASCULAR SURGERY)

## 2021-11-11 PROCEDURE — 25010000002 AMIODARONE PER 30 MG

## 2021-11-11 PROCEDURE — 0 CEFAZOLIN PER 500 MG: Performed by: ANESTHESIOLOGY

## 2021-11-11 PROCEDURE — 02580ZZ DESTRUCTION OF CONDUCTION MECHANISM, OPEN APPROACH: ICD-10-PCS | Performed by: THORACIC SURGERY (CARDIOTHORACIC VASCULAR SURGERY)

## 2021-11-11 PROCEDURE — 82803 BLOOD GASES ANY COMBINATION: CPT

## 2021-11-11 PROCEDURE — C1889 IMPLANT/INSERT DEVICE, NOC: HCPCS | Performed by: THORACIC SURGERY (CARDIOTHORACIC VASCULAR SURGERY)

## 2021-11-11 PROCEDURE — 71045 X-RAY EXAM CHEST 1 VIEW: CPT

## 2021-11-11 PROCEDURE — 25010000002 MAGNESIUM SULFATE PER 500 MG OF MAGNESIUM: Performed by: ANESTHESIOLOGY

## 2021-11-11 PROCEDURE — 80069 RENAL FUNCTION PANEL: CPT | Performed by: NURSE PRACTITIONER

## 2021-11-11 PROCEDURE — 25010000002 HEPARIN (PORCINE) 1000-0.9 UT/500ML-% SOLUTION: Performed by: ANESTHESIOLOGY

## 2021-11-11 PROCEDURE — 25010000002 CEFAZOLIN PER 500 MG: Performed by: NURSE PRACTITIONER

## 2021-11-11 PROCEDURE — 25010000002 HEPARIN (PORCINE) PER 1000 UNITS: Performed by: THORACIC SURGERY (CARDIOTHORACIC VASCULAR SURGERY)

## 2021-11-11 PROCEDURE — C1729 CATH, DRAINAGE: HCPCS | Performed by: THORACIC SURGERY (CARDIOTHORACIC VASCULAR SURGERY)

## 2021-11-11 PROCEDURE — 85025 COMPLETE CBC W/AUTO DIFF WBC: CPT | Performed by: NURSE PRACTITIONER

## 2021-11-11 PROCEDURE — S0260 H&P FOR SURGERY: HCPCS | Performed by: THORACIC SURGERY (CARDIOTHORACIC VASCULAR SURGERY)

## 2021-11-11 PROCEDURE — 02100Z9 BYPASS CORONARY ARTERY, ONE ARTERY FROM LEFT INTERNAL MAMMARY, OPEN APPROACH: ICD-10-PCS | Performed by: THORACIC SURGERY (CARDIOTHORACIC VASCULAR SURGERY)

## 2021-11-11 PROCEDURE — 25010000002 MORPHINE SULFATE (PF) 2 MG/ML SOLUTION: Performed by: NURSE PRACTITIONER

## 2021-11-11 PROCEDURE — 33518 CABG ARTERY-VEIN TWO: CPT | Performed by: PHYSICIAN ASSISTANT

## 2021-11-11 PROCEDURE — 85610 PROTHROMBIN TIME: CPT | Performed by: NURSE PRACTITIONER

## 2021-11-11 PROCEDURE — 85730 THROMBOPLASTIN TIME PARTIAL: CPT | Performed by: NURSE PRACTITIONER

## 2021-11-11 PROCEDURE — C1751 CATH, INF, PER/CENT/MIDLINE: HCPCS | Performed by: ANESTHESIOLOGY

## 2021-11-11 PROCEDURE — 25010000002 ALBUMIN HUMAN 5% PER 50 ML: Performed by: NURSE PRACTITIONER

## 2021-11-11 PROCEDURE — A4648 IMPLANTABLE TISSUE MARKER: HCPCS | Performed by: THORACIC SURGERY (CARDIOTHORACIC VASCULAR SURGERY)

## 2021-11-11 PROCEDURE — 82330 ASSAY OF CALCIUM: CPT

## 2021-11-11 PROCEDURE — 33508 ENDOSCOPIC VEIN HARVEST: CPT | Performed by: PHYSICIAN ASSISTANT

## 2021-11-11 PROCEDURE — 33426 REPAIR OF MITRAL VALVE: CPT | Performed by: THORACIC SURGERY (CARDIOTHORACIC VASCULAR SURGERY)

## 2021-11-11 PROCEDURE — 33259 ABLATE ATRIA W/BYPASS ADD-ON: CPT | Performed by: THORACIC SURGERY (CARDIOTHORACIC VASCULAR SURGERY)

## 2021-11-11 PROCEDURE — 93005 ELECTROCARDIOGRAM TRACING: CPT | Performed by: NURSE PRACTITIONER

## 2021-11-11 PROCEDURE — 0211093 BYPASS CORONARY ARTERY, TWO ARTERIES FROM CORONARY ARTERY WITH AUTOLOGOUS VENOUS TISSUE, OPEN APPROACH: ICD-10-PCS | Performed by: THORACIC SURGERY (CARDIOTHORACIC VASCULAR SURGERY)

## 2021-11-11 PROCEDURE — 25010000002 FENTANYL CITRATE (PF) 250 MCG/5ML SOLUTION: Performed by: ANESTHESIOLOGY

## 2021-11-11 PROCEDURE — 25010000002 MAGNESIUM SULFATE IN D5W 1G/100ML (PREMIX) 1-5 GM/100ML-% SOLUTION: Performed by: NURSE PRACTITIONER

## 2021-11-11 PROCEDURE — 85018 HEMOGLOBIN: CPT

## 2021-11-11 PROCEDURE — 33518 CABG ARTERY-VEIN TWO: CPT | Performed by: THORACIC SURGERY (CARDIOTHORACIC VASCULAR SURGERY)

## 2021-11-11 PROCEDURE — 25010000002 PROPOFOL 10 MG/ML EMULSION: Performed by: ANESTHESIOLOGY

## 2021-11-11 PROCEDURE — 25010000002 PAPAVERINE PER 60 MG: Performed by: THORACIC SURGERY (CARDIOTHORACIC VASCULAR SURGERY)

## 2021-11-11 PROCEDURE — 84132 ASSAY OF SERUM POTASSIUM: CPT

## 2021-11-11 PROCEDURE — 63710000001 INSULIN REGULAR HUMAN PER 5 UNITS: Performed by: ANESTHESIOLOGY

## 2021-11-11 PROCEDURE — P9041 ALBUMIN (HUMAN),5%, 50ML: HCPCS | Performed by: NURSE PRACTITIONER

## 2021-11-11 PROCEDURE — 33533 CABG ARTERIAL SINGLE: CPT | Performed by: THORACIC SURGERY (CARDIOTHORACIC VASCULAR SURGERY)

## 2021-11-11 PROCEDURE — 33508 ENDOSCOPIC VEIN HARVEST: CPT | Performed by: THORACIC SURGERY (CARDIOTHORACIC VASCULAR SURGERY)

## 2021-11-11 PROCEDURE — 5A1221Z PERFORMANCE OF CARDIAC OUTPUT, CONTINUOUS: ICD-10-PCS | Performed by: THORACIC SURGERY (CARDIOTHORACIC VASCULAR SURGERY)

## 2021-11-11 PROCEDURE — 06BP4ZZ EXCISION OF RIGHT SAPHENOUS VEIN, PERCUTANEOUS ENDOSCOPIC APPROACH: ICD-10-PCS | Performed by: THORACIC SURGERY (CARDIOTHORACIC VASCULAR SURGERY)

## 2021-11-11 PROCEDURE — 85007 BL SMEAR W/DIFF WBC COUNT: CPT | Performed by: NURSE PRACTITIONER

## 2021-11-11 PROCEDURE — 82330 ASSAY OF CALCIUM: CPT | Performed by: NURSE PRACTITIONER

## 2021-11-11 PROCEDURE — 25010000002 HEPARIN (PORCINE) PER 1000 UNITS: Performed by: ANESTHESIOLOGY

## 2021-11-11 PROCEDURE — 25010000002 MIDAZOLAM PER 1 MG: Performed by: ANESTHESIOLOGY

## 2021-11-11 PROCEDURE — 85014 HEMATOCRIT: CPT

## 2021-11-11 PROCEDURE — C1713 ANCHOR/SCREW BN/BN,TIS/BN: HCPCS | Performed by: THORACIC SURGERY (CARDIOTHORACIC VASCULAR SURGERY)

## 2021-11-11 PROCEDURE — C2618 PROBE/NEEDLE, CRYO: HCPCS | Performed by: THORACIC SURGERY (CARDIOTHORACIC VASCULAR SURGERY)

## 2021-11-11 PROCEDURE — 82962 GLUCOSE BLOOD TEST: CPT

## 2021-11-11 PROCEDURE — 82947 ASSAY GLUCOSE BLOOD QUANT: CPT

## 2021-11-11 PROCEDURE — 25010000002 PHENYLEPHRINE 10 MG/ML SOLUTION: Performed by: ANESTHESIOLOGY

## 2021-11-11 PROCEDURE — 85347 COAGULATION TIME ACTIVATED: CPT

## 2021-11-11 DEVICE — ABSORBABLE HEMOSTAT (OXIDIZED REGENERATED CELLULOSE, U.S.P.)
Type: IMPLANTABLE DEVICE | Site: CHEST | Status: FUNCTIONAL
Brand: SURGICEL

## 2021-11-11 DEVICE — DEV CONTRL TISS STRATAFIXSPIRALMNCRYL PLSPS2 REV3/0 15CM: Type: IMPLANTABLE DEVICE | Site: LEG | Status: FUNCTIONAL

## 2021-11-11 DEVICE — INCISIONLINE PLEDGET TFLN SFT LG: Type: IMPLANTABLE DEVICE | Site: CHEST | Status: FUNCTIONAL

## 2021-11-11 DEVICE — WAX,BONE,NATURAL
Type: IMPLANTABLE DEVICE | Site: STERNUM | Status: FUNCTIONAL
Brand: MEDLINE INDUSTRIES

## 2021-11-11 DEVICE — COR-KNOT MINI® COMBO KITBASE PACKAGE TYPE - KITEACH STERILE PACKAGE KIT CONTAINS (2) SINGLE PATIENT USE COR-KNOT MINI® DEVICES AND (12) COR-KNOT® QUICK LOADS®.
Type: IMPLANTABLE DEVICE | Site: HEART | Status: FUNCTIONAL
Brand: COR-KNOT MINI®

## 2021-11-11 DEVICE — CARPENTIER-EDWARDS PHYSIO II ANNULOPLASTY RING
Type: IMPLANTABLE DEVICE | Site: HEART | Status: FUNCTIONAL
Brand: CARPENTIER-EDWARDS PHYSIO II ANNULOPLASTY RING

## 2021-11-11 DEVICE — SS SUTURE, 6 PER SLEEVE
Type: IMPLANTABLE DEVICE | Site: STERNUM | Status: FUNCTIONAL
Brand: MYO/WIRE II

## 2021-11-11 DEVICE — COR-KNOT® QUICK LOAD® SINGLES
Type: IMPLANTABLE DEVICE | Site: HEART | Status: FUNCTIONAL
Brand: COR-KNOT® QUICK LOAD®

## 2021-11-11 DEVICE — DEV CONTRL TISS STRATAFIX SPIRAL MNCRYL UD 3/0 PLS 30CM: Type: IMPLANTABLE DEVICE | Site: CHEST | Status: FUNCTIONAL

## 2021-11-11 DEVICE — SS SUTURE, 3 PER SLEEVE
Type: IMPLANTABLE DEVICE | Site: STERNUM | Status: FUNCTIONAL
Brand: MYO/WIRE II

## 2021-11-11 RX ORDER — DEXTROSE MONOHYDRATE 25 G/50ML
25 INJECTION, SOLUTION INTRAVENOUS
Status: DISCONTINUED | OUTPATIENT
Start: 2021-11-11 | End: 2021-11-17 | Stop reason: HOSPADM

## 2021-11-11 RX ORDER — AMOXICILLIN 250 MG
2 CAPSULE ORAL 2 TIMES DAILY
Status: DISCONTINUED | OUTPATIENT
Start: 2021-11-11 | End: 2021-11-17 | Stop reason: HOSPADM

## 2021-11-11 RX ORDER — MAGNESIUM SULFATE HEPTAHYDRATE 500 MG/ML
INJECTION, SOLUTION INTRAMUSCULAR; INTRAVENOUS AS NEEDED
Status: DISCONTINUED | OUTPATIENT
Start: 2021-11-11 | End: 2021-11-11 | Stop reason: SURG

## 2021-11-11 RX ORDER — POTASSIUM CHLORIDE 20 MEQ/1
20 TABLET, EXTENDED RELEASE ORAL AS NEEDED
Status: DISCONTINUED | OUTPATIENT
Start: 2021-11-12 | End: 2021-11-17 | Stop reason: HOSPADM

## 2021-11-11 RX ORDER — CHLORHEXIDINE GLUCONATE 0.12 MG/ML
15 RINSE ORAL EVERY 12 HOURS
Status: DISCONTINUED | OUTPATIENT
Start: 2021-11-11 | End: 2021-11-12

## 2021-11-11 RX ORDER — OLANZAPINE 10 MG/2ML
1 INJECTION, POWDER, LYOPHILIZED, FOR SOLUTION INTRAMUSCULAR
Status: DISCONTINUED | OUTPATIENT
Start: 2021-11-11 | End: 2021-11-17 | Stop reason: HOSPADM

## 2021-11-11 RX ORDER — ACETAMINOPHEN 160 MG/5ML
650 SOLUTION ORAL EVERY 6 HOURS
Status: COMPLETED | OUTPATIENT
Start: 2021-11-11 | End: 2021-11-12

## 2021-11-11 RX ORDER — POTASSIUM CHLORIDE 1.5 G/1.77G
20 POWDER, FOR SOLUTION ORAL AS NEEDED
Status: DISCONTINUED | OUTPATIENT
Start: 2021-11-12 | End: 2021-11-17 | Stop reason: HOSPADM

## 2021-11-11 RX ORDER — MAGNESIUM HYDROXIDE 1200 MG/15ML
LIQUID ORAL AS NEEDED
Status: DISCONTINUED | OUTPATIENT
Start: 2021-11-11 | End: 2021-11-11 | Stop reason: HOSPADM

## 2021-11-11 RX ORDER — NALOXONE HCL 0.4 MG/ML
0.4 VIAL (ML) INJECTION
Status: DISCONTINUED | OUTPATIENT
Start: 2021-11-11 | End: 2021-11-17 | Stop reason: HOSPADM

## 2021-11-11 RX ORDER — VECURONIUM BROMIDE 1 MG/ML
INJECTION, POWDER, LYOPHILIZED, FOR SOLUTION INTRAVENOUS AS NEEDED
Status: DISCONTINUED | OUTPATIENT
Start: 2021-11-11 | End: 2021-11-11 | Stop reason: SURG

## 2021-11-11 RX ORDER — ACETAMINOPHEN 325 MG/1
650 TABLET ORAL EVERY 4 HOURS PRN
Status: DISCONTINUED | OUTPATIENT
Start: 2021-11-12 | End: 2021-11-17 | Stop reason: HOSPADM

## 2021-11-11 RX ORDER — NOREPINEPHRINE BIT/0.9 % NACL 8 MG/250ML
.02-.06 INFUSION BOTTLE (ML) INTRAVENOUS CONTINUOUS PRN
Status: DISCONTINUED | OUTPATIENT
Start: 2021-11-11 | End: 2021-11-12

## 2021-11-11 RX ORDER — NICARDIPINE HYDROCHLORIDE 2.5 MG/ML
INJECTION INTRAVENOUS AS NEEDED
Status: DISCONTINUED | OUTPATIENT
Start: 2021-11-11 | End: 2021-11-11 | Stop reason: SURG

## 2021-11-11 RX ORDER — HEPARIN SODIUM 200 [USP'U]/100ML
INJECTION, SOLUTION INTRAVENOUS
Status: COMPLETED | OUTPATIENT
Start: 2021-11-11 | End: 2021-11-11

## 2021-11-11 RX ORDER — ASPIRIN 81 MG/1
81 TABLET ORAL DAILY
Status: DISCONTINUED | OUTPATIENT
Start: 2021-11-12 | End: 2021-11-17 | Stop reason: HOSPADM

## 2021-11-11 RX ORDER — CHLORHEXIDINE GLUCONATE 0.12 MG/ML
15 RINSE ORAL EVERY 12 HOURS
Status: DISCONTINUED | OUTPATIENT
Start: 2021-11-11 | End: 2021-11-17 | Stop reason: HOSPADM

## 2021-11-11 RX ORDER — ATORVASTATIN CALCIUM 40 MG/1
40 TABLET, FILM COATED ORAL NIGHTLY
Status: DISCONTINUED | OUTPATIENT
Start: 2021-11-12 | End: 2021-11-17 | Stop reason: HOSPADM

## 2021-11-11 RX ORDER — ACETAMINOPHEN 650 MG
TABLET, EXTENDED RELEASE ORAL AS NEEDED
Status: DISCONTINUED | OUTPATIENT
Start: 2021-11-11 | End: 2021-11-11 | Stop reason: HOSPADM

## 2021-11-11 RX ORDER — NITROGLYCERIN 20 MG/100ML
INJECTION INTRAVENOUS CONTINUOUS PRN
Status: DISCONTINUED | OUTPATIENT
Start: 2021-11-11 | End: 2021-11-11 | Stop reason: SURG

## 2021-11-11 RX ORDER — PANTOPRAZOLE SODIUM 40 MG/10ML
40 INJECTION, POWDER, LYOPHILIZED, FOR SOLUTION INTRAVENOUS ONCE
Status: COMPLETED | OUTPATIENT
Start: 2021-11-11 | End: 2021-11-11

## 2021-11-11 RX ORDER — NITROGLYCERIN 20 MG/100ML
10-50 INJECTION INTRAVENOUS CONTINUOUS PRN
Status: DISCONTINUED | OUTPATIENT
Start: 2021-11-11 | End: 2021-11-12

## 2021-11-11 RX ORDER — POTASSIUM CHLORIDE 7.45 MG/ML
INJECTION INTRAVENOUS
Status: DISPENSED
Start: 2021-11-11 | End: 2021-11-11

## 2021-11-11 RX ORDER — MIDAZOLAM HYDROCHLORIDE 1 MG/ML
INJECTION INTRAMUSCULAR; INTRAVENOUS AS NEEDED
Status: DISCONTINUED | OUTPATIENT
Start: 2021-11-11 | End: 2021-11-11 | Stop reason: SURG

## 2021-11-11 RX ORDER — CHLORHEXIDINE GLUCONATE 500 MG/1
1 CLOTH TOPICAL EVERY 12 HOURS PRN
Status: DISCONTINUED | OUTPATIENT
Start: 2021-11-11 | End: 2021-11-11 | Stop reason: HOSPADM

## 2021-11-11 RX ORDER — HEPARIN SODIUM 1000 [USP'U]/ML
INJECTION, SOLUTION INTRAVENOUS; SUBCUTANEOUS AS NEEDED
Status: DISCONTINUED | OUTPATIENT
Start: 2021-11-11 | End: 2021-11-11 | Stop reason: SURG

## 2021-11-11 RX ORDER — ACETAMINOPHEN 650 MG/1
650 SUPPOSITORY RECTAL EVERY 4 HOURS PRN
Status: DISCONTINUED | OUTPATIENT
Start: 2021-11-12 | End: 2021-11-17 | Stop reason: HOSPADM

## 2021-11-11 RX ORDER — DEXMEDETOMIDINE HYDROCHLORIDE 4 UG/ML
.2-1.5 INJECTION, SOLUTION INTRAVENOUS
Status: DISCONTINUED | OUTPATIENT
Start: 2021-11-11 | End: 2021-11-12

## 2021-11-11 RX ORDER — SODIUM CHLORIDE 9 MG/ML
INJECTION, SOLUTION INTRAVENOUS AS NEEDED
Status: DISCONTINUED | OUTPATIENT
Start: 2021-11-11 | End: 2021-11-11 | Stop reason: HOSPADM

## 2021-11-11 RX ORDER — PROPOFOL 10 MG/ML
VIAL (ML) INTRAVENOUS AS NEEDED
Status: DISCONTINUED | OUTPATIENT
Start: 2021-11-11 | End: 2021-11-11 | Stop reason: SURG

## 2021-11-11 RX ORDER — POLYETHYLENE GLYCOL 3350 17 G/17G
17 POWDER, FOR SOLUTION ORAL 2 TIMES DAILY
Status: DISCONTINUED | OUTPATIENT
Start: 2021-11-11 | End: 2021-11-17 | Stop reason: HOSPADM

## 2021-11-11 RX ORDER — POTASSIUM CHLORIDE 7.45 MG/ML
10 INJECTION INTRAVENOUS
Status: DISCONTINUED | OUTPATIENT
Start: 2021-11-11 | End: 2021-11-15

## 2021-11-11 RX ORDER — CALCIUM CHLORIDE 100 MG/ML
INJECTION INTRAVENOUS; INTRAVENTRICULAR AS NEEDED
Status: DISCONTINUED | OUTPATIENT
Start: 2021-11-11 | End: 2021-11-11 | Stop reason: SURG

## 2021-11-11 RX ORDER — MILRINONE LACTATE 0.2 MG/ML
INJECTION, SOLUTION INTRAVENOUS CONTINUOUS PRN
Status: DISCONTINUED | OUTPATIENT
Start: 2021-11-11 | End: 2021-11-11 | Stop reason: SURG

## 2021-11-11 RX ORDER — ASPIRIN 325 MG
325 TABLET ORAL ONCE
Status: COMPLETED | OUTPATIENT
Start: 2021-11-11 | End: 2021-11-11

## 2021-11-11 RX ORDER — CEFAZOLIN SODIUM 1 G/3ML
INJECTION, POWDER, FOR SOLUTION INTRAMUSCULAR; INTRAVENOUS AS NEEDED
Status: DISCONTINUED | OUTPATIENT
Start: 2021-11-11 | End: 2021-11-11 | Stop reason: SURG

## 2021-11-11 RX ORDER — HYDROCODONE BITARTRATE AND ACETAMINOPHEN 5; 325 MG/1; MG/1
1 TABLET ORAL EVERY 4 HOURS PRN
Status: DISCONTINUED | OUTPATIENT
Start: 2021-11-11 | End: 2021-11-17 | Stop reason: HOSPADM

## 2021-11-11 RX ORDER — INSULIN LISPRO 100 [IU]/ML
0-9 INJECTION, SOLUTION INTRAVENOUS; SUBCUTANEOUS
Status: DISCONTINUED | OUTPATIENT
Start: 2021-11-14 | End: 2021-11-17 | Stop reason: HOSPADM

## 2021-11-11 RX ORDER — NOREPINEPHRINE BIT/0.9 % NACL 8 MG/250ML
INFUSION BOTTLE (ML) INTRAVENOUS CONTINUOUS PRN
Status: DISCONTINUED | OUTPATIENT
Start: 2021-11-11 | End: 2021-11-11 | Stop reason: SURG

## 2021-11-11 RX ORDER — MORPHINE SULFATE 2 MG/ML
2 INJECTION, SOLUTION INTRAMUSCULAR; INTRAVENOUS
Status: DISPENSED | OUTPATIENT
Start: 2021-11-11 | End: 2021-11-14

## 2021-11-11 RX ORDER — ONDANSETRON 2 MG/ML
4 INJECTION INTRAMUSCULAR; INTRAVENOUS EVERY 6 HOURS PRN
Status: DISCONTINUED | OUTPATIENT
Start: 2021-11-11 | End: 2021-11-17 | Stop reason: HOSPADM

## 2021-11-11 RX ORDER — CHLORHEXIDINE GLUCONATE 0.12 MG/ML
15 RINSE ORAL ONCE
Status: COMPLETED | OUTPATIENT
Start: 2021-11-11 | End: 2021-11-11

## 2021-11-11 RX ORDER — SENNA PLUS 8.6 MG/1
2 TABLET ORAL NIGHTLY
Status: DISCONTINUED | OUTPATIENT
Start: 2021-11-11 | End: 2021-11-11 | Stop reason: SDUPTHER

## 2021-11-11 RX ORDER — INSULIN LISPRO 100 [IU]/ML
0-9 INJECTION, SOLUTION INTRAVENOUS; SUBCUTANEOUS AS NEEDED
Status: DISCONTINUED | OUTPATIENT
Start: 2021-11-14 | End: 2021-11-17 | Stop reason: HOSPADM

## 2021-11-11 RX ORDER — FENTANYL CITRATE 50 UG/ML
INJECTION, SOLUTION INTRAMUSCULAR; INTRAVENOUS AS NEEDED
Status: DISCONTINUED | OUTPATIENT
Start: 2021-11-11 | End: 2021-11-11 | Stop reason: SURG

## 2021-11-11 RX ORDER — SODIUM CHLORIDE 9 MG/ML
INJECTION, SOLUTION INTRAVENOUS CONTINUOUS PRN
Status: DISCONTINUED | OUTPATIENT
Start: 2021-11-11 | End: 2021-11-11 | Stop reason: SURG

## 2021-11-11 RX ORDER — XYLITOL/YERBA SANTA
2 AEROSOL, SPRAY WITH PUMP (ML) MUCOUS MEMBRANE EVERY 4 HOURS PRN
Status: DISCONTINUED | OUTPATIENT
Start: 2021-11-11 | End: 2021-11-15

## 2021-11-11 RX ORDER — PHENYLEPHRINE HYDROCHLORIDE 10 MG/ML
INJECTION INTRAVENOUS AS NEEDED
Status: DISCONTINUED | OUTPATIENT
Start: 2021-11-11 | End: 2021-11-11 | Stop reason: SURG

## 2021-11-11 RX ORDER — MILRINONE LACTATE 0.2 MG/ML
0.25 INJECTION, SOLUTION INTRAVENOUS
Status: DISCONTINUED | OUTPATIENT
Start: 2021-11-11 | End: 2021-11-12

## 2021-11-11 RX ORDER — ALBUMIN, HUMAN INJ 5% 5 %
12.5 SOLUTION INTRAVENOUS AS NEEDED
Status: COMPLETED | OUTPATIENT
Start: 2021-11-11 | End: 2021-11-11

## 2021-11-11 RX ORDER — MEPERIDINE HYDROCHLORIDE 25 MG/ML
25 INJECTION INTRAMUSCULAR; INTRAVENOUS; SUBCUTANEOUS ONCE AS NEEDED
Status: DISCONTINUED | OUTPATIENT
Start: 2021-11-11 | End: 2021-11-12

## 2021-11-11 RX ORDER — ALBUMIN, HUMAN INJ 5% 5 %
SOLUTION INTRAVENOUS
Status: DISPENSED
Start: 2021-11-11 | End: 2021-11-11

## 2021-11-11 RX ORDER — AMIODARONE HCL/D5W 450 MG/250
PLASTIC BAG, INJECTION (ML) INTRAVENOUS CONTINUOUS PRN
Status: DISCONTINUED | OUTPATIENT
Start: 2021-11-11 | End: 2021-11-11 | Stop reason: SURG

## 2021-11-11 RX ORDER — PANTOPRAZOLE SODIUM 40 MG/1
40 TABLET, DELAYED RELEASE ORAL
Status: DISCONTINUED | OUTPATIENT
Start: 2021-11-12 | End: 2021-11-17 | Stop reason: HOSPADM

## 2021-11-11 RX ORDER — CHLORHEXIDINE GLUCONATE 500 MG/1
1 CLOTH TOPICAL EVERY 12 HOURS PRN
Status: DISCONTINUED | OUTPATIENT
Start: 2021-11-11 | End: 2021-11-11

## 2021-11-11 RX ORDER — NICOTINE POLACRILEX 4 MG
15 LOZENGE BUCCAL
Status: DISCONTINUED | OUTPATIENT
Start: 2021-11-11 | End: 2021-11-17 | Stop reason: HOSPADM

## 2021-11-11 RX ORDER — ACETAMINOPHEN 325 MG/1
650 TABLET ORAL EVERY 6 HOURS
Status: COMPLETED | OUTPATIENT
Start: 2021-11-11 | End: 2021-11-12

## 2021-11-11 RX ORDER — MAGNESIUM SULFATE 1 G/100ML
1 INJECTION INTRAVENOUS EVERY 8 HOURS
Status: COMPLETED | OUTPATIENT
Start: 2021-11-11 | End: 2021-11-12

## 2021-11-11 RX ORDER — ACETAMINOPHEN 650 MG/1
650 SUPPOSITORY RECTAL EVERY 6 HOURS
Status: COMPLETED | OUTPATIENT
Start: 2021-11-11 | End: 2021-11-12

## 2021-11-11 RX ORDER — ACETAMINOPHEN 160 MG/5ML
650 SOLUTION ORAL EVERY 4 HOURS PRN
Status: DISCONTINUED | OUTPATIENT
Start: 2021-11-12 | End: 2021-11-17 | Stop reason: HOSPADM

## 2021-11-11 RX ORDER — SODIUM CHLORIDE 9 MG/ML
30 INJECTION, SOLUTION INTRAVENOUS CONTINUOUS
Status: DISPENSED | OUTPATIENT
Start: 2021-11-11 | End: 2021-11-14

## 2021-11-11 RX ORDER — AMINOCAPROIC ACID 250 MG/ML
INJECTION, SOLUTION INTRAVENOUS AS NEEDED
Status: DISCONTINUED | OUTPATIENT
Start: 2021-11-11 | End: 2021-11-11 | Stop reason: SURG

## 2021-11-11 RX ADMIN — HYDROCODONE BITARTRATE AND ACETAMINOPHEN 1 TABLET: 5; 325 TABLET ORAL at 15:22

## 2021-11-11 RX ADMIN — CEFAZOLIN SODIUM 2 G: 1 INJECTION, POWDER, FOR SOLUTION INTRAMUSCULAR; INTRAVENOUS at 11:04

## 2021-11-11 RX ADMIN — NITROGLYCERIN 5 MCG/MIN: 20 INJECTION INTRAVENOUS at 08:12

## 2021-11-11 RX ADMIN — MAGNESIUM SULFATE HEPTAHYDRATE 2 G: 500 INJECTION, SOLUTION INTRAMUSCULAR; INTRAVENOUS at 10:20

## 2021-11-11 RX ADMIN — FENTANYL CITRATE 500 MCG: 0.05 INJECTION, SOLUTION INTRAMUSCULAR; INTRAVENOUS at 07:41

## 2021-11-11 RX ADMIN — VECURONIUM BROMIDE 6 MG: 10 INJECTION, POWDER, LYOPHILIZED, FOR SOLUTION INTRAVENOUS at 09:44

## 2021-11-11 RX ADMIN — VECURONIUM BROMIDE 4 MG: 10 INJECTION, POWDER, LYOPHILIZED, FOR SOLUTION INTRAVENOUS at 07:42

## 2021-11-11 RX ADMIN — SODIUM CHLORIDE: 0.9 INJECTION, SOLUTION INTRAVENOUS at 10:48

## 2021-11-11 RX ADMIN — MILRINONE LACTATE IN DEXTROSE 0.25 MCG/KG/MIN: 200 INJECTION, SOLUTION INTRAVENOUS at 10:18

## 2021-11-11 RX ADMIN — FENTANYL CITRATE 100 MCG: 0.05 INJECTION, SOLUTION INTRAMUSCULAR; INTRAVENOUS at 06:43

## 2021-11-11 RX ADMIN — MIDAZOLAM 2 MG: 1 INJECTION INTRAMUSCULAR; INTRAVENOUS at 08:58

## 2021-11-11 RX ADMIN — PHENYLEPHRINE HYDROCHLORIDE 100 MCG: 10 INJECTION INTRAVENOUS at 08:31

## 2021-11-11 RX ADMIN — CALCIUM CHLORIDE 0.2 G: 100 INJECTION, SOLUTION INTRAVENOUS at 10:45

## 2021-11-11 RX ADMIN — DEXMEDETOMIDINE HYDROCHLORIDE 0.5 MCG/KG/HR: 4 INJECTION, SOLUTION INTRAVENOUS at 13:42

## 2021-11-11 RX ADMIN — DOCUSATE SODIUM 50 MG AND SENNOSIDES 8.6 MG 2 TABLET: 8.6; 5 TABLET, FILM COATED ORAL at 20:03

## 2021-11-11 RX ADMIN — CEFAZOLIN SODIUM 2 G: 10 INJECTION, POWDER, FOR SOLUTION INTRAVENOUS at 20:02

## 2021-11-11 RX ADMIN — ALBUMIN HUMAN 12.5 G: 0.05 INJECTION, SOLUTION INTRAVENOUS at 17:46

## 2021-11-11 RX ADMIN — HEPARIN SODIUM 1000 UNITS: 200 INJECTION, SOLUTION INTRAVENOUS at 08:14

## 2021-11-11 RX ADMIN — SODIUM CHLORIDE 30 ML/HR: 9 INJECTION, SOLUTION INTRAVENOUS at 13:43

## 2021-11-11 RX ADMIN — VECURONIUM BROMIDE 2 MG: 10 INJECTION, POWDER, LYOPHILIZED, FOR SOLUTION INTRAVENOUS at 08:29

## 2021-11-11 RX ADMIN — PROTAMINE SULFATE 300 MG: 10 INJECTION, SOLUTION INTRAVENOUS at 10:33

## 2021-11-11 RX ADMIN — ACETAMINOPHEN 650 MG: 325 TABLET, FILM COATED ORAL at 20:02

## 2021-11-11 RX ADMIN — METOPROLOL TARTRATE 12.5 MG: 25 TABLET, FILM COATED ORAL at 06:05

## 2021-11-11 RX ADMIN — NICARDIPINE HYDROCHLORIDE 1 MG: 25 INJECTION, SOLUTION INTRAVENOUS at 07:49

## 2021-11-11 RX ADMIN — MAGNESIUM SULFATE IN DEXTROSE 1 G: 10 INJECTION, SOLUTION INTRAVENOUS at 20:03

## 2021-11-11 RX ADMIN — MIDAZOLAM 4 MG: 1 INJECTION INTRAMUSCULAR; INTRAVENOUS at 10:16

## 2021-11-11 RX ADMIN — AMINOCAPROIC ACID 10 G: 250 INJECTION, SOLUTION INTRAVENOUS at 11:04

## 2021-11-11 RX ADMIN — CEFAZOLIN SODIUM 2 G: 1 INJECTION, POWDER, FOR SOLUTION INTRAMUSCULAR; INTRAVENOUS at 06:51

## 2021-11-11 RX ADMIN — NICARDIPINE HYDROCHLORIDE 1 MG: 25 INJECTION, SOLUTION INTRAVENOUS at 07:42

## 2021-11-11 RX ADMIN — DEXMEDETOMIDINE HYDROCHLORIDE 0.5 MCG/KG/HR: 100 INJECTION, SOLUTION INTRAVENOUS at 07:34

## 2021-11-11 RX ADMIN — PANTOPRAZOLE SODIUM 40 MG: 40 INJECTION, POWDER, FOR SOLUTION INTRAVENOUS at 13:07

## 2021-11-11 RX ADMIN — PROPOFOL 50 MG: 10 INJECTION, EMULSION INTRAVENOUS at 06:50

## 2021-11-11 RX ADMIN — FENTANYL CITRATE 250 MCG: 0.05 INJECTION, SOLUTION INTRAMUSCULAR; INTRAVENOUS at 10:16

## 2021-11-11 RX ADMIN — ALBUMIN HUMAN 12.5 G: 0.05 INJECTION, SOLUTION INTRAVENOUS at 13:07

## 2021-11-11 RX ADMIN — Medication 1 MCG/MIN: at 10:28

## 2021-11-11 RX ADMIN — ACETAMINOPHEN ORAL SOLUTION 650 MG: 650 SOLUTION ORAL at 13:07

## 2021-11-11 RX ADMIN — MORPHINE SULFATE 2 MG: 2 INJECTION, SOLUTION INTRAMUSCULAR; INTRAVENOUS at 13:42

## 2021-11-11 RX ADMIN — VECURONIUM BROMIDE 14 MG: 10 INJECTION, POWDER, LYOPHILIZED, FOR SOLUTION INTRAVENOUS at 06:50

## 2021-11-11 RX ADMIN — AMINOCAPROIC ACID 10 G: 250 INJECTION, SOLUTION INTRAVENOUS at 07:02

## 2021-11-11 RX ADMIN — CHLORHEXIDINE GLUCONATE 15 ML: 1.2 SOLUTION ORAL at 06:04

## 2021-11-11 RX ADMIN — MIDAZOLAM 2 MG: 1 INJECTION INTRAMUSCULAR; INTRAVENOUS at 06:43

## 2021-11-11 RX ADMIN — CHLORHEXIDINE GLUCONATE 1 APPLICATION: 500 CLOTH TOPICAL at 06:05

## 2021-11-11 RX ADMIN — POLYETHYLENE GLYCOL 3350 17 G: 17 POWDER, FOR SOLUTION ORAL at 20:02

## 2021-11-11 RX ADMIN — HEPARIN SODIUM 5000 UNITS: 1000 INJECTION, SOLUTION INTRAVENOUS; SUBCUTANEOUS at 08:32

## 2021-11-11 RX ADMIN — SODIUM CHLORIDE 2 UNITS/HR: 900 INJECTION INTRAVENOUS at 08:51

## 2021-11-11 RX ADMIN — VECURONIUM BROMIDE 8 MG: 10 INJECTION, POWDER, LYOPHILIZED, FOR SOLUTION INTRAVENOUS at 08:58

## 2021-11-11 RX ADMIN — VECURONIUM BROMIDE 6 MG: 10 INJECTION, POWDER, LYOPHILIZED, FOR SOLUTION INTRAVENOUS at 10:16

## 2021-11-11 RX ADMIN — PROPOFOL 50 MG: 10 INJECTION, EMULSION INTRAVENOUS at 06:53

## 2021-11-11 RX ADMIN — ASPIRIN 325 MG ORAL TABLET 325 MG: 325 PILL ORAL at 15:19

## 2021-11-11 RX ADMIN — FENTANYL CITRATE 150 MCG: 0.05 INJECTION, SOLUTION INTRAMUSCULAR; INTRAVENOUS at 06:50

## 2021-11-11 RX ADMIN — FENTANYL CITRATE 250 MCG: 0.05 INJECTION, SOLUTION INTRAMUSCULAR; INTRAVENOUS at 11:04

## 2021-11-11 RX ADMIN — NICARDIPINE HYDROCHLORIDE 1 MG: 25 INJECTION, SOLUTION INTRAVENOUS at 08:13

## 2021-11-11 RX ADMIN — SODIUM CHLORIDE: 0.9 INJECTION, SOLUTION INTRAVENOUS at 06:41

## 2021-11-11 RX ADMIN — MUPIROCIN 1 APPLICATION: 20 OINTMENT TOPICAL at 20:02

## 2021-11-11 RX ADMIN — MORPHINE SULFATE 2 MG: 2 INJECTION, SOLUTION INTRAMUSCULAR; INTRAVENOUS at 12:51

## 2021-11-11 RX ADMIN — ALBUMIN HUMAN 12.5 G: 0.05 INJECTION, SOLUTION INTRAVENOUS at 15:20

## 2021-11-11 RX ADMIN — ALBUMIN HUMAN 12.5 G: 0.05 INJECTION, SOLUTION INTRAVENOUS at 14:21

## 2021-11-11 RX ADMIN — HYDROCODONE BITARTRATE AND ACETAMINOPHEN 1 TABLET: 5; 325 TABLET ORAL at 20:03

## 2021-11-11 RX ADMIN — HEPARIN SODIUM 30000 UNITS: 1000 INJECTION, SOLUTION INTRAVENOUS; SUBCUTANEOUS at 08:21

## 2021-11-11 RX ADMIN — FENTANYL CITRATE 250 MCG: 0.05 INJECTION, SOLUTION INTRAMUSCULAR; INTRAVENOUS at 07:17

## 2021-11-11 RX ADMIN — Medication 0.04 MCG/KG/MIN: at 08:51

## 2021-11-11 RX ADMIN — CHLORHEXIDINE GLUCONATE 15 ML: 1.2 SOLUTION ORAL at 20:02

## 2021-11-11 RX ADMIN — MAGNESIUM SULFATE HEPTAHYDRATE 2 G: 500 INJECTION, SOLUTION INTRAMUSCULAR; INTRAVENOUS at 10:45

## 2021-11-11 NOTE — ANESTHESIA PROCEDURE NOTES
Arterial Line      Patient reassessed immediately prior to procedure    Patient location during procedure: OR   Line placed for hemodynamic monitoring and MD/Surgeon request.  Performed By   Anesthesiologist: Corey Daigle MD  Preanesthetic Checklist  Completed: patient identified, IV checked, site marked, risks and benefits discussed, surgical consent, monitors and equipment checked, pre-op evaluation and timeout performed  Arterial Line Prep   Sterile Tech: cap, gloves, sterile barriers and mask  Prep: ChloraPrep  Patient monitoring: EKG, continuous pulse oximetry and blood pressure monitoring  Arterial Line Procedure   Laterality:left  Location:  radial artery  Catheter size: 20 G   Guidance: palpation technique  Number of attempts: 2  Successful placement: yes  Heparin (Porcine) in NaCl 1000-0.9 UT/500ML-% for arterial line pressure bag, 1,000 Units  Post Assessment   Dressing Type: wrist guard applied, secured with tape and occlusive dressing applied.   Complications no  Circ/Move/Sens Assessment: normal and unchanged.   Patient Tolerance: patient tolerated the procedure well with no apparent complications

## 2021-11-11 NOTE — PLAN OF CARE
Problem: Adult Inpatient Plan of Care  Goal: Plan of Care Review  Outcome: Ongoing, Progressing  Goal: Patient-Specific Goal (Individualized)  Outcome: Ongoing, Progressing  Goal: Absence of Hospital-Acquired Illness or Injury  Outcome: Ongoing, Progressing  Intervention: Identify and Manage Fall Risk  Recent Flowsheet Documentation  Taken 11/11/2021 1200 by Cony Wise RN  Safety Promotion/Fall Prevention: safety round/check completed  Intervention: Prevent Skin Injury  Recent Flowsheet Documentation  Taken 11/11/2021 1200 by Cony Wise RN  Body Position: supine  Skin Protection:   incontinence pads utilized   adhesive use limited   silicone foam dressing in place   pulse oximeter probe site changed   skin-to-device areas padded   skin-to-skin areas padded   tubing/devices free from skin contact  Intervention: Prevent and Manage VTE (venous thromboembolism) Risk  Recent Flowsheet Documentation  Taken 11/11/2021 1200 by Cony Wise RN  VTE Prevention/Management:   bilateral   sequential compression devices on   foot pump device on  Intervention: Prevent Infection  Recent Flowsheet Documentation  Taken 11/11/2021 1200 by Cony Wise RN  Infection Prevention:   equipment surfaces disinfected   personal protective equipment utilized   single patient room provided   visitors restricted/screened  Goal: Optimal Comfort and Wellbeing  Outcome: Ongoing, Progressing  Intervention: Provide Person-Centered Care  Recent Flowsheet Documentation  Taken 11/11/2021 1200 by Cony Wise RN  Trust Relationship/Rapport:   care explained   choices provided   emotional support provided  Goal: Readiness for Transition of Care  Outcome: Ongoing, Progressing     Problem: Skin Injury Risk Increased  Goal: Skin Health and Integrity  Outcome: Ongoing, Progressing  Intervention: Optimize Skin Protection  Recent Flowsheet Documentation  Taken 11/11/2021 1200 by Cony Wise RN  Head of Bed (HOB): HOB at 20-30  degrees  Skin Protection:   incontinence pads utilized   adhesive use limited   silicone foam dressing in place   pulse oximeter probe site changed   skin-to-device areas padded   skin-to-skin areas padded   tubing/devices free from skin contact     Problem: Fall Injury Risk  Goal: Absence of Fall and Fall-Related Injury  Outcome: Ongoing, Progressing  Intervention: Promote Injury-Free Environment  Recent Flowsheet Documentation  Taken 11/11/2021 1200 by Cony Wise RN  Safety Promotion/Fall Prevention: safety round/check completed     Problem: Activity Intolerance (Cardiovascular Surgery)  Goal: Improved Activity Tolerance  Outcome: Ongoing, Progressing     Problem: Adjustment to Surgery (Cardiovascular Surgery)  Goal: Optimal Coping with Heart Surgery  Outcome: Ongoing, Progressing  Intervention: Support Patient/Family Psychosocial Response to Major Surgery  Recent Flowsheet Documentation  Taken 11/11/2021 1200 by Cony Wise RN  Family/Support System Care: support provided     Problem: Bleeding (Cardiovascular Surgery)  Goal: Absence of Bleeding  Outcome: Ongoing, Progressing     Problem: Bowel Elimination Impaired (Cardiovascular Surgery)  Goal: Effective Bowel Elimination  Outcome: Ongoing, Progressing     Problem: Cardiac Function Impaired (Cardiovascular Surgery)  Goal: Effective Cardiac Function  Outcome: Ongoing, Progressing     Problem: Cerebral Tissue Perfusion Risk (Cardiovascular Surgery)  Goal: Effective Cerebral Perfusion  Outcome: Ongoing, Progressing  Intervention: Protect and Optimize Cerebral Perfusion  Recent Flowsheet Documentation  Taken 11/11/2021 1200 by Cony Wise RN  Glycemic Management:   blood glucose monitoring   supplemental insulin given     Problem: Fluid Imbalance (Cardiovascular Surgery)  Goal: Fluid Balance  Outcome: Ongoing, Progressing     Problem: Infection (Cardiovascular Surgery)  Goal: Absence of Infection Signs and Symptoms  Outcome: Ongoing,  Progressing  Intervention: Prevent or Manage Infection  Recent Flowsheet Documentation  Taken 11/11/2021 1200 by Cony Wise RN  Glycemic Management:   blood glucose monitoring   supplemental insulin given  Infection Prevention:   equipment surfaces disinfected   personal protective equipment utilized   single patient room provided   visitors restricted/screened     Problem: Ongoing Anesthesia Effects (Cardiovascular Surgery)  Goal: Anesthesia/Sedation Recovery  Outcome: Ongoing, Progressing  Intervention: Optimize Anesthesia Recovery  Recent Flowsheet Documentation  Taken 11/11/2021 1200 by Cony Wise RN  Safety Promotion/Fall Prevention: safety round/check completed     Problem: Pain (Cardiovascular Surgery)  Goal: Acceptable Pain Control  Outcome: Ongoing, Progressing     Problem: Postoperative Nausea and Vomiting (Cardiovascular Surgery)  Goal: Nausea and Vomiting Relief  Outcome: Ongoing, Progressing     Problem: Postoperative Urinary Retention (Cardiovascular Surgery)  Goal: Effective Urinary Elimination  Outcome: Ongoing, Progressing     Problem: Respiratory Compromise (Cardiovascular Surgery)  Goal: Effective Oxygenation and Ventilation  Outcome: Ongoing, Progressing  Intervention: Promote Airway Secretion Clearance  Recent Flowsheet Documentation  Taken 11/11/2021 1200 by Cony Wise RN  Cough And Deep Breathing: unable to perform     Problem: Pain Acute  Goal: Optimal Pain Control  Outcome: Ongoing, Progressing   Goal Outcome Evalua

## 2021-11-11 NOTE — ANESTHESIA PROCEDURE NOTES
Procedure Performed: Emergent/Open-Heart Anesthesia AVERY     Start Time:        End Time:      Preanesthesia Checklist:  Patient identified, IV assessed, risks and benefits discussed, monitors and equipment assessed, procedure being performed at surgeon's request and anesthesia consent obtained.    General Procedure Information  AVERY Placed for monitoring purposes only -- This is not a diagnostic AVERY  Diagnostic Indications for Echo:  assessment of surgical repair, defect repair evaluation and hemodynamic monitoring  Physician Requesting Echo: Johnathan Hernandez MD  Location performed:  OR  Intubated  Bite block placed  Heart visualized  Probe Insertion:  Easy  Probe Type:  Multiplane  Modalities:  Color flow mapping    Echocardiographic and Doppler Measurements    Ventricles    Right Ventricle:  Cavity size normal.  Global function mildly impaired.    Left Ventricle:  Cavity size normal.  Hypertrophy present.  Thrombus not present.  Global Function mildly impaired.  Ejection Fraction 55%.          Valves    Aortic Valve:  Annulus normal.  Stenosis not present.  Regurgitation absent.  Leaflets normal.  Leaflet motions normal.                                Anesthesia Information      Echocardiogram Comments:       Post bypass AVERY EF 45%. No MR. No AS or AI. No perivalvular leak.

## 2021-11-11 NOTE — ANESTHESIA PREPROCEDURE EVALUATION
Anesthesia Evaluation     Patient summary reviewed and Nursing notes reviewed   no history of anesthetic complications:  NPO Solid Status: > 8 hours  NPO Liquid Status: > 8 hours           Airway   Mallampati: II  TM distance: >3 FB  Neck ROM: full  No difficulty expected  Dental      Pulmonary - normal exam   (+) a smoker Former,   Cardiovascular     Rhythm: irregular  Rate: normal    (+) hypertension, valvular problems/murmurs MR, CAD, dysrhythmias Atrial Fib,       Neuro/Psych- negative ROS  GI/Hepatic/Renal/Endo    (+) obesity,       Musculoskeletal     Abdominal  - normal exam   Substance History - negative use     OB/GYN negative ob/gyn ROS         Other   arthritis,                      Anesthesia Plan    ASA 4     general   (Discussed post op vent, a line, central line, intraop AVERY. All questions answered.)  intravenous induction   Postoperative Plan: Expected vent after surgery  Anesthetic plan, all risks, benefits, and alternatives have been provided, discussed and informed consent has been obtained with: patient.

## 2021-11-11 NOTE — ANESTHESIA PROCEDURE NOTES
Central Line      Patient reassessed immediately prior to procedure    Patient location during procedure: OR  Indications: central pressure monitoring, vascular access and MD/Surgeon request  Staff  Anesthesiologist: Corey Daigle MD  Preanesthetic Checklist  Completed: patient identified, IV checked, site marked, risks and benefits discussed, surgical consent, monitors and equipment checked, pre-op evaluation and timeout performed  Central Line Prep  Sterile Tech:gloves, cap, gown, mask and sterile barriers  Prep: chloraprep  Patient monitoring: blood pressure monitoring, continuous pulse oximetry and EKG  Central Line Procedure  Laterality:right  Location:internal jugular  Catheter Type:double lumen  Catheter Size:9 Fr  Guidance:ultrasound guided  PROCEDURE NOTE/ULTRASOUND INTERPRETATION.  Using ultrasound guidance the potential vascular sites for insertion of the catheter were visualized to determine the patency of the vessel to be used for vascular access.  After selecting the appropriate site for insertion, the needle was visualized under ultrasound being inserted into the internal jugular vein, followed by ultrasound confirmation of wire and catheter placement. There were no abnormalities seen on ultrasound; an image was taken; and the patient tolerated the procedure with no complications. Images: still images not obtained  Assessment  Post procedure:biopatch applied, line sutured and occlusive dressing applied  Assessement:blood return through all ports, free fluid flow, chest x-ray ordered and Regulo Test  Complications:no  Patient Tolerance:patient tolerated the procedure well with no apparent complications

## 2021-11-11 NOTE — ANESTHESIA PROCEDURE NOTES
Airway  Urgency: elective    Date/Time: 11/11/2021 6:55 AM  Airway not difficult    General Information and Staff    Patient location during procedure: OR  Anesthesiologist: Corey Daigle MD    Indications and Patient Condition  Indications for airway management: airway protection    Preoxygenated: yes  MILS maintained throughout  Mask difficulty assessment: 1 - vent by mask    Final Airway Details  Final airway type: endotracheal airway      Successful airway: ETT  Cuffed: yes   Successful intubation technique: direct laryngoscopy  Facilitating devices/methods: cricoid pressure  Endotracheal tube insertion site: oral  Blade: Jose  Blade size: 4  ETT size (mm): 7.5 (glottic suction ETT)  Cormack-Lehane Classification: grade IIa - partial view of glottis  Placement verified by: chest auscultation and capnometry   Measured from: lips  ETT/EBT  to lips (cm): 23  Number of attempts at approach: 1  Assessment: lips, teeth, and gum same as pre-op    Additional Comments  On initial attempt of ETT insertion difficult to pass vocal cords. On withdrawal a pop was felt by me and RN holding cricoid. On exam no heme or abrasions noted. Vocal cords appeared open and symmetrical. Second attempt passed without difficulty.

## 2021-11-11 NOTE — OP NOTE
Operative Note    Date of Dictation: 11/11/21    Date of Procedure: Same    Referring Physician: Elmer Knight M.D.    Indications:   Progressive dyspnea of exertion    Preoperative diagnosis:  1.  Multivessel coronary artery disease  2.  Moderate mitral regurgitation  3.  Persistent atrial fibrillation  4.  Dyspnea with exertion    Postoperative diagnosis:  Same    Procedure:  1.  CABG x3 with a LIMA to the mid LAD and reverse individual saphenous vein graft to the obtuse marginal 1 and PLB branch of the right coronary artery (CPT code 84652, 62563)   2.  Mitral valve repair with a 28 mm physiotwo ring annuloplasty (CPT code 46368)   3.  Right and left cryo-maze procedure with full set of lesions (CPT code 57523)  4.  Endoscopic vein harvest of the right lower extremity (CPT code 92355)       Surgeon: Johnathan Hernandez MD     Assistants: PALOMA Davila Assistant: Mariana Gonzalez PA was responsible for performing the following activities: Retraction, Suction, Irrigation, Suturing, Closing, Placing Dressing, Harvesting of Vessels, Bypass Grafting and all aspects of the case and their skilled assistance was necessary for the success of this case.    Anesthesia: General endotracheal anesthesia and AVERY    Findings:  The saphenous vein was harvested endoscopically form the right leg. The vein had a diameter of 3 to 4 mm and was of adequate quality. The coronaries had diameters between 2 and 2.5 mm.    Blood loss: Approximately 600 cc, most of it recovered with a Cell Saver device and cardiotomy suckers and was retransfused to the patient    STS data: The patient was explained the risk (STS calculated for mitral valve repair/CABG), benefits and alternatives of surgery and he wished to proceed.  The antibiotics and beta-blockers were given within the STS require window.  Counseling was given about diet, alcohol and tobacco use as needed    Description of the procedure:     The patient was placed supine  on the operative table. Anesthesia was given and lines placed. The patient was prepped and draped using the usual sterile technique. A median sternotomy was performed with a scalpel and the layers carried down to the sternum using the electrocautery. The sternum was splited in the midline using a vertical oscillating saw. Hemostasis was achieved. The LIMA was harvested as a pedicle and prepared with papaverine. It was of good quality. 300 units/kg of IV heparin was given to an ACT over 400. A Luciano retractor was placed and the mediastinum exposed, the pericardium was opened and the edges tacked to the wound. Cannulation sutures were placed in the ascending aorta and both superior and inferior vena cava. Small cannulas were placed and aorto bicaval cardiopulmonary bypass was started. Cardioplegia cannulas were placed and then the ascending aorta was clamped. One liter of cold blood cardioplegia was given in an antegrade and retrograde fashion to achieved diastolic arrest and further doses every 15 minutes thereafter. Constructions of grafts were done in the sequence distal - proximal between the reversed saphenous vein graft and each coronary targets. Grafts were constructed to the PLB branch of the right coronary artery and the obtuse marginal 1 coronary arteries and plegia given between sequences after de-airing the aortic root.  The LIMA was anastomosed to the mid LAD using 7-0 Prolene continuous suture in end-to-side fashion.  Anastomosis was tested, there was good flow good morphology and no leakage.  The bulldog was reapplied to the JAMSHID.    Following, a left atriotomy was performed through the right interatrial groove. The left atrial cavity and the mitral valve were exposed using self retracted blades attached to the retractor. The valve has significant annular dilatation and no leaflet disease.  In my opinion it was repairable.      MAZE:  The MAZE procedure was performed using a cryoprobe at -140 degrees  centigrade for 2 minutes in the box lesion encompassing the posterior wall around the 4 pulmonary veins take off and for 1 minute the posterior wall lesion to the mitral valve annulus, MV annular lesion between P2-P3, box lesion to left atrial appendage and right pulmonary veins isolation. Then, the left atrial appendage was ligated using a double layer of 4.0 prolene continuous suture. and MV REPAIR:  The mitral valve was systematically evaluated to identify the etiology using valve hooks. The problem was Annular dilatation. Then, 2.0 Ethibond suture were placed in a plicating fashion Circumferentially around the annulus. A ring was selected by measuring the inter-trigonal distance and the AP distance of the anterior leaflet. The sutures were passed through the28 mm physiotwo ring and the knots secured. The valve was tested by injecting saline and no residual leak was seen. Then the atrium was closed using a 3.0 prolene running sutures, the chamber de-aired and suture tied down.  Of note, the core knot device was used to secure the sutures in the repair.  Also, a 1 minute lesions were performed in the coronaries sinus epicardially and in the right atrium in the intracaval, and free right atrial wall configuration from the appendage to the inferior vena cava.  The patient was systemically rewarmed, the warm dose of cardioplegia was given and then the aortic clamp removed with steep suction on the aortic vent as well as the LIMA bulldog clamp. All anastomoses were checked and had good flow and morphology. The suture lines were checked for hemostasis as well. The left pleural space was suctioned and the lungs ventilated. The heart was paced till regular atrial rhythm resumed. I allowed the heart to eject and the left heart chambers were de-aired using the standard techniques under AVERY guidance. Once hemodynamics were acceptable, then the CPB was discontinued and the venous and cardioplegia cannulas removed. The  matching dose of protamine was given and the aortic cannula removed as well. AV temporary wires and pleural and mediastinal chest tubes were placed and the wound sprayed with platelet rich plasma.  The sternum was closed with single and double wires and soft tissue in layers of reabsorbable material. The wounds were covered with sterile dressings.    Specimen removed: None    CPB time: 105 minutes    Aortic clamp time: 91 minutes       Complications: None           Disposition: Cardiovascular recovery room           Condition: Critical but stable.

## 2021-11-11 NOTE — ANESTHESIA POSTPROCEDURE EVALUATION
Patient: Salinas Dill    Procedure Summary     Date: 11/11/21 Room / Location: [facility] 01 / [facility]    Anesthesia Start: 0641 Anesthesia Stop: 1206    Procedures:       CORONARY ARTERY BYPASS WITH INTERNAL MAMMARY ARTERY GRAFT AND MAZE PROCEDURE (N/A Chest)      MITRAL VALVE REPAIR/REPLACEMENT (N/A Chest) Diagnosis:       Coronary artery disease involving native coronary artery of native heart without angina pectoris      (Coronary artery disease involving native coronary artery of native heart without angina pectoris [I25.10])    Surgeons: Johnathan Hernandez MD Provider: Corey Daigle MD    Anesthesia Type: general ASA Status: 4          Anesthesia Type: general    Vitals  Vitals Value Taken Time   BP     Temp 96.08 °F (35.6 °C) 11/11/21 1400   Pulse 89 11/11/21 1400   Resp     SpO2 100 % 11/11/21 1400   Vitals shown include unvalidated device data.        Post Anesthesia Care and Evaluation    Patient location during evaluation: ICU  Patient participation: complete - patient cannot participate  Level of consciousness: obtunded/minimal responses  Pain scale: See nurse's notes for pain score.  Pain management: adequate  Airway patency: patent  Anesthetic complications: No anesthetic complications  PONV Status: none  Cardiovascular status: acceptable  Respiratory status: acceptable  Hydration status: acceptable    Comments: Patient seen and examined postoperatively; vital signs stable; SpO2 greater than or equal to 90%; cardiopulmonary status stable; nausea/vomiting adequately controlled; pain adequately controlled; no apparent anesthesia complications; patient discharged from anesthesia care when discharge criteria were met

## 2021-11-11 NOTE — ANESTHESIA PROCEDURE NOTES
Central Line      Patient location during procedure: OR  Indications: central pressure monitoring, vascular access and MD/Surgeon request  Staff  Anesthesiologist: Corey Daigle MD  Preanesthetic Checklist  Completed: patient identified, IV checked, site marked, risks and benefits discussed, surgical consent, monitors and equipment checked, pre-op evaluation and timeout performed  Central Line Prep  Sterile Tech:gloves, cap, gown, mask and sterile barriers  Prep: chloraprep  Patient monitoring: blood pressure monitoring, continuous pulse oximetry and EKG  Central Line Procedure  Laterality:right  Location:internal jugular  Catheter Type:Marquette-Rula (placed through double lumen)  Assessment  Post procedure:biopatch applied, line sutured and occlusive dressing applied  Assessement:blood return through all ports, free fluid flow and chest x-ray ordered  Complications:no  Patient Tolerance:patient tolerated the procedure well with no apparent complications

## 2021-11-12 ENCOUNTER — APPOINTMENT (OUTPATIENT)
Dept: GENERAL RADIOLOGY | Facility: HOSPITAL | Age: 76
End: 2021-11-12

## 2021-11-12 LAB
ALBUMIN SERPL-MCNC: 4.1 G/DL (ref 3.5–5.2)
ANION GAP SERPL CALCULATED.3IONS-SCNC: 14 MMOL/L (ref 5–15)
ATMOSPHERIC PRESS: ABNORMAL MM[HG]
BASE EXCESS BLDV CALC-SCNC: -8.9 MMOL/L (ref -2–2)
BASOPHILS # BLD AUTO: 0 10*3/MM3 (ref 0–0.2)
BASOPHILS NFR BLD AUTO: 0 % (ref 0–1.5)
BDY SITE: ABNORMAL
BUN SERPL-MCNC: 19 MG/DL (ref 8–23)
BUN/CREAT SERPL: 25 (ref 7–25)
CA-I SERPL ISE-MCNC: 1.23 MMOL/L (ref 1.2–1.3)
CALCIUM SPEC-SCNC: 8.3 MG/DL (ref 8.6–10.5)
CHLORIDE SERPL-SCNC: 108 MMOL/L (ref 98–107)
CO2 BLDA-SCNC: 15.9 MMOL/L (ref 22–29)
CO2 SERPL-SCNC: 22 MMOL/L (ref 22–29)
CREAT SERPL-MCNC: 0.76 MG/DL (ref 0.76–1.27)
DEPRECATED RDW RBC AUTO: 43.8 FL (ref 37–54)
EOSINOPHIL # BLD AUTO: 0 10*3/MM3 (ref 0–0.4)
EOSINOPHIL NFR BLD AUTO: 0 % (ref 0.3–6.2)
ERYTHROCYTE [DISTWIDTH] IN BLOOD BY AUTOMATED COUNT: 14.1 % (ref 12.3–15.4)
GFR SERPL CREATININE-BSD FRML MDRD: 100 ML/MIN/1.73
GLUCOSE BLDC GLUCOMTR-MCNC: 106 MG/DL (ref 70–105)
GLUCOSE BLDC GLUCOMTR-MCNC: 106 MG/DL (ref 70–105)
GLUCOSE BLDC GLUCOMTR-MCNC: 107 MG/DL (ref 70–105)
GLUCOSE BLDC GLUCOMTR-MCNC: 111 MG/DL (ref 70–105)
GLUCOSE BLDC GLUCOMTR-MCNC: 111 MG/DL (ref 70–105)
GLUCOSE BLDC GLUCOMTR-MCNC: 113 MG/DL (ref 70–105)
GLUCOSE BLDC GLUCOMTR-MCNC: 114 MG/DL (ref 70–105)
GLUCOSE BLDC GLUCOMTR-MCNC: 115 MG/DL (ref 70–105)
GLUCOSE BLDC GLUCOMTR-MCNC: 116 MG/DL (ref 70–105)
GLUCOSE BLDC GLUCOMTR-MCNC: 119 MG/DL (ref 70–105)
GLUCOSE BLDC GLUCOMTR-MCNC: 122 MG/DL (ref 70–105)
GLUCOSE BLDC GLUCOMTR-MCNC: 130 MG/DL (ref 70–105)
GLUCOSE BLDC GLUCOMTR-MCNC: 149 MG/DL (ref 70–105)
GLUCOSE BLDC GLUCOMTR-MCNC: 164 MG/DL (ref 70–105)
GLUCOSE BLDC GLUCOMTR-MCNC: 203 MG/DL (ref 70–105)
GLUCOSE BLDC GLUCOMTR-MCNC: 92 MG/DL (ref 70–105)
GLUCOSE SERPL-MCNC: 106 MG/DL (ref 65–99)
HCO3 BLDV-SCNC: 15.2 MMOL/L (ref 22–26)
HCT VFR BLD AUTO: 30.7 % (ref 37.5–51)
HGB BLD-MCNC: 10.6 G/DL (ref 13–17.7)
INHALED O2 CONCENTRATION: <21 %
INR PPP: 1.08 (ref 0.93–1.1)
LYMPHOCYTES # BLD AUTO: 0.5 10*3/MM3 (ref 0.7–3.1)
LYMPHOCYTES NFR BLD AUTO: 3.2 % (ref 19.6–45.3)
MAGNESIUM SERPL-MCNC: 2.3 MG/DL (ref 1.6–2.4)
MCH RBC QN AUTO: 30.4 PG (ref 26.6–33)
MCHC RBC AUTO-ENTMCNC: 34.5 G/DL (ref 31.5–35.7)
MCV RBC AUTO: 88.1 FL (ref 79–97)
MODALITY: ABNORMAL
MONOCYTES # BLD AUTO: 0.8 10*3/MM3 (ref 0.1–0.9)
MONOCYTES NFR BLD AUTO: 5.1 % (ref 5–12)
NEUTROPHILS NFR BLD AUTO: 13.9 10*3/MM3 (ref 1.7–7)
NEUTROPHILS NFR BLD AUTO: 91.7 % (ref 42.7–76)
NRBC BLD AUTO-RTO: 0 /100 WBC (ref 0–0.2)
PCO2 BLDV: 24.9 MM HG (ref 42–51)
PH BLDV: 7.39 PH UNITS (ref 7.32–7.43)
PHOSPHATE SERPL-MCNC: 3.3 MG/DL (ref 2.5–4.5)
PLATELET # BLD AUTO: 127 10*3/MM3 (ref 140–450)
PMV BLD AUTO: 8.6 FL (ref 6–12)
PO2 BLDV: 33.1 MM HG (ref 40–42)
POTASSIUM SERPL-SCNC: 3.5 MMOL/L (ref 3.5–5.2)
POTASSIUM SERPL-SCNC: 3.8 MMOL/L (ref 3.5–5.2)
PROTHROMBIN TIME: 11.9 SECONDS (ref 9.6–11.7)
QT INTERVAL: 371 MS
RBC # BLD AUTO: 3.49 10*6/MM3 (ref 4.14–5.8)
SAO2 % BLDCOV: 65.1 % (ref 95–99)
SODIUM SERPL-SCNC: 144 MMOL/L (ref 136–145)
WBC # BLD AUTO: 15.2 10*3/MM3 (ref 3.4–10.8)

## 2021-11-12 PROCEDURE — 25010000002 CEFAZOLIN PER 500 MG: Performed by: NURSE PRACTITIONER

## 2021-11-12 PROCEDURE — 85025 COMPLETE CBC W/AUTO DIFF WBC: CPT | Performed by: NURSE PRACTITIONER

## 2021-11-12 PROCEDURE — 97110 THERAPEUTIC EXERCISES: CPT

## 2021-11-12 PROCEDURE — 99221 1ST HOSP IP/OBS SF/LOW 40: CPT | Performed by: INTERNAL MEDICINE

## 2021-11-12 PROCEDURE — 84132 ASSAY OF SERUM POTASSIUM: CPT | Performed by: THORACIC SURGERY (CARDIOTHORACIC VASCULAR SURGERY)

## 2021-11-12 PROCEDURE — 99024 POSTOP FOLLOW-UP VISIT: CPT | Performed by: NURSE PRACTITIONER

## 2021-11-12 PROCEDURE — 82330 ASSAY OF CALCIUM: CPT | Performed by: NURSE PRACTITIONER

## 2021-11-12 PROCEDURE — 94799 UNLISTED PULMONARY SVC/PX: CPT

## 2021-11-12 PROCEDURE — 25010000002 FUROSEMIDE PER 20 MG: Performed by: NURSE PRACTITIONER

## 2021-11-12 PROCEDURE — 80069 RENAL FUNCTION PANEL: CPT | Performed by: NURSE PRACTITIONER

## 2021-11-12 PROCEDURE — 0 INSULIN REGULAR HUMAN PER 5 UNITS: Performed by: NURSE PRACTITIONER

## 2021-11-12 PROCEDURE — 83735 ASSAY OF MAGNESIUM: CPT | Performed by: NURSE PRACTITIONER

## 2021-11-12 PROCEDURE — 82803 BLOOD GASES ANY COMBINATION: CPT

## 2021-11-12 PROCEDURE — 97166 OT EVAL MOD COMPLEX 45 MIN: CPT

## 2021-11-12 PROCEDURE — 71045 X-RAY EXAM CHEST 1 VIEW: CPT

## 2021-11-12 PROCEDURE — 0 MILRINONE LACTATE IN DEXTROSE 20-5 MG/100ML-% SOLUTION: Performed by: NURSE PRACTITIONER

## 2021-11-12 PROCEDURE — 82962 GLUCOSE BLOOD TEST: CPT

## 2021-11-12 PROCEDURE — 97163 PT EVAL HIGH COMPLEX 45 MIN: CPT

## 2021-11-12 PROCEDURE — 85610 PROTHROMBIN TIME: CPT | Performed by: NURSE PRACTITIONER

## 2021-11-12 PROCEDURE — 93005 ELECTROCARDIOGRAM TRACING: CPT | Performed by: NURSE PRACTITIONER

## 2021-11-12 PROCEDURE — 25010000002 MAGNESIUM SULFATE IN D5W 1G/100ML (PREMIX) 1-5 GM/100ML-% SOLUTION: Performed by: NURSE PRACTITIONER

## 2021-11-12 RX ORDER — POTASSIUM CHLORIDE 20 MEQ/1
40 TABLET, EXTENDED RELEASE ORAL ONCE
Status: COMPLETED | OUTPATIENT
Start: 2021-11-12 | End: 2021-11-12

## 2021-11-12 RX ORDER — CARVEDILOL 6.25 MG/1
6.25 TABLET ORAL EVERY 12 HOURS
Status: DISCONTINUED | OUTPATIENT
Start: 2021-11-12 | End: 2021-11-15

## 2021-11-12 RX ORDER — CARVEDILOL 3.12 MG/1
3.12 TABLET ORAL EVERY 12 HOURS
Status: DISCONTINUED | OUTPATIENT
Start: 2021-11-12 | End: 2021-11-12

## 2021-11-12 RX ORDER — FUROSEMIDE 10 MG/ML
40 INJECTION INTRAMUSCULAR; INTRAVENOUS ONCE
Status: COMPLETED | OUTPATIENT
Start: 2021-11-12 | End: 2021-11-12

## 2021-11-12 RX ORDER — CARVEDILOL 3.12 MG/1
3.12 TABLET ORAL ONCE
Status: COMPLETED | OUTPATIENT
Start: 2021-11-12 | End: 2021-11-12

## 2021-11-12 RX ADMIN — MUPIROCIN 1 APPLICATION: 20 OINTMENT TOPICAL at 21:08

## 2021-11-12 RX ADMIN — ACETAMINOPHEN 325 MG: 325 TABLET, FILM COATED ORAL at 08:02

## 2021-11-12 RX ADMIN — POTASSIUM CHLORIDE 40 MEQ: 1500 TABLET, EXTENDED RELEASE ORAL at 08:56

## 2021-11-12 RX ADMIN — POLYETHYLENE GLYCOL 3350 17 G: 17 POWDER, FOR SOLUTION ORAL at 08:02

## 2021-11-12 RX ADMIN — CHLORHEXIDINE GLUCONATE 15 ML: 1.2 SOLUTION ORAL at 08:02

## 2021-11-12 RX ADMIN — MAGNESIUM SULFATE IN DEXTROSE 1 G: 10 INJECTION, SOLUTION INTRAVENOUS at 03:11

## 2021-11-12 RX ADMIN — ACETAMINOPHEN 650 MG: 325 TABLET, FILM COATED ORAL at 01:11

## 2021-11-12 RX ADMIN — CEFAZOLIN SODIUM 2 G: 10 INJECTION, POWDER, FOR SOLUTION INTRAVENOUS at 10:44

## 2021-11-12 RX ADMIN — NICARDIPINE HYDROCHLORIDE 2.5 MG/HR: 25 INJECTION, SOLUTION INTRAVENOUS at 03:42

## 2021-11-12 RX ADMIN — FUROSEMIDE 40 MG: 10 INJECTION, SOLUTION INTRAMUSCULAR; INTRAVENOUS at 08:56

## 2021-11-12 RX ADMIN — MUPIROCIN 1 APPLICATION: 20 OINTMENT TOPICAL at 08:03

## 2021-11-12 RX ADMIN — HYDROCODONE BITARTRATE AND ACETAMINOPHEN 1 TABLET: 5; 325 TABLET ORAL at 23:21

## 2021-11-12 RX ADMIN — POTASSIUM CHLORIDE 20 MEQ: 1500 TABLET, EXTENDED RELEASE ORAL at 06:10

## 2021-11-12 RX ADMIN — HYDROCODONE BITARTRATE AND ACETAMINOPHEN 1 TABLET: 5; 325 TABLET ORAL at 08:02

## 2021-11-12 RX ADMIN — CARVEDILOL 6.25 MG: 6.25 TABLET, FILM COATED ORAL at 21:07

## 2021-11-12 RX ADMIN — CHLORHEXIDINE GLUCONATE 15 ML: 1.2 SOLUTION ORAL at 21:08

## 2021-11-12 RX ADMIN — PANTOPRAZOLE SODIUM 40 MG: 40 TABLET, DELAYED RELEASE ORAL at 06:00

## 2021-11-12 RX ADMIN — HYDROCODONE BITARTRATE AND ACETAMINOPHEN 1 TABLET: 5; 325 TABLET ORAL at 12:36

## 2021-11-12 RX ADMIN — NICARDIPINE HYDROCHLORIDE 2.5 MG/HR: 25 INJECTION, SOLUTION INTRAVENOUS at 21:09

## 2021-11-12 RX ADMIN — CARVEDILOL 3.12 MG: 3.12 TABLET, FILM COATED ORAL at 08:56

## 2021-11-12 RX ADMIN — CEFAZOLIN SODIUM 2 G: 10 INJECTION, POWDER, FOR SOLUTION INTRAVENOUS at 18:01

## 2021-11-12 RX ADMIN — HYDROCODONE BITARTRATE AND ACETAMINOPHEN 1 TABLET: 5; 325 TABLET ORAL at 03:57

## 2021-11-12 RX ADMIN — POLYETHYLENE GLYCOL 3350 17 G: 17 POWDER, FOR SOLUTION ORAL at 21:08

## 2021-11-12 RX ADMIN — ATORVASTATIN CALCIUM 40 MG: 40 TABLET, FILM COATED ORAL at 21:07

## 2021-11-12 RX ADMIN — DOCUSATE SODIUM 50 MG AND SENNOSIDES 8.6 MG 2 TABLET: 8.6; 5 TABLET, FILM COATED ORAL at 21:07

## 2021-11-12 RX ADMIN — ASPIRIN 81 MG: 81 TABLET, COATED ORAL at 08:02

## 2021-11-12 RX ADMIN — CARVEDILOL 3.12 MG: 3.12 TABLET, FILM COATED ORAL at 18:01

## 2021-11-12 RX ADMIN — DOCUSATE SODIUM 50 MG AND SENNOSIDES 8.6 MG 2 TABLET: 8.6; 5 TABLET, FILM COATED ORAL at 08:02

## 2021-11-12 RX ADMIN — MILRINONE LACTATE IN DEXTROSE 0.25 MCG/KG/MIN: 200 INJECTION, SOLUTION INTRAVENOUS at 08:11

## 2021-11-12 RX ADMIN — POTASSIUM CHLORIDE 20 MEQ: 1500 TABLET, EXTENDED RELEASE ORAL at 08:02

## 2021-11-12 RX ADMIN — HYDROCODONE BITARTRATE AND ACETAMINOPHEN 1 TABLET: 5; 325 TABLET ORAL at 17:55

## 2021-11-12 RX ADMIN — NICARDIPINE HYDROCHLORIDE 2.5 MG/HR: 25 INJECTION, SOLUTION INTRAVENOUS at 11:58

## 2021-11-12 RX ADMIN — MAGNESIUM SULFATE IN DEXTROSE 1 G: 10 INJECTION, SOLUTION INTRAVENOUS at 10:44

## 2021-11-12 RX ADMIN — CEFAZOLIN SODIUM 2 G: 10 INJECTION, POWDER, FOR SOLUTION INTRAVENOUS at 03:11

## 2021-11-12 NOTE — CASE MANAGEMENT/SOCIAL WORK
Discharge Planning Assessment  [facility]     Patient Name: Salinas Dill  MRN: [id number]  Today's Date: 11/12/2021    Admit Date: 11/11/2021     Discharge Needs Assessment     Row Name 11/12/21 0851       Living Environment    Lives With spouse    Current Living Arrangements home/apartment/condo    Quality of Family Relationships supportive    Able to Return to Prior Arrangements yes       Resource/Environmental Concerns    Resource/Environmental Concerns none    Transportation Concerns car, none       Transition Planning    Patient/Family Anticipates Transition to home with family; home with help/services    Patient/Family Anticipated Services at Transition none    Transportation Anticipated family or friend will provide       Discharge Needs Assessment    Readmission Within the Last 30 Days no previous admission in last 30 days    Equipment Currently Used at Home walker, standard; cane, quad               Discharge Plan     Row Name 11/12/21 0852       Plan    Plan D/C Plan : POD 1 of a CABG , PT/OT to eval , pt has a s. walker    Plan Comments confirmed PCP and pharmacy , wife for transport home at D/C              Continued Care and Services - Admitted Since 11/11/2021    Coordination has not been started for this encounter.          Demographic Summary     Row Name 11/12/21 0849       General Information    Admission Type inpatient    Arrived From observation    Preferred Language English     Used During This Interaction no               Functional Status     Row Name 11/12/21 0851       Functional Status    Usual Activity Tolerance good    Current Activity Tolerance good       Mental Status    General Appearance WDL WDL       Mental Status Summary    Recent Changes in Mental Status/Cognitive Functioning no changes                         Evelina Haynes RN

## 2021-11-12 NOTE — CONSULTS
Cardiology Consult Note      REQUESTING PHYSICIAN    Johnathan Hernandez MD    PATIENT IDENTIFICATION  Name: Salinas Dill  Age: 75 y.o.  Sex: male  :  1945  MRN: [id number]            Cardiology assessment and plan    Multivessel coronary artery disease  Persistent and chronic atrial fibrillation  Moderate mitral regurgitation  Normal LV systolic function  Hypertension  Hyperlipidemia    Postop day 1 successful coronary bypass surgery mitral valve repair and Maze procedure with left atrial appendage closure  Continue current medical therapy  Patient clinically hemodynamically stable sitting up in chair  Tolerating p.o. medications  Start patient on low-dose of beta-blockers  Diuretics as needed  Discontinue Primacor  Maintaining sinus rhythm  Wean off the drips/Primacor  Discussed with the patient and family at bedside        Impressions:  IFR evaluation of the mid LAD showing IFR value of 0.89 suggestive of physiologically significant stenosis involving the mid LAD  IFR evaluation of the ramus branch of the circumflex showing IFR value of 0.88 suggestive of physiologically significant stenosis involving the ramus branch of the circumflex  IFR evaluation of the left main showing IFR value of 0.94 suggestive of no physiologically significant stenosis involving the distal left main  Severe three-vessel coronary artery disease  Extensive calcification of the multiple coronary arteries segments  Normal LV systolic function normal wall motion normal left-sided filling pressures estimate LV ejection fraction of 60%  Chronic atrial fibrillation       Interpretation Summary    · Left ventricular wall thickness is consistent with mild concentric hypertrophy.  · Estimated left ventricular EF = 55% Estimated left ventricular EF was in agreement with the calculated left ventricular EF. Left ventricular systolic function is normal.  · Mild dilation of the aortic root is present. Mild dilation of the ascending  aorta is present.  · Moderate mitral valve regurgitation is present.  · Estimated right ventricular systolic pressure from tricuspid regurgitation is normal (<35 mmHg).  · Left ventricular diastolic function is consistent with (grade I) impaired relaxation.        REASON FOR CONSULTATION:  75-year-old male recently diagnosed with severe multivessel coronary artery disease, paroxysmal atrial fibrillation, mitral valve disorder.  He has additional history of BPH, elevated blood sugar, hypertension, vitamin D deficiency      CC:  Coronary artery disease  Mitral valve insufficiency  Paroxysmal atrial fibrillation    HISTORY OF PRESENT ILLNESS:   Patient was seen in new consultation by Dr. 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  He had no prior history.  Patient was started on Eliquis for anticoagulation and oral Cardizem for rate control.  He had cardiac calcium score 1098 and underwent ischemic work-up including nuclear stress testing with no evidence of inducible ischemia.  He reported no symptoms of chest pain or shortness of breath.  TTE showed moderate MR.  Patient had cardioversion and went back into atrial fibrillation.  Cardiac catheterization was recommended due to elevated calcium score where he was found to have left main 50% stenosis, LAD 70% mid, D1 99%, LCx 80% ostial, ONM 90%, RI 70%, D2 80%, RCA has PLB branch 90% and PDA 70%.  IFR mid LAD .89, IFR RI .88, and IFR left main .94.  Echo from July 2020, showed EF 55% and mild to mod MR.   CTS was consulted and CABG with JAMSHID/maze procedure and possible MVR were recommended.  [facility] CABG x3 with LIMA to mid LAD, reverse individual saphenous vein graft to the obtuse marginal 1 and PLB branch of the right coronary artery, Mitral valve repair with a 28 mm physiotwo ring annuloplasty, right and left cryo-maze procedure with full set of lesions.    Upon my evaluation, patient  "is sitting in bedside chair and looks quite well today.  He reports sternal wound pain controlled with oral pain medication.  He denies any nausea, shortness of breath.  Rhythm currently sinus with first-degree AV block.  Drips include Cardene, insulin, Primacor.  Pressure stable at 141/55.  He is very talkative and reports that he feels well today.        REVIEW OF SYSTEMS:  Pertinent items are noted in HPI, all other systems reviewed and negative    OBJECTIVE   Potassium 3.8    ASSESSMENT  Coronary artery disease status post three-vessel CABG  Mitral valve insufficiency status post mitral valve repair/ring  Paroxysmal atrial fibrillation status post maze procedure and left atrial appendage ligation  Essential hypertension  Dyslipidemia  BPH            PLAN  Patient appears to be progressing quite well.  BB started  Wean drips as tolerated  Monitor rhythm closely  Tubes and lines per CTS  Mobility as tolerated  Blood pressure has improved-plans to wean Cardene  Further recommendations patient's hospital course progresses        Vital Signs  Visit Vitals  /61   Pulse 90   Temp 99 °F (37.2 °C)   Resp 18   Ht 180.3 cm (71\")   Wt 99.2 kg (218 lb 12.8 oz)   SpO2 99%   BMI 30.52 kg/m²     Oxygen Therapy  SpO2: 99 %  Pulse Oximetry Type: Continuous  Device (Oxygen Therapy): room air  Flow (L/min): 2  Oxygen Concentration (%): 70  Flowsheet Rows      First Filed Value   Admission Height 180.3 cm (71\") Documented at 11/11/2021 0554   Admission Weight 99.4 kg (219 lb 2.2 oz) Documented at 11/11/2021 0554        Intake & Output (last 3 days)       11/09 0701  11/10 0700 11/10 0701  11/11 0700 11/11 0701  11/12 0700 11/12 0701  11/13 0700    P.O.   1200     I.V. (mL/kg)   2488 (25.1)     Blood   780     IV Piggyback  20 800     Total Intake(mL/kg)  20 (0.2) 5268 (53.1)     Urine (mL/kg/hr)   1490 (0.6)     Chest Tube   720     Total Output   2210     Net  +20 +3058                 Lines, Drains & Airways     Active LDAs  "    Name Placement date Placement time Site Days    Pulmonary Artery Catheter - Triple Lumen 11/11/21 Right Internal jugular 11/11/21  0817  created via procedure documentation   1    CVC Double Lumen 11/11/21 Right Internal jugular 11/11/21  0815  created via procedure documentation  Internal jugular  1    Peripheral IV 11/11/21 0629 Posterior; Right Hand 11/11/21  0629  Hand  1    Urethral Catheter Temperature probe 16 Fr. 11/11/21  0657   1    Y Chest Tube 1 and 2 1 Mediastinal 28 Fr. 2 Pleural 28 Fr. 11/11/21  --   1    Arterial Line 11/11/21 Left Radial 11/11/21  0815  created via procedure documentation  Radial  1    Pacer Wires 11/11/21  --  Atrial and Ventricular  1                MEDICAL HISTORY    Past Medical History:   Diagnosis Date   • Arthritis    • Atrial fibrillation (HCC)    • BPH (benign prostatic hyperplasia)    • Bulging lumbar disc    • Difficulty maintaining body in lying position     NEEDS PILLOW UNDER KNEES IN SURGERY D/T LUMBAR ISSUE   • Hyperglycemia    • Hypertension    • OAB (overactive bladder)    • Skin mole    • Snoring    • Urinary urgency    • Vitamin D deficiency         SURGICAL HISTORY    Past Surgical History:   Procedure Laterality Date   • CARDIAC CATHETERIZATION Right 10/13/2021    Procedure: Left Heart Cath;  Surgeon: Renato Knight MD;  Location: [facility] CATH INVASIVE LOCATION;  Service: Cardiovascular;  Laterality: Right;   • CARDIAC CATHETERIZATION  10/13/2021    Procedure: Functional Flow [place];  Surgeon: Renato Knight MD;  Location: [facility] CATH INVASIVE LOCATION;  Service: Cardiovascular;;   • COLONOSCOPY     • FINGER SURGERY Right     repair right pointer finger   • JOINT REPLACEMENT     • TOTAL SHOULDER ARTHROPLASTY      shoulder replacement        FAMILY HISTORY    Family History   Problem Relation Age of Onset   • Diabetes Mother    • Heart disease Mother    • Hypertension Sister    • Hyperlipidemia Sister    • Kidney disease Sister    • Cancer  "Sister    • Other Sister         weight disorder   • Diabetes Sister    • Stroke Brother    • Hypertension Other        SOCIAL HISTORY    Social History     Tobacco Use   • Smoking status: Former Smoker     Packs/day: 1.00     Years: 12.00     Pack years: 12.00     Types: Cigarettes     Quit date:      Years since quittin.8   • Smokeless tobacco: Never Used   Substance Use Topics   • Alcohol use: Yes     Alcohol/week: 1.0 standard drink     Types: 1 Shots of liquor per week     Comment: 1 drink weekly        ALLERGIES    Allergies   Allergen Reactions   • Lisinopril Cough              /61   Pulse 90   Temp 99 °F (37.2 °C)   Resp 18   Ht 180.3 cm (71\")   Wt 99.2 kg (218 lb 12.8 oz)   SpO2 99%   BMI 30.52 kg/m²   Intake/Output last 3 shifts:  I/O last 3 completed shifts:  In: 5288 [P.O.:1200; I.V.:2488; Blood:780; IV Piggyback:820]  Out: 2210 [Urine:1490; Chest Tube:720]  Intake/Output this shift:  No intake/output data recorded.    PHYSICAL EXAM:    General: Well-developed, well-nourished 75-year-old male who is alert, cooperative, no distress, appears stated age  Head:  Normocephalic, atraumatic, mucous membranes moist  Eyes:  Conjunctiva/corneas clear, EOM's intact     Neck:  Supple, no JVD, IJ right  Lungs: Clear to auscultation bilaterally, no wheezes rhonchi rales are noted  Chest wall: No tenderness  Heart::  Regular rate and rhythm, S1 and S2 normal, no murmur, rub or gallop  Abdomen: Soft, non-tender, nondistended bowel sounds active  Extremities: No cyanosis, clubbing, or edema   Pulses: 2+ and symmetric all extremities  Skin:  No rashes or lesions  Neuro/psych: A&O x3. CN II through XII are grossly intact with appropriate affect      Scheduled Meds:      aspirin, 81 mg, Oral, Daily  atorvastatin, 40 mg, Oral, Nightly  carvedilol, 3.125 mg, Oral, Q12H  ceFAZolin, 2 g, Intravenous, Q8H  chlorhexidine, 15 mL, Mouth/Throat, Q12H  chlorhexidine, 15 mL, Mouth/Throat, Q12H  [START ON " 11/13/2021] enoxaparin, 40 mg, Subcutaneous, Daily  [START ON 11/14/2021] insulin lispro, 0-9 Units, Subcutaneous, TID AC  magnesium sulfate, 1 g, Intravenous, Q8H  mupirocin, 1 application, Each Nare, BID  pantoprazole, 40 mg, Oral, Q AM  polyethylene glycol, 17 g, Oral, BID  senna-docusate sodium, 2 tablet, Oral, BID        Continuous Infusions:    dexmedetomidine, 0.2-1.5 mcg/kg/hr, Last Rate: Stopped (11/11/21 1441)  insulin, 0-50 Units/hr, Last Rate: 3.6 Units/hr (11/12/21 0903)  milrinone, 0.25 mcg/kg/min, Last Rate: 0.125 mcg/kg/min (11/12/21 0830)  niCARdipine, 5-15 mg/hr, Last Rate: 5 mg/hr (11/12/21 0830)  nitroglycerin, 10-50 mcg/min  norepinephrine, 0.02-0.06 mcg/kg/min, Last Rate: Stopped (11/11/21 2100)  sodium chloride, 30 mL/hr, Last Rate: 30 mL/hr (11/11/21 1900)        PRN Meds:    •  acetaminophen **OR** acetaminophen **OR** acetaminophen  •  dextrose  •  dextrose  •  glucagon (human recombinant)  •  HYDROcodone-acetaminophen  •  [START ON 11/14/2021] insulin lispro **AND** [START ON 11/14/2021] insulin lispro  •  insulin  •  meperidine  •  Morphine **AND** naloxone  •  Mouth Kote  •  niCARdipine  •  nitroglycerin  •  norepinephrine  •  ondansetron  •  potassium chloride **OR** potassium chloride  •  potassium chloride **OR** potassium chloride        Results Review:     I reviewed the patient's new clinical results.    CBC    Results from last 7 days   Lab Units 11/12/21  0310 11/11/21  1730 11/11/21  1613 11/11/21  1345 11/11/21  1246 11/11/21  1215 11/11/21  1051 11/11/21  0735 11/09/21  0859   WBC 10*3/mm3 15.20*  --   --   --   --  21.60*  --   --  7.60   HEMOGLOBIN g/dL 10.6*  --   --   --   --  12.8*  --   --  14.4   HEMOGLOBIN, POC g/dL  --  11.9* 12.2 12.7 13.5  --  10.9*   < >  --    PLATELETS 10*3/mm3 127*  --   --   --   --  160  --   --  249    < > = values in this interval not displayed.     Cr Clearance Estimated Creatinine Clearance: 97.3 mL/min (by C-G formula based on SCr of 0.76  mg/dL).  Coag   Results from last 7 days   Lab Units 11/12/21  0310 11/11/21  1215 11/09/21  0859   INR  1.08 1.08 0.98   APTT seconds  --  25.6 24.5     HbA1C   Lab Results   Component Value Date    HGBA1C 5.6 11/09/2021    HGBA1C 5.8 (H) 07/13/2021    HGBA1C 5.5 03/03/2020     Blood Glucose   Glucose   Date/Time Value Ref Range Status   11/12/2021 0858 164 (H) 70 - 105 mg/dL Final     Comment:     Serial Number: [id number]:  [id number]   11/12/2021 0719 106 (H) 70 - 105 mg/dL Final     Comment:     Serial Number: [id number]:  [id number]   11/12/2021 0552 114 (H) 70 - 105 mg/dL Final     Comment:     Serial Number: [id number]:  [id number]   11/12/2021 0503 115 (H) 70 - 105 mg/dL Final     Comment:     Serial Number: [id number]:  [id number]   11/12/2021 0407 119 (H) 70 - 105 mg/dL Final     Comment:     Serial Number: [id number]:  [id number]   11/12/2021 0321 106 (H) 70 - 105 mg/dL Final     Comment:     Serial Number: [id number]:  [id number]   11/12/2021 0207 111 (H) 70 - 105 mg/dL Final     Comment:     Serial Number: [id number]:  [id number]   11/12/2021 0108 107 (H) 70 - 105 mg/dL Final     Comment:     Serial Number: [id number]:  [id number]     Infection     CMP   Results from last 7 days   Lab Units 11/12/21  0310 11/11/21  1215 11/09/21  0859   SODIUM mmol/L 144 143 144   POTASSIUM mmol/L 3.5 3.7 4.7   CHLORIDE mmol/L 108* 109* 104   CO2 mmol/L 22.0 22.0 29.0   BUN mg/dL 19 22 22   CREATININE mg/dL 0.76 0.87 0.85   GLUCOSE mg/dL 106* 116* 91   ALBUMIN g/dL 4.10 4.00 4.00   BILIRUBIN mg/dL  --   --  0.3   ALK PHOS U/L  --   --  60   AST (SGOT) U/L  --   --  26   ALT (SGPT) U/L  --   --  20     ABG    Results from last 7 days   Lab Units 11/11/21  1730 11/11/21  1613 11/11/21  1345 11/11/21  1252 11/11/21  1246 11/11/21  1051 11/11/21  1008 11/11/21  0933 11/11/21  0933   PH, ARTERIAL pH units 7.349* 7.382 7.384  --  7.372 7.310* 7.280*  --  7.320*   PCO2, ARTERIAL  mm Hg 45.5 41.9 38.3  --  41.5 53.8* 62.0*  --  55.7*   PO2 ART mm Hg 95.2 166.9* 110.6*  --  231.3* 457.0* 463.0*  --  401.0*   O2 SATURATION ART % 96.9 99.5* 98.3*  --  99.8* 100.0* 100.0*  --  100.0*   BASE EXCESS ART mmol/L -0.9* -0.3* -1.9* -2.7* -1.2* 1.0 2.0   < > 3.0    < > = values in this interval not displayed.     UA    Results from last 7 days   Lab Units 11/09/21  0859   NITRITE UA  Negative     KRISTA  No results found for: POCMETH, POCAMPHET, POCBARBITUR, POCBENZO, POCCOCAINE, POCOPIATES, POCOXYCODO, POCPHENCYC, POCPROPOXY, POCTHC, POCTRICYC  Lysis Labs   Results from last 7 days   Lab Units 11/12/21  0310 11/11/21  1730 11/11/21  1613 11/11/21  1345 11/11/21  1246 11/11/21  1215 11/11/21  1051 11/11/21  0735 11/09/21  0859   INR  1.08  --   --   --   --  1.08  --   --  0.98   APTT seconds  --   --   --   --   --  25.6  --   --  24.5   HEMOGLOBIN g/dL 10.6*  --   --   --   --  12.8*  --   --  14.4   HEMOGLOBIN, POC g/dL  --  11.9* 12.2 12.7 13.5  --  10.9*   < >  --    PLATELETS 10*3/mm3 127*  --   --   --   --  160  --   --  249   CREATININE mg/dL 0.76  --   --   --   --  0.87  --   --  0.85    < > = values in this interval not displayed.     Radiology(recent) XR Chest 1 View    Result Date: 11/12/2021  1.Mediastinal and left chest tubes in place. No pneumothorax identified. 2.Pulmonary vascular congestion. 3.Bibasilar atelectasis.  Electronically Signed By-Zaid Garcia MD On:11/12/2021 7:34 AM This report was finalized on [id number] by  Zaid Garcia MD.    XR Chest 1 View    Result Date: 11/11/2021   1. New CABG and cardiac valve replacement changes. Expected degree of mild postoperative cardiac enlargement. 2. Mild linear subsegmental atelectatic changes in the bases. 3. Support line and tube placement as described. No visible pneumothorax.  Electronically Signed By-Ambreen Amaya MD On:11/11/2021 12:48 PM This report was finalized on [id number] by  Ambreen Amaya MD.               Xrays, labs reviewed personally by physician.    ECG/EMG Results (most recent)     Procedure Component Value Units Date/Time    ECG 12 Lead [[id number]] Collected: 11/11/21 1617     Updated: 11/11/21 1619     QT Interval 405 ms     Narrative:      HEART RATE= 73  bpm  RR Interval= 824  ms  AZ Interval= 89  ms  P Horizontal Axis= 78  deg  P Front Axis= 0  deg  QRSD Interval= 97  ms  QT Interval= 405  ms  QRS Axis= 59  deg  T Wave Axis= -8  deg  - NORMAL ECG -  Sinus rhythm  Electronically Signed By:   Date and Time of Study: 2021-11-11 16:17:02    ECG 12 Lead [[id number]] Collected: 11/12/21 0511     Updated: 11/12/21 0512     QT Interval 371 ms     Narrative:      HEART RATE= 90  bpm  RR Interval= 664  ms  AZ Interval= 39  ms  P Horizontal Axis= 86  deg  P Front Axis= 0  deg  QRSD Interval= 100  ms  QT Interval= 371  ms  QRS Axis= 59  deg  T Wave Axis= -34  deg  - NORMAL ECG -  Sinus rhythm  When compared with ECG of 11-Nov-2021 16:17:02,  No significant change  Electronically Signed By:   Date and Time of Study: 2021-11-12 05:11:26            Medication Review:   I have reviewed the patient's current medication list  Scheduled Meds:aspirin, 81 mg, Oral, Daily  atorvastatin, 40 mg, Oral, Nightly  carvedilol, 3.125 mg, Oral, Q12H  ceFAZolin, 2 g, Intravenous, Q8H  chlorhexidine, 15 mL, Mouth/Throat, Q12H  chlorhexidine, 15 mL, Mouth/Throat, Q12H  [START ON 11/13/2021] enoxaparin, 40 mg, Subcutaneous, Daily  [START ON 11/14/2021] insulin lispro, 0-9 Units, Subcutaneous, TID AC  magnesium sulfate, 1 g, Intravenous, Q8H  mupirocin, 1 application, Each Nare, BID  pantoprazole, 40 mg, Oral, Q AM  polyethylene glycol, 17 g, Oral, BID  senna-docusate sodium, 2 tablet, Oral, BID      Continuous Infusions:dexmedetomidine, 0.2-1.5 mcg/kg/hr, Last Rate: Stopped (11/11/21 1441)  insulin, 0-50 Units/hr, Last Rate: 3.6 Units/hr (11/12/21 0903)  milrinone, 0.25 mcg/kg/min, Last Rate: 0.125 mcg/kg/min (11/12/21 0830)  niCARdipine,  5-15 mg/hr, Last Rate: 5 mg/hr (11/12/21 0830)  nitroglycerin, 10-50 mcg/min  norepinephrine, 0.02-0.06 mcg/kg/min, Last Rate: Stopped (11/11/21 2100)  sodium chloride, 30 mL/hr, Last Rate: 30 mL/hr (11/11/21 1900)      PRN Meds:.•  acetaminophen **OR** acetaminophen **OR** acetaminophen  •  dextrose  •  dextrose  •  glucagon (human recombinant)  •  HYDROcodone-acetaminophen  •  [START ON 11/14/2021] insulin lispro **AND** [START ON 11/14/2021] insulin lispro  •  insulin  •  meperidine  •  Morphine **AND** naloxone  •  Mouth Kote  •  niCARdipine  •  nitroglycerin  •  norepinephrine  •  ondansetron  •  potassium chloride **OR** potassium chloride  •  potassium chloride **OR** potassium chloride    Imaging:  Imaging Results (Last 72 Hours)     Procedure Component Value Units Date/Time    XR Chest 1 View [[id number]] Collected: 11/12/21 0733     Updated: 11/12/21 0737    Narrative:      XR CHEST 1 VW-     Date of Exam: 11/12/2021 4:07 AM     Indication: Post-Op Heart Surgery; I25.10-Atherosclerotic heart disease  of native coronary artery without angina pectoris.     Comparison Exams: November 11, 2021     Technique: Single AP chest radiograph     FINDINGS:  Median sternotomy wires appear intact. The patient has been extubated in  the interim. A right internal jugular [place]-Rula catheter has its tip in  the main pulmonary artery. Mediastinal and left chest tubes are in  place. No pneumothorax is identified. There is bibasilar atelectasis.  The heart and mediastinal contours appear stable. There is pulmonary  vascular congestion. A right shoulder arthroplasty is partially  visualized.       Impression:      1.Mediastinal and left chest tubes in place. No pneumothorax identified.  2.Pulmonary vascular congestion.  3.Bibasilar atelectasis.     Electronically Signed By-Zaid Garcia MD On:11/12/2021 7:34 AM  This report was finalized on [id number] by  Zaid Garcia MD.    XR Chest 1 View [[id number]] Collected:  "11/11/21 1247     Updated: 11/11/21 1250    Narrative:      DATE OF EXAM:  11/11/2021 12:43 PM     PROCEDURE:  XR CHEST 1 VW-     INDICATIONS:  Post-Op Check Line & Tube Placement; I25.10-Atherosclerotic heart  disease of native coronary artery without angina pectoris       COMPARISON:  PA and lateral chest 11/9/2021.     TECHNIQUE:   Single radiographic view of the chest was obtained.     FINDINGS:  Heart size is slightly enlarged, with new median sternotomy, CABG, and  cardiac valve replacement. There is linear subsegmental atelectatic  changes in the left mid to lower lung zone and right infrahilar region.  There is no visible pneumothorax. Left basilar chest tube and  mediastinal drain are in place. NG tube extends below the level of the  diaphragm. ET tube is in satisfactory position in the midthoracic  trachea. Right IJ approach [place]-Rula catheter tip terminates at the  level of the main pulmonary artery. Right shoulder replacement changes  are incidentally noted.       Impression:         1. New CABG and cardiac valve replacement changes. Expected degree of  mild postoperative cardiac enlargement.  2. Mild linear subsegmental atelectatic changes in the bases.  3. Support line and tube placement as described. No visible  pneumothorax.     Electronically Signed By-Ambreen Amaya MD On:11/11/2021 12:48 PM  This report was finalized on [id number] by  Ambreen Amaya MD.            KWASI Leonard  11/12/21  09:26 EST       EMR Dragon/Transcription:   \"Dictated utilizing Dragon dictation\".                 Electronically signed by KWASI Leonard, 11/12/21, 9:26 AM EST.    "

## 2021-11-12 NOTE — THERAPY EVALUATION
Patient Name: Salinas Dill  : 1945    MRN: [id number]                              Today's Date: 2021       Admit Date: 2021    Visit Dx:     ICD-10-CM ICD-9-CM   1. Coronary artery disease involving native coronary artery of native heart without angina pectoris  I25.10 414.01     Patient Active Problem List   Diagnosis   • Plantar fasciitis   • Atrial fibrillation (HCC)   • B12 deficiency   • Benign prostatic hyperplasia with urinary obstruction   • Drug-induced impotence   • Hyperglycemia   • Hypertension   • Increased frequency of urination   • Low back pain   • Presence of artificial shoulder joint   • Snoring   • Vitamin D deficiency   • Skin mole   • Right wrist pain   • Localized swelling on hand   • Elbow pain, left   • Nuclear senile cataract   • Presbyopia   • Dyslipidemia   • Obesity   • Class 1 obesity due to excess calories with serious comorbidity and body mass index (BMI) of 31.0 to 31.9 in adult   • Nonrheumatic mitral valve regurgitation   • Coronary artery disease involving native coronary artery of native heart without angina pectoris     Past Medical History:   Diagnosis Date   • Arthritis    • Atrial fibrillation (HCC)    • BPH (benign prostatic hyperplasia)    • Bulging lumbar disc    • Difficulty maintaining body in lying position     NEEDS PILLOW UNDER KNEES IN SURGERY D/T LUMBAR ISSUE   • Hyperglycemia    • Hypertension    • OAB (overactive bladder)    • Skin mole    • Snoring    • Urinary urgency    • Vitamin D deficiency      Past Surgical History:   Procedure Laterality Date   • CARDIAC CATHETERIZATION Right 10/13/2021    Procedure: Left Heart Cath;  Surgeon: Renato Knight MD;  Location: [facility] CATH INVASIVE LOCATION;  Service: Cardiovascular;  Laterality: Right;   • CARDIAC CATHETERIZATION  10/13/2021    Procedure: Functional Flow [place];  Surgeon: Renato Knight MD;  Location: [facility] CATH INVASIVE LOCATION;  Service: Cardiovascular;;   •  COLONOSCOPY     • FINGER SURGERY Right     repair right pointer finger   • JOINT REPLACEMENT     • TOTAL SHOULDER ARTHROPLASTY      shoulder replacement      General Information     Row Name 11/12/21 1708          Physical Therapy Time and Intention    Document Type evaluation  -NK     Mode of Treatment physical therapy  -NK     Row Name 11/12/21 1708          General Information    Patient Profile Reviewed yes  -NK     Prior Level of Function independent:  -NK     Existing Precautions/Restrictions sternal; fall; cardiac  -NK     Row Name 11/12/21 1708          Living Environment    Lives With spouse  -NK     Row Name 11/12/21 1708          Home Main Entrance    Number of Stairs, Main Entrance --  ramp  -NK     Row Name 11/12/21 1708          Stairs Within Home, Primary    Number of Stairs, Within Home, Primary none  -NK     Row Name 11/12/21 1708          Cognition    Orientation Status (Cognition) oriented x 4  -NK     Row Name 11/12/21 1708          Safety Issues, Functional Mobility    Impairments Affecting Function (Mobility) balance; pain; endurance/activity tolerance; shortness of breath; strength  -NK           User Key  (r) = Recorded By, (t) = Taken By, (c) = Cosigned By    Initials Name Provider Type    NK Alecia Agarwal, PT Student PT Student               Mobility     Row Name 11/12/21 1709          Bed Mobility    Bed Mobility bed mobility (all) activities  -NK     All Activities, [place] (Bed Mobility) moderate assist (50% patient effort); 2 person assist  -NK     Row Name 11/12/21 1709          Bed-Chair Transfer    Bed-Chair [place] (Transfers) contact guard  -NK     Assistive Device (Bed-Chair Transfers) walker, front-wheeled  -NK     Row Name 11/12/21 1709          Sit-Stand Transfer    Sit-Stand [place] (Transfers) contact guard  -NK     Assistive Device (Sit-Stand Transfers) walker, front-wheeled  -NK     Row Name 11/12/21 1721 11/12/21 1709       Gait/Stairs (Locomotion)     [place] Level (Gait) -- contact guard  -NK    Assistive Device (Gait) -- walker, front-wheeled  -NK    Distance in Feet (Gait) -- 20  -NK    Comment (Gait/Stairs) --  -NK Cardiac Level 3  -NK          User Key  (r) = Recorded By, (t) = Taken By, (c) = Cosigned By    Initials Name Provider Type    Alecia Gregg, PT Student PT Student               Obj/Interventions     Row Name 11/12/21 1710          Range of Motion Comprehensive    General Range of Motion no range of motion deficits identified  -NK     Row Name 11/12/21 1710          Strength Comprehensive (MMT)    General Manual Muscle Testing (MMT) Assessment no strength deficits identified  -NK     Comment, General Manual Muscle Testing (MMT) Assessment Unable to formally assess d/t sternal precautions; pt appearing WFL with functional assessment  -NK     Row Name 11/12/21 1710          Motor Skills    Therapeutic Exercise --  Performed cardiac rehab exercises  -NK     [facility] Name 11/12/21 1710          Balance    Static Sitting Balance WNL  -NK     Dynamic Sitting Balance WNL  -NK     Static Standing Balance WNL  -NK     Dynamic Standing Balance WFL  -NK     Row Name 11/12/21 1710          Sensory Assessment (Somatosensory)    Sensory Assessment (Somatosensory) sensation intact  -NK           User Key  (r) = Recorded By, (t) = Taken By, (c) = Cosigned By    Initials Name Provider Type    Alecia Gregg, PT Student PT Student               Goals/Plan     Row Name 11/12/21 1720          Bed Mobility Goal 1 (PT)    Activity/Assistive Device (Bed Mobility Goal 1, PT) bed mobility activities, all  -NK     [place] Level/Cues Needed (Bed Mobility Goal 1, PT) modified independence  -NK     Time Frame (Bed Mobility Goal 1, PT) long term goal (LTG); 2 weeks  -NK     Row Name 11/12/21 1720          Transfer Goal 1 (PT)    Activity/Assistive Device (Transfer Goal 1, PT) transfers, all  -NK     [place] Level/Cues Needed (Transfer Goal 1, PT) independent   -NK     Time Frame (Transfer Goal 1, PT) long term goal (LTG); 2 weeks  -NK     Row Name 11/12/21 1720          Gait Training Goal 1 (PT)    Activity/Assistive Device (Gait Training Goal 1, PT) gait (walking locomotion)  -NK     [place] Level (Gait Training Goal 1, PT) modified independence  -NK     Distance (Gait Training Goal 1, PT) 200  -NK     Time Frame (Gait Training Goal 1, PT) long term goal (LTG); 2 weeks  -NK           User Key  (r) = Recorded By, (t) = Taken By, (c) = Cosigned By    Initials Name Provider Type    NK Alecia Agarwal, PT Student PT Student               Clinical Impression     Row Name 11/12/21 1712          Pain Scale: FACES Pre/Post-Treatment    Pain: FACES Scale, Pretreatment 2-->hurts little bit  -NK     Posttreatment Pain Rating 4-->hurts little more  -NK     Pain Location chest  -NK     Pre/Posttreatment Pain Comment Pt stating pain in chest with ambulation  -NK     Row Name 11/12/21 5822          Plan of Care Review    Plan of Care Reviewed With patient; spouse  -NK     Progress no change  -NK     Outcome Summary Pt is a 75 y.o. male POD1 CABG x3, MV repair, MAZE procedure. Pt with PMH of Afib and chronic LBP. Pt lives with spouse in single story home with ramp to enter. Pt reports PLOF independent with all ADLs, however had been experiencing chest pain for some time. Pt able to recall all sternal precautions, did require assistance with cardiac rehab exercises, stating he was having difficulty with the instructions. Pt completed transfer up to walker with CGA, staying within sternal precautions without v/c. Pt able to ambulate with RW and CGA however c/o of 3/10 chest pain once outside of room and assisted back into room. BP and HR monitored throughout session and remained WNL. Pt requiring mod A x2 from EOB>supine positioning in bed. Recommending pt home with family assist and home health PRN. Will continue to follow while in [facility] 5x week.  -NK     Row Name 11/12/21 0006           Therapy Assessment/Plan (PT)    Rehab Potential (PT) good, to achieve stated therapy goals  -NK     Criteria for Skilled Interventions Met (PT) yes; meets criteria  -NK     Predicted Duration of Therapy Intervention (PT) Until dc  -NK     Row Name 11/12/21 1712          Vital Signs    Pre Patient Position Sitting  -NK     Intra Patient Position Standing  -NK     Post Patient Position Supine  -NK     Row Name 11/12/21 1712          Positioning and Restraints    Post Treatment Position bed  -NK     In Bed encouraged to call for assist; exit alarm on; call light within reach; with nsg  -NK           User Key  (r) = Recorded By, (t) = Taken By, (c) = Cosigned By    Initials Name Provider Type    Alecia Gregg, PT Student PT Student               Outcome Measures     Row Name 11/12/21 1724          How much help from another person do you currently need...    Turning from your back to your side while in flat bed without using bedrails? 2  -NK     Moving from lying on back to sitting on the side of a flat bed without bedrails? 2  -NK     Moving to and from a bed to a chair (including a wheelchair)? 3  -NK     Standing up from a chair using your arms (e.g., wheelchair, bedside chair)? 3  -NK     Climbing 3-5 steps with a railing? 2  -NK     To walk in hospital room? 3  -NK     AM-PAC 6 Clicks Score (PT) 15  -NK     Row Name 11/12/21 1724 11/12/21 1551       Functional Assessment    Outcome Measure Options AM-PAC 6 Clicks Basic Mobility (PT)  -NK AM-PAC 6 Clicks Daily Activity (OT)  -ES          User Key  (r) = Recorded By, (t) = Taken By, (c) = Cosigned By    Initials Name Provider Type    Mariana Ryan OT Occupational Therapist    Alecia Gregg, PT Student PT Student                             Physical Therapy Education                 Title: PT OT SLP Therapies (Done)     Topic: Physical Therapy (Done)     Point: Mobility training (Done)     Learning Progress Summary           Patient Acceptance, E,  VU by NK at 11/12/2021 1724                   Point: Home exercise program (Done)     Learning Progress Summary           Patient Acceptance, E, VU by NK at 11/12/2021 1724                   Point: Body mechanics (Done)     Learning Progress Summary           Patient Acceptance, E, VU by NK at 11/12/2021 1724                   Point: Precautions (Done)     Learning Progress Summary           Patient Acceptance, E, VU by NK at 11/12/2021 1724                               User Key     Initials Effective Dates Name Provider Type Discipline     08/09/21 -  Alecia Agarwal, PT Student PT Student PT              PT Recommendation and Plan     Plan of Care Reviewed With: patient, spouse  Progress: no change  Outcome Summary: Pt is a 75 y.o. male POD1 CABG x3, MV repair, MAZE procedure. Pt with PMH of Afib and chronic LBP. Pt lives with spouse in single story home with ramp to enter. Pt reports PLOF independent with all ADLs, however had been experiencing chest pain for some time. Pt able to recall all sternal precautions, did require assistance with cardiac rehab exercises, stating he was having difficulty with the instructions. Pt completed transfer up to walker with CGA, staying within sternal precautions without v/c. Pt able to ambulate with RW and CGA however c/o of 3/10 chest pain once outside of room and assisted back into room. BP and HR monitored throughout session and remained WNL. Pt requiring mod A x2 from EOB>supine positioning in bed. Recommending pt home with family assist and home health PRN. Will continue to follow while in [facility] 5x week.     Time Calculation:    PT Charges     Row Name 11/12/21 1724             Time Calculation    Start Time 1450  -NK      Stop Time 1523  -NK      Time Calculation (min) 33 min  -NK      PT Received On 11/12/21  -NK      PT - Next Appointment 11/14/21  -NK      PT Goal Re-Cert Due Date 11/26/21  -NK            User Key  (r) = Recorded By, (t) = Taken By, (c) = Cosigned By     Initials Name Provider Type    NK Fernanda Richey, PT Student PT Student              Therapy Charges for Today     Code Description Service Date Service Provider Modifiers Qty    [id number] HC PT EVAL HIGH COMPLEXITY 4 11/12/2021 Fernanda Richey, PT Student GP 1          PT G-Codes  Outcome Measure Options: AM-PAC 6 Clicks Basic Mobility (PT)  AM-PAC 6 Clicks Score (PT): 15  AM-PAC 6 Clicks Score (OT): 13    FERNANDA RICHEY PT Student  11/12/2021

## 2021-11-12 NOTE — PLAN OF CARE
Goal Outcome Evaluation:  Plan of Care Reviewed With: patient, spouse, son           Outcome Summary: Pt is 74 yo male POD1 CABG x3, MV repair, MAZE procedure. PMHx significant for Afib, back pain. At baseline, pt resides with spouse in 1 level home with ramp entrance. He is independent with ADLs prior to admission. Pt up in chair upon OT arrival. Edu on strenal precautions with spouse and son present. Pt pleasant, motivated and joking with therapits throughout session. Completes sit<>stand with min a. Dressing tasks with Max A secondary to precautions and multiple medical lines. Provided and Reviewed Cardiac HEP. Pt demos fair understanding, but will require additional education to ensure carryover. Pt has good social support and anticipate he will d/c home with spouse and [facility] PRN.

## 2021-11-12 NOTE — PLAN OF CARE
Goal Outcome Evaluation:              Outcome Summary: Patient was an [facility] patient.  Pt extubated 1426, and is on 2L NC.  The patient has minimal complaints of pain that are mostly present whenever moving due to prior hip and side pain when standing.  All WNL, will continue to monitor the patient.

## 2021-11-12 NOTE — PLAN OF CARE
Goal Outcome Evaluation:  Plan of Care Reviewed With: patient, spouse        Progress: no change  Outcome Summary: Pt is a 75 y.o. male POD1 CABG x3, MV repair, MAZE procedure. Pt with PMH of Afib and chronic LBP. Pt lives with spouse in single story home with ramp to enter. Pt reports PLOF independent with all ADLs, however had been experiencing chest pain for some time. Pt able to recall all sternal precautions, did require assistance with cardiac rehab exercises, stating he was having difficulty with the instructions. Pt completed transfer up to walker with CGA, staying within sternal precautions without v/c. Pt able to ambulate with RW and CGA however c/o of 3/10 chest pain once outside of room and assisted back into room. BP and HR monitored throughout session and remained WNL. Pt requiring mod A x2 from EOB>supine positioning in bed. Recommending pt home with family assist and home health PRN. Will continue to follow while in [facility] 5x week.

## 2021-11-12 NOTE — THERAPY EVALUATION
Patient Name: Salinas Dill  : 1945    MRN: [id number]                              Today's Date: 2021       Admit Date: 2021    Visit Dx:     ICD-10-CM ICD-9-CM   1. Coronary artery disease involving native coronary artery of native heart without angina pectoris  I25.10 414.01     Patient Active Problem List   Diagnosis   • Plantar fasciitis   • Atrial fibrillation (HCC)   • B12 deficiency   • Benign prostatic hyperplasia with urinary obstruction   • Drug-induced impotence   • Hyperglycemia   • Hypertension   • Increased frequency of urination   • Low back pain   • Presence of artificial shoulder joint   • Snoring   • Vitamin D deficiency   • Skin mole   • Right wrist pain   • Localized swelling on hand   • Elbow pain, left   • Nuclear senile cataract   • Presbyopia   • Dyslipidemia   • Obesity   • Class 1 obesity due to excess calories with serious comorbidity and body mass index (BMI) of 31.0 to 31.9 in adult   • Nonrheumatic mitral valve regurgitation   • Coronary artery disease involving native coronary artery of native heart without angina pectoris     Past Medical History:   Diagnosis Date   • Arthritis    • Atrial fibrillation (HCC)    • BPH (benign prostatic hyperplasia)    • Bulging lumbar disc    • Difficulty maintaining body in lying position     NEEDS PILLOW UNDER KNEES IN SURGERY D/T LUMBAR ISSUE   • Hyperglycemia    • Hypertension    • OAB (overactive bladder)    • Skin mole    • Snoring    • Urinary urgency    • Vitamin D deficiency      Past Surgical History:   Procedure Laterality Date   • CARDIAC CATHETERIZATION Right 10/13/2021    Procedure: Left Heart Cath;  Surgeon: Renato Knight MD;  Location: [facility] CATH INVASIVE LOCATION;  Service: Cardiovascular;  Laterality: Right;   • CARDIAC CATHETERIZATION  10/13/2021    Procedure: Functional Flow Jackson;  Surgeon: Renato Knight MD;  Location: [facility] CATH INVASIVE LOCATION;  Service: Cardiovascular;;   •  COLONOSCOPY     • FINGER SURGERY Right     repair right pointer finger   • JOINT REPLACEMENT     • TOTAL SHOULDER ARTHROPLASTY      shoulder replacement      General Information     Row Name 11/12/21 1403          OT Time and Intention    Document Type evaluation  -ES     Mode of Treatment occupational therapy  -ES     Row Name 11/12/21 1403          General Information    Patient Profile Reviewed yes  -ES     Prior Level of Function independent:  -ES     Existing Precautions/Restrictions cardiac; fall  -ES     Row Name 11/12/21 1403          Occupational Profile    Reason for Services/Referral (Occupational Profile) Pt is 74 yo male POD1 CABG x3, MV repair, MAZE procedure. PMHx significant for Afib, back pain. At baseline, pt resides with spouse in 1 level home with ramp entrance. He is independent with ADLs prior to admission.  -ES     Row Name 11/12/21 1403          Living Environment    Lives With spouse  -ES     Row Name 11/12/21 1403          Home Main Entrance    Number of Stairs, Main Entrance --  ramp  -ES     Row Name 11/12/21 1403          Stairs Within Home, Primary    Number of Stairs, Within Home, Primary none  -ES     Row Name 11/12/21 1403          Cognition    Orientation Status (Cognition) oriented x 4  -ES           User Key  (r) = Recorded By, (t) = Taken By, (c) = Cosigned By    Initials Name Provider Type    ES Mariana Deluna OT Occupational Therapist                 Mobility/ADL's     Row Name 11/12/21 1528          Bed Mobility    Comment (Bed Mobility) Pt up in chair upon OT arrival  -ES     Row Name 11/12/21 1522          Transfers    Transfers bed-chair transfer; sit-stand transfer  -ES     Bed-Chair [place] (Transfers) minimum assist (75% patient effort)  -ES     Assistive Device (Bed-Chair Transfers) walker, front-wheeled  -ES     Sit-Stand [place] (Transfers) minimum assist (75% patient effort)  -ES     Row Name 11/12/21 1528          Sit-Stand Transfer    Assistive  Device (Sit-Stand Transfers) walker, front-wheeled  -ES     Row Name 11/12/21 1528          Functional Mobility    Functional Mobility- Ind. Level minimum assist (75% patient effort)  -ES     Row Name 11/12/21 1528          Activities of Daily Living    BADL Assessment/Intervention upper body dressing; lower body dressing  -ES     Row Name 11/12/21 1528          Mobility    Extremity Weight-bearing Status left upper extremity; right upper extremity  -ES     Left Upper Extremity (Weight-bearing Status) touch down weight-bearing (TDWB)  -ES     Right Upper Extremity (Weight-bearing Status) touch down weight-bearing (TDWB)  -ES     Row Name 11/12/21 1528          Upper Body Dressing Assessment/Training    [place] Level (Upper Body Dressing) maximum assist (25% patient effort)  -     Row Name 11/12/21 1528          Lower Body Dressing Assessment/Training    [place] Level (Lower Body Dressing) maximum assist (25% patient effort)  -           User Key  (r) = Recorded By, (t) = Taken By, (c) = Cosigned By    Initials Name Provider Type     Mariana Deluna OT Occupational Therapist               Obj/Interventions     [facility] Name 11/12/21 1535          Sensory Assessment (Somatosensory)    Sensory Assessment (Somatosensory) sensation intact  -[facility] Name 11/12/21 1535          Vision Assessment/Intervention    Visual Impairment/Limitations corrective lenses for reading  -[facility] Name 11/12/21 1535          Range of Motion Comprehensive    General Range of Motion no range of motion deficits identified  -ES     Comment, General Range of Motion WFL within ROM  -     Row Name 11/12/21 1535          Strength Comprehensive (MMT)    General Manual Muscle Testing (MMT) Assessment no strength deficits identified  -ES     Comment, General Manual Muscle Testing (MMT) Assessment Not formally assessed seconar to sternal precautions. Grossly WFL.  -     Row Name 11/12/21 1535          Balance    Balance  Assessment sitting static balance; sitting dynamic balance; standing static balance; standing dynamic balance  -ES     Static Sitting Balance WNL  -ES     Dynamic Sitting Balance WNL  -ES     Static Standing Balance WFL  -ES     Dynamic Standing Balance mild impairment  -ES     Balance Interventions sitting; standing; static; dynamic  -ES     Row Name 11/12/21 153          Therapeutic Exercise    Therapeutic Exercise --  Provided and Reviewed Cardiac HEP. Pt demos fair understanding, but will require additional education to ensure carryover.  -ES           User Key  (r) = Recorded By, (t) = Taken By, (c) = Cosigned By    Initials Name Provider Type    ES Mraiana Deluna, OT Occupational Therapist               Goals/Plan     Row Name 11/12/21 1550          Bathing Goal 1 (OT)    Activity/Device (Bathing Goal 1, OT) bathing skills, all  -ES     [place] Level/Cues Needed (Bathing Goal 1, OT) modified independence  -ES     Time Frame (Bathing Goal 1, OT) 2 weeks  -ES     Row Name 11/12/21 6760          Dressing Goal 1 (OT)    Activity/Device (Dressing Goal 1, OT) dressing skills, all  -ES     [place]/Cues Needed (Dressing Goal 1, OT) modified independence  -ES     Time Frame (Dressing Goal 1, OT) 2 weeks  -ES     Row Name 11/12/21 8750          Toileting Goal 1 (OT)    Activity/Device (Toileting Goal 1, OT) toileting skills, all  -ES     [place] Level/Cues Needed (Toileting Goal 1, OT) modified independence  -ES     Time Frame (Toileting Goal 1, OT) 2 weeks  -ES     Row Name 11/12/21 1574          Therapy Assessment/Plan (OT)    Planned Therapy Interventions (OT) activity tolerance training; BADL retraining; edema control/reduction; neuromuscular control/coordination retraining; orthotic fabrication/fitting/training; passive ROM/stretching; patient/caregiver education/training; ROM/therapeutic exercise; strengthening exercise  -ES           User Key  (r) = Recorded By, (t) = Taken By, (c) = Cosigned  By    Initials Name Provider Type    Mariana Ryan OT Occupational Therapist               Clinical Impression     Row Name 11/12/21 1538          Pain Assessment    Additional Documentation Pain Scale: FACES Pre/Post-Treatment (Group)  -ES     Row Name 11/12/21 1538          Pain Scale: FACES Pre/Post-Treatment    Pain: FACES Scale, Pretreatment 2-->hurts little bit  -ES     Posttreatment Pain Rating 2-->hurts little bit  -ES     Pain Location back  -ES     Pre/Posttreatment Pain Comment arthritic pain, back  -ES     Row Name 11/12/21 1538          Plan of Care Review    Plan of Care Reviewed With patient; spouse; son  -ES     Outcome Summary Pt is 76 yo male POD1 CABG x3, MV repair, MAZE procedure. PMHx significant for Afib, back pain. At baseline, pt resides with spouse in 1 level home with ramp entrance. He is independent with ADLs prior to admission. Pt up in chair upon OT arrival. Edu on strenal precautions with spouse and son present. Pt pleasant, motivated and joking with therapits throughout session. Completes sit<>stand with min a. Dressing tasks with Max A secondary to precautions and multiple medical lines. Provided and Reviewed Cardiac HEP. Pt demos fair understanding, but will require additional education to ensure carryover. Pt has good social support and anticipate he will d/c home with spouse and [facility] PRN.  -ES     Row Name 11/12/21 1538          Therapy Assessment/Plan (OT)    Criteria for Skilled Therapeutic Interventions Met (OT) yes  -ES     Therapy Frequency (OT) 5 times/wk  -ES     Row Name 11/12/21 1538          Therapy Plan Review/Discharge Plan (OT)    Anticipated Discharge Disposition (OT) home with assist  -ES     Row Name 11/12/21 1535          Vital Signs    Pre Systolic BP Rehab 112  -ES     Pre Treatment Diastolic BP 74  -ES     Intra Systolic BP Rehab 107  -ES     Intra Treatment Diastolic BP 71  -ES     Post Systolic BP Rehab 132  -ES     Post Treatment Diastolic BP 85  -ES      Pretreatment Heart Rate (beats/min) 80  -ES     Intratreatment Heart Rate (beats/min) 81  -ES     Posttreatment Heart Rate (beats/min) 80  -ES     Pre SpO2 (%) 96  -ES     O2 Delivery Pre Treatment room air  -ES     Intra SpO2 (%) 97  -ES     O2 Delivery Intra Treatment room air  -ES     Post SpO2 (%) 98  -ES     O2 Delivery Post Treatment room air  -ES     Pre Patient Position Sitting  -ES     Intra Patient Position Standing  -ES     Post Patient Position Sitting  -ES     Row Name 11/12/21 1538          Positioning and Restraints    Pre-Treatment Position sitting in chair/recliner  -ES     Post Treatment Position chair  -ES     In Chair notified nsg; call light within reach; encouraged to call for assist; with family/caregiver  -ES           User Key  (r) = Recorded By, (t) = Taken By, (c) = Cosigned By    Initials Name Provider Type    Mariana Ryan OT Occupational Therapist               Outcome Measures     Row Name 11/12/21 0561          How much help from another is currently needed...    Putting on and taking off regular lower body clothing? 2  -ES     Bathing (including washing, rinsing, and drying) 2  -ES     Toileting (which includes using toilet bed pan or urinal) 1  -ES     Putting on and taking off regular upper body clothing 2  -ES     Taking care of personal grooming (such as brushing teeth) 3  -ES     Eating meals 3  -ES     AM-PAC 6 Clicks Score (OT) 13  -ES     Row Name 11/12/21 1551          Functional Assessment    Outcome Measure Options AM-PAC 6 Clicks Daily Activity (OT)  -ES           User Key  (r) = Recorded By, (t) = Taken By, (c) = Cosigned By    Initials Name Provider Type    Mariana Ryan OT Occupational Therapist                Occupational Therapy Education                 Title: PT OT SLP Therapies (Done)     Topic: Occupational Therapy (Done)     Point: ADL training (Done)     Description:   Instruct learner(s) on proper safety adaptation and remediation  techniques during self care or transfers.   Instruct in proper use of assistive devices.              Learning Progress Summary           Patient Acceptance, E,TB, VU,NR by ES at 11/12/2021 1551                   Point: Home exercise program (Done)     Description:   Instruct learner(s) on appropriate technique for monitoring, assisting and/or progressing therapeutic exercises/activities.              Learning Progress Summary           Patient Acceptance, E,TB, VU,NR by ES at 11/12/2021 1551                   Point: Precautions (Done)     Description:   Instruct learner(s) on prescribed precautions during self-care and functional transfers.              Learning Progress Summary           Patient Acceptance, E,TB, VU,NR by ES at 11/12/2021 1551                   Point: Body mechanics (Done)     Description:   Instruct learner(s) on proper positioning and spine alignment during self-care, functional mobility activities and/or exercises.              Learning Progress Summary           Patient Acceptance, E,TB, VU,NR by  at 11/12/2021 1551                               User Key     Initials Effective Dates Name Provider Type Discipline     06/16/21 -  Mariana Deluna OT Occupational Therapist OT              OT Recommendation and Plan  Planned Therapy Interventions (OT): activity tolerance training, BADL retraining, edema control/reduction, neuromuscular control/coordination retraining, orthotic fabrication/fitting/training, passive ROM/stretching, patient/caregiver education/training, ROM/therapeutic exercise, strengthening exercise  Therapy Frequency (OT): 5 times/wk  Plan of Care Review  Plan of Care Reviewed With: patient, spouse, son  Outcome Summary: Pt is 76 yo male POD1 CABG x3, MV repair, MAZE procedure. PMHx significant for Afib, back pain. At baseline, pt resides with spouse in 1 level home with ramp entrance. He is independent with ADLs prior to admission. Pt up in chair upon OT arrival. Edu on  strenal precautions with spouse and son present. Pt pleasant, motivated and joking with therapits throughout session. Completes sit<>stand with min a. Dressing tasks with Max A secondary to precautions and multiple medical lines. Provided and Reviewed Cardiac HEP. Pt demos fair understanding, but will require additional education to ensure carryover. Pt has good social support and anticipate he will d/c home with spouse and C PRN.     Time Calculation:              Mariana Deluna OT  11/12/2021

## 2021-11-12 NOTE — PROGRESS NOTES
" LOS: 1 day   Patient Care Team:  Kortney Ferrera MD as PCP - General  Kortney Ferrera MD as PCP - Family Medicine    Chief Complaint: post op fu    Subjective:  Symptoms:  No shortness of breath or chest pain.    Diet:  No nausea.    Pain:  Pain is requiring pain medication.          Vital Signs  Temp:  [94.3 °F (34.6 °C)-99.7 °F (37.6 °C)] 99 °F (37.2 °C)  Heart Rate:  [] 90  Resp:  [12-18] 18  BP: ()/(52-78) 114/61  Arterial Line BP: ()/() 127/52  FiO2 (%):  [40 %-70 %] 40 %  Body mass index is 30.52 kg/m².    Intake/Output Summary (Last 24 hours) at 11/12/2021 0758  Last data filed at 11/12/2021 0554  Gross per 24 hour   Intake 5268 ml   Output 2210 ml   Net 3058 ml     No intake/output data recorded.    Chest tube drainage last 8 hours:  210 (no air leak)        11/11/21  0554 11/12/21  0555   Weight: 99.4 kg (219 lb 2.2 oz) 99.2 kg (218 lb 12.8 oz)         Objective:  General Appearance:  Comfortable.    Vital signs: (most recent): Blood pressure 114/61, pulse 90, temperature 99 °F (37.2 °C), resp. rate 18, height 180.3 cm (71\"), weight 99.2 kg (218 lb 12.8 oz), SpO2 99 %.    Output: Producing urine.    Lungs:  Normal effort and normal respiratory rate.  There are decreased breath sounds (bilateral lower lobes).  No rales, wheezes or rhonchi.  (Room air)  Heart: Tachycardia.  Regular rhythm.  Positive for friction rub (chest tubes).  (NSR, rate 102)  Abdomen: Abdomen is soft and non-distended.    Extremities: There is no dependent edema.    Neurological: Patient is alert and oriented to person, place and time.    Skin:  Warm and dry.  (Sternal dressing and leg ace wrap intact)        Sitting up in chair    Results Review:        WBC WBC   Date Value Ref Range Status   11/12/2021 15.20 (H) 3.40 - 10.80 10*3/mm3 Final   11/11/2021 21.60 (H) 3.40 - 10.80 10*3/mm3 Final   11/09/2021 7.60 3.40 - 10.80 10*3/mm3 Final      HGB Hemoglobin   Date Value Ref Range Status   11/12/2021 " 10.6 (L) 13.0 - 17.7 g/dL Final     Comment:     Result checked    11/11/2021 11.9 (L) 12.0 - 17.0 g/dL Final   11/11/2021 12.2 12.0 - 17.0 g/dL Final   11/11/2021 12.7 12.0 - 17.0 g/dL Final   11/11/2021 13.5 12.0 - 17.0 g/dL Final   11/11/2021 12.8 (L) 13.0 - 17.7 g/dL Final   11/11/2021 10.9 (L) 12.0 - 17.0 g/dL Final   11/11/2021 12.2 12.0 - 17.0 g/dL Final   11/11/2021 12.6 12.0 - 17.0 g/dL Final   11/11/2021 11.9 (L) 12.0 - 17.0 g/dL Final   11/11/2021 10.9 (L) 12.0 - 17.0 g/dL Final   11/11/2021 12.6 12.0 - 17.0 g/dL Final   11/09/2021 14.4 13.0 - 17.7 g/dL Final      HCT Hematocrit   Date Value Ref Range Status   11/12/2021 30.7 (L) 37.5 - 51.0 % Final   11/11/2021 35 (L) 38 - 51 % Final   11/11/2021 36 (L) 38 - 51 % Final   11/11/2021 37 (L) 38 - 51 % Final   11/11/2021 40 38 - 51 % Final   11/11/2021 38.6 37.5 - 51.0 % Final   11/11/2021 32 (L) 38 - 51 % Final   11/11/2021 36 (L) 38 - 51 % Final   11/11/2021 37 (L) 38 - 51 % Final   11/11/2021 35 (L) 38 - 51 % Final   11/11/2021 32 (L) 38 - 51 % Final   11/11/2021 37 (L) 38 - 51 % Final   11/09/2021 41.6 37.5 - 51.0 % Final      Platelets Platelets   Date Value Ref Range Status   11/12/2021 127 (L) 140 - 450 10*3/mm3 Final   11/11/2021 160 140 - 450 10*3/mm3 Final   11/09/2021 249 140 - 450 10*3/mm3 Final        PT/INR:    Protime   Date Value Ref Range Status   11/12/2021 11.9 (H) 9.6 - 11.7 Seconds Final   11/11/2021 11.9 (H) 9.6 - 11.7 Seconds Final   11/09/2021 10.9 9.6 - 11.7 Seconds Final   /  INR   Date Value Ref Range Status   11/12/2021 1.08 0.93 - 1.10 Final   11/11/2021 1.08 0.93 - 1.10 Final   11/09/2021 0.98 0.93 - 1.10 Final       Sodium Sodium   Date Value Ref Range Status   11/12/2021 144 136 - 145 mmol/L Final   11/11/2021 143 136 - 145 mmol/L Final   11/09/2021 144 136 - 145 mmol/L Final      Potassium Potassium   Date Value Ref Range Status   11/12/2021 3.5 3.5 - 5.2 mmol/L Final   11/11/2021 3.7 3.5 - 5.2 mmol/L Final     Comment:      Slight hemolysis detected by analyzer. Results may be affected.   11/09/2021 4.7 3.5 - 5.2 mmol/L Final      Chloride Chloride   Date Value Ref Range Status   11/12/2021 108 (H) 98 - 107 mmol/L Final   11/11/2021 109 (H) 98 - 107 mmol/L Final   11/09/2021 104 98 - 107 mmol/L Final      Bicarbonate CO2   Date Value Ref Range Status   11/12/2021 22.0 22.0 - 29.0 mmol/L Final   11/11/2021 22.0 22.0 - 29.0 mmol/L Final   11/09/2021 29.0 22.0 - 29.0 mmol/L Final      BUN BUN   Date Value Ref Range Status   11/12/2021 19 8 - 23 mg/dL Final   11/11/2021 22 8 - 23 mg/dL Final   11/09/2021 22 8 - 23 mg/dL Final      Creatinine Creatinine   Date Value Ref Range Status   11/12/2021 0.76 0.76 - 1.27 mg/dL Final   11/11/2021 0.87 0.76 - 1.27 mg/dL Final   11/09/2021 0.85 0.76 - 1.27 mg/dL Final      Calcium Calcium   Date Value Ref Range Status   11/12/2021 8.3 (L) 8.6 - 10.5 mg/dL Final   11/11/2021 9.1 8.6 - 10.5 mg/dL Final   11/09/2021 9.6 8.6 - 10.5 mg/dL Final      Magnesium Magnesium   Date Value Ref Range Status   11/12/2021 2.3 1.6 - 2.4 mg/dL Final   11/09/2021 2.1 1.6 - 2.4 mg/dL Final      Troponin No results found for: TROPONIN   BNP No results found for: BNP         acetaminophen, 650 mg, Oral, Q6H   Or  acetaminophen, 650 mg, Oral, Q6H   Or  acetaminophen, 650 mg, Rectal, Q6H  aspirin, 81 mg, Oral, Daily  atorvastatin, 40 mg, Oral, Nightly  ceFAZolin, 2 g, Intravenous, Q8H  chlorhexidine, 15 mL, Mouth/Throat, Q12H  chlorhexidine, 15 mL, Mouth/Throat, Q12H  [START ON 11/13/2021] enoxaparin, 40 mg, Subcutaneous, Daily  [START ON 11/14/2021] insulin lispro, 0-9 Units, Subcutaneous, TID AC  magnesium sulfate, 1 g, Intravenous, Q8H  mupirocin, 1 application, Each Nare, BID  pantoprazole, 40 mg, Oral, Q AM  polyethylene glycol, 17 g, Oral, BID  senna-docusate sodium, 2 tablet, Oral, BID      dexmedetomidine, 0.2-1.5 mcg/kg/hr, Last Rate: Stopped (11/11/21 1441)  insulin, 0-50 Units/hr, Last Rate: 2.4 Units/hr (11/12/21  0700)  milrinone, 0.25 mcg/kg/min, Last Rate: 0.25 mcg/kg/min (11/12/21 0700)  niCARdipine, 5-15 mg/hr, Last Rate: 2.5 mg/hr (11/12/21 0700)  nitroglycerin, 10-50 mcg/min  norepinephrine, 0.02-0.06 mcg/kg/min, Last Rate: Stopped (11/11/21 2100)  sodium chloride, 30 mL/hr, Last Rate: 30 mL/hr (11/11/21 1900)        PRN Meds:.acetaminophen **OR** acetaminophen **OR** acetaminophen  •  dextrose  •  dextrose  •  glucagon (human recombinant)  •  HYDROcodone-acetaminophen  •  [START ON 11/14/2021] insulin lispro **AND** [START ON 11/14/2021] insulin lispro  •  insulin  •  meperidine  •  Morphine **AND** naloxone  •  Mouth [place]  •  niCARdipine  •  nitroglycerin  •  norepinephrine  •  ondansetron  •  potassium chloride **OR** potassium chloride  •  potassium chloride **OR** potassium chloride    Patient Active Problem List   Diagnosis Code   • Plantar fasciitis M72.2   • Atrial fibrillation (HCC) I48.91   • B12 deficiency E53.8   • Benign prostatic hyperplasia with urinary obstruction N40.1, N13.8   • Drug-induced impotence N52.2   • Hyperglycemia R73.9   • Hypertension I10   • Increased frequency of urination R35.0   • Low back pain M54.50   • Presence of artificial shoulder joint Z96.619   • Snoring R06.83   • Vitamin D deficiency E55.9   • Skin mole D22.9   • Right wrist pain M25.531   • Localized swelling on hand R22.30   • Elbow pain, left M25.522   • Nuclear senile cataract H25.10   • Presbyopia H52.4   • Dyslipidemia E78.5   • Obesity E66.9   • Class 1 obesity due to excess calories with serious comorbidity and body mass index (BMI) of 31.0 to 31.9 in adult E66.09, Z68.31   • Nonrheumatic mitral valve regurgitation I34.0   • Coronary artery disease involving native coronary artery of native heart without angina pectoris I25.10       Assessment & Plan     - MV CAD, mitral regurgitationEF 60% (cath) s/p CABG x3 with LIMA, MV repair/ring, right and left MAZE with JOSE G ligation----POD #1 (Pagni)  - Persistent atrial fib,  s/p cardioversion (preop Eliquis)----s/p MAZE/JOSE G ligation  - HTN----cardene gtt  - Dyslipidemia----statin  - BPH    Start coreg, lasix 40 mg IV this am, replace K+.  D/c primacor, wean off cardene, d/c arterial line and swan when off of drips and CI >2.2.  De-escalate.      Rachel Koch, APRN  11/12/21  07:58 EST

## 2021-11-12 NOTE — PLAN OF CARE
Goal Outcome Evaluation:  Plan of Care Reviewed With: patient           Outcome Summary: Patient POD1. [place] and a line removed. Primacor stopped per order. Cardene still on. Patient on room air and tolerating.

## 2021-11-13 ENCOUNTER — APPOINTMENT (OUTPATIENT)
Dept: GENERAL RADIOLOGY | Facility: HOSPITAL | Age: 76
End: 2021-11-13

## 2021-11-13 LAB
ANION GAP SERPL CALCULATED.3IONS-SCNC: 9 MMOL/L (ref 5–15)
BUN SERPL-MCNC: 18 MG/DL (ref 8–23)
BUN/CREAT SERPL: 29 (ref 7–25)
CALCIUM SPEC-SCNC: 8.1 MG/DL (ref 8.6–10.5)
CHLORIDE SERPL-SCNC: 106 MMOL/L (ref 98–107)
CO2 SERPL-SCNC: 24 MMOL/L (ref 22–29)
CREAT SERPL-MCNC: 0.62 MG/DL (ref 0.76–1.27)
DEPRECATED RDW RBC AUTO: 43.8 FL (ref 37–54)
ERYTHROCYTE [DISTWIDTH] IN BLOOD BY AUTOMATED COUNT: 14 % (ref 12.3–15.4)
GFR SERPL CREATININE-BSD FRML MDRD: 127 ML/MIN/1.73
GLUCOSE BLDC GLUCOMTR-MCNC: 104 MG/DL (ref 70–105)
GLUCOSE BLDC GLUCOMTR-MCNC: 110 MG/DL (ref 70–105)
GLUCOSE BLDC GLUCOMTR-MCNC: 110 MG/DL (ref 70–105)
GLUCOSE BLDC GLUCOMTR-MCNC: 117 MG/DL (ref 70–105)
GLUCOSE BLDC GLUCOMTR-MCNC: 117 MG/DL (ref 70–105)
GLUCOSE BLDC GLUCOMTR-MCNC: 119 MG/DL (ref 70–105)
GLUCOSE BLDC GLUCOMTR-MCNC: 121 MG/DL (ref 70–105)
GLUCOSE BLDC GLUCOMTR-MCNC: 132 MG/DL (ref 70–105)
GLUCOSE BLDC GLUCOMTR-MCNC: 133 MG/DL (ref 70–105)
GLUCOSE BLDC GLUCOMTR-MCNC: 166 MG/DL (ref 70–105)
GLUCOSE BLDC GLUCOMTR-MCNC: 169 MG/DL (ref 70–105)
GLUCOSE SERPL-MCNC: 106 MG/DL (ref 65–99)
HCT VFR BLD AUTO: 30.4 % (ref 37.5–51)
HGB BLD-MCNC: 10.5 G/DL (ref 13–17.7)
MCH RBC QN AUTO: 30.7 PG (ref 26.6–33)
MCHC RBC AUTO-ENTMCNC: 34.4 G/DL (ref 31.5–35.7)
MCV RBC AUTO: 89.1 FL (ref 79–97)
PLATELET # BLD AUTO: 134 10*3/MM3 (ref 140–450)
PMV BLD AUTO: 9 FL (ref 6–12)
POTASSIUM SERPL-SCNC: 4 MMOL/L (ref 3.5–5.2)
QT INTERVAL: 391 MS
QT INTERVAL: 405 MS
RBC # BLD AUTO: 3.41 10*6/MM3 (ref 4.14–5.8)
SODIUM SERPL-SCNC: 139 MMOL/L (ref 136–145)
WBC # BLD AUTO: 14.1 10*3/MM3 (ref 3.4–10.8)

## 2021-11-13 PROCEDURE — 93005 ELECTROCARDIOGRAM TRACING: CPT | Performed by: THORACIC SURGERY (CARDIOTHORACIC VASCULAR SURGERY)

## 2021-11-13 PROCEDURE — 25010000002 MAGNESIUM SULFATE 2 GM/50ML SOLUTION: Performed by: THORACIC SURGERY (CARDIOTHORACIC VASCULAR SURGERY)

## 2021-11-13 PROCEDURE — 71045 X-RAY EXAM CHEST 1 VIEW: CPT

## 2021-11-13 PROCEDURE — 25010000002 FUROSEMIDE PER 20 MG: Performed by: THORACIC SURGERY (CARDIOTHORACIC VASCULAR SURGERY)

## 2021-11-13 PROCEDURE — 82962 GLUCOSE BLOOD TEST: CPT

## 2021-11-13 PROCEDURE — 93010 ELECTROCARDIOGRAM REPORT: CPT | Performed by: INTERNAL MEDICINE

## 2021-11-13 PROCEDURE — 25010000002 ENOXAPARIN PER 10 MG: Performed by: NURSE PRACTITIONER

## 2021-11-13 PROCEDURE — 99232 SBSQ HOSP IP/OBS MODERATE 35: CPT | Performed by: INTERNAL MEDICINE

## 2021-11-13 PROCEDURE — 99024 POSTOP FOLLOW-UP VISIT: CPT | Performed by: THORACIC SURGERY (CARDIOTHORACIC VASCULAR SURGERY)

## 2021-11-13 PROCEDURE — 85027 COMPLETE CBC AUTOMATED: CPT | Performed by: NURSE PRACTITIONER

## 2021-11-13 PROCEDURE — 25010000002 AMIODARONE IN DEXTROSE 5% 150-4.21 MG/100ML-% SOLUTION: Performed by: THORACIC SURGERY (CARDIOTHORACIC VASCULAR SURGERY)

## 2021-11-13 PROCEDURE — 25010000002 AMIODARONE PER 30 MG: Performed by: THORACIC SURGERY (CARDIOTHORACIC VASCULAR SURGERY)

## 2021-11-13 PROCEDURE — 94799 UNLISTED PULMONARY SVC/PX: CPT

## 2021-11-13 PROCEDURE — 25010000002 CEFAZOLIN PER 500 MG: Performed by: NURSE PRACTITIONER

## 2021-11-13 PROCEDURE — 80048 BASIC METABOLIC PNL TOTAL CA: CPT | Performed by: NURSE PRACTITIONER

## 2021-11-13 PROCEDURE — 93005 ELECTROCARDIOGRAM TRACING: CPT | Performed by: NURSE PRACTITIONER

## 2021-11-13 RX ORDER — AMIODARONE HCL/D5W 450 MG/250
0.5 PLASTIC BAG, INJECTION (ML) INTRAVENOUS CONTINUOUS
Status: DISPENSED | OUTPATIENT
Start: 2021-11-14 | End: 2021-11-14

## 2021-11-13 RX ORDER — FUROSEMIDE 10 MG/ML
40 INJECTION INTRAMUSCULAR; INTRAVENOUS ONCE
Status: COMPLETED | OUTPATIENT
Start: 2021-11-13 | End: 2021-11-13

## 2021-11-13 RX ORDER — MAGNESIUM SULFATE HEPTAHYDRATE 40 MG/ML
2 INJECTION, SOLUTION INTRAVENOUS ONCE
Status: COMPLETED | OUTPATIENT
Start: 2021-11-13 | End: 2021-11-13

## 2021-11-13 RX ORDER — AMIODARONE HCL/D5W 450 MG/250
1 PLASTIC BAG, INJECTION (ML) INTRAVENOUS CONTINUOUS
Status: DISPENSED | OUTPATIENT
Start: 2021-11-13 | End: 2021-11-14

## 2021-11-13 RX ADMIN — ENOXAPARIN SODIUM 40 MG: 40 INJECTION SUBCUTANEOUS at 18:20

## 2021-11-13 RX ADMIN — ACETAMINOPHEN 650 MG: 325 TABLET, FILM COATED ORAL at 14:13

## 2021-11-13 RX ADMIN — FUROSEMIDE 40 MG: 10 INJECTION, SOLUTION INTRAMUSCULAR; INTRAVENOUS at 09:06

## 2021-11-13 RX ADMIN — MAGNESIUM SULFATE HEPTAHYDRATE 2 G: 2 INJECTION, SOLUTION INTRAVENOUS at 22:54

## 2021-11-13 RX ADMIN — CHLORHEXIDINE GLUCONATE 15 ML: 1.2 SOLUTION ORAL at 08:55

## 2021-11-13 RX ADMIN — AMIODARONE HYDROCHLORIDE 1 MG/MIN: 50 INJECTION, SOLUTION INTRAVENOUS at 22:53

## 2021-11-13 RX ADMIN — ASPIRIN 81 MG: 81 TABLET, COATED ORAL at 08:56

## 2021-11-13 RX ADMIN — MUPIROCIN 1 APPLICATION: 20 OINTMENT TOPICAL at 21:04

## 2021-11-13 RX ADMIN — HYDROCODONE BITARTRATE AND ACETAMINOPHEN 1 TABLET: 5; 325 TABLET ORAL at 04:14

## 2021-11-13 RX ADMIN — MUPIROCIN 1 APPLICATION: 20 OINTMENT TOPICAL at 09:02

## 2021-11-13 RX ADMIN — ATORVASTATIN CALCIUM 40 MG: 40 TABLET, FILM COATED ORAL at 21:04

## 2021-11-13 RX ADMIN — AMIODARONE HYDROCHLORIDE 150 MG: 1.5 INJECTION, SOLUTION INTRAVENOUS at 22:54

## 2021-11-13 RX ADMIN — CARVEDILOL 6.25 MG: 6.25 TABLET, FILM COATED ORAL at 21:04

## 2021-11-13 RX ADMIN — DOCUSATE SODIUM 50 MG AND SENNOSIDES 8.6 MG 2 TABLET: 8.6; 5 TABLET, FILM COATED ORAL at 08:56

## 2021-11-13 RX ADMIN — ACETAMINOPHEN 650 MG: 325 TABLET, FILM COATED ORAL at 08:56

## 2021-11-13 RX ADMIN — CEFAZOLIN SODIUM 2 G: 10 INJECTION, POWDER, FOR SOLUTION INTRAVENOUS at 02:12

## 2021-11-13 RX ADMIN — PANTOPRAZOLE SODIUM 40 MG: 40 TABLET, DELAYED RELEASE ORAL at 06:01

## 2021-11-13 RX ADMIN — POLYETHYLENE GLYCOL 3350 17 G: 17 POWDER, FOR SOLUTION ORAL at 08:56

## 2021-11-13 RX ADMIN — CARVEDILOL 6.25 MG: 6.25 TABLET, FILM COATED ORAL at 09:02

## 2021-11-13 RX ADMIN — HYDROCODONE BITARTRATE AND ACETAMINOPHEN 1 TABLET: 5; 325 TABLET ORAL at 21:04

## 2021-11-13 RX ADMIN — CHLORHEXIDINE GLUCONATE 15 ML: 1.2 SOLUTION ORAL at 21:04

## 2021-11-13 NOTE — PROGRESS NOTES
Referring Provider: Johnathan Hernandez MD    Reason for follow-up:  Status post CABG 2012.  Maze procedure     Patient Care Team:  Kortney Ferrera MD as PCP - General  Kortney Ferrera MD as PCP - Family Medicine    Subjective .      ROS    The patient has been without any chest discomfort ,shortness of breath, palpitations, dizziness or syncope.  Denies having any headache ,abdominal pain ,nausea, vomiting , diarrhea constipation, loss of weight or loss of appetite.  Denies having any excessive bruising ,hematuria or blood in the stool.    Review of all systems negative except as indicated    History  Past Medical History:   Diagnosis Date   • Arthritis    • Atrial fibrillation (HCC)    • BPH (benign prostatic hyperplasia)    • Bulging lumbar disc    • Difficulty maintaining body in lying position     NEEDS PILLOW UNDER KNEES IN SURGERY D/T LUMBAR ISSUE   • Hyperglycemia    • Hypertension    • OAB (overactive bladder)    • Skin mole    • Snoring    • Urinary urgency    • Vitamin D deficiency        Past Surgical History:   Procedure Laterality Date   • CARDIAC CATHETERIZATION Right 10/13/2021    Procedure: Left Heart Cath;  Surgeon: Renato Knight MD;  Location: [facility] CATH INVASIVE LOCATION;  Service: Cardiovascular;  Laterality: Right;   • CARDIAC CATHETERIZATION  10/13/2021    Procedure: Functional Flow [place];  Surgeon: Renato Knight MD;  Location: [facility] CATH INVASIVE LOCATION;  Service: Cardiovascular;;   • COLONOSCOPY     • FINGER SURGERY Right     repair right pointer finger   • JOINT REPLACEMENT     • TOTAL SHOULDER ARTHROPLASTY      shoulder replacement       Family History   Problem Relation Age of Onset   • Diabetes Mother    • Heart disease Mother    • Hypertension Sister    • Hyperlipidemia Sister    • Kidney disease Sister    • Cancer Sister    • Other Sister         weight disorder   • Diabetes Sister    • Stroke Brother    • Hypertension Other        Social History      Tobacco Use   • Smoking status: Former Smoker     Packs/day: 1.00     Years: 12.00     Pack years: 12.00     Types: Cigarettes     Quit date:      Years since quittin.8   • Smokeless tobacco: Never Used   Vaping Use   • Vaping Use: Never used   Substance Use Topics   • Alcohol use: Yes     Alcohol/week: 1.0 standard drink     Types: 1 Shots of liquor per week     Comment: 1 drink weekly   • Drug use: No        Medications Prior to Admission   Medication Sig Dispense Refill Last Dose   • apixaban (Eliquis) 5 MG tablet tablet Take 1 tablet by mouth 2 (Two) Times a Day. 180 tablet 3 2021   • aspirin (aspirin) 81 MG EC tablet Take 1 tablet by mouth Daily. 30 tablet 2 11/10/2021 at Unknown time   • B Complex Vitamins (vitamin b complex) capsule capsule Take 1 capsule by mouth Daily. LD 2021   • Cholecalciferol (VITAMIN D) 2000 units capsule Take 2,000 Units by mouth Daily. 2021   • dilTIAZem CD (CARDIZEM CD) 120 MG 24 hr capsule Take 120 mg by mouth Daily.   11/10/2021 at Unknown time   • ELDERBERRY PO Take 1 capsule by mouth 2 (two) times a day. Elderberry 158mg  LD 2021   • losartan (COZAAR) 50 MG tablet TAKE ONE BY MOUTH DAILY 30 tablet 0 2021   • melatonin 5 MG tablet tablet Take 5 mg by mouth Every Night.   11/10/2021   • mupirocin (BACTROBAN) 2 % ointment Apply to nares (inside each nostril) twice daily as directed for procedure. 22 g 0 2021 at Unknown time   • Turmeric 500 MG capsule Take 500 mg by mouth Daily. LD 2021       Allergies  Lisinopril    Scheduled Meds:aspirin, 81 mg, Oral, Daily  atorvastatin, 40 mg, Oral, Nightly  carvedilol, 6.25 mg, Oral, Q12H  chlorhexidine, 15 mL, Mouth/Throat, Q12H  enoxaparin, 40 mg, Subcutaneous, Daily  [START ON 2021] insulin lispro, 0-9 Units, Subcutaneous, TID AC  mupirocin, 1 application, Each Nare, BID  pantoprazole, 40 mg, Oral, Q AM  polyethylene glycol, 17 g, Oral, BID  senna-docusate  "sodium, 2 tablet, Oral, BID      Continuous Infusions:insulin, 0-50 Units/hr, Last Rate: 1.2 Units/hr (11/13/21 0631)  niCARdipine, 5-15 mg/hr, Last Rate: Stopped (11/13/21 0024)  sodium chloride, 30 mL/hr, Last Rate: 30 mL/hr (11/11/21 1900)      PRN Meds:.•  acetaminophen **OR** acetaminophen **OR** acetaminophen  •  dextrose  •  dextrose  •  glucagon (human recombinant)  •  HYDROcodone-acetaminophen  •  [START ON 11/14/2021] insulin lispro **AND** [START ON 11/14/2021] insulin lispro  •  insulin  •  Morphine **AND** naloxone  •  Mouth [place]  •  niCARdipine  •  ondansetron  •  potassium chloride **OR** potassium chloride  •  potassium chloride **OR** potassium chloride    Objective     VITAL SIGNS  Vitals:    11/13/21 0900 11/13/21 1000 11/13/21 1015 11/13/21 1030   BP: 121/72  114/73 112/68   BP Location:       Patient Position:       Pulse: 92 82 80 79   Resp:       Temp:       TempSrc:       SpO2: 98% 98% 98% 97%   Weight:       Height:           Flowsheet Rows      First Filed Value   Admission Height 180.3 cm (71\") Documented at 11/11/2021 0554   Admission Weight 99.4 kg (219 lb 2.2 oz) Documented at 11/11/2021 0554            Intake/Output Summary (Last 24 hours) at 11/13/2021 1406  Last data filed at 11/13/2021 0800  Gross per 24 hour   Intake 1313.13 ml   Output 1135 ml   Net 178.13 ml        TELEMETRY: Sinus    Physical Exam:  The patient is alert, oriented and in no distress.  Vital signs as noted above.  Head and neck revealed no carotid bruits or jugular venous distention.  No thyromegaly or lymphadenopathy is present  Lungs clear.  No wheezing.  Breath sounds are normal bilaterally.  Heart normal first and second heart sounds.  No murmur. No precordial rub is present.  No gallop is present.  Abdomen soft and nontender.  No organomegaly is present.  Extremities with good peripheral pulses without any pedal edema.  Skin warm and dry.  Visible incisions are looking well.  Musculoskeletal system is grossly " normal  CNS grossly normal      Results Review:   I reviewed the patient's new clinical results.  Lab Results (last 24 hours)     Procedure Component Value Units Date/Time    POC Glucose Once [[id number]]  (Abnormal) Collected: 11/13/21 1108    Specimen: Blood Updated: 11/13/21 1109     Glucose 119 mg/dL      Comment: Serial Number: [id number]:  [id number]       POC Glucose Once [[id number]]  (Abnormal) Collected: 11/13/21 0718    Specimen: Blood Updated: 11/13/21 0719     Glucose 117 mg/dL      Comment: Serial Number: [id number]:  [id number]       Basic Metabolic Panel [[id number]]  (Abnormal) Collected: 11/13/21 0625    Specimen: Blood Updated: 11/13/21 0703     Glucose 106 mg/dL      BUN 18 mg/dL      Creatinine 0.62 mg/dL      Sodium 139 mmol/L      Potassium 4.0 mmol/L      Chloride 106 mmol/L      CO2 24.0 mmol/L      Calcium 8.1 mg/dL      eGFR Non African Amer 127 mL/min/1.73      BUN/Creatinine Ratio 29.0     Anion Gap 9.0 mmol/L     Narrative:      GFR Normal >60  Chronic Kidney Disease <60  Kidney Failure <15      CBC (No Diff) [[id number]]  (Abnormal) Collected: 11/13/21 0625    Specimen: Blood Updated: 11/13/21 0637     WBC 14.10 10*3/mm3      RBC 3.41 10*6/mm3      Hemoglobin 10.5 g/dL      Hematocrit 30.4 %      MCV 89.1 fL      MCH 30.7 pg      MCHC 34.4 g/dL      RDW 14.0 %      RDW-SD 43.8 fl      MPV 9.0 fL      Platelets 134 10*3/mm3     POC Glucose Once [[id number]]  (Abnormal) Collected: 11/13/21 0610    Specimen: Blood Updated: 11/13/21 0612     Glucose 121 mg/dL      Comment: Serial Number: [id number]:  [id number]       POC Glucose Once [[id number]]  (Abnormal) Collected: 11/13/21 0418    Specimen: Blood Updated: 11/13/21 0419     Glucose 117 mg/dL      Comment: Serial Number: [id number]:  [id number]       POC Glucose Once [[id number]]  (Abnormal) Collected: 11/13/21 0221    Specimen: Blood Updated: 11/13/21 0223     Glucose 110 mg/dL      Comment: Serial Number:  [id number]:  [id number]       POC Glucose Once [[id number]]  (Abnormal) Collected: 11/12/21 2325    Specimen: Blood Updated: 11/12/21 2328     Glucose 113 mg/dL      Comment: Serial Number: [id number]:  [id number]       POC Glucose Once [[id number]]  (Normal) Collected: 11/12/21 2118    Specimen: Blood Updated: 11/12/21 2121     Glucose 92 mg/dL      Comment: Serial Number: [id number]:  [id number]       POC Glucose Once [[id number]]  (Abnormal) Collected: 11/12/21 2013    Specimen: Blood Updated: 11/12/21 2013     Glucose 149 mg/dL      Comment: Serial Number: [id number]:  [id number]       POC Glucose Once [[id number]]  (Abnormal) Collected: 11/12/21 1909    Specimen: Blood Updated: 11/12/21 1910     Glucose 203 mg/dL      Comment: Serial Number: [id number]:  [id number]       POC Glucose Once [[id number]]  (Abnormal) Collected: 11/12/21 1541    Specimen: Blood Updated: 11/12/21 1545     Glucose 130 mg/dL      Comment: Serial Number: [id number]:  [id number]             Imaging Results (Last 24 Hours)     Procedure Component Value Units Date/Time    XR Chest 1 View [[id number]] Collected: 11/13/21 0806     Updated: 11/13/21 0811    Narrative:      DATE OF EXAM:  11/13/2021 5:20 AM     PROCEDURE:  XR CHEST 1 VW-     INDICATIONS:  Post-Op Heart Surgery; I25.10-Atherosclerotic heart disease of native  coronary artery without angina pectoris     COMPARISON:  AP chest x-ray 11/12/2021.     TECHNIQUE:   Single radiographic AP view of the chest was obtained.     FINDINGS:  Tiro-Rula catheter has been removed. Right internal jugular sheath  remains in place. Mediastinal and left basilar chest tubes remain in  place. There are multiple wires overlying the patient.     There is a possible tiny left apical pneumothorax, better visualized on  the second image with degree of pleural separation measuring 6 mm.  Cardiomediastinal contours appear stable, including changes of CABG.  Patchy left basilar  opacities appear stable.       Impression:      1.Possible tiny left apical pneumothorax. Chest tubes remain in place.  2.Patchy left basilar opacities, likely atelectasis.     Electronically Signed By-Marlee Rivera MD On:11/13/2021 8:09 AM  This report was finalized on [id number] by  Marlee Rivera MD.      LAB RESULTS (LAST 7 DAYS)    CBC  Results from last 7 days   Lab Units 11/13/21  0625 11/12/21  0310 11/11/21  1730 11/11/21  1613 11/11/21  1345 11/11/21  1246 11/11/21  1215 11/11/21  0735 11/09/21  0859   WBC 10*3/mm3 14.10* 15.20*  --   --   --   --  21.60*  --  7.60   RBC 10*6/mm3 3.41* 3.49*  --   --   --   --  4.25  --  4.66   HEMOGLOBIN g/dL 10.5* 10.6*  --   --   --   --  12.8*  --  14.4   HEMOGLOBIN, POC g/dL  --   --  11.9* 12.2 12.7 13.5  --    < >  --    HEMATOCRIT % 30.4* 30.7*  --   --   --   --  38.6  --  41.6   HEMATOCRIT POC %  --   --  35* 36* 37* 40  --    < >  --    MCV fL 89.1 88.1  --   --   --   --  90.7  --  89.3   PLATELETS 10*3/mm3 134* 127*  --   --   --   --  160  --  249    < > = values in this interval not displayed.       BMP  Results from last 7 days   Lab Units 11/13/21  0625 11/12/21  1225 11/12/21  0310 11/11/21  1215 11/09/21  0859   SODIUM mmol/L 139  --  144 143 144   POTASSIUM mmol/L 4.0 3.8 3.5 3.7 4.7   CHLORIDE mmol/L 106  --  108* 109* 104   CO2 mmol/L 24.0  --  22.0 22.0 29.0   BUN mg/dL 18  --  19 22 22   CREATININE mg/dL 0.62*  --  0.76 0.87 0.85   GLUCOSE mg/dL 106*  --  106* 116* 91   MAGNESIUM mg/dL  --   --  2.3  --  2.1   PHOSPHORUS mg/dL  --   --  3.3 2.1*  --        CMP   Results from last 7 days   Lab Units 11/13/21  0625 11/12/21  1225 11/12/21  0310 11/11/21  1215 11/09/21  0859   SODIUM mmol/L 139  --  144 143 144   POTASSIUM mmol/L 4.0 3.8 3.5 3.7 4.7   CHLORIDE mmol/L 106  --  108* 109* 104   CO2 mmol/L 24.0  --  22.0 22.0 29.0   BUN mg/dL 18  --  19 22 22   CREATININE mg/dL 0.62*  --  0.76 0.87 0.85   GLUCOSE mg/dL 106*  --  106* 116* 91   ALBUMIN  g/dL  --   --  4.10 4.00 4.00   BILIRUBIN mg/dL  --   --   --   --  0.3   ALK PHOS U/L  --   --   --   --  60   AST (SGOT) U/L  --   --   --   --  26   ALT (SGPT) U/L  --   --   --   --  20         BNP        TROPONIN        CoAg  Results from last 7 days   Lab Units 11/12/21  0310 11/11/21  1215 11/09/21  0859   INR  1.08 1.08 0.98   APTT seconds  --  25.6 24.5       Creatinine Clearance  Estimated Creatinine Clearance: 97.3 mL/min (A) (by C-G formula based on SCr of 0.62 mg/dL (L)).    ABG  Results from last 7 days   Lab Units 11/11/21  1730 11/11/21  1613 11/11/21  1345 11/11/21  1252 11/11/21  1246 11/11/21  1051 11/11/21  1008 11/11/21  0933 11/11/21  0933   PH, ARTERIAL pH units 7.349* 7.382 7.384  --  7.372 7.310* 7.280*  --  7.320*   PCO2, ARTERIAL mm Hg 45.5 41.9 38.3  --  41.5 53.8* 62.0*  --  55.7*   PO2 ART mm Hg 95.2 166.9* 110.6*  --  231.3* 457.0* 463.0*  --  401.0*   O2 SATURATION ART % 96.9 99.5* 98.3*  --  99.8* 100.0* 100.0*  --  100.0*   BASE EXCESS ART mmol/L -0.9* -0.3* -1.9* -2.7* -1.2* 1.0 2.0   < > 3.0    < > = values in this interval not displayed.       Radiology  XR Chest 1 View    Result Date: 11/13/2021  1.Possible tiny left apical pneumothorax. Chest tubes remain in place. 2.Patchy left basilar opacities, likely atelectasis.  Electronically Signed By-Marlee Rivera MD On:11/13/2021 8:09 AM This report was finalized on [id number] by  Marlee Rivera MD.    XR Chest 1 View    Result Date: 11/12/2021  1.Mediastinal and left chest tubes in place. No pneumothorax identified. 2.Pulmonary vascular congestion. 3.Bibasilar atelectasis.  Electronically Signed By-Zaid Garcia MD On:11/12/2021 7:34 AM This report was finalized on [id number] by  Zaid Garcia MD.              EKG                  I personally viewed and interpreted the patient's EKG/Telemetry data:    ECHOCARDIOGRAM:    Results for orders placed in visit on 11/11/21    Emergent/Open-Heart Anesthesia  AVERY    Narrative  Procedure Performed: Emergent/Open-Heart Anesthesia AVERY  Start Time:  End Time:    Preanesthesia Checklist:  Patient identified, IV assessed, risks and benefits discussed, monitors and equipment assessed, procedure being performed at surgeon's request and anesthesia consent obtained.    General Procedure Information  AVERY Placed for monitoring purposes only -- This is not a diagnostic AVERY  Diagnostic Indications for Echo:  assessment of surgical repair, defect repair evaluation and hemodynamic monitoring  Physician Requesting Echo: Johnathan Hernandez MD  Location performed:  OR  Intubated  Bite block placed  Heart visualized  Probe Insertion:  Easy  Probe Type:  Multiplane  Modalities:  Color flow mapping    Echocardiographic and Doppler Measurements    Ventricles    Right Ventricle:  Cavity size normal.  Global function mildly impaired.  Left Ventricle:  Cavity size normal.  Hypertrophy present.  Thrombus not present.  Global Function mildly impaired.  Ejection Fraction 55%.        Valves    Aortic Valve:  Annulus normal.  Stenosis not present.  Regurgitation absent.  Leaflets normal.  Leaflet motions normal.                              Anesthesia Information      Echocardiogram Comments:  Post bypass AVERY EF 45%. No MR. No AS or AI. No perivalvular leak.          STRESS MYOVIEW:    Cardiolite (Tc-99m Sestamibi) stress test    CARDIAC CATHETERIZATION:            OTHER:         Assessment/Plan     Principal Problem:    Coronary artery disease involving native coronary artery of native heart without angina pectoris      Multivessel coronary artery disease  Persistent and chronic atrial fibrillation  Moderate mitral regurgitation  Normal LV systolic function  Hypertension  Hyperlipidemia     Postop day 1 successful coronary bypass surgery mitral valve repair and Maze procedure with left atrial appendage closure  Continue current medical therapy  Patient clinically hemodynamically stable sitting up in  chair  Tolerating p.o. medications  Start patient on low-dose of beta-blockers  Diuretics as needed  Discontinue Primacor  Maintaining sinus rhythm  Wean off the drips/Primacor  Discussed with the patient and family at bedside           Impressions:  IFR evaluation of the mid LAD showing IFR value of 0.89 suggestive of physiologically significant stenosis involving the mid LAD  IFR evaluation of the ramus branch of the circumflex showing IFR value of 0.88 suggestive of physiologically significant stenosis involving the ramus branch of the circumflex  IFR evaluation of the left main showing IFR value of 0.94 suggestive of no physiologically significant stenosis involving the distal left main  Severe three-vessel coronary artery disease  Extensive calcification of the multiple coronary arteries segments  Normal LV systolic function normal wall motion normal left-sided filling pressures estimate LV ejection fraction of 60%  Chronic atrial fibrillation        Interpretation Summary     · Left ventricular wall thickness is consistent with mild concentric hypertrophy.  · Estimated left ventricular EF = 55% Estimated left ventricular EF was in agreement with the calculated left ventricular EF. Left ventricular systolic function is normal.  · Mild dilation of the aortic root is present. Mild dilation of the ascending aorta is present.  · Moderate mitral valve regurgitation is present.  · Estimated right ventricular systolic pressure from tricuspid regurgitation is normal (<35 mmHg).  · Left ventricular diastolic function is consistent with (grade I) impaired relaxation.           REASON FOR CONSULTATION:  75-year-old male recently diagnosed with severe multivessel coronary artery disease, paroxysmal atrial fibrillation, mitral valve disorder.  He has additional history of BPH, elevated blood sugar, hypertension, vitamin D deficiency        CC:  Coronary artery disease  Mitral valve insufficiency  Paroxysmal atrial  fibrillation     HISTORY OF PRESENT ILLNESS:   Patient was seen in new consultation by Dr. 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  He had no prior history.  Patient was started on Eliquis for anticoagulation and oral Cardizem for rate control.  He had cardiac calcium score 1098 and underwent ischemic work-up including nuclear stress testing with no evidence of inducible ischemia.  He reported no symptoms of chest pain or shortness of breath.  TTE showed moderate MR.  Patient had cardioversion and went back into atrial fibrillation.  Cardiac catheterization was recommended due to elevated calcium score where he was found to have left main 50% stenosis, LAD 70% mid, D1 99%, LCx 80% ostial, ONM 90%, RI 70%, D2 80%, RCA has PLB branch 90% and PDA 70%.  IFR mid LAD .89, IFR RI .88, and IFR left main .94.  Echo from July 2020, showed EF 55% and mild to mod MR.   CTS was consulted and CABG with JAMSHID/maze procedure and possible MVR were recommended.  [facility] for CABG x3 with LIMA to mid LAD, reverse individual saphenous vein graft to the obtuse marginal 1 and PLB branch of the right coronary artery, Mitral valve repair with a 28 mm physiotwo ring annuloplasty, right and left cryo-maze procedure with full set of lesions.     Upon my evaluation, patient is sitting in bedside chair and looks quite well today.  He reports sternal wound pain controlled with oral pain medication.  He denies any nausea, shortness of breath.  Rhythm currently sinus with first-degree AV block.  Drips include Cardene, insulin, Primacor.  Pressure stable at 141/55.  He is very talkative and reports that he feels well today.           REVIEW OF SYSTEMS:  Pertinent items are noted in HPI, all other systems reviewed and negative     OBJECTIVE   Potassium 3.8     ASSESSMENT  Coronary artery disease status post three-vessel CABG  Mitral valve insufficiency status post mitral  valve repair/ring  Paroxysmal atrial fibrillation status post maze procedure and left atrial appendage ligation  Essential hypertension  Dyslipidemia  BPH                 PLAN  Patient appears to be progressing quite well.  BB started  Wean drips as tolerated  Monitor rhythm closely  Tubes and lines per CTS  Mobility as tolerated  Blood pressure has improved-plans to wean Cardene  Further recommendations patient's hospital course progresses    ]]]]]]]]]]]]]]]]]]]]]]  11/13/2021  ==========  Status post CABG mitral valve repair Maze procedure    Hypertension  Dyslipidemia  Paroxysmal atrial fibrillation-status post Maze procedure and left atrial appendage ligation.    Chest tubes in place.    Have discussed with attending nurse for coordination of care.    Have discussed with patient's family at bedside.    Further plan will depend on patient's progress.  ]]]]]]]]]]]]]]]]]]]]  Abhijit Anand MD  11/13/21  14:06 EST

## 2021-11-13 NOTE — PROGRESS NOTES
Status post CABG/mitral valve repair/maze, postop day #2.    CV stable.  Ground pacing wires today and remove tomorrow.  Pulmonary toileting.  Mobilize.  I would keep chest tubes today because we still have significant, although thin, chest tube output.  Oral diet as tolerated.  Nonoliguric.  Remove Luu catheter today.  Patient is quite nervous about removing the catheter.  Afebrile.  Borderline leukocytosis that is slowly improving.    Overall making reasonable progress.

## 2021-11-14 ENCOUNTER — APPOINTMENT (OUTPATIENT)
Dept: GENERAL RADIOLOGY | Facility: HOSPITAL | Age: 76
End: 2021-11-14

## 2021-11-14 LAB
ANION GAP SERPL CALCULATED.3IONS-SCNC: 10 MMOL/L (ref 5–15)
BASOPHILS # BLD AUTO: 0 10*3/MM3 (ref 0–0.2)
BASOPHILS NFR BLD AUTO: 0.3 % (ref 0–1.5)
BUN SERPL-MCNC: 19 MG/DL (ref 8–23)
BUN/CREAT SERPL: 33.9 (ref 7–25)
CALCIUM SPEC-SCNC: 8.2 MG/DL (ref 8.6–10.5)
CHLORIDE SERPL-SCNC: 101 MMOL/L (ref 98–107)
CO2 SERPL-SCNC: 26 MMOL/L (ref 22–29)
CREAT SERPL-MCNC: 0.56 MG/DL (ref 0.76–1.27)
DEPRECATED RDW RBC AUTO: 43.8 FL (ref 37–54)
EOSINOPHIL # BLD AUTO: 0.2 10*3/MM3 (ref 0–0.4)
EOSINOPHIL NFR BLD AUTO: 1.7 % (ref 0.3–6.2)
ERYTHROCYTE [DISTWIDTH] IN BLOOD BY AUTOMATED COUNT: 14.1 % (ref 12.3–15.4)
GFR SERPL CREATININE-BSD FRML MDRD: 143 ML/MIN/1.73
GLUCOSE BLDC GLUCOMTR-MCNC: 115 MG/DL (ref 70–105)
GLUCOSE BLDC GLUCOMTR-MCNC: 127 MG/DL (ref 70–105)
GLUCOSE BLDC GLUCOMTR-MCNC: 142 MG/DL (ref 70–105)
GLUCOSE BLDC GLUCOMTR-MCNC: 157 MG/DL (ref 70–105)
GLUCOSE BLDC GLUCOMTR-MCNC: 159 MG/DL (ref 70–105)
GLUCOSE BLDC GLUCOMTR-MCNC: 91 MG/DL (ref 70–105)
GLUCOSE SERPL-MCNC: 179 MG/DL (ref 65–99)
HCT VFR BLD AUTO: 30.1 % (ref 37.5–51)
HGB BLD-MCNC: 10.3 G/DL (ref 13–17.7)
LYMPHOCYTES # BLD AUTO: 0.7 10*3/MM3 (ref 0.7–3.1)
LYMPHOCYTES NFR BLD AUTO: 5.9 % (ref 19.6–45.3)
MAGNESIUM SERPL-MCNC: 2.7 MG/DL (ref 1.6–2.4)
MCH RBC QN AUTO: 30.5 PG (ref 26.6–33)
MCHC RBC AUTO-ENTMCNC: 34.3 G/DL (ref 31.5–35.7)
MCV RBC AUTO: 88.9 FL (ref 79–97)
MONOCYTES # BLD AUTO: 1.3 10*3/MM3 (ref 0.1–0.9)
MONOCYTES NFR BLD AUTO: 10.5 % (ref 5–12)
NEUTROPHILS NFR BLD AUTO: 81.6 % (ref 42.7–76)
NEUTROPHILS NFR BLD AUTO: 9.8 10*3/MM3 (ref 1.7–7)
NRBC BLD AUTO-RTO: 0.1 /100 WBC (ref 0–0.2)
PLATELET # BLD AUTO: 136 10*3/MM3 (ref 140–450)
PMV BLD AUTO: 9.3 FL (ref 6–12)
POTASSIUM SERPL-SCNC: 3.9 MMOL/L (ref 3.5–5.2)
RBC # BLD AUTO: 3.39 10*6/MM3 (ref 4.14–5.8)
SODIUM SERPL-SCNC: 137 MMOL/L (ref 136–145)
WBC # BLD AUTO: 12.1 10*3/MM3 (ref 3.4–10.8)

## 2021-11-14 PROCEDURE — 99232 SBSQ HOSP IP/OBS MODERATE 35: CPT | Performed by: INTERNAL MEDICINE

## 2021-11-14 PROCEDURE — 93010 ELECTROCARDIOGRAM REPORT: CPT | Performed by: INTERNAL MEDICINE

## 2021-11-14 PROCEDURE — 99024 POSTOP FOLLOW-UP VISIT: CPT | Performed by: THORACIC SURGERY (CARDIOTHORACIC VASCULAR SURGERY)

## 2021-11-14 PROCEDURE — 97116 GAIT TRAINING THERAPY: CPT

## 2021-11-14 PROCEDURE — 82962 GLUCOSE BLOOD TEST: CPT

## 2021-11-14 PROCEDURE — 93005 ELECTROCARDIOGRAM TRACING: CPT | Performed by: THORACIC SURGERY (CARDIOTHORACIC VASCULAR SURGERY)

## 2021-11-14 PROCEDURE — 94799 UNLISTED PULMONARY SVC/PX: CPT

## 2021-11-14 PROCEDURE — 83735 ASSAY OF MAGNESIUM: CPT | Performed by: THORACIC SURGERY (CARDIOTHORACIC VASCULAR SURGERY)

## 2021-11-14 PROCEDURE — 97530 THERAPEUTIC ACTIVITIES: CPT

## 2021-11-14 PROCEDURE — 80048 BASIC METABOLIC PNL TOTAL CA: CPT | Performed by: NURSE PRACTITIONER

## 2021-11-14 PROCEDURE — 25010000002 ENOXAPARIN PER 10 MG: Performed by: NURSE PRACTITIONER

## 2021-11-14 PROCEDURE — 71045 X-RAY EXAM CHEST 1 VIEW: CPT

## 2021-11-14 PROCEDURE — 25010000002 AMIODARONE PER 30 MG: Performed by: THORACIC SURGERY (CARDIOTHORACIC VASCULAR SURGERY)

## 2021-11-14 PROCEDURE — 85025 COMPLETE CBC W/AUTO DIFF WBC: CPT | Performed by: THORACIC SURGERY (CARDIOTHORACIC VASCULAR SURGERY)

## 2021-11-14 RX ORDER — AMIODARONE HYDROCHLORIDE 200 MG/1
200 TABLET ORAL
Status: DISCONTINUED | OUTPATIENT
Start: 2021-11-14 | End: 2021-11-15

## 2021-11-14 RX ORDER — FUROSEMIDE 40 MG/1
40 TABLET ORAL DAILY
Status: COMPLETED | OUTPATIENT
Start: 2021-11-14 | End: 2021-11-16

## 2021-11-14 RX ORDER — POTASSIUM CHLORIDE 20 MEQ/1
20 TABLET, EXTENDED RELEASE ORAL DAILY
Status: COMPLETED | OUTPATIENT
Start: 2021-11-14 | End: 2021-11-16

## 2021-11-14 RX ADMIN — ACETAMINOPHEN 650 MG: 325 TABLET, FILM COATED ORAL at 04:55

## 2021-11-14 RX ADMIN — CARVEDILOL 6.25 MG: 6.25 TABLET, FILM COATED ORAL at 21:58

## 2021-11-14 RX ADMIN — AMIODARONE HYDROCHLORIDE 0.5 MG/MIN: 50 INJECTION, SOLUTION INTRAVENOUS at 11:23

## 2021-11-14 RX ADMIN — FUROSEMIDE 40 MG: 40 TABLET ORAL at 09:48

## 2021-11-14 RX ADMIN — MUPIROCIN 1 APPLICATION: 20 OINTMENT TOPICAL at 09:48

## 2021-11-14 RX ADMIN — CARVEDILOL 6.25 MG: 6.25 TABLET, FILM COATED ORAL at 09:48

## 2021-11-14 RX ADMIN — POTASSIUM CHLORIDE 20 MEQ: 1500 TABLET, EXTENDED RELEASE ORAL at 09:48

## 2021-11-14 RX ADMIN — CHLORHEXIDINE GLUCONATE 15 ML: 1.2 SOLUTION ORAL at 20:32

## 2021-11-14 RX ADMIN — CHLORHEXIDINE GLUCONATE 15 ML: 1.2 SOLUTION ORAL at 09:48

## 2021-11-14 RX ADMIN — ASPIRIN 81 MG: 81 TABLET, COATED ORAL at 09:47

## 2021-11-14 RX ADMIN — PANTOPRAZOLE SODIUM 40 MG: 40 TABLET, DELAYED RELEASE ORAL at 05:20

## 2021-11-14 RX ADMIN — MUPIROCIN 1 APPLICATION: 20 OINTMENT TOPICAL at 20:40

## 2021-11-14 RX ADMIN — ACETAMINOPHEN 650 MG: 325 TABLET, FILM COATED ORAL at 20:32

## 2021-11-14 RX ADMIN — AMIODARONE HYDROCHLORIDE 0.5 MG/MIN: 50 INJECTION, SOLUTION INTRAVENOUS at 04:55

## 2021-11-14 RX ADMIN — AMIODARONE HYDROCHLORIDE 200 MG: 200 TABLET ORAL at 09:48

## 2021-11-14 RX ADMIN — ATORVASTATIN CALCIUM 40 MG: 40 TABLET, FILM COATED ORAL at 20:32

## 2021-11-14 RX ADMIN — ACETAMINOPHEN 650 MG: 325 TABLET, FILM COATED ORAL at 09:48

## 2021-11-14 RX ADMIN — ENOXAPARIN SODIUM 40 MG: 40 INJECTION SUBCUTANEOUS at 17:38

## 2021-11-14 NOTE — PROGRESS NOTES
Referring Provider: Johnathan Hernandez MD    Reason for follow-up:  Status post CABG .  Maze procedure     Patient Care Team:  Kortney Ferrera MD as PCP - General  Kortney Ferrera MD as PCP - Family Medicine    Subjective .      ROS    The patient has been without any chest discomfort ,shortness of breath, palpitations, dizziness or syncope.  Denies having any headache ,abdominal pain ,nausea, vomiting , diarrhea constipation, loss of weight or loss of appetite.  Denies having any excessive bruising ,hematuria or blood in the stool.    Review of all systems negative except as indicated    History  Past Medical History:   Diagnosis Date   • Arthritis    • Atrial fibrillation (HCC)    • BPH (benign prostatic hyperplasia)    • Bulging lumbar disc    • Difficulty maintaining body in lying position     NEEDS PILLOW UNDER KNEES IN SURGERY D/T LUMBAR ISSUE   • Hyperglycemia    • Hypertension    • OAB (overactive bladder)    • Skin mole    • Snoring    • Urinary urgency    • Vitamin D deficiency        Past Surgical History:   Procedure Laterality Date   • CARDIAC CATHETERIZATION Right 10/13/2021    Procedure: Left Heart Cath;  Surgeon: Renato Knight MD;  Location: [facility] CATH INVASIVE LOCATION;  Service: Cardiovascular;  Laterality: Right;   • CARDIAC CATHETERIZATION  10/13/2021    Procedure: Functional Flow Ramer;  Surgeon: Renato Knight MD;  Location: [facility] CATH INVASIVE LOCATION;  Service: Cardiovascular;;   • COLONOSCOPY     • FINGER SURGERY Right     repair right pointer finger   • JOINT REPLACEMENT     • TOTAL SHOULDER ARTHROPLASTY      shoulder replacement       Family History   Problem Relation Age of Onset   • Diabetes Mother    • Heart disease Mother    • Hypertension Sister    • Hyperlipidemia Sister    • Kidney disease Sister    • Cancer Sister    • Other Sister         weight disorder   • Diabetes Sister    • Stroke Brother    • Hypertension Other        Social History      Tobacco Use   • Smoking status: Former Smoker     Packs/day: 1.00     Years: 12.00     Pack years: 12.00     Types: Cigarettes     Quit date:      Years since quittin.8   • Smokeless tobacco: Never Used   Vaping Use   • Vaping Use: Never used   Substance Use Topics   • Alcohol use: Yes     Alcohol/week: 1.0 standard drink     Types: 1 Shots of liquor per week     Comment: 1 drink weekly   • Drug use: No        Medications Prior to Admission   Medication Sig Dispense Refill Last Dose   • apixaban (Eliquis) 5 MG tablet tablet Take 1 tablet by mouth 2 (Two) Times a Day. 180 tablet 3 2021   • aspirin (aspirin) 81 MG EC tablet Take 1 tablet by mouth Daily. 30 tablet 2 11/10/2021 at Unknown time   • B Complex Vitamins (vitamin b complex) capsule capsule Take 1 capsule by mouth Daily. LD 2021   • Cholecalciferol (VITAMIN D) 2000 units capsule Take 2,000 Units by mouth Daily. 2021   • dilTIAZem CD (CARDIZEM CD) 120 MG 24 hr capsule Take 120 mg by mouth Daily.   11/10/2021 at Unknown time   • ELDERBERRY PO Take 1 capsule by mouth 2 (two) times a day. Elderberry 158mg  LD 2021   • losartan (COZAAR) 50 MG tablet TAKE ONE BY MOUTH DAILY 30 tablet 0 2021   • melatonin 5 MG tablet tablet Take 5 mg by mouth Every Night.   11/10/2021   • mupirocin (BACTROBAN) 2 % ointment Apply to nares (inside each nostril) twice daily as directed for procedure. 22 g 0 2021 at Unknown time   • Turmeric 500 MG capsule Take 500 mg by mouth Daily. LD 2021       Allergies  Lisinopril    Scheduled Meds:aspirin, 81 mg, Oral, Daily  atorvastatin, 40 mg, Oral, Nightly  carvedilol, 6.25 mg, Oral, Q12H  chlorhexidine, 15 mL, Mouth/Throat, Q12H  enoxaparin, 40 mg, Subcutaneous, Daily  insulin lispro, 0-9 Units, Subcutaneous, TID AC  mupirocin, 1 application, Each Nare, BID  pantoprazole, 40 mg, Oral, Q AM  polyethylene glycol, 17 g, Oral, BID  senna-docusate sodium, 2 tablet, Oral,  "BID      Continuous Infusions:amiodarone, 0.5 mg/min, Last Rate: 0.5 mg/min (11/14/21 0455)  insulin, 0-50 Units/hr, Last Rate: 2 Units/hr (11/14/21 0315)  niCARdipine, 5-15 mg/hr, Last Rate: Stopped (11/13/21 0024)  sodium chloride, 30 mL/hr, Last Rate: 30 mL/hr (11/11/21 1900)      PRN Meds:.•  acetaminophen **OR** acetaminophen **OR** acetaminophen  •  dextrose  •  dextrose  •  glucagon (human recombinant)  •  HYDROcodone-acetaminophen  •  insulin lispro **AND** insulin lispro  •  insulin  •  Morphine **AND** naloxone  •  Mouth Kote  •  niCARdipine  •  ondansetron  •  potassium chloride **OR** potassium chloride  •  potassium chloride **OR** potassium chloride    Objective     VITAL SIGNS  Vitals:    11/14/21 0100 11/14/21 0200 11/14/21 0300 11/14/21 0448   BP: 128/75 119/73 129/76 126/69   BP Location:    Left arm   Patient Position:    Lying   Pulse: 69 64 64 75   Resp:    22   Temp:    98.1 °F (36.7 °C)   TempSrc:    Oral   SpO2: 94% 94% 95% 96%   Weight:       Height:           Flowsheet Rows      First Filed Value   Admission Height 180.3 cm (71\") Documented at 11/11/2021 0554   Admission Weight 99.4 kg (219 lb 2.2 oz) Documented at 11/11/2021 0554            Intake/Output Summary (Last 24 hours) at 11/14/2021 0713  Last data filed at 11/13/2021 2325  Gross per 24 hour   Intake 1375 ml   Output 2030 ml   Net -655 ml        TELEMETRY: Sinus    Physical Exam:  The patient is alert, oriented and in no distress.  Vital signs as noted above.  Head and neck revealed no carotid bruits or jugular venous distention.  No thyromegaly or lymphadenopathy is present  Lungs clear.  No wheezing.  Breath sounds are normal bilaterally.  Heart normal first and second heart sounds.  No murmur. No precordial rub is present.  No gallop is present.  Abdomen soft and nontender.  No organomegaly is present.  Extremities with good peripheral pulses without any pedal edema.  Skin warm and dry.  Visible incisions are looking " well.  Musculoskeletal system is grossly normal  CNS grossly normal      Results Review:   I reviewed the patient's new clinical results.  Lab Results (last 24 hours)     Procedure Component Value Units Date/Time    Basic Metabolic Panel [[id number]]  (Abnormal) Collected: 11/14/21 0329    Specimen: Blood Updated: 11/14/21 0357     Glucose 179 mg/dL      BUN 19 mg/dL      Creatinine 0.56 mg/dL      Sodium 137 mmol/L      Potassium 3.9 mmol/L      Comment: Slight hemolysis detected by analyzer. Results may be affected.        Chloride 101 mmol/L      CO2 26.0 mmol/L      Calcium 8.2 mg/dL      eGFR Non African Amer 143 mL/min/1.73      BUN/Creatinine Ratio 33.9     Anion Gap 10.0 mmol/L     Narrative:      GFR Normal >60  Chronic Kidney Disease <60  Kidney Failure <15      Magnesium [[id number]]  (Abnormal) Collected: 11/14/21 0329    Specimen: Blood Updated: 11/14/21 0357     Magnesium 2.7 mg/dL     CBC & Differential [[id number]]  (Abnormal) Collected: 11/14/21 0329    Specimen: Blood Updated: 11/14/21 0334    Narrative:      The following orders were created for panel order CBC & Differential.  Procedure                               Abnormality         Status                     ---------                               -----------         ------                     CBC Auto Differential[[id number]]        Abnormal            Final result                 Please view results for these tests on the individual orders.    CBC Auto Differential [[id number]]  (Abnormal) Collected: 11/14/21 0329    Specimen: Blood Updated: 11/14/21 0334     WBC 12.10 10*3/mm3      RBC 3.39 10*6/mm3      Hemoglobin 10.3 g/dL      Hematocrit 30.1 %      MCV 88.9 fL      MCH 30.5 pg      MCHC 34.3 g/dL      RDW 14.1 %      RDW-SD 43.8 fl      MPV 9.3 fL      Platelets 136 10*3/mm3      Neutrophil % 81.6 %      Lymphocyte % 5.9 %      Monocyte % 10.5 %      Eosinophil % 1.7 %      Basophil % 0.3 %      Neutrophils, Absolute 9.80 10*3/mm3       Lymphocytes, Absolute 0.70 10*3/mm3      Monocytes, Absolute 1.30 10*3/mm3      Eosinophils, Absolute 0.20 10*3/mm3      Basophils, Absolute 0.00 10*3/mm3      nRBC 0.1 /100 WBC     POC Glucose Once [[id number]]  (Abnormal) Collected: 11/14/21 0327    Specimen: Blood Updated: 11/14/21 0328     Glucose 159 mg/dL      Comment: Serial Number: [id number]:  [id number]       POC Glucose Once [[id number]]  (Abnormal) Collected: 11/14/21 0201    Specimen: Blood Updated: 11/14/21 0202     Glucose 142 mg/dL      Comment: Serial Number: [id number]:  [id number]       POC Glucose Once [[id number]]  (Abnormal) Collected: 11/13/21 2322    Specimen: Blood Updated: 11/13/21 2323     Glucose 133 mg/dL      Comment: Serial Number: [id number]:  [id number]       POC Glucose Once [[id number]]  (Abnormal) Collected: 11/13/21 2155    Specimen: Blood Updated: 11/13/21 2156     Glucose 132 mg/dL      Comment: Serial Number: [id number]:  [id number]       POC Glucose Once [[id number]]  (Abnormal) Collected: 11/13/21 2029    Specimen: Blood Updated: 11/13/21 2030     Glucose 166 mg/dL      Comment: Serial Number: [id number]:  [id number]       POC Glucose Once [[id number]]  (Abnormal) Collected: 11/13/21 1811    Specimen: Blood Updated: 11/13/21 1812     Glucose 110 mg/dL      Comment: Serial Number: [id number]:  [id number]       POC Glucose Once [[id number]]  (Normal) Collected: 11/13/21 1524    Specimen: Blood Updated: 11/13/21 1526     Glucose 104 mg/dL      Comment: Serial Number: [id number]:  [id number]       POC Glucose Once [[id number]]  (Abnormal) Collected: 11/13/21 1418    Specimen: Blood Updated: 11/13/21 1418     Glucose 169 mg/dL      Comment: Serial Number: [id number]:  [id number]       POC Glucose Once [[id number]]  (Abnormal) Collected: 11/13/21 1108    Specimen: Blood Updated: 11/13/21 1109     Glucose 119 mg/dL      Comment: Serial Number: [id number]:  [id number]       POC Glucose  Once [[id number]]  (Abnormal) Collected: 11/13/21 0718    Specimen: Blood Updated: 11/13/21 0719     Glucose 117 mg/dL      Comment: Serial Number: [id number]:  [id number]             Imaging Results (Last 24 Hours)     Procedure Component Value Units Date/Time    XR Chest 1 View [[id number]] Resulted: 11/14/21 0641     Updated: 11/14/21 0641    XR Chest 1 View [[id number]] Collected: 11/13/21 0806     Updated: 11/13/21 0811    Narrative:      DATE OF EXAM:  11/13/2021 5:20 AM     PROCEDURE:  XR CHEST 1 VW-     INDICATIONS:  Post-Op Heart Surgery; I25.10-Atherosclerotic heart disease of native  coronary artery without angina pectoris     COMPARISON:  AP chest x-ray 11/12/2021.     TECHNIQUE:   Single radiographic AP view of the chest was obtained.     FINDINGS:  [place]-R[place] catheter has been removed. Right internal jugular sheath  remains in place. Mediastinal and left basilar chest tubes remain in  place. There are multiple wires overlying the patient.     There is a possible tiny left apical pneumothorax, better visualized on  the second image with degree of pleural separation measuring 6 mm.  Cardiomediastinal contours appear stable, including changes of CABG.  Patchy left basilar opacities appear stable.       Impression:      1.Possible tiny left apical pneumothorax. Chest tubes remain in place.  2.Patchy left basilar opacities, likely atelectasis.     Electronically Signed By-Marlee Rivera MD On:11/13/2021 8:09 AM  This report was finalized on [id number] by  Marlee Rivera MD.      LAB RESULTS (LAST 7 DAYS)    CBC  Results from last 7 days   Lab Units 11/14/21  0329 11/13/21  0625 11/12/21  0310 11/11/21  1730 11/11/21  1613 11/11/21  1345 11/11/21  1246 11/11/21  1215 11/11/21  1051 11/11/21  0735 11/09/21  0859   WBC 10*3/mm3 12.10* 14.10* 15.20*  --   --   --   --  21.60*  --   --  7.60   RBC 10*6/mm3 3.39* 3.41* 3.49*  --   --   --   --  4.25  --   --  4.66   HEMOGLOBIN g/dL 10.3* 10.5* 10.6*  --    --   --   --  12.8*  --   --  14.4   HEMOGLOBIN, POC g/dL  --   --   --  11.9* 12.2 12.7 13.5  --    < >   < >  --    HEMATOCRIT % 30.1* 30.4* 30.7*  --   --   --   --  38.6  --   --  41.6   HEMATOCRIT POC %  --   --   --  35* 36* 37* 40  --    < >   < >  --    MCV fL 88.9 89.1 88.1  --   --   --   --  90.7  --   --  89.3   PLATELETS 10*3/mm3 136* 134* 127*  --   --   --   --  160  --   --  249    < > = values in this interval not displayed.       BMP  Results from last 7 days   Lab Units 11/14/21 0329 11/13/21 0625 11/12/21 1225 11/12/21 0310 11/11/21 1215 11/09/21  0859   SODIUM mmol/L 137 139  --  144 143 144   POTASSIUM mmol/L 3.9 4.0 3.8 3.5 3.7 4.7   CHLORIDE mmol/L 101 106  --  108* 109* 104   CO2 mmol/L 26.0 24.0  --  22.0 22.0 29.0   BUN mg/dL 19 18  --  19 22 22   CREATININE mg/dL 0.56* 0.62*  --  0.76 0.87 0.85   GLUCOSE mg/dL 179* 106*  --  106* 116* 91   MAGNESIUM mg/dL 2.7*  --   --  2.3  --  2.1   PHOSPHORUS mg/dL  --   --   --  3.3 2.1*  --        CMP   Results from last 7 days   Lab Units 11/14/21 0329 11/13/21 0625 11/12/21 1225 11/12/21 0310 11/11/21 1215 11/09/21  0859   SODIUM mmol/L 137 139  --  144 143 144   POTASSIUM mmol/L 3.9 4.0 3.8 3.5 3.7 4.7   CHLORIDE mmol/L 101 106  --  108* 109* 104   CO2 mmol/L 26.0 24.0  --  22.0 22.0 29.0   BUN mg/dL 19 18  --  19 22 22   CREATININE mg/dL 0.56* 0.62*  --  0.76 0.87 0.85   GLUCOSE mg/dL 179* 106*  --  106* 116* 91   ALBUMIN g/dL  --   --   --  4.10 4.00 4.00   BILIRUBIN mg/dL  --   --   --   --   --  0.3   ALK PHOS U/L  --   --   --   --   --  60   AST (SGOT) U/L  --   --   --   --   --  26   ALT (SGPT) U/L  --   --   --   --   --  20         BNP        TROPONIN        CoAg  Results from last 7 days   Lab Units 11/12/21  0310 11/11/21  1215 11/09/21  0859   INR  1.08 1.08 0.98   APTT seconds  --  25.6 24.5       Creatinine Clearance  Estimated Creatinine Clearance: 97.3 mL/min (A) (by C-G formula based on SCr of 0.56 mg/dL  (L)).    ABG  Results from last 7 days   Lab Units 11/11/21  1730 11/11/21  1613 11/11/21  1345 11/11/21  1252 11/11/21  1246 11/11/21  1051 11/11/21  1008 11/11/21  0933 11/11/21  0933   PH, ARTERIAL pH units 7.349* 7.382 7.384  --  7.372 7.310* 7.280*  --  7.320*   PCO2, ARTERIAL mm Hg 45.5 41.9 38.3  --  41.5 53.8* 62.0*  --  55.7*   PO2 ART mm Hg 95.2 166.9* 110.6*  --  231.3* 457.0* 463.0*  --  401.0*   O2 SATURATION ART % 96.9 99.5* 98.3*  --  99.8* 100.0* 100.0*  --  100.0*   BASE EXCESS ART mmol/L -0.9* -0.3* -1.9* -2.7* -1.2* 1.0 2.0   < > 3.0    < > = values in this interval not displayed.       Radiology  XR Chest 1 View    Result Date: 11/13/2021  1.Possible tiny left apical pneumothorax. Chest tubes remain in place. 2.Patchy left basilar opacities, likely atelectasis.  Electronically Signed By-Marlee Rivera MD On:11/13/2021 8:09 AM This report was finalized on [id number] by  Marlee Rivera MD.              EKG                  I personally viewed and interpreted the patient's EKG/Telemetry data:    ECHOCARDIOGRAM:    Results for orders placed in visit on 11/11/21    Emergent/Open-Heart Anesthesia AVERY    Narrative  Procedure Performed: Emergent/Open-Heart Anesthesia AVERY  Start Time:  End Time:    Preanesthesia Checklist:  Patient identified, IV assessed, risks and benefits discussed, monitors and equipment assessed, procedure being performed at surgeon's request and anesthesia consent obtained.    General Procedure Information  AVERY Placed for monitoring purposes only -- This is not a diagnostic AVERY  Diagnostic Indications for Echo:  assessment of surgical repair, defect repair evaluation and hemodynamic monitoring  Physician Requesting Echo: Johnathan Hernandez MD  Location performed:  OR  Intubated  Bite block placed  Heart visualized  Probe Insertion:  Easy  Probe Type:  Multiplane  Modalities:  Color flow mapping    Echocardiographic and Doppler Measurements    Ventricles    Right  Ventricle:  Cavity size normal.  Global function mildly impaired.  Left Ventricle:  Cavity size normal.  Hypertrophy present.  Thrombus not present.  Global Function mildly impaired.  Ejection Fraction 55%.        Valves    Aortic Valve:  Annulus normal.  Stenosis not present.  Regurgitation absent.  Leaflets normal.  Leaflet motions normal.                              Anesthesia Information      Echocardiogram Comments:  Post bypass AVERY EF 45%. No MR. No AS or AI. No perivalvular leak.          STRESS MYOVIEW:    Cardiolite (Tc-99m Sestamibi) stress test    CARDIAC CATHETERIZATION:            OTHER:         Assessment/Plan     Principal Problem:    Coronary artery disease involving native coronary artery of native heart without angina pectoris      Multivessel coronary artery disease  Persistent and chronic atrial fibrillation  Moderate mitral regurgitation  Normal LV systolic function  Hypertension  Hyperlipidemia     Postop day 1 successful coronary bypass surgery mitral valve repair and Maze procedure with left atrial appendage closure  Continue current medical therapy  Patient clinically hemodynamically stable sitting up in chair  Tolerating p.o. medications  Start patient on low-dose of beta-blockers  Diuretics as needed  Discontinue Primacor  Maintaining sinus rhythm  Wean off the drips/Primacor  Discussed with the patient and family at bedside           Impressions:  IFR evaluation of the mid LAD showing IFR value of 0.89 suggestive of physiologically significant stenosis involving the mid LAD  IFR evaluation of the ramus branch of the circumflex showing IFR value of 0.88 suggestive of physiologically significant stenosis involving the ramus branch of the circumflex  IFR evaluation of the left main showing IFR value of 0.94 suggestive of no physiologically significant stenosis involving the distal left main  Severe three-vessel coronary artery disease  Extensive calcification of the multiple coronary  arteries segments  Normal LV systolic function normal wall motion normal left-sided filling pressures estimate LV ejection fraction of 60%  Chronic atrial fibrillation        Interpretation Summary     · Left ventricular wall thickness is consistent with mild concentric hypertrophy.  · Estimated left ventricular EF = 55% Estimated left ventricular EF was in agreement with the calculated left ventricular EF. Left ventricular systolic function is normal.  · Mild dilation of the aortic root is present. Mild dilation of the ascending aorta is present.  · Moderate mitral valve regurgitation is present.  · Estimated right ventricular systolic pressure from tricuspid regurgitation is normal (<35 mmHg).  · Left ventricular diastolic function is consistent with (grade I) impaired relaxation.           REASON FOR CONSULTATION:  75-year-old male recently diagnosed with severe multivessel coronary artery disease, paroxysmal atrial fibrillation, mitral valve disorder.  He has additional history of BPH, elevated blood sugar, hypertension, vitamin D deficiency        CC:  Coronary artery disease  Mitral valve insufficiency  Paroxysmal atrial fibrillation     HISTORY OF PRESENT ILLNESS:   Patient was seen in new consultation by Dr. 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  He had no prior history.  Patient was started on Eliquis for anticoagulation and oral Cardizem for rate control.  He had cardiac calcium score 1098 and underwent ischemic work-up including nuclear stress testing with no evidence of inducible ischemia.  He reported no symptoms of chest pain or shortness of breath.  TTE showed moderate MR.  Patient had cardioversion and went back into atrial fibrillation.  Cardiac catheterization was recommended due to elevated calcium score where he was found to have left main 50% stenosis, LAD 70% mid, D1 99%, LCx 80% ostial, ONM 90%, RI 70%, D2 80%, RCA has PLB branch 90% and PDA 70%.   IFR mid LAD .89, IFR RI .88, and IFR left main .94.  Echo from July 2020, showed EF 55% and mild to mod MR.   CTS was consulted and CABG with JAMSHID/maze procedure and possible MVR were recommended.  He presented to [facility] 11/11/2021 for CABG x3 with LIMA to mid LAD, reverse individual saphenous vein graft to the obtuse marginal 1 and PLB branch of the right coronary artery, Mitral valve repair with a 28 mm physiotwo ring annuloplasty, right and left cryo-maze procedure with full set of lesions.     Upon my evaluation, patient is sitting in bedside chair and looks quite well today.  He reports sternal wound pain controlled with oral pain medication.  He denies any nausea, shortness of breath.  Rhythm currently sinus with first-degree AV block.  Drips include Cardene, insulin, Primacor.  Pressure stable at 141/55.  He is very talkative and reports that he feels well today.           REVIEW OF SYSTEMS:  Pertinent items are noted in HPI, all other systems reviewed and negative     OBJECTIVE   Potassium 3.8     ASSESSMENT  Coronary artery disease status post three-vessel CABG  Mitral valve insufficiency status post mitral valve repair/ring  Paroxysmal atrial fibrillation status post maze procedure and left atrial appendage ligation  Essential hypertension  Dyslipidemia  BPH                 PLAN  Patient appears to be progressing quite well.  BB started  Wean drips as tolerated  Monitor rhythm closely  Tubes and lines per CTS  Mobility as tolerated  Blood pressure has improved-plans to wean Cardene  Further recommendations patient's hospital course progresses    ]]]]]]]]]]]]]]]]]]]]]]  11/14/2021  ==========  Status post CABG mitral valve repair Maze procedure    Hypertension  Dyslipidemia  Paroxysmal atrial fibrillation-status post Maze procedure and left atrial appendage ligation.    Chest tubes in place.    Have discussed with patient's family at bedside.    Further plan will depend on patient's  progress.    Primary cardiologist will see the patient in the morning.    ]]]]]]]]]]]]]]]]]]]]  Abhijit Anand MD  11/14/21  07:13 EST

## 2021-11-14 NOTE — THERAPY TREATMENT NOTE
Subjective: Pt agreeable to therapeutic plan of care.    Objective:     Bed mobility - Min-A and Mod-A  Transfers - SBA and CGA  Ambulation - 100 feet CGA     Cardiac Rehab Initiated  Level 3: AROM Exercises. Ambulation with any level of assistance up to 150 feet.     Sitting tolerance: >10min  Standing tolerance: 5-10min    Precautions:   Mid-sternal incision; avoid scapular retraction and depression.   Cardiovascular impairment post-sx; encourage energy conservation strategies.    Therapeutic Exercises: 10 reps UE and LE AROM in seated position cardiac protocol.    MET level equivalent: 1.4-2.0 (Self care ADLs in sitting / slow ambulation in room, light intensity activities)    Pain: 0 VAS  Education: Provided education on importance of mobility and skilled verbal / tactile cueing throughout intervention.     Assessment: [place] presents with functional mobility impairments which indicate the need for skilled intervention. Pt amb from recliner to toilet with CGA. Completed multiple functional STS from various surfaces including recliner and toilet with CGA/SBA for safety. Pt able to verbalize and maintain sternal precautions throughout tx independently. Requiring mod assist to return to supine in bed. Tolerating session today without incident. Will continue to follow and progress as tolerated.     Plan/Recommendations:   Pt would benefit from Home with Home Health at discharge from facility and requires rolling walker at discharge.   Pt desires Home with Home Health at discharge. Pt cooperative; agreeable to therapeutic recommendations and plan of care.     Basic Mobility 6-click:  Rollin = Total, A lot = 2, A little = 3; 4 = None  Supine>Sit:   1 = Total, A lot = 2, A little = 3; 4 = None   Sit>Stand with arms:  1 = Total, A lot = 2, A little = 3; 4 = None  Bed>Chair:   1 = Total, A lot = 2, A little = 3; 4 = None  Ambulate in room:  1 = Total, A lot = 2, A little = 3; 4 = None  3-5 Steps with  railin = Total, A lot = 2, A little = 3; 4 = None  Score: 20    Modified Juancarlos: N/A = No pre-op stroke/TIA    Post-Tx Position: Supine with HOB Elevated, Alarms activated and Call light and personal items within reach  PPE: gloves, surgical mask, eyewear protection

## 2021-11-14 NOTE — PLAN OF CARE
Assessment: Salinas Dill presents with functional mobility impairments which indicate the need for skilled intervention. Pt amb from recliner to toilet with CGA. Completed multiple functional STS from various surfaces including recliner and toilet with CGA/SBA for safety. Pt able to verbalize and maintain sternal precautions throughout tx independently. Requiring mod assist to return to supine in bed. Tolerating session today without incident. Will continue to follow and progress as tolerated.     Plan/Recommendations:   Pt would benefit from Home with Home Health at discharge from facility and requires rolling walker at discharge.   Pt desires Home with Home Health at discharge. Pt cooperative; agreeable to therapeutic recommendations and plan of care.

## 2021-11-14 NOTE — PROGRESS NOTES
Status post CABG/mitral valve repair/maze, postop day #3.    CV stable.  He did go into atrial fibrillation last evening but is now back in sinus rhythm with amiodarone drip.  Transition to oral amiodarone continue Coreg.  Keep central line as long as amiodarone drip running.  Keep epicardial pacing wires for another day.  Pulmonary toileting.  Mobilize.  Remove chest tubes today.  Daily Lasix ordered.  Replace electrolytes as needed.  Afebrile.  Borderline leukocytosis continues to improve.

## 2021-11-15 ENCOUNTER — APPOINTMENT (OUTPATIENT)
Dept: GENERAL RADIOLOGY | Facility: HOSPITAL | Age: 76
End: 2021-11-15

## 2021-11-15 LAB
ANION GAP SERPL CALCULATED.3IONS-SCNC: 8 MMOL/L (ref 5–15)
BUN SERPL-MCNC: 17 MG/DL (ref 8–23)
BUN/CREAT SERPL: 27.4 (ref 7–25)
CALCIUM SPEC-SCNC: 7.8 MG/DL (ref 8.6–10.5)
CHLORIDE SERPL-SCNC: 102 MMOL/L (ref 98–107)
CO2 SERPL-SCNC: 27 MMOL/L (ref 22–29)
CREAT SERPL-MCNC: 0.62 MG/DL (ref 0.76–1.27)
DEPRECATED RDW RBC AUTO: 43.3 FL (ref 37–54)
ERYTHROCYTE [DISTWIDTH] IN BLOOD BY AUTOMATED COUNT: 13.9 % (ref 12.3–15.4)
GFR SERPL CREATININE-BSD FRML MDRD: 127 ML/MIN/1.73
GLUCOSE BLDC GLUCOMTR-MCNC: 141 MG/DL (ref 70–105)
GLUCOSE BLDC GLUCOMTR-MCNC: 161 MG/DL (ref 70–105)
GLUCOSE BLDC GLUCOMTR-MCNC: 186 MG/DL (ref 70–105)
GLUCOSE SERPL-MCNC: 138 MG/DL (ref 65–99)
HCT VFR BLD AUTO: 29.1 % (ref 37.5–51)
HGB BLD-MCNC: 10 G/DL (ref 13–17.7)
MAGNESIUM SERPL-MCNC: 2.6 MG/DL (ref 1.6–2.4)
MCH RBC QN AUTO: 31.3 PG (ref 26.6–33)
MCHC RBC AUTO-ENTMCNC: 34.4 G/DL (ref 31.5–35.7)
MCV RBC AUTO: 91 FL (ref 79–97)
PLATELET # BLD AUTO: 155 10*3/MM3 (ref 140–450)
PMV BLD AUTO: 9.3 FL (ref 6–12)
POTASSIUM SERPL-SCNC: 3.6 MMOL/L (ref 3.5–5.2)
POTASSIUM SERPL-SCNC: 4 MMOL/L (ref 3.5–5.2)
RBC # BLD AUTO: 3.2 10*6/MM3 (ref 4.14–5.8)
SODIUM SERPL-SCNC: 137 MMOL/L (ref 136–145)
WBC # BLD AUTO: 10 10*3/MM3 (ref 3.4–10.8)

## 2021-11-15 PROCEDURE — 25010000002 ENOXAPARIN PER 10 MG: Performed by: NURSE PRACTITIONER

## 2021-11-15 PROCEDURE — 97535 SELF CARE MNGMENT TRAINING: CPT

## 2021-11-15 PROCEDURE — 80048 BASIC METABOLIC PNL TOTAL CA: CPT | Performed by: NURSE PRACTITIONER

## 2021-11-15 PROCEDURE — 97110 THERAPEUTIC EXERCISES: CPT

## 2021-11-15 PROCEDURE — 71045 X-RAY EXAM CHEST 1 VIEW: CPT

## 2021-11-15 PROCEDURE — 63710000001 INSULIN LISPRO (HUMAN) PER 5 UNITS: Performed by: THORACIC SURGERY (CARDIOTHORACIC VASCULAR SURGERY)

## 2021-11-15 PROCEDURE — 82962 GLUCOSE BLOOD TEST: CPT

## 2021-11-15 PROCEDURE — 85027 COMPLETE CBC AUTOMATED: CPT | Performed by: NURSE PRACTITIONER

## 2021-11-15 PROCEDURE — 84132 ASSAY OF SERUM POTASSIUM: CPT | Performed by: NURSE PRACTITIONER

## 2021-11-15 PROCEDURE — 99024 POSTOP FOLLOW-UP VISIT: CPT | Performed by: NURSE PRACTITIONER

## 2021-11-15 PROCEDURE — 93010 ELECTROCARDIOGRAM REPORT: CPT | Performed by: INTERNAL MEDICINE

## 2021-11-15 PROCEDURE — 99232 SBSQ HOSP IP/OBS MODERATE 35: CPT | Performed by: INTERNAL MEDICINE

## 2021-11-15 PROCEDURE — 25010000002 MAGNESIUM SULFATE 2 GM/50ML SOLUTION: Performed by: NURSE PRACTITIONER

## 2021-11-15 PROCEDURE — 83735 ASSAY OF MAGNESIUM: CPT | Performed by: NURSE PRACTITIONER

## 2021-11-15 PROCEDURE — 97116 GAIT TRAINING THERAPY: CPT

## 2021-11-15 PROCEDURE — 93005 ELECTROCARDIOGRAM TRACING: CPT | Performed by: THORACIC SURGERY (CARDIOTHORACIC VASCULAR SURGERY)

## 2021-11-15 RX ORDER — AMIODARONE HYDROCHLORIDE 200 MG/1
400 TABLET ORAL 2 TIMES DAILY WITH MEALS
Status: DISCONTINUED | OUTPATIENT
Start: 2021-11-15 | End: 2021-11-15

## 2021-11-15 RX ORDER — BUMETANIDE 0.25 MG/ML
1 INJECTION INTRAMUSCULAR; INTRAVENOUS ONCE
Status: COMPLETED | OUTPATIENT
Start: 2021-11-15 | End: 2021-11-15

## 2021-11-15 RX ORDER — CARVEDILOL 3.12 MG/1
3.12 TABLET ORAL EVERY 12 HOURS SCHEDULED
Status: DISCONTINUED | OUTPATIENT
Start: 2021-11-15 | End: 2021-11-17 | Stop reason: HOSPADM

## 2021-11-15 RX ORDER — POTASSIUM CHLORIDE 20 MEQ/1
20 TABLET, EXTENDED RELEASE ORAL ONCE
Status: COMPLETED | OUTPATIENT
Start: 2021-11-15 | End: 2021-11-15

## 2021-11-15 RX ORDER — AMIODARONE HYDROCHLORIDE 200 MG/1
200 TABLET ORAL 2 TIMES DAILY WITH MEALS
Status: DISCONTINUED | OUTPATIENT
Start: 2021-11-15 | End: 2021-11-17 | Stop reason: HOSPADM

## 2021-11-15 RX ORDER — MAGNESIUM SULFATE HEPTAHYDRATE 40 MG/ML
2 INJECTION, SOLUTION INTRAVENOUS ONCE
Status: COMPLETED | OUTPATIENT
Start: 2021-11-15 | End: 2021-11-15

## 2021-11-15 RX ADMIN — INSULIN LISPRO 2 UNITS: 100 INJECTION, SOLUTION INTRAVENOUS; SUBCUTANEOUS at 18:22

## 2021-11-15 RX ADMIN — ACETAMINOPHEN 650 MG: 325 TABLET, FILM COATED ORAL at 07:06

## 2021-11-15 RX ADMIN — CHLORHEXIDINE GLUCONATE 15 ML: 1.2 SOLUTION ORAL at 08:38

## 2021-11-15 RX ADMIN — ENOXAPARIN SODIUM 40 MG: 40 INJECTION SUBCUTANEOUS at 20:08

## 2021-11-15 RX ADMIN — ACETAMINOPHEN 650 MG: 325 TABLET, FILM COATED ORAL at 21:44

## 2021-11-15 RX ADMIN — MUPIROCIN 1 APPLICATION: 20 OINTMENT TOPICAL at 20:08

## 2021-11-15 RX ADMIN — POTASSIUM CHLORIDE 20 MEQ: 1500 TABLET, EXTENDED RELEASE ORAL at 13:17

## 2021-11-15 RX ADMIN — ATORVASTATIN CALCIUM 40 MG: 40 TABLET, FILM COATED ORAL at 20:08

## 2021-11-15 RX ADMIN — PANTOPRAZOLE SODIUM 40 MG: 40 TABLET, DELAYED RELEASE ORAL at 05:45

## 2021-11-15 RX ADMIN — MAGNESIUM SULFATE HEPTAHYDRATE 2 G: 2 INJECTION, SOLUTION INTRAVENOUS at 09:46

## 2021-11-15 RX ADMIN — POTASSIUM CHLORIDE 20 MEQ: 1500 TABLET, EXTENDED RELEASE ORAL at 08:38

## 2021-11-15 RX ADMIN — ACETAMINOPHEN 650 MG: 325 TABLET, FILM COATED ORAL at 12:47

## 2021-11-15 RX ADMIN — AMIODARONE HYDROCHLORIDE 200 MG: 200 TABLET ORAL at 08:38

## 2021-11-15 RX ADMIN — AMIODARONE HYDROCHLORIDE 200 MG: 200 TABLET ORAL at 18:22

## 2021-11-15 RX ADMIN — BUMETANIDE 1 MG: 0.25 INJECTION, SOLUTION INTRAMUSCULAR; INTRAVENOUS at 13:17

## 2021-11-15 RX ADMIN — CHLORHEXIDINE GLUCONATE 15 ML: 1.2 SOLUTION ORAL at 20:08

## 2021-11-15 RX ADMIN — MUPIROCIN 1 APPLICATION: 20 OINTMENT TOPICAL at 08:38

## 2021-11-15 RX ADMIN — FUROSEMIDE 40 MG: 40 TABLET ORAL at 08:38

## 2021-11-15 RX ADMIN — CARVEDILOL 3.12 MG: 3.12 TABLET, FILM COATED ORAL at 20:08

## 2021-11-15 RX ADMIN — CARVEDILOL 6.25 MG: 6.25 TABLET, FILM COATED ORAL at 09:46

## 2021-11-15 RX ADMIN — POTASSIUM CHLORIDE 20 MEQ: 1500 TABLET, EXTENDED RELEASE ORAL at 09:46

## 2021-11-15 RX ADMIN — ASPIRIN 81 MG: 81 TABLET, COATED ORAL at 08:38

## 2021-11-15 NOTE — PROGRESS NOTES
S/P POD# 4 CABG x3 with LIMA/MV repair/maze procedure--Pagni  EF 60% (cath)    Subjective:  No c/o's, wants to go home    Chest tubes remain in d/t increased output yest  Afib over weekend  Amio drip discontinued last evening ~10pm  Wt up 3 kgs from preop  CVP 17        Intake/Output Summary (Last 24 hours) at 11/15/2021 0822  Last data filed at 11/15/2021 0800  Gross per 24 hour   Intake 2213.8 ml   Output 1115 ml   Net 1098.8 ml     Temp:  [98.4 °F (36.9 °C)-99.1 °F (37.3 °C)] 99 °F (37.2 °C)  Heart Rate:  [56-83] 75  Resp:  [19-26] 22  BP: ()/(52-89) 126/73      Results from last 7 days   Lab Units 11/15/21  0451 11/14/21  0329 11/13/21  0625 11/12/21  0310 11/11/21  1246 11/11/21  1215 11/11/21  0735 11/09/21  0859   WBC 10*3/mm3 10.00 12.10*   < > 15.20*   < > 21.60*  --  7.60   HEMOGLOBIN g/dL 10.0* 10.3*   < > 10.6*   < > 12.8*  --  14.4   HEMOGLOBIN, POC   --   --   --   --    < >  --    < >  --    HEMATOCRIT % 29.1* 30.1*   < > 30.7*   < > 38.6  --  41.6   HEMATOCRIT POC   --   --   --   --    < >  --    < >  --    PLATELETS 10*3/mm3 155 136*   < > 127*   < > 160  --  249   INR   --   --   --  1.08  --  1.08  --  0.98    < > = values in this interval not displayed.     Results from last 7 days   Lab Units 11/15/21  0451 11/14/21  0329 11/14/21  0329 11/12/21  1225 11/12/21  0310   CREATININE mg/dL 0.62*   < > 0.56*   < > 0.76   POTASSIUM mmol/L 3.6   < > 3.9   < > 3.5   SODIUM mmol/L 137   < > 137   < > 144   MAGNESIUM mg/dL  --   --  2.7*  --  2.3   PHOSPHORUS mg/dL  --   --   --   --  3.3    < > = values in this interval not displayed.         Physical Exam:  Neuro intact, nad, up in chair, family at bedside  Tele:  SR with freq PACs rate in 70s  Diminished bases, on RA 96%  Sternotomy with ecchymosis around incision, SVHS healing well  Benign abd, + BM  Trace BLE edema    Assessment/Plan:  Principal Problem:    Coronary artery disease involving native coronary artery of native heart without angina  pectoris    MV CAD, moderate mitral regurg, EF 60% (cath)--s/p CABG x3 with LIMA/MV repair/maze procedure (Pagni)  Persistent atrial fibrillation--s/p maze procedure. Postop afib, amio  Eliquis use preop--resume prior to discharge  HTN--stable  Dyslipidemia--statin    POD#4.  Doing well.  DC cordis, wires, & CTs.  Diurese.  Increase amio to 200 mg bid d/t frequent PACs.  Replete e-.  On asa/bb/statin.      Addendum:  After Coreg 6.25, HR down into 40-50s, BP tolerated well.  Call placed to cards by nsg about medications.  Decreased Coreg to 3.125 bid.  Diurese this afternoon--CVP 17.  Pt did develop NSVT, recheck e-.  Cardiology notified.    Routine care--as above  D/w pt/family, nsg, Yvonne Gay/Geoffrey  Anticipate home at discharge    Ning Ruiz, APRN  11/15/2021  08:22 EST

## 2021-11-15 NOTE — PROGRESS NOTES
"  Cardiology Progress  Note      Patient Care Team:  Kortney Ferrera MD as PCP - General  Kortney Ferrera MD as PCP - Family Medicine    PATIENT IDENTIFICATION  Name: Salinas Dill  Age: 75 y.o.  Sex: male  :  1945  MRN: [id number]             REASON FOR FOLLOW-UP:  Severe multivessel coronary artery disease  Persistent and chronic atrial fibrillation  Mitral valve insufficiency        SUBJECTIVE    Seen and examined. Chart labs reviewed. Patient reports he does have shortness of breath with activity. Has been ambulating around nursing station. Patient weight is up and to receive diuretics today.      REVIEW OF SYSTEMS:  Pertinent items are noted in HPI, all other systems reviewed and negative    OBJECTIVE       ASSESSMENT  Severe multivessel coronary artery disease status post CABG x3 with a LIMA to the mid LAD and reverse individual saphenous vein graft to the obtuse marginal 1 and PLB branch of the right coronary artery 2021  Severe mitral valve insufficiency status post ring annuloplasty  Atrial fibrillation with right and left cryomaze procedure and left atrial appendage ligation  Postop atrial fibrillation  Essential hypertension  Dyslipidemia  BPH      RECOMMENDATIONS  Patient in sinus rhythm today.  Case discussed with CT surgery. Will start amiodarone 200 mg twice daily  Increase beta-blocker as tolerated  Increase activity as tolerated  Continue to monitor rhythm          Vital Signs  Visit Vitals  /73 (BP Location: Left arm, Patient Position: Sitting)   Pulse 75   Temp 99 °F (37.2 °C) (Oral)   Resp 22   Ht 180.3 cm (71\")   Wt 102 kg (225 lb 1.4 oz)   SpO2 96%   BMI 31.39 kg/m²     Oxygen Therapy  SpO2: 96 %  Pulse Oximetry Type: Continuous  Device (Oxygen Therapy): room air  Flow (L/min): 2  Oxygen Concentration (%): 70  Flowsheet Rows      First Filed Value   Admission Height 180.3 cm (71\") Documented at 2021 0554   Admission Weight 99.4 kg (219 lb 2.2 oz) " "Documented at 11/11/2021 0554        Intake & Output (last 3 days)       11/12 0701 11/13 0700 11/13 0701 11/14 0700 11/14 0701  11/15 0700 11/15 0701  11/16 0700    P.O.  480    I.V. (mL/kg) 1447.7 (14.6) 1027 (10.1) 533.8 (5.2)     Blood        IV Piggyback        Total Intake(mL/kg) 1687.7 (17) 1987 (19.5) 1733.8 (17) 480 (4.7)    Urine (mL/kg/hr) 2025 (0.9) 2310 (0.9) 725 (0.3)     Emesis/NG output   0     Stool 0 0 0     Chest Tube 550 260 390     Total Output 2575 2570 1115     Net -887.3 -583 +618.8 +480            Stool Unmeasured Occurrence 0 x 1 x  1 x        Lines, Drains & Airways     Active LDAs     Name Placement date Placement time Site Days    CVC Double Lumen 11/11/21 Right Internal jugular 11/11/21  0815  created via procedure documentation  Internal jugular  3    Peripheral IV 11/11/21 0629 Posterior; Right Hand 11/11/21  0629  Hand  4    Y Chest Tube 1 and 2 1 Mediastinal 28 Fr. 2 Pleural 28 Fr. 11/11/21  --   4    Pacer Wires 11/11/21  --  Atrial and Ventricular  4                       /73 (BP Location: Left arm, Patient Position: Sitting)   Pulse 75   Temp 99 °F (37.2 °C) (Oral)   Resp 22   Ht 180.3 cm (71\")   Wt 102 kg (225 lb 1.4 oz)   SpO2 96%   BMI 31.39 kg/m²   Intake/Output last 3 shifts:  I/O last 3 completed shifts:  In: 2963.8 [P.O.:1680; I.V.:1283.8]  Out: 1935 [Urine:1425; Chest Tube:510]  Intake/Output this shift:  I/O this shift:  In: 480 [P.O.:480]  Out: -     PHYSICAL EXAM:    General: Alert, cooperative, no distress, appears stated age  Head:  Normocephalic, atraumatic, mucous membranes moist  Eyes:  Conjunctiva/corneas clear, EOM's intact     Neck:  Supple,  no bruit  Lungs: Coarse and diminished bilaterally but otherwise clear to auscultation.  Chest wall: No tenderness  Heart::  Regular rate and rhythm, S1 and S2 normal, 1/6 holosystolic murmur. No rub or gallop  Abdomen: Soft, non-tender, nondistended bowel sounds active  Extremities: No cyanosis, " clubbing. Trace edema right lower extremity  Pulses: 2+ and symmetric all extremities  Skin:  No rashes or lesions  Neuro/psych: A&O x3. CN II through XII are grossly intact with appropriate affect      Scheduled Meds:      amiodarone, 200 mg, Oral, Q24H  aspirin, 81 mg, Oral, Daily  atorvastatin, 40 mg, Oral, Nightly  carvedilol, 6.25 mg, Oral, Q12H  chlorhexidine, 15 mL, Mouth/Throat, Q12H  enoxaparin, 40 mg, Subcutaneous, Daily  furosemide, 40 mg, Oral, Daily  insulin lispro, 0-9 Units, Subcutaneous, TID AC  mupirocin, 1 application, Each Nare, BID  pantoprazole, 40 mg, Oral, Q AM  polyethylene glycol, 17 g, Oral, BID  potassium chloride, 20 mEq, Oral, Daily  senna-docusate sodium, 2 tablet, Oral, BID        Continuous Infusions:    niCARdipine, 5-15 mg/hr, Last Rate: Stopped (21 0024)        PRN Meds:    •  acetaminophen **OR** acetaminophen **OR** acetaminophen  •  dextrose  •  dextrose  •  glucagon (human recombinant)  •  HYDROcodone-acetaminophen  •  insulin lispro **AND** insulin lispro  •  Mouth Kote  •  [] Morphine **AND** naloxone  •  niCARdipine  •  ondansetron  •  potassium chloride **OR** potassium chloride  •  potassium chloride **OR** potassium chloride        Results Review:     I reviewed the patient's new clinical results.    CBC    Results from last 7 days   Lab Units 11/15/21  0451 21  0329 21  0625 21  0310 21  1730 21  1613 21  1345 21  1246 21  1215 21  0735 21  0859   WBC 10*3/mm3 10.00 12.10* 14.10* 15.20*  --   --   --   --  21.60*  --  7.60   HEMOGLOBIN g/dL 10.0* 10.3* 10.5* 10.6*  --   --   --   --  12.8*  --  14.4   HEMOGLOBIN, POC g/dL  --   --   --   --  11.9* 12.2 12.7   < >  --    < >  --    PLATELETS 10*3/mm3 155 136* 134* 127*  --   --   --   --  160  --  249    < > = values in this interval not displayed.     Cr Clearance Estimated Creatinine Clearance: 98.5 mL/min (A) (by C-G formula based on SCr of 0.62  mg/dL (L)).  Coag   Results from last 7 days   Lab Units 11/12/21  0310 11/11/21  1215 11/09/21  0859   INR  1.08 1.08 0.98   APTT seconds  --  25.6 24.5     HbA1C   Lab Results   Component Value Date    HGBA1C 5.6 11/09/2021    HGBA1C 5.8 (H) 07/13/2021    HGBA1C 5.5 03/03/2020     Blood Glucose   Glucose   Date/Time Value Ref Range Status   11/14/2021 1921 157 (H) 70 - 105 mg/dL Final     Comment:     Serial Number: [id number]:  [id number]   11/14/2021 1737 127 (H) 70 - 105 mg/dL Final     Comment:     Serial Number: [id number]:  [id number]   11/14/2021 1306 115 (H) 70 - 105 mg/dL Final     Comment:     Serial Number: [id number]:  [id number]   11/14/2021 0825 91 70 - 105 mg/dL Final     Comment:     Serial Number: [id number]:  [id number]   11/14/2021 0327 159 (H) 70 - 105 mg/dL Final     Comment:     Serial Number: [id number]:  [id number]   11/14/2021 0201 142 (H) 70 - 105 mg/dL Final     Comment:     Serial Number: [id number]:  [id number]   11/13/2021 2322 133 (H) 70 - 105 mg/dL Final     Comment:     Serial Number: [id number]:  [id number]   11/13/2021 2155 132 (H) 70 - 105 mg/dL Final     Comment:     Serial Number: [id number]:  [id number]     Infection     CMP   Results from last 7 days   Lab Units 11/15/21  0451 11/14/21  0329 11/13/21  0625 11/12/21  1225 11/12/21  0310 11/11/21  1215 11/09/21  0859   SODIUM mmol/L 137 137 139  --  144 143 144   POTASSIUM mmol/L 3.6 3.9 4.0 3.8 3.5 3.7 4.7   CHLORIDE mmol/L 102 101 106  --  108* 109* 104   CO2 mmol/L 27.0 26.0 24.0  --  22.0 22.0 29.0   BUN mg/dL 17 19 18  --  19 22 22   CREATININE mg/dL 0.62* 0.56* 0.62*  --  0.76 0.87 0.85   GLUCOSE mg/dL 138* 179* 106*  --  106* 116* 91   ALBUMIN g/dL  --   --   --   --  4.10 4.00 4.00   BILIRUBIN mg/dL  --   --   --   --   --   --  0.3   ALK PHOS U/L  --   --   --   --   --   --  60   AST (SGOT) U/L  --   --   --   --   --   --  26   ALT (SGPT) U/L  --   --   --   --   --    --  20     ABG    Results from last 7 days   Lab Units 11/11/21  1730 11/11/21  1613 11/11/21  1345 11/11/21  1252 11/11/21  1246 11/11/21  1051 11/11/21  1008 11/11/21  0933 11/11/21  0933   PH, ARTERIAL pH units 7.349* 7.382 7.384  --  7.372 7.310* 7.280*  --  7.320*   PCO2, ARTERIAL mm Hg 45.5 41.9 38.3  --  41.5 53.8* 62.0*  --  55.7*   PO2 ART mm Hg 95.2 166.9* 110.6*  --  231.3* 457.0* 463.0*  --  401.0*   O2 SATURATION ART % 96.9 99.5* 98.3*  --  99.8* 100.0* 100.0*  --  100.0*   BASE EXCESS ART mmol/L -0.9* -0.3* -1.9* -2.7* -1.2* 1.0 2.0   < > 3.0    < > = values in this interval not displayed.     UA    Results from last 7 days   Lab Units 11/09/21  0859   NITRITE UA  Negative     KRISTA  No results found for: POCMETH, POCAMPHET, POCBARBITUR, POCBENZO, POCCOCAINE, POCOPIATES, POCOXYCODO, POCPHENCYC, POCPROPOXY, POCTHC, POCTRICYC  Lysis Labs   Results from last 7 days   Lab Units 11/15/21  0451 11/14/21  0329 11/13/21  0625 11/12/21  0310 11/11/21  1730 11/11/21  1613 11/11/21  1345 11/11/21  1246 11/11/21  1215 11/11/21  0735 11/09/21  0859   INR   --   --   --  1.08  --   --   --   --  1.08  --  0.98   APTT seconds  --   --   --   --   --   --   --   --  25.6  --  24.5   HEMOGLOBIN g/dL 10.0* 10.3* 10.5* 10.6*  --   --   --   --  12.8*  --  14.4   HEMOGLOBIN, POC g/dL  --   --   --   --  11.9* 12.2 12.7   < >  --    < >  --    PLATELETS 10*3/mm3 155 136* 134* 127*  --   --   --   --  160  --  249   CREATININE mg/dL 0.62* 0.56* 0.62* 0.76  --   --   --   --  0.87  --  0.85    < > = values in this interval not displayed.     Radiology(recent) XR Chest 1 View    Result Date: 11/14/2021   1. Very small left apical pneumothorax. Left chest tube and mediastinal drains as well as right IJ central venous catheter sheath remain in place. 2. Bibasilar atelectasis and changes from recent cardiac surgery are again noted.   Electronically Signed By-Addison Joyner DO On:11/14/2021 10:05 AM This report was finalized on  [id number] by  Addison Joyner DO.              Xrays, labs reviewed personally by physician.    ECG/EMG Results (most recent)     Procedure Component Value Units Date/Time    ECG 12 Lead [[id number]] Collected: 11/12/21 0511     Updated: 11/12/21 1758     QT Interval 371 ms     Narrative:      HEART RATE= 90  bpm  RR Interval= 664  ms  SC Interval= 39  ms  P Horizontal Axis= 86  deg  P Front Axis= 0  deg  QRSD Interval= 100  ms  QT Interval= 371  ms  QRS Axis= 59  deg  T Wave Axis= -34  deg  - NORMAL ECG -  Sinus rhythm  When compared with ECG of 11-Nov-2021 16:17:02,  No significant change  Electronically Signed By: Isael Courtney ([facility]) 12-Nov-2021 17:52:01  Date and Time of Study: 2021-11-12 05:11:26    ECG 12 Lead [[id number]] Collected: 11/13/21 0524     Updated: 11/13/21 1627     QT Interval 391 ms     Narrative:      HEART RATE= 79  bpm  RR Interval= 760  ms  SC Interval=   ms  P Horizontal Axis=   deg  P Front Axis=   deg  QRSD Interval= 90  ms  QT Interval= 391  ms  QRS Axis= 60  deg  T Wave Axis= -16  deg  - ABNORMAL ECG -  Sinus rhythm  When compared with ECG of 12-Nov-2021 5:11:26,  No new changes noted  Electronically Signed By: Isael Courtney ([facility]) 13-Nov-2021 16:27:23  Date and Time of Study: 2021-11-13 05:24:13    ECG 12 Lead [[id number]] Collected: 11/11/21 1617     Updated: 11/13/21 2057     QT Interval 405 ms     Narrative:      HEART RATE= 73  bpm  RR Interval= 824  ms  SC Interval= 89  ms  P Horizontal Axis= 78  deg  P Front Axis= 0  deg  QRSD Interval= 97  ms  QT Interval= 405  ms  QRS Axis= 59  deg  T Wave Axis= -8  deg  - NORMAL ECG -  Sinus rhythm  When compared with ECG of 09-Nov-2021 9:30:24,  Significant change in rhythm: previously atrial fibrillation  Electronically Signed By: Abhijit Anand ([facility]) 13-Nov-2021 20:57:10  Date and Time of Study: 2021-11-11 16:17:02    ECG 12 Lead [[id number]] Collected: 11/13/21 2214     Updated: 11/13/21 2217     QT Interval 386 ms     Narrative:       HEART RATE= 79  bpm  RR Interval= 762  ms  SD Interval=   ms  P Horizontal Axis=   deg  P Front Axis=   deg  QRSD Interval= 93  ms  QT Interval= 386  ms  QRS Axis= 73  deg  T Wave Axis= 4  deg  - ABNORMAL ECG -  Atrial fibrillation  When compared with ECG of 13-Nov-2021 5:24:13,  Significant change in rhythm  Electronically Signed By:   Date and Time of Study: 2021-11-13 22:14:39    ECG 12 Lead [[id number]] Collected: 11/14/21 0518     Updated: 11/14/21 0521     QT Interval 415 ms     Narrative:      HEART RATE= 66  bpm  RR Interval= 904  ms  SD Interval= 249  ms  P Horizontal Axis=   deg  P Front Axis= 86  deg  QRSD Interval= 94  ms  QT Interval= 415  ms  QRS Axis= 73  deg  T Wave Axis= 19  deg  - ABNORMAL ECG -  Sinus rhythm  Prolonged SD interval  Electronically Signed By:   Date and Time of Study: 2021-11-14 05:18:05    ECG 12 Lead [[id number]] Collected: 11/15/21 0527     Updated: 11/15/21 0528     QT Interval 417 ms     Narrative:      HEART RATE= 69  bpm  RR Interval= 864  ms  SD Interval= 253  ms  P Horizontal Axis=   deg  P Front Axis= 83  deg  QRSD Interval= 95  ms  QT Interval= 417  ms  QRS Axis= 65  deg  T Wave Axis= 31  deg  - ABNORMAL ECG -  Sinus rhythm  Prolonged SD interval  When compared with ECG of 14-Nov-2021 5:18:05,  No significant change  Electronically Signed By:   Date and Time of Study: 2021-11-15 05:27:14            Medication Review:   I have reviewed the patient's current medication list  Scheduled Meds:amiodarone, 200 mg, Oral, Q24H  aspirin, 81 mg, Oral, Daily  atorvastatin, 40 mg, Oral, Nightly  carvedilol, 6.25 mg, Oral, Q12H  chlorhexidine, 15 mL, Mouth/Throat, Q12H  enoxaparin, 40 mg, Subcutaneous, Daily  furosemide, 40 mg, Oral, Daily  insulin lispro, 0-9 Units, Subcutaneous, TID AC  mupirocin, 1 application, Each Nare, BID  pantoprazole, 40 mg, Oral, Q AM  polyethylene glycol, 17 g, Oral, BID  potassium chloride, 20 mEq, Oral, Daily  senna-docusate sodium, 2 tablet, Oral,  BID      Continuous Infusions:niCARdipine, 5-15 mg/hr, Last Rate: Stopped (21 0024)      PRN Meds:.•  acetaminophen **OR** acetaminophen **OR** acetaminophen  •  dextrose  •  dextrose  •  glucagon (human recombinant)  •  HYDROcodone-acetaminophen  •  insulin lispro **AND** insulin lispro  •  Mouth Kote  •  [] Morphine **AND** naloxone  •  niCARdipine  •  ondansetron  •  potassium chloride **OR** potassium chloride  •  potassium chloride **OR** potassium chloride    Imaging:  Imaging Results (Last 72 Hours)     Procedure Component Value Units Date/Time    XR Chest 1 View [[id number]] Collected: 21 1002     Updated: 21 1008    Narrative:      XR CHEST 1 VW-     Date of Exam: 2021 6:40 AM     Indication: Postop; I25.10-Atherosclerotic heart disease of native  coronary artery without angina pectoris  postop day 3     Comparison: 2021, 2021, 2021 and 2021     Technique: 1 view(s) of the chest were obtained.     FINDINGS: Right IJ central venous catheter sheath remains in place.  Mediastinal drains and left chest tube also remain in place. There is a  very small left apical pneumothorax. There our areas of plate  atelectasis in each lung base. No definite pleural effusions. Pulmonary  vascularity is within normal limits. Cardiac hilar and mediastinal  silhouettes are stable with prosthetic cardiac valve and changes from  recent CABG. There is mild cardiomegaly. Extensive degenerative change  in the left glenohumeral joint is present.       Impression:         1. Very small left apical pneumothorax. Left chest tube and mediastinal  drains as well as right IJ central venous catheter sheath remain in  place.  2. Bibasilar atelectasis and changes from recent cardiac surgery are  again noted.        Electronically Signed By-Addison Joyner DO On:2021 10:05 AM  This report was finalized on [id number] by  Addison Joyner DO.    XR Chest 1 View [[id number]] Collected:  "11/13/21 0806     Updated: 11/13/21 0811    Narrative:      DATE OF EXAM:  11/13/2021 5:20 AM     PROCEDURE:  XR CHEST 1 VW-     INDICATIONS:  Post-Op Heart Surgery; I25.10-Atherosclerotic heart disease of native  coronary artery without angina pectoris     COMPARISON:  AP chest x-ray 11/12/2021.     TECHNIQUE:   Single radiographic AP view of the chest was obtained.     FINDINGS:  [place] catheter has been removed. Right internal jugular sheath  remains in place. Mediastinal and left basilar chest tubes remain in  place. There are multiple wires overlying the patient.     There is a possible tiny left apical pneumothorax, better visualized on  the second image with degree of pleural separation measuring 6 mm.  Cardiomediastinal contours appear stable, including changes of CABG.  Patchy left basilar opacities appear stable.       Impression:      1.Possible tiny left apical pneumothorax. Chest tubes remain in place.  2.Patchy left basilar opacities, likely atelectasis.     Electronically Signed By-Marlee Rivera MD On:11/13/2021 8:09 AM  This report was finalized on [id number] by  Marlee Rivera MD.            KWASI Leonard  11/15/21  08:11 EST       EMR Dragon/Transcription:   \"Dictated utilizing Dragon dictation\".       Electronically signed by KWASI Leonard, 11/15/21, 8:11 AM EST.    Cardiology attending:  As above. Agree with assessment plan. Seen examined. Chart reviewed. Note scribed seen for accuracy above changes noted. Patient denies chest pain. Does report shortness of breath with activity. Case discussed with surgery. Will add IV diuretics today. We will also transition to oral amiodarone 2 mg twice daily. Titrate beta-blocker as patient's blood pressure and heart rate tolerate. Maintaining sinus rhythm today. Continue to monitor rhythm and hemodynamics.    "

## 2021-11-15 NOTE — THERAPY TREATMENT NOTE
Subjective: Pt agreeable to therapeutic plan of care.  Cognition: oriented to Person, Place, Time and Situation    Objective: Oriented to 3/3 of sternal precautions. Reports he has just returned to bed and declines OOB activity tolerance now.     Bed Mobility: Mod-A    Bathing: Educated patient and spouse on sternal care for bathing.     Precautions:   Mid-sternal incision; avoid scapular retraction and depression.   Cardiovascular impairment post-sx; encourage energy conservation strategies.    Therapeutic Exercises: OT provided and reviewed Cardiac Rehab HEP. Pt completes 1 set x 10 reps of each exercise with fair understanding.Pt reports completing 3x/daily. Pt will require additional education to ensure carryover.     Pain: 3 VAS  Education: Provided education on importance of mobility and skilled verbal / tactile cueing throughout intervention.     Assessment: Sal[place] Dill presents with ADL impairments below baseline abilities which indicate the need for continued skilled intervention while inpatient. Patient supine in bed and declines OOB activity. Completes Cardiac Rehab HEP without cuing.OT edu pt and spouse on precautions during bathing, dressing, car transfers, and Heart Hugger needs. Completes bed mobility with mod A. Edu spouse on proper ergonomics to assist with transfers.  Tolerating session today without incident. Will continue to follow and progress as tolerated.     Plan/Recommendations:   Pt would benefit from Home with Home Health at discharge from facility.   Pt desires Home with family assist at discharge. Pt cooperative; agreeable to therapeutic recommendations and plan of care.     Post-Tx Position: Supine with HOB Elevated and Call light and personal items within reach   PPE: gloves, surgical mask, eyewear protection

## 2021-11-15 NOTE — CASE MANAGEMENT/SOCIAL WORK
Continued Stay Note  [facility]     Patient Name: Salinas Dill  MRN: [id number]  Today's Date: 11/15/2021    Admit Date: 11/11/2021     Discharge Plan     Row Name 11/15/21 1203       Plan    Plan D/C Plan: Home with [facility] (pending acceptance, will need order). 3:1 BSC from [facility] (order placed).    Provided Post Acute Provider List? Yes    Post Acute Provider List Home Health    Delivered To Patient    Method of Delivery In person    Patient/Family in Agreement with Plan yes    Plan Comments  spoke to patient at bedside wearing mask and goggles and keeping distance greater than 6 feet and spent less than 15 minutes in room. CM discussed home health and patient is agreeable to referral to [facility]-liaison notified, will need order once accepted. Patient requests 3:1 BSC/shower chair-CM notified [facility] and order placed. Patient denies any further d/c needs at this time. Barrier to D/C: POD#4 CABG/MVR, pacer wires.                Expected Discharge Date and Time     Expected Discharge Date Expected Discharge Time    Nov 17, 2021             Sita Galindo

## 2021-11-15 NOTE — PLAN OF CARE
Salinas Dill presents with ADL impairments below baseline abilities which indicate the need for continued skilled intervention while inpatient. Patient supine in bed and declines OOB activity. Completes Cardiac Rehab HEP without cuing.OT [facility] pt and spouse on precautions during bathing, dressing, car transfers, and Heart Hugger needs. Completes bed mobility with mod A. Aaron spouse on proper ergonomics to assist with transfers.  Tolerating session today without incident. Will continue to follow and progress as tolerated.

## 2021-11-15 NOTE — DISCHARGE PLACEMENT REQUEST
"Salinas Dill (75 y.o. Male)             Date of Birth Social Security Number Address Home Phone MRN    1945  [id number] [address] [phone] [id number]    [employer] Marital Status             Non-Mosque        Admission Date Admission Type Admitting Provider Attending Provider Department, Room/Bed    11/11/21 Elective Johnathan Hernandez MD Pagni, Sebastian, MD [facility] CARDIOVASCULAR CARE UNIT, 2201/1    Discharge Date Discharge Disposition Discharge Destination                         Attending Provider: Johnathan Hernandez MD    Allergies: Lisinopril    Isolation: None   Infection: None   Code Status: CPR   Advance Care Planning Activity    Ht: 180.3 cm (71\")   Wt: 102 kg (225 lb 1.4 oz)    Admission Cmt: None   Principal Problem: Coronary artery disease involving native coronary artery of native heart without angina pectoris [I25.10] More...                 Active Insurance as of 11/11/2021     Primary Coverage     Payor Plan Insurance Group Employer/Plan Group    MEDICARE MEDICARE A & B      Payor Plan Address Payor Plan Phone Number Payor Plan Fax Number Effective Dates    [address] [phone]  12/1/2011 - None Entered    [facility] [id number]       Subscriber Name Subscriber Birth Date Member ID       SALINAS DILL 1945 [id number]           Secondary Coverage     Payor Plan Insurance Group Employer/Plan Group      LIFE INSURANCE PLAN F     Payor Plan Address Payor Plan Phone Number Payor Plan Fax Number Effective Dates    [address] [address] [phone]  1/22/2020 - None Entered    [place] [id number]       Subscriber Name Subscriber Birth Date Member ID       SALINAS DILL 1945 [id number]                 Emergency Contacts      (Rel.) Home Phone Work Phone Mobile Phone    JENNIFERSUN (Spouse) [phone] -- [phone]    VERENA DILL (Son) [phone] -- --            "

## 2021-11-15 NOTE — THERAPY TREATMENT NOTE
Subjective: Pt agreeable to therapeutic plan of care.    Objective:     Bed mobility - Mod-A  Transfers - SBA  Ambulation - 200 feet CGA and with rolling walker  Cardiac Rehab Initiated  Level 4: Progressive AROM Exercises with supervision only: Ambulation 200-400 feet with assist as needed.     Sitting tolerance: >10min  Standing tolerance: 5-10min and supported    Precautions:   Mid-sternal incision; avoid scapular retraction and depression.   Cardiovascular impairment post-sx; encourage energy conservation strategies.    Therapeutic Exercises: 10 reps UE and LE AROM in seated position.     MET level equivalent: 2.0-3.0 (Unlimited sitting, ambulation on level ground <2mph, light housework)      Pain: 0 VAS  Education: Provided education on importance of mobility and skilled verbal / tactile cueing throughout intervention.     Assessment: Salinas Dill presents with functional mobility impairments which indicate the need for skilled intervention. Pt with some hesitancy to participating in therapy. Appearing with slight diaphoresis prior to and during ambulation. Pt /58 prior to ambulation, 127/67 post-ambulation. HR between 65-75 during treatment. Pt gait speed increasingly slower with further ambulation, required one standing rest break ~1 min before proceeding to room. Pt needing a few minutes before performing cardiac rehab d/t fatigue. Pt continues to require mod A to transfer EOB to supine as he has PMH of LBP. Will need continued education of bed mobility within sternal precautions. Tolerating session today without incident. Will continue to follow and progress as tolerated.     Plan/Recommendations:   Pt would benefit from Home with Home Health at discharge from facility and requires no DME at discharge.   Pt desires Home with Home Health at discharge. Pt cooperative; agreeable to therapeutic recommendations and plan of care.     Basic Mobility 6-click:  Rollin = Total, A lot = 2, A little  = 3; 4 = None  Supine>Sit:   1 = Total, A lot = 2, A little = 3; 4 = None   Sit>Stand with arms:  1 = Total, A lot = 2, A little = 3; 4 = None  Bed>Chair:   1 = Total, A lot = 2, A little = 3; 4 = None  Ambulate in room:  1 = Total, A lot = 2, A little = 3; 4 = None  3-5 Steps with railin = Total, A lot = 2, A little = 3; 4 = None  Score: 20    Modified [place]: N/A = No pre-op stroke/TIA    Post-Tx Position: Supine with HOB Elevated, Staff Present and Call light and personal items within reach  PPE: gloves, surgical mask, eyewear protection

## 2021-11-15 NOTE — PLAN OF CARE
Goal Outcome Evaluation:  Plan of Care Reviewed With: patient, spouse       Salinas Dill presents with functional mobility impairments which indicate the need for skilled intervention. Pt with some hesitancy to participating in therapy. Appearing with slight diaphoresis prior to and during ambulation. Pt /58 prior to ambulation, 127/67 post-ambulation. HR between 65-75 during treatment. Pt gait speed increasingly slower with further ambulation, required one standing rest break ~1 min before proceeding to room. Pt needing a few minutes before performing cardiac rehab d/t fatigue. Pt continues to require mod A to transfer EOB to supine as he has PMH of LBP. Will need continued education of bed mobility within sternal precautions. Tolerating session today without incident. Will continue to follow and progress as tolerated.

## 2021-11-15 NOTE — PLAN OF CARE
Goal Outcome Evaluation:  Patient with a lot of pacs this am, magnesium and potassium given. After AM dose of coreg patient with bradycardia into the upper 40s. Dr. Gay changed coreg to 3.125 mg. Patient with 15 beat run of vtach this afternoon. Ning and Dr. Gay both aware. Decided to keep wires one more day due to this.  Cordis and Cts removed today. Patient able to ambulate multiple times in swan. Likely discharge home soon.

## 2021-11-16 LAB
ANION GAP SERPL CALCULATED.3IONS-SCNC: 10 MMOL/L (ref 5–15)
BUN SERPL-MCNC: 18 MG/DL (ref 8–23)
BUN/CREAT SERPL: 23.1 (ref 7–25)
CALCIUM SPEC-SCNC: 8 MG/DL (ref 8.6–10.5)
CHLORIDE SERPL-SCNC: 103 MMOL/L (ref 98–107)
CO2 SERPL-SCNC: 27 MMOL/L (ref 22–29)
CREAT SERPL-MCNC: 0.78 MG/DL (ref 0.76–1.27)
DEPRECATED RDW RBC AUTO: 42.9 FL (ref 37–54)
ERYTHROCYTE [DISTWIDTH] IN BLOOD BY AUTOMATED COUNT: 13.8 % (ref 12.3–15.4)
GFR SERPL CREATININE-BSD FRML MDRD: 97 ML/MIN/1.73
GLUCOSE BLDC GLUCOMTR-MCNC: 128 MG/DL (ref 70–105)
GLUCOSE BLDC GLUCOMTR-MCNC: 132 MG/DL (ref 70–105)
GLUCOSE BLDC GLUCOMTR-MCNC: 180 MG/DL (ref 70–105)
GLUCOSE BLDC GLUCOMTR-MCNC: 98 MG/DL (ref 70–105)
GLUCOSE SERPL-MCNC: 112 MG/DL (ref 65–99)
HCT VFR BLD AUTO: 32.4 % (ref 37.5–51)
HGB BLD-MCNC: 10.7 G/DL (ref 13–17.7)
MCH RBC QN AUTO: 29.8 PG (ref 26.6–33)
MCHC RBC AUTO-ENTMCNC: 33.1 G/DL (ref 31.5–35.7)
MCV RBC AUTO: 90.2 FL (ref 79–97)
PLATELET # BLD AUTO: 200 10*3/MM3 (ref 140–450)
PMV BLD AUTO: 8.4 FL (ref 6–12)
POTASSIUM SERPL-SCNC: 3.8 MMOL/L (ref 3.5–5.2)
QT INTERVAL: 386 MS
QT INTERVAL: 399 MS
QT INTERVAL: 415 MS
RBC # BLD AUTO: 3.59 10*6/MM3 (ref 4.14–5.8)
SODIUM SERPL-SCNC: 140 MMOL/L (ref 136–145)
WBC # BLD AUTO: 9 10*3/MM3 (ref 3.4–10.8)

## 2021-11-16 PROCEDURE — 80048 BASIC METABOLIC PNL TOTAL CA: CPT | Performed by: NURSE PRACTITIONER

## 2021-11-16 PROCEDURE — 97530 THERAPEUTIC ACTIVITIES: CPT

## 2021-11-16 PROCEDURE — 85027 COMPLETE CBC AUTOMATED: CPT | Performed by: NURSE PRACTITIONER

## 2021-11-16 PROCEDURE — 25010000002 ENOXAPARIN PER 10 MG: Performed by: NURSE PRACTITIONER

## 2021-11-16 PROCEDURE — 99024 POSTOP FOLLOW-UP VISIT: CPT | Performed by: NURSE PRACTITIONER

## 2021-11-16 PROCEDURE — 93010 ELECTROCARDIOGRAM REPORT: CPT | Performed by: INTERNAL MEDICINE

## 2021-11-16 PROCEDURE — 97535 SELF CARE MNGMENT TRAINING: CPT

## 2021-11-16 PROCEDURE — 94799 UNLISTED PULMONARY SVC/PX: CPT

## 2021-11-16 PROCEDURE — 93005 ELECTROCARDIOGRAM TRACING: CPT | Performed by: THORACIC SURGERY (CARDIOTHORACIC VASCULAR SURGERY)

## 2021-11-16 PROCEDURE — 99222 1ST HOSP IP/OBS MODERATE 55: CPT | Performed by: INTERNAL MEDICINE

## 2021-11-16 PROCEDURE — 99232 SBSQ HOSP IP/OBS MODERATE 35: CPT | Performed by: INTERNAL MEDICINE

## 2021-11-16 PROCEDURE — 82962 GLUCOSE BLOOD TEST: CPT

## 2021-11-16 RX ORDER — POTASSIUM CHLORIDE 20 MEQ/1
40 TABLET, EXTENDED RELEASE ORAL ONCE
Status: COMPLETED | OUTPATIENT
Start: 2021-11-16 | End: 2021-11-16

## 2021-11-16 RX ADMIN — ASPIRIN 81 MG: 81 TABLET, COATED ORAL at 08:26

## 2021-11-16 RX ADMIN — MUPIROCIN 1 APPLICATION: 20 OINTMENT TOPICAL at 08:26

## 2021-11-16 RX ADMIN — PANTOPRAZOLE SODIUM 40 MG: 40 TABLET, DELAYED RELEASE ORAL at 06:17

## 2021-11-16 RX ADMIN — ATORVASTATIN CALCIUM 40 MG: 40 TABLET, FILM COATED ORAL at 21:02

## 2021-11-16 RX ADMIN — AMIODARONE HYDROCHLORIDE 200 MG: 200 TABLET ORAL at 08:26

## 2021-11-16 RX ADMIN — POTASSIUM CHLORIDE 40 MEQ: 1500 TABLET, EXTENDED RELEASE ORAL at 08:26

## 2021-11-16 RX ADMIN — POTASSIUM CHLORIDE 20 MEQ: 1500 TABLET, EXTENDED RELEASE ORAL at 12:07

## 2021-11-16 RX ADMIN — CARVEDILOL 3.12 MG: 3.12 TABLET, FILM COATED ORAL at 08:26

## 2021-11-16 RX ADMIN — FUROSEMIDE 40 MG: 40 TABLET ORAL at 08:26

## 2021-11-16 RX ADMIN — ENOXAPARIN SODIUM 40 MG: 40 INJECTION SUBCUTANEOUS at 17:32

## 2021-11-16 RX ADMIN — CARVEDILOL 3.12 MG: 3.12 TABLET, FILM COATED ORAL at 21:02

## 2021-11-16 RX ADMIN — AMIODARONE HYDROCHLORIDE 200 MG: 200 TABLET ORAL at 17:32

## 2021-11-16 RX ADMIN — CHLORHEXIDINE GLUCONATE 15 ML: 1.2 SOLUTION ORAL at 21:02

## 2021-11-16 NOTE — PLAN OF CARE
Salinas Dill presents with ADL impairments below baseline abilities which indicate the need for continued skilled intervention while inpatient. Pt completes toileting with supervision, and was edu on positioning during nani hygiene tasks to facilitate good compliance with sternal precautions. Cont to require CGA for LB ADLs (primarily for threading), and Min A for bed mobility. Reviewed proper joint alignment / body mechanics for bed mobility transfer with spouse.  Spouse with medical background and states/demos good understanding.  Completes HEP without assist, and ambulates in hallway with mild SOB. SpO2 >90% throughout and HR stable. Requires encouragement to utilize RW during ambulatory tasks.  Tolerating session today without incident. Will continue to follow and progress as tolerated.

## 2021-11-16 NOTE — PROGRESS NOTES
"  Cardiology Progress  Note      Patient Care Team:  Kortney Ferrera MD as PCP - General  Kortney Ferrera MD as PCP - Family Medicine    PATIENT IDENTIFICATION  Name: Salinas Dill  Age: 75 y.o.  Sex: male  :  1945  MRN: [id number]             REASON FOR FOLLOW-UP:  Severe multivessel coronary artery disease  Persistent and chronic atrial fibrillation  Mitral valve insufficiency        SUBJECTIVE    Seen and examined. Chart labs reviewed. Patient reports he does have shortness of breath with activity.  Patient is ambulated around the unit.  Patient denies chest pain.  Still having issues with bradycardia and paroxysmal atrial fibrillation.  Discussed with CT surgery.  Discussed with cardiac electrophysiology.  We will have EP evaluate patient prior to removal of pacer wires and discharge.      REVIEW OF SYSTEMS:  Pertinent items are noted in HPI, all other systems reviewed and negative    OBJECTIVE       ASSESSMENT  Severe multivessel coronary artery disease status post CABG x3 with a LIMA to the mid LAD and reverse individual saphenous vein graft to the obtuse marginal 1 and PLB branch of the right coronary artery 2021  Severe mitral valve insufficiency status post ring annuloplasty  Atrial fibrillation with right and left cryomaze procedure and left atrial appendage ligation  Postop atrial fibrillation with intermittent bradycardia  Essential hypertension  Dyslipidemia  BPH      RECOMMENDATIONS  Continue with current cardiovascular regimen  Cardiac electrophysiology to evaluate patient for potential tachycardia/bradycardia syndrome postoperatively prior to removal of pacer wires and discharge  Monitor rate and rhythm closely          Vital Signs  Visit Vitals  /44   Pulse 75   Temp 99.5 °F (37.5 °C) (Oral)   Resp 22   Ht 180.3 cm (71\")   Wt 100 kg (221 lb)   SpO2 98%   BMI 30.82 kg/m²     Oxygen Therapy  SpO2: 98 %  Pulse Oximetry Type: Continuous  Device (Oxygen Therapy): room " "air  Flow (L/min): 2  Oxygen Concentration (%): 70  Flowsheet Rows      First Filed Value   Admission Height 180.3 cm (71\") Documented at 11/11/2021 0554   Admission Weight 99.4 kg (219 lb 2.2 oz) Documented at 11/11/2021 0554        Intake & Output (last 3 days)       11/13 0701 11/14 0700 11/14 0701  11/15 0700 11/15 0701 11/16 0700 11/16 0701 11/17 0700    P.O. 960 1200 1560 240    I.V. (mL/kg) 1027 (10.1) 533.8 (5.2)      Total Intake(mL/kg) 1987 (19.5) 1733.8 (17) 1560 (15.6) 240 (2.4)    Urine (mL/kg/hr) 2310 (0.9) 725 (0.3) 3050 (1.3) 725 (0.8)    Emesis/NG output  0      Stool 0 0 0 0    Chest Tube 260 390      Total Output 2570 1115 3050 725    Net -583 +618.8 -1490 -485            Urine Unmeasured Occurrence    1 x    Stool Unmeasured Occurrence 1 x  1 x 1 x        Lines, Drains & Airways     Active LDAs     Name Placement date Placement time Site Days    CVC Double Lumen 11/11/21 Right Internal jugular 11/11/21  0815  created via procedure documentation  Internal jugular  3    Peripheral IV 11/11/21 0629 Posterior; Right Hand 11/11/21  0629  Hand  4    Y Chest Tube 1 and 2 1 Mediastinal 28 Fr. 2 Pleural 28 Fr. 11/11/21  --   4    Pacer Wires 11/11/21  --  Atrial and Ventricular  4                       /44   Pulse 75   Temp 99.5 °F (37.5 °C) (Oral)   Resp 22   Ht 180.3 cm (71\")   Wt 100 kg (221 lb)   SpO2 98%   BMI 30.82 kg/m²   Intake/Output last 3 shifts:  I/O last 3 completed shifts:  In: 2452.8 [P.O.:2280; I.V.:172.8]  Out: 3520 [Urine:3350; Chest Tube:170]  Intake/Output this shift:  I/O this shift:  In: 240 [P.O.:240]  Out: 725 [Urine:725]    PHYSICAL EXAM:    General: Alert, cooperative, no distress, appears stated age  Head:  Normocephalic, atraumatic, mucous membranes moist  Eyes:  Conjunctiva/corneas clear, EOM's intact     Neck:  Supple,  no bruit  Lungs: Coarse and diminished bilaterally but otherwise clear to auscultation.  Chest wall: No tenderness  Heart::  Regular rate and " rhythm, S1 and S2 normal, 1/6 holosystolic murmur. No rub or gallop  Abdomen: Soft, non-tender, nondistended bowel sounds active  Extremities: No cyanosis, clubbing. Trace edema right lower extremity  Pulses: 2+ and symmetric all extremities  Skin:  No rashes or lesions  Neuro/psych: A&O x3. CN II through XII are grossly intact with appropriate affect      Scheduled Meds:      amiodarone, 200 mg, Oral, BID With Meals  aspirin, 81 mg, Oral, Daily  atorvastatin, 40 mg, Oral, Nightly  carvedilol, 3.125 mg, Oral, Q12H  chlorhexidine, 15 mL, Mouth/Throat, Q12H  enoxaparin, 40 mg, Subcutaneous, Daily  insulin lispro, 0-9 Units, Subcutaneous, TID AC  pantoprazole, 40 mg, Oral, Q AM  polyethylene glycol, 17 g, Oral, BID  senna-docusate sodium, 2 tablet, Oral, BID        Continuous Infusions:         PRN Meds:    •  acetaminophen **OR** acetaminophen **OR** acetaminophen  •  dextrose  •  dextrose  •  glucagon (human recombinant)  •  HYDROcodone-acetaminophen  •  insulin lispro **AND** insulin lispro  •  [] Morphine **AND** naloxone  •  ondansetron  •  potassium chloride **OR** potassium chloride        Results Review:     I reviewed the patient's new clinical results.    CBC    Results from last 7 days   Lab Units 21  0350 11/15/21  0451 21  0329 21  0625 21  0310 21  1730 21  1613 21  1246 21  1215   WBC 10*3/mm3 9.00 10.00 12.10* 14.10* 15.20*  --   --   --  21.60*   HEMOGLOBIN g/dL 10.7* 10.0* 10.3* 10.5* 10.6*  --   --   --  12.8*   HEMOGLOBIN, POC g/dL  --   --   --   --   --  11.9* 12.2   < >  --    PLATELETS 10*3/mm3 200 155 136* 134* 127*  --   --   --  160    < > = values in this interval not displayed.     Cr Clearance Estimated Creatinine Clearance: 97.6 mL/min (by AYESHA-G formula based on SCr of 0.78 mg/dL).  Coag   Results from last 7 days   Lab Units 21  0310 21  1215   INR  1.08 1.08   APTT seconds  --  25.6     HbA1C   Lab Results   Component  Value Date    HGBA1C 5.6 11/09/2021    HGBA1C 5.8 (H) 07/13/2021    HGBA1C 5.5 03/03/2020     Blood Glucose   Glucose   Date/Time Value Ref Range Status   11/16/2021 1055 132 (H) 70 - 105 mg/dL Final     Comment:     Serial Number: [id number]:  [id number]   11/16/2021 0811 128 (H) 70 - 105 mg/dL Final     Comment:     Serial Number: [id number]:  [id number]   11/15/2021 1939 186 (H) 70 - 105 mg/dL Final     Comment:     Serial Number: [id number]:  [id number]   11/15/2021 1702 161 (H) 70 - 105 mg/dL Final     Comment:     Serial Number: [id number]:  [id number]   11/15/2021 1210 141 (H) 70 - 105 mg/dL Final     Comment:     Serial Number: [id number]:  [id number]   11/14/2021 1921 157 (H) 70 - 105 mg/dL Final     Comment:     Serial Number: [id number]:  [id number]   11/14/2021 1737 127 (H) 70 - 105 mg/dL Final     Comment:     Serial Number: [id number]:  [id number]   11/14/2021 1306 115 (H) 70 - 105 mg/dL Final     Comment:     Serial Number: [id number]:  [id number]     Infection     CMP   Results from last 7 days   Lab Units 11/16/21  0350 11/15/21  1339 11/15/21  0451 11/14/21  0329 11/13/21  0625 11/12/21  1225 11/12/21  0310 11/11/21  1215 11/11/21  1215   SODIUM mmol/L 140  --  137 137 139  --  144  --  143   POTASSIUM mmol/L 3.8 4.0 3.6 3.9 4.0 3.8 3.5   < > 3.7   CHLORIDE mmol/L 103  --  102 101 106  --  108*  --  109*   CO2 mmol/L 27.0  --  27.0 26.0 24.0  --  22.0  --  22.0   BUN mg/dL 18  --  17 19 18  --  19  --  22   CREATININE mg/dL 0.78  --  0.62* 0.56* 0.62*  --  0.76  --  0.87   GLUCOSE mg/dL 112*  --  138* 179* 106*  --  106*  --  116*   ALBUMIN g/dL  --   --   --   --   --   --  4.10  --  4.00    < > = values in this interval not displayed.     ABG    Results from last 7 days   Lab Units 11/11/21  1730 11/11/21  1613 11/11/21  1345 11/11/21  1252 11/11/21  1246 11/11/21  1051 11/11/21  1008 11/11/21  0933 11/11/21  0933   PH, ARTERIAL pH units 7.349*  7.382 7.384  --  7.372 7.310* 7.280*  --  7.320*   PCO2, ARTERIAL mm Hg 45.5 41.9 38.3  --  41.5 53.8* 62.0*  --  55.7*   PO2 ART mm Hg 95.2 166.9* 110.6*  --  231.3* 457.0* 463.0*  --  401.0*   O2 SATURATION ART % 96.9 99.5* 98.3*  --  99.8* 100.0* 100.0*  --  100.0*   BASE EXCESS ART mmol/L -0.9* -0.3* -1.9* -2.7* -1.2* 1.0 2.0   < > 3.0    < > = values in this interval not displayed.     UA        KRISTA  No results found for: POCMETH, POCAMPHET, POCBARBITUR, POCBENZO, POCCOCAINE, POCOPIATES, POCOXYCODO, POCPHENCYC, POCPROPOXY, POCTHC, POCTRICYC  Lysis Labs   Results from last 7 days   Lab Units 11/16/21  0350 11/15/21  0451 11/14/21  0329 11/13/21  0625 11/12/21  0310 11/11/21  1730 11/11/21  1613 11/11/21  1246 11/11/21  1215   INR   --   --   --   --  1.08  --   --   --  1.08   APTT seconds  --   --   --   --   --   --   --   --  25.6   HEMOGLOBIN g/dL 10.7* 10.0* 10.3* 10.5* 10.6*  --   --   --  12.8*   HEMOGLOBIN, POC g/dL  --   --   --   --   --  11.9* 12.2   < >  --    PLATELETS 10*3/mm3 200 155 136* 134* 127*  --   --   --  160   CREATININE mg/dL 0.78 0.62* 0.56* 0.62* 0.76  --   --   --  0.87    < > = values in this interval not displayed.     Radiology(recent) XR Chest 1 View    Result Date: 11/15/2021   1. Stable tiny left apical pneumothorax. 2. Stable cardiomegaly. 3. Stable trace bibasilar pleural effusions. Mild bibasilar atelectasis has improved.  Electronically Signed By-Ambreen Amaya MD On:11/15/2021 12:14 PM This report was finalized on [id number] by  Ambreen Amaya MD.              Xrays, labs reviewed personally by physician.    ECG/EMG Results (most recent)     Procedure Component Value Units Date/Time    ECG 12 Lead [[id number]] Collected: 11/12/21 0511     Updated: 11/12/21 1758     QT Interval 371 ms     Narrative:      HEART RATE= 90  bpm  RR Interval= 664  ms  UT Interval= 39  ms  P Horizontal Axis= 86  deg  P Front Axis= 0  deg  QRSD Interval= 100  ms  QT Interval= 371  ms  QRS Axis=  59  deg  T Wave Axis= -34  deg  - NORMAL ECG -  Sinus rhythm  When compared with ECG of 11-Nov-2021 16:17:02,  No significant change  Electronically Signed By: Isael Courtney ([facility]) 12-Nov-2021 17:52:01  Date and Time of Study: 2021-11-12 05:11:26    ECG 12 Lead [[id number]] Collected: 11/13/21 0524     Updated: 11/13/21 1627     QT Interval 391 ms     Narrative:      HEART RATE= 79  bpm  RR Interval= 760  ms  [place] Interval=   ms  P Horizontal Axis=   deg  P Front Axis=   deg  QRSD Interval= 90  ms  QT Interval= 391  ms  QRS Axis= 60  deg  T Wave Axis= -16  deg  - ABNORMAL ECG -  Sinus rhythm  When compared with ECG of 12-Nov-2021 5:11:26,  No new changes noted  Electronically Signed By: Isael Courtney ([facility]) 13-Nov-2021 16:27:23  Date and Time of Study: 2021-11-13 05:24:13    ECG 12 Lead [[id number]] Collected: 11/11/21 1617     Updated: 11/13/21 2057     QT Interval 405 ms     Narrative:      HEART RATE= 73  bpm  RR Interval= 824  ms  KY Interval= 89  ms  P Horizontal Axis= 78  deg  P Front Axis= 0  deg  QRSD Interval= 97  ms  QT Interval= 405  ms  QRS Axis= 59  deg  T Wave Axis= -8  deg  - NORMAL ECG -  Sinus rhythm  When compared with ECG of 09-Nov-2021 9:30:24,  Significant change in rhythm: previously atrial fibrillation  Electronically Signed By: Abhijit Anand ([facility]) 13-Nov-2021 20:57:10  Date and Time of Study: 2021-11-11 16:17:02    ECG 12 Lead [[id number]] Collected: 11/13/21 2214     Updated: 11/13/21 2217     QT Interval 386 ms     Narrative:      HEART RATE= 79  bpm  RR Interval= 762  ms  KY Interval=   ms  P Horizontal Axis=   deg  P Front Axis=   deg  QRSD Interval= 93  ms  QT Interval= 386  ms  QRS Axis= 73  deg  T Wave Axis= 4  deg  - ABNORMAL ECG -  Atrial fibrillation  When compared with ECG of 13-Nov-2021 5:24:13,  Significant change in rhythm  Electronically Signed By:   Date and Time of Study: 2021-11-13 22:14:39    ECG 12 Lead [[id number]] Collected: 11/14/21 0518     Updated: 11/14/21 0521     QT  Interval 415 ms     Narrative:      HEART RATE= 66  bpm  RR Interval= 904  ms  DE Interval= 249  ms  P Horizontal Axis=   deg  P Front Axis= 86  deg  QRSD Interval= 94  ms  QT Interval= 415  ms  QRS Axis= 73  deg  T Wave Axis= 19  deg  - ABNORMAL ECG -  Sinus rhythm  Prolonged DE interval  Electronically Signed By:   Date and Time of Study: 2021-11-14 05:18:05    ECG 12 Lead [[id number]] Collected: 11/15/21 0527     Updated: 11/15/21 0528     QT Interval 417 ms     Narrative:      HEART RATE= 69  bpm  RR Interval= 864  ms  DE Interval= 253  ms  P Horizontal Axis=   deg  P Front Axis= 83  deg  QRSD Interval= 95  ms  QT Interval= 417  ms  QRS Axis= 65  deg  T Wave Axis= 31  deg  - ABNORMAL ECG -  Sinus rhythm  Prolonged DE interval  When compared with ECG of 14-Nov-2021 5:18:05,  No significant change  Electronically Signed By:   Date and Time of Study: 2021-11-15 05:27:14    ECG 12 Lead [[id number]] Collected: 11/16/21 0605     Updated: 11/16/21 0840     QT Interval 399 ms     Narrative:      HEART RATE= 59  bpm  RR Interval= 1021  ms  DE Interval=   ms  P Horizontal Axis=   deg  P Front Axis=   deg  QRSD Interval= 86  ms  QT Interval= 399  ms  QRS Axis= 57  deg  T Wave Axis= 18  deg  - ABNORMAL ECG -  Atrial fibrillation  When compared with ECG of 15-Nov-2021 5:27:14,  Significant change in rhythm: previously sinus  Electronically Signed By: Darren Phan ([facility]) 16-Nov-2021 08:37:58  Date and Time of Study: 2021-11-16 06:05:20            Medication Review:   I have reviewed the patient's current medication list  Scheduled Meds:amiodarone, 200 mg, Oral, BID With Meals  aspirin, 81 mg, Oral, Daily  atorvastatin, 40 mg, Oral, Nightly  carvedilol, 3.125 mg, Oral, Q12H  chlorhexidine, 15 mL, Mouth/Throat, Q12H  enoxaparin, 40 mg, Subcutaneous, Daily  insulin lispro, 0-9 Units, Subcutaneous, TID AC  pantoprazole, 40 mg, Oral, Q AM  polyethylene glycol, 17 g, Oral, BID  senna-docusate sodium, 2 tablet, Oral,  BID      Continuous Infusions:   PRN Meds:.•  acetaminophen **OR** acetaminophen **OR** acetaminophen  •  dextrose  •  dextrose  •  glucagon (human recombinant)  •  HYDROcodone-acetaminophen  •  insulin lispro **AND** insulin lispro  •  [] Morphine **AND** naloxone  •  ondansetron  •  potassium chloride **OR** potassium chloride    Imaging:  Imaging Results (Last 72 Hours)     Procedure Component Value Units Date/Time    XR Chest 1 View [[id number]] Collected: 11/15/21 1212     Updated: 11/15/21 1216    Narrative:      DATE OF EXAM:  11/15/2021 12:04 PM     PROCEDURE:  XR CHEST 1 VW-     INDICATIONS:  s/p ct removal; I25.10-Atherosclerotic heart disease of native coronary  artery without angina pectoris       COMPARISON:  AP portable chest 2021.     TECHNIQUE:   Single radiographic view of the chest was obtained.     FINDINGS:  Heart size is mildly enlarged but stable with signs of CABG and valve  replacement. Pulmonary vascular distribution is normal. Trace bibasilar  pleural fluid is thought to be present. There is mild bibasilar  atelectasis which appears improved. No acute lung consolidation. Right  shoulder replacement. No evidence of overt pulmonary edema.     Previously described tiny left apical pneumothorax is unchanged.             Impression:         1. Stable tiny left apical pneumothorax.  2. Stable cardiomegaly.  3. Stable trace bibasilar pleural effusions. Mild bibasilar atelectasis  has improved.     Electronically Signed By-Ambreen Amaya MD On:11/15/2021 12:14 PM  This report was finalized on [id number] by  Ambreen Amaya MD.    XR Chest 1 View [[id number]] Collected: 21 1002     Updated: 21 1008    Narrative:      XR CHEST 1 VW-     Date of Exam: 2021 6:40 AM     Indication: Postop; I25.10-Atherosclerotic heart disease of native  coronary artery without angina pectoris  postop day 3     Comparison: 2021, 2021, 2021 and 2021     Technique: 1  "view(s) of the chest were obtained.     FINDINGS: Right IJ central venous catheter sheath remains in place.  Mediastinal drains and left chest tube also remain in place. There is a  very small left apical pneumothorax. There our areas of plate  atelectasis in each lung base. No definite pleural effusions. Pulmonary  vascularity is within normal limits. Cardiac hilar and mediastinal  silhouettes are stable with prosthetic cardiac valve and changes from  recent CABG. There is mild cardiomegaly. Extensive degenerative change  in the left glenohumeral joint is present.       Impression:         1. Very small left apical pneumothorax. Left chest tube and mediastinal  drains as well as right IJ central venous catheter sheath remain in  place.  2. Bibasilar atelectasis and changes from recent cardiac surgery are  again noted.        Electronically Signed By-Addison Joyner DO On:11/14/2021 10:05 AM  This report was finalized on [id number] by  Addison Joyner DO.            Yayo Gay DO  11/16/21  16:16 EST       EMR Dragon/Transcription:   \"Dictated utilizing Dragon dictation\".     "

## 2021-11-16 NOTE — PLAN OF CARE
Problem: Adult Inpatient Plan of Care  Goal: Plan of Care Review  Outcome: Ongoing, Progressing  Goal: Patient-Specific Goal (Individualized)  Outcome: Ongoing, Progressing  Goal: Absence of Hospital-Acquired Illness or Injury  Outcome: Ongoing, Progressing  Intervention: Identify and Manage Fall Risk  Recent Flowsheet Documentation  Taken 11/16/2021 0400 by Melissa Arias RN  Safety Promotion/Fall Prevention: safety round/check completed  Taken 11/16/2021 0300 by Melissa Arias RN  Safety Promotion/Fall Prevention: safety round/check completed  Taken 11/16/2021 0000 by Melissa Arias RN  Safety Promotion/Fall Prevention:   fall prevention program maintained   safety round/check completed  Taken 11/15/2021 2300 by Melissa Arias RN  Safety Promotion/Fall Prevention: safety round/check completed  Taken 11/15/2021 2200 by Melissa Arias RN  Safety Promotion/Fall Prevention: safety round/check completed  Taken 11/15/2021 2100 by Melissa Arias RN  Safety Promotion/Fall Prevention: safety round/check completed  Taken 11/15/2021 2000 by Melissa Arias RN  Safety Promotion/Fall Prevention: safety round/check completed  Intervention: Prevent Skin Injury  Recent Flowsheet Documentation  Taken 11/16/2021 0400 by Melissa Arias RN  Body Position: position changed independently  Taken 11/16/2021 0300 by Melissa Arias RN  Body Position: position changed independently  Taken 11/16/2021 0000 by Melissa Arias RN  Body Position: position changed independently  Taken 11/15/2021 2300 by Melissa Arias RN  Body Position: position changed independently  Taken 11/15/2021 2200 by Melissa Arias RN  Body Position: position changed independently  Taken 11/15/2021 2100 by Melissa Arias RN  Body Position: position changed independently  Taken 11/15/2021 2000 by Melissa Arias RN  Body Position: position changed independently  Goal: Optimal Comfort and Wellbeing  Outcome: Ongoing, Progressing  Goal: Readiness for Transition  of Care  Outcome: Ongoing, Progressing     Problem: Skin Injury Risk Increased  Goal: Skin Health and Integrity  Outcome: Ongoing, Progressing  Intervention: Optimize Skin Protection  Recent Flowsheet Documentation  Taken 11/15/2021 2000 by Melissa Arias RN  Pressure Reduction Techniques:   weight shift assistance provided   frequent weight shift encouraged  Pressure Reduction Devices: pressure-redistributing mattress utilized     Problem: Fall Injury Risk  Goal: Absence of Fall and Fall-Related Injury  Outcome: Ongoing, Progressing  Intervention: Promote Injury-Free Environment  Recent Flowsheet Documentation  Taken 11/16/2021 0400 by Melissa Arias RN  Safety Promotion/Fall Prevention: safety round/check completed  Taken 11/16/2021 0300 by Melissa Arias RN  Safety Promotion/Fall Prevention: safety round/check completed  Taken 11/16/2021 0000 by Melissa Arias RN  Safety Promotion/Fall Prevention:   fall prevention program maintained   safety round/check completed  Taken 11/15/2021 2300 by Melissa Arias RN  Safety Promotion/Fall Prevention: safety round/check completed  Taken 11/15/2021 2200 by Melissa Arias RN  Safety Promotion/Fall Prevention: safety round/check completed  Taken 11/15/2021 2100 by Melissa Arias RN  Safety Promotion/Fall Prevention: safety round/check completed  Taken 11/15/2021 2000 by Melissa Arias RN  Safety Promotion/Fall Prevention: safety round/check completed     Problem: Activity Intolerance (Cardiovascular Surgery)  Goal: Improved Activity Tolerance  Outcome: Ongoing, Progressing     Problem: Adjustment to Surgery (Cardiovascular Surgery)  Goal: Optimal Coping with Heart Surgery  Outcome: Ongoing, Progressing     Problem: Bleeding (Cardiovascular Surgery)  Goal: Absence of Bleeding  Outcome: Ongoing, Progressing     Problem: Bowel Elimination Impaired (Cardiovascular Surgery)  Goal: Effective Bowel Elimination  Outcome: Ongoing, Progressing     Problem: Cardiac Function  Impaired (Cardiovascular Surgery)  Goal: Effective Cardiac Function  Outcome: Ongoing, Progressing     Problem: Cerebral Tissue Perfusion Risk (Cardiovascular Surgery)  Goal: Effective Cerebral Perfusion  Outcome: Ongoing, Progressing     Problem: Fluid Imbalance (Cardiovascular Surgery)  Goal: Fluid Balance  Outcome: Ongoing, Progressing     Problem: Infection (Cardiovascular Surgery)  Goal: Absence of Infection Signs and Symptoms  Outcome: Ongoing, Progressing     Problem: Ongoing Anesthesia Effects (Cardiovascular Surgery)  Goal: Anesthesia/Sedation Recovery  Outcome: Ongoing, Progressing  Intervention: Optimize Anesthesia Recovery  Recent Flowsheet Documentation  Taken 11/16/2021 0400 by Melissa Arias RN  Safety Promotion/Fall Prevention: safety round/check completed  Taken 11/16/2021 0300 by Melissa Arias RN  Safety Promotion/Fall Prevention: safety round/check completed  Taken 11/16/2021 0000 by Melissa Arias RN  Safety Promotion/Fall Prevention:   fall prevention program maintained   safety round/check completed  Taken 11/15/2021 2300 by Melissa Arias RN  Safety Promotion/Fall Prevention: safety round/check completed  Taken 11/15/2021 2200 by Melissa Arias RN  Safety Promotion/Fall Prevention: safety round/check completed  Taken 11/15/2021 2100 by Melissa Arias RN  Safety Promotion/Fall Prevention: safety round/check completed  Taken 11/15/2021 2000 by Melissa Arias RN  Safety Promotion/Fall Prevention: safety round/check completed     Problem: Pain (Cardiovascular Surgery)  Goal: Acceptable Pain Control  Outcome: Ongoing, Progressing     Problem: Postoperative Nausea and Vomiting (Cardiovascular Surgery)  Goal: Nausea and Vomiting Relief  Outcome: Ongoing, Progressing     Problem: Postoperative Urinary Retention (Cardiovascular Surgery)  Goal: Effective Urinary Elimination  Outcome: Ongoing, Progressing     Problem: Respiratory Compromise (Cardiovascular Surgery)  Goal: Effective Oxygenation  and Ventilation  Outcome: Ongoing, Progressing     Problem: Pain Acute  Goal: Optimal Pain Control  Outcome: Ongoing, Progressing   Goal Outcome Evaluation:

## 2021-11-16 NOTE — CONSULTS
Cardiac rehab evaluation s/p CABG. Patient sitting up in chair. Spouse in room. Lengthy discussion regarding program and benefits. Brochure and post op activity list, healthy eating tips packet given. Patient is interested. Verified phone number. Will call following discharge.

## 2021-11-16 NOTE — PROGRESS NOTES
" LOS: 5 days   Patient Care Team:  Kortney Ferrera MD as PCP - General  Kortney Ferrera MD as PCP - Family Medicine    Chief Complaint: post op fu    Subjective:  Symptoms:  No shortness of breath or chest pain.    Diet:  No nausea.    Activity level: Returning to normal.    Pain:  He reports no pain.          Vital Signs  Temp:  [98.6 °F (37 °C)-99.4 °F (37.4 °C)] 98.6 °F (37 °C)  Heart Rate:  [52-79] 76  Resp:  [17-26] 26  BP: (101-138)/(58-82) 122/69  Body mass index is 30.82 kg/m².    Intake/Output Summary (Last 24 hours) at 11/16/2021 0802  Last data filed at 11/16/2021 0600  Gross per 24 hour   Intake 1080 ml   Output 3050 ml   Net -1970 ml     No intake/output data recorded.          11/14/21  0600 11/15/21  0545 11/16/21  0648   Weight: 102 kg (224 lb 12.8 oz) 102 kg (225 lb 1.4 oz) 100 kg (221 lb)         Objective:  General Appearance:  Comfortable.    Vital signs: (most recent): Blood pressure 122/75, pulse 87, temperature 99.2 °F (37.3 °C), temperature source Oral, resp. rate 20, height 180.3 cm (71\"), weight 100 kg (221 lb), SpO2 98 %.    Output: Producing urine and producing stool.    Lungs:  Normal effort and normal respiratory rate.  Breath sounds clear to auscultation.  There are decreased breath sounds (right base).  (Room air)  Heart: Normal rate.  Regular rhythm.  S1 normal and S2 normal.  No murmur or friction rub.   Extremities: There is no dependent edema.    Neurological: Patient is alert and oriented to person, place and time.    Skin:  Warm and dry.  (Sternal incision well approximated without erythema, edema, or drainage)            Results Review:        WBC WBC   Date Value Ref Range Status   11/16/2021 9.00 3.40 - 10.80 10*3/mm3 Final   11/15/2021 10.00 3.40 - 10.80 10*3/mm3 Final   11/14/2021 12.10 (H) 3.40 - 10.80 10*3/mm3 Final      HGB Hemoglobin   Date Value Ref Range Status   11/16/2021 10.7 (L) 13.0 - 17.7 g/dL Final   11/15/2021 10.0 (L) 13.0 - 17.7 g/dL Final "   11/14/2021 10.3 (L) 13.0 - 17.7 g/dL Final      HCT Hematocrit   Date Value Ref Range Status   11/16/2021 32.4 (L) 37.5 - 51.0 % Final   11/15/2021 29.1 (L) 37.5 - 51.0 % Final   11/14/2021 30.1 (L) 37.5 - 51.0 % Final      Platelets Platelets   Date Value Ref Range Status   11/16/2021 200 140 - 450 10*3/mm3 Final   11/15/2021 155 140 - 450 10*3/mm3 Final   11/14/2021 136 (L) 140 - 450 10*3/mm3 Final        PT/INR:  No results found for: PROTIME/No results found for: INR    Sodium Sodium   Date Value Ref Range Status   11/16/2021 140 136 - 145 mmol/L Final   11/15/2021 137 136 - 145 mmol/L Final   11/14/2021 137 136 - 145 mmol/L Final      Potassium Potassium   Date Value Ref Range Status   11/16/2021 3.8 3.5 - 5.2 mmol/L Final   11/15/2021 4.0 3.5 - 5.2 mmol/L Final   11/15/2021 3.6 3.5 - 5.2 mmol/L Final   11/14/2021 3.9 3.5 - 5.2 mmol/L Final     Comment:     Slight hemolysis detected by analyzer. Results may be affected.      Chloride Chloride   Date Value Ref Range Status   11/16/2021 103 98 - 107 mmol/L Final   11/15/2021 102 98 - 107 mmol/L Final   11/14/2021 101 98 - 107 mmol/L Final      Bicarbonate CO2   Date Value Ref Range Status   11/16/2021 27.0 22.0 - 29.0 mmol/L Final   11/15/2021 27.0 22.0 - 29.0 mmol/L Final   11/14/2021 26.0 22.0 - 29.0 mmol/L Final      BUN BUN   Date Value Ref Range Status   11/16/2021 18 8 - 23 mg/dL Final   11/15/2021 17 8 - 23 mg/dL Final   11/14/2021 19 8 - 23 mg/dL Final      Creatinine Creatinine   Date Value Ref Range Status   11/16/2021 0.78 0.76 - 1.27 mg/dL Final   11/15/2021 0.62 (L) 0.76 - 1.27 mg/dL Final   11/14/2021 0.56 (L) 0.76 - 1.27 mg/dL Final      Calcium Calcium   Date Value Ref Range Status   11/16/2021 8.0 (L) 8.6 - 10.5 mg/dL Final   11/15/2021 7.8 (L) 8.6 - 10.5 mg/dL Final   11/14/2021 8.2 (L) 8.6 - 10.5 mg/dL Final      Magnesium Magnesium   Date Value Ref Range Status   11/15/2021 2.6 (H) 1.6 - 2.4 mg/dL Final   11/14/2021 2.7 (H) 1.6 - 2.4 mg/dL  Final      Troponin No results found for: TROPONIN   BNP No results found for: BNP         amiodarone, 200 mg, Oral, BID With Meals  aspirin, 81 mg, Oral, Daily  atorvastatin, 40 mg, Oral, Nightly  carvedilol, 3.125 mg, Oral, Q12H  chlorhexidine, 15 mL, Mouth/Throat, Q12H  enoxaparin, 40 mg, Subcutaneous, Daily  furosemide, 40 mg, Oral, Daily  insulin lispro, 0-9 Units, Subcutaneous, TID AC  mupirocin, 1 application, Each Nare, BID  pantoprazole, 40 mg, Oral, Q AM  polyethylene glycol, 17 g, Oral, BID  potassium chloride, 20 mEq, Oral, Daily  senna-docusate sodium, 2 tablet, Oral, BID           PRN Meds:.•  acetaminophen **OR** acetaminophen **OR** acetaminophen  •  dextrose  •  dextrose  •  glucagon (human recombinant)  •  HYDROcodone-acetaminophen  •  insulin lispro **AND** insulin lispro  •  [] Morphine **AND** naloxone  •  ondansetron  •  potassium chloride **OR** potassium chloride    Patient Active Problem List   Diagnosis Code   • Plantar fasciitis M72.2   • Atrial fibrillation (HCC) I48.91   • B12 deficiency E53.8   • Benign prostatic hyperplasia with urinary obstruction N40.1, N13.8   • Drug-induced impotence N52.2   • Hyperglycemia R73.9   • Hypertension I10   • Increased frequency of urination R35.0   • Low back pain M54.50   • Presence of artificial shoulder joint Z96.619   • Snoring R06.83   • Vitamin D deficiency E55.9   • Skin mole D22.9   • Right wrist pain M25.531   • Localized swelling on hand R22.30   • Elbow pain, left M25.522   • Nuclear senile cataract H25.10   • Presbyopia H52.4   • Dyslipidemia E78.5   • Obesity E66.9   • Class 1 obesity due to excess calories with serious comorbidity and body mass index (BMI) of 31.0 to 31.9 in adult E66.09, Z68.31   • Nonrheumatic mitral valve regurgitation I34.0   • Coronary artery disease involving native coronary artery of native heart without angina pectoris I25.10       Assessment & Plan    - MV CAD, mitral regurgitationEF 60% (cath) s/p CABG x3  with LIMA, MV repair/ring, right and left MAZE with JOSE G ligation----POD #5 (Pagn[facility])  - Persistent atrial fib, s/p cardioversion (preop Eliquis)----s/p MAZE/JOSE G ligation, AF over weekend----coreg decreased due to sinus bianca---NSVT 11/15, resume Eliquis at discharge  - HTN----cardene gtt  - Dyslipidemia----statin  - BPH    In and out of AF, rate controlled, had bianca rates 40's overnight, reported 2 second pause.  D/W cardiology, Dr. Phan to evaluate, will leave wires for now.  Anticipate home with home health nursing/PT at discharge, if no changes by EP d/c today.    KWASI Hall  11/16/21  08:02 EST

## 2021-11-16 NOTE — CASE MANAGEMENT/SOCIAL WORK
Continued Stay Note  [facility]     Patient Name: Salinas Dill  MRN: [id number]  Today's Date: 11/16/2021    Admit Date: 11/11/2021     Discharge Plan     Row Name 11/16/21 1034       Plan    Plan D/C Plan: Home with [facility] (accepted, order placed). 3:1 BSC from [facility] (order placed).    Plan Comments Barrier to D/C: EP cardiology to eval today, possible d/c if no changes, POD#5 CABG.                Expected Discharge Date and Time     Expected Discharge Date Expected Discharge Time    Nov 16, 2021             Sita Galindo

## 2021-11-16 NOTE — CONSULTS
CC--post surgery atrial fibrillation and nonsustained VT    Consult requested by interventional cardiology and CT surgery      Sub--75-year-old male patient has multivessel coronary artery disease with severe mitral regurgitation and at least persistent to permanent atrial fibrillation for last 2 years on anticoagulation.  Patient underwent multivessel bypass surgery mitral valve annuloplasty ring and left atrial appendage ligation with an left and right cryo-maze procedure.  Post surgery he is having intermittent atrial fibrillation and sinus bradycardia and last night he had nonsustained VT.  Currently patient is on low-dose of carvedilol and amiodarone.  Patient denies any caridad syncope prior to this surgery.  He is fairly active sitting up in the chair without any distress.  Chronic medical problems include atrial fibrillation, mitral regurgitation and coronary artery disease.  Apart from hypertension        Past Medical History:   Diagnosis Date   • Arthritis    • Atrial fibrillation (HCC)    • BPH (benign prostatic hyperplasia)    • Bulging lumbar disc    • Difficulty maintaining body in lying position     NEEDS PILLOW UNDER KNEES IN SURGERY D/T LUMBAR ISSUE   • Hyperglycemia    • Hypertension    • OAB (overactive bladder)    • Skin mole    • Snoring    • Urinary urgency    • Vitamin D deficiency      Past Surgical History:   Procedure Laterality Date   • CARDIAC CATHETERIZATION Right 10/13/2021    Procedure: Left Heart Cath;  Surgeon: Renato Knight MD;  Location: [facility] CATH INVASIVE LOCATION;  Service: Cardiovascular;  Laterality: Right;   • CARDIAC CATHETERIZATION  10/13/2021    Procedure: Functional Flow Warren;  Surgeon: Renato Knight MD;  Location: [facility] CATH INVASIVE LOCATION;  Service: Cardiovascular;;   • COLONOSCOPY     • FINGER SURGERY Right     repair right pointer finger   • JOINT REPLACEMENT     • TOTAL SHOULDER ARTHROPLASTY      shoulder replacement     Family History    Problem Relation Age of Onset   • Diabetes Mother    • Heart disease Mother    • Hypertension Sister    • Hyperlipidemia Sister    • Kidney disease Sister    • Cancer Sister    • Other Sister         weight disorder   • Diabetes Sister    • Stroke Brother    • Hypertension Other      Social History     Tobacco Use   • Smoking status: Former Smoker     Packs/day: 1.00     Years: 12.00     Pack years: 12.00     Types: Cigarettes     Quit date:      Years since quittin.9   • Smokeless tobacco: Never Used   Vaping Use   • Vaping Use: Never used   Substance Use Topics   • Alcohol use: Yes     Alcohol/week: 1.0 standard drink     Types: 1 Shots of liquor per week     Comment: 1 drink weekly   • Drug use: No     Medications Prior to Admission   Medication Sig Dispense Refill Last Dose   • apixaban (Eliquis) 5 MG tablet tablet Take 1 tablet by mouth 2 (Two) Times a Day. 180 tablet 3 2021   • aspirin (aspirin) 81 MG EC tablet Take 1 tablet by mouth Daily. 30 tablet 2 11/10/2021 at Unknown time   • B Complex Vitamins (vitamin b complex) capsule capsule Take 1 capsule by mouth Daily. LD 2021   • Cholecalciferol (VITAMIN D) 2000 units capsule Take 2,000 Units by mouth Daily. 2021   • dilTIAZem CD (CARDIZEM CD) 120 MG 24 hr capsule Take 120 mg by mouth Daily.   11/10/2021 at Unknown time   • ELDERBERRY PO Take 1 capsule by mouth 2 (two) times a day. Elderberry 158mg  LD 2021   • losartan (COZAAR) 50 MG tablet TAKE ONE BY MOUTH DAILY 30 tablet 0 2021   • melatonin 5 MG tablet tablet Take 5 mg by mouth Every Night.   11/10/2021   • mupirocin (BACTROBAN) 2 % ointment Apply to nares (inside each nostril) twice daily as directed for procedure. 22 g 0 2021 at Unknown time   • Turmeric 500 MG capsule Take 500 mg by mouth Daily. LD 2021     Allergies:  Lisinopril    Review of Systems   General:  positive for fatigue and tiredness  Eyes: No  redness  Cardiovascular: No chest pain, no palpitations  Respiratory:  no shortness of breath  Gastrointestinal: No nausea or vomiting, bleeding  Genitourinary: no hematuria or dysuria  Musculoskeletal: No arthralgia or myalgia  Skin: No rash  Neurologic: No numbness, tingling, syncope  Hematologic/Lymphatic: No abnormal bleeding      Physical Exam  VITALS REVIEWED--blood pressure 113/60 pulse rate is 82 patient is afebrile respiration 12 times a minute    General:      well developed, well nourished, in no acute distress.    Head:      normocephalic and atraumatic.    Eyes:      PERRL/EOM intact, conjunctiva and sclera clear with out nystagmus.    Neck:      no masses, thyromegaly,  trachea central with normal respiratory effort   Lungs:      clear bilaterally to auscultation.    Heart:       underlying atrial fibrillation with irregularly irregular rhythm and  without any murmurs gallops or rubs  Msk:      no deformity or scoliosis noted of thoracic or lumbar spine.    Pulses:      pulses normal in all 4 extremities.    Extremities:       no cyanosis or clubbing-edema   neurologic:      no focal deficits.   alert oriented x3  Skin:      intact without lesions or rashes.    Psych:      alert and cooperative; normal mood and affect; normal attention span and concentration.          CBC    Results from last 7 days   Lab Units 11/16/21  0350 11/15/21  0451 11/14/21 0329 11/13/21  0625 11/12/21  0310 11/11/21  1730 11/11/21  1613 11/11/21  1246 11/11/21  1215   WBC 10*3/mm3 9.00 10.00 12.10* 14.10* 15.20*  --   --   --  21.60*   HEMOGLOBIN g/dL 10.7* 10.0* 10.3* 10.5* 10.6*  --   --   --  12.8*   HEMOGLOBIN, POC g/dL  --   --   --   --   --  11.9* 12.2   < >  --    PLATELETS 10*3/mm3 200 155 136* 134* 127*  --   --   --  160    < > = values in this interval not displayed.     BMP   Results from last 7 days   Lab Units 11/16/21  0350 11/15/21  1339 11/15/21  0451 11/14/21  0329 11/13/21  0625 11/12/21  1225  11/12/21  0310 11/11/21  1215 11/11/21  1215   SODIUM mmol/L 140  --  137 137 139  --  144  --  143   POTASSIUM mmol/L 3.8 4.0 3.6 3.9 4.0 3.8 3.5   < > 3.7   CHLORIDE mmol/L 103  --  102 101 106  --  108*  --  109*   CO2 mmol/L 27.0  --  27.0 26.0 24.0  --  22.0  --  22.0   BUN mg/dL 18 -- 17 19 18  --  19  --  22   CREATININE mg/dL 0.78  --  0.62* 0.56* 0.62*  --  0.76  --  0.87   GLUCOSE mg/dL 112*  --  138* 179* 106*  --  106*  --  116*   MAGNESIUM mg/dL  --  2.6*  --  2.7*  --   --  2.3  --   --    PHOSPHORUS mg/dL  --   --   --   --   --   --  3.3  --  2.1*    < > = values in this interval not displayed.     CMP   Results from last 7 days   Lab Units 11/16/21  0350 11/15/21  1339 11/15/21  0451 11/14/21  0329 11/13/21  0625 11/12/21  1225 11/12/21  0310 11/11/21  1215 11/11/21  1215   SODIUM mmol/L 140  --  137 137 139  --  144  --  143   POTASSIUM mmol/L 3.8 4.0 3.6 3.9 4.0 3.8 3.5   < > 3.7   CHLORIDE mmol/L 103  --  102 101 106  --  108*  --  109*   CO2 mmol/L 27.0  --  27.0 26.0 24.0  --  22.0  --  22.0   BUN mg/dL 18 -- 17 19 18 --  19  --  22   CREATININE mg/dL 0.78  --  0.62* 0.56* 0.62*  --  0.76  --  0.87   GLUCOSE mg/dL 112*  --  138* 179* 106*  --  106*  --  116*   ALBUMIN g/dL  --   --   --   --   --   --  4.10  --  4.00    < > = values in this interval not displayed.     Radiology(recent) XR Chest 1 View    Result Date: 11/15/2021   1. Stable tiny left apical pneumothorax. 2. Stable cardiomegaly. 3. Stable trace bibasilar pleural effusions. Mild bibasilar atelectasis has improved.  Electronically Signed By-Ambreen Amaya MD On:11/15/2021 12:14 PM This report was finalized on [id number] by  Ambreen Amaya MD.          Assessment and plan      Post multivessel coronary artery disease surgery along with mitral annuloplasty ring placement and left atrial appendage ligation and right and left cryo-maze procedure for persistent atrial fibrillation  Multivessel coronary artery disease  Mitral  regurgitation  Longstanding atrial fibrillation  Essential hypertension    Postop early recurrence of atrial fibrillation with intermittent sinus bradycardia nonsustained VT    Continue amiodarone and low-dose of beta-blockers  If the patient continues to be stable without significant bradycardia arrhythmias in the next 24 to 48 hours patient can be discharged home with a home monitoring system to monitor his arrhythmias.  Discussed with cardiology and CT surgery    Chads vas score--3  Electronically signed by Darren Phan MD, 11/16/21, 6:09 PM EST.

## 2021-11-16 NOTE — THERAPY TREATMENT NOTE
Subjective: Pt agreeable to therapeutic plan of care.  Cognition: oriented to Person, Place, Time and Situation and safety/judgement: fair    Objective: Pt states having restful night of sleep and eager to d/c home today. Cont to demo flat affect throughout session.     Bed Mobility: Min-A  Functional Transfers: SBA  Functional Ambulation: SBA    Lower Body Dressing: CGA  ADL Position: unsupported sitting and unsupported standing  ADL Comments: Difficulty with socks. States spouse will assist    Toileting: SBA  ADL Position: unsupported sitting and unsupported standing  ADL Comments: Edu on positioning for nani hygiene to comply with sternal precautions    Cardiac Rehab Initiated  Level 4: Progressive AROM Exercises with supervision only: Ambulation 200-400 feet with assist as needed.     Sitting tolerance: >10min  Standing tolerance: 5-10min and supported    Precautions:   Mid-sternal incision; avoid scapular retraction and depression.   Cardiovascular impairment post-sx; encourage energy conservation strategies.    Therapeutic Exercises: 10 reps UE and LE AROM in seated position.     MET level equivalent: 2.0-3.0 (Unlimited sitting, ambulation on level ground <2mph, light housework)    Pain: 3 VAS  Education: Provided education on importance of mobility and skilled verbal / tactile cueing throughout intervention.     Assessment: Salinas Dill presents with ADL impairments below baseline abilities which indicate the need for continued skilled intervention while inpatient. Pt completes toileting with supervision, and was edu on positioning during nani hygiene tasks to facilitate good compliance with sternal precautions. Cont to require CGA for LB ADLs (primarily for threading), and Min A for bed mobility. Reviewed proper joint alignment / body mechanics for bed mobility transfer with spouse.  Spouse with medical background and states/demos good understanding.  Completes HEP without assist, and ambulates in hallway  with mild SOB. SpO2 >90% throughout and HR stable. Requires encouragement to utilize RW during ambulatory tasks.  Tolerating session today without incident. Will continue to follow and progress as tolerated.     Plan/Recommendations:   Pt would benefit from Home with family assist at discharge from facility.   Pt desires Home with Home Health at discharge. Pt cooperative; agreeable to therapeutic recommendations and plan of care.     Post-Tx Position: Up in Chair, Alarms activated and Call light and personal items within reach  PPE: gloves, surgical mask, eyewear protection

## 2021-11-17 ENCOUNTER — APPOINTMENT (OUTPATIENT)
Dept: RESPIRATORY THERAPY | Facility: HOSPITAL | Age: 76
End: 2021-11-17

## 2021-11-17 ENCOUNTER — HOME HEALTH ADMISSION (OUTPATIENT)
Dept: HOME HEALTH SERVICES | Facility: HOME HEALTHCARE | Age: 76
End: 2021-11-17

## 2021-11-17 ENCOUNTER — READMISSION MANAGEMENT (OUTPATIENT)
Dept: CALL CENTER | Facility: HOSPITAL | Age: 76
End: 2021-11-17

## 2021-11-17 VITALS
RESPIRATION RATE: 17 BRPM | OXYGEN SATURATION: 96 % | DIASTOLIC BLOOD PRESSURE: 75 MMHG | WEIGHT: 220.6 LBS | HEART RATE: 79 BPM | SYSTOLIC BLOOD PRESSURE: 115 MMHG | BODY MASS INDEX: 30.88 KG/M2 | HEIGHT: 71 IN | TEMPERATURE: 98.3 F

## 2021-11-17 PROBLEM — Z86.79 S/P MAZE OPERATION FOR ATRIAL FIBRILLATION: Status: ACTIVE | Noted: 2021-11-17

## 2021-11-17 PROBLEM — Z95.1 S/P CABG X 3: Status: ACTIVE | Noted: 2021-11-17

## 2021-11-17 PROBLEM — Z98.890 S/P MVR (MITRAL VALVE REPAIR): Status: ACTIVE | Noted: 2021-11-17

## 2021-11-17 PROBLEM — Z98.890 S/P MAZE OPERATION FOR ATRIAL FIBRILLATION: Status: ACTIVE | Noted: 2021-11-17

## 2021-11-17 LAB
ANION GAP SERPL CALCULATED.3IONS-SCNC: 10 MMOL/L (ref 5–15)
BUN SERPL-MCNC: 17 MG/DL (ref 8–23)
BUN/CREAT SERPL: 24.3 (ref 7–25)
CALCIUM SPEC-SCNC: 8.5 MG/DL (ref 8.6–10.5)
CHLORIDE SERPL-SCNC: 101 MMOL/L (ref 98–107)
CO2 SERPL-SCNC: 26 MMOL/L (ref 22–29)
CREAT SERPL-MCNC: 0.7 MG/DL (ref 0.76–1.27)
DEPRECATED RDW RBC AUTO: 44.6 FL (ref 37–54)
ERYTHROCYTE [DISTWIDTH] IN BLOOD BY AUTOMATED COUNT: 14.2 % (ref 12.3–15.4)
GFR SERPL CREATININE-BSD FRML MDRD: 110 ML/MIN/1.73
GLUCOSE BLDC GLUCOMTR-MCNC: 109 MG/DL (ref 70–105)
GLUCOSE SERPL-MCNC: 106 MG/DL (ref 65–99)
HCT VFR BLD AUTO: 32.5 % (ref 37.5–51)
HGB BLD-MCNC: 11 G/DL (ref 13–17.7)
MCH RBC QN AUTO: 30.1 PG (ref 26.6–33)
MCHC RBC AUTO-ENTMCNC: 33.8 G/DL (ref 31.5–35.7)
MCV RBC AUTO: 89.2 FL (ref 79–97)
PLATELET # BLD AUTO: 255 10*3/MM3 (ref 140–450)
PMV BLD AUTO: 9.3 FL (ref 6–12)
POTASSIUM SERPL-SCNC: 4 MMOL/L (ref 3.5–5.2)
QT INTERVAL: 417 MS
RBC # BLD AUTO: 3.65 10*6/MM3 (ref 4.14–5.8)
SODIUM SERPL-SCNC: 137 MMOL/L (ref 136–145)
WBC # BLD AUTO: 8.5 10*3/MM3 (ref 3.4–10.8)

## 2021-11-17 PROCEDURE — S0260 H&P FOR SURGERY: HCPCS | Performed by: THORACIC SURGERY (CARDIOTHORACIC VASCULAR SURGERY)

## 2021-11-17 PROCEDURE — 82962 GLUCOSE BLOOD TEST: CPT

## 2021-11-17 PROCEDURE — 85027 COMPLETE CBC AUTOMATED: CPT | Performed by: NURSE PRACTITIONER

## 2021-11-17 PROCEDURE — 80048 BASIC METABOLIC PNL TOTAL CA: CPT | Performed by: NURSE PRACTITIONER

## 2021-11-17 PROCEDURE — 97116 GAIT TRAINING THERAPY: CPT

## 2021-11-17 PROCEDURE — 99232 SBSQ HOSP IP/OBS MODERATE 35: CPT | Performed by: INTERNAL MEDICINE

## 2021-11-17 PROCEDURE — 93246 EXT ECG>7D<15D RECORDING: CPT

## 2021-11-17 PROCEDURE — 97110 THERAPEUTIC EXERCISES: CPT

## 2021-11-17 RX ORDER — TRIAMTERENE AND HYDROCHLOROTHIAZIDE 37.5; 25 MG/1; MG/1
1 CAPSULE ORAL EVERY MORNING
COMMUNITY
End: 2021-11-17 | Stop reason: HOSPADM

## 2021-11-17 RX ORDER — AMIODARONE HYDROCHLORIDE 200 MG/1
TABLET ORAL
Qty: 38 TABLET | Refills: 0 | Status: SHIPPED | OUTPATIENT
Start: 2021-11-17 | End: 2021-12-16

## 2021-11-17 RX ORDER — POTASSIUM CHLORIDE 750 MG/1
20 CAPSULE, EXTENDED RELEASE ORAL DAILY
Qty: 10 CAPSULE | Refills: 0 | Status: SHIPPED | OUTPATIENT
Start: 2021-11-17 | End: 2021-11-24 | Stop reason: SDUPTHER

## 2021-11-17 RX ORDER — ATORVASTATIN CALCIUM 40 MG/1
40 TABLET, FILM COATED ORAL NIGHTLY
Qty: 40 TABLET | Refills: 3 | Status: SHIPPED | OUTPATIENT
Start: 2021-11-17 | End: 2021-12-17 | Stop reason: SDUPTHER

## 2021-11-17 RX ORDER — CARVEDILOL 3.12 MG/1
3.12 TABLET ORAL EVERY 12 HOURS SCHEDULED
Qty: 60 TABLET | Refills: 3 | Status: SHIPPED | OUTPATIENT
Start: 2021-11-17 | End: 2021-12-16 | Stop reason: SDUPTHER

## 2021-11-17 RX ORDER — FUROSEMIDE 40 MG/1
40 TABLET ORAL DAILY
Qty: 5 TABLET | Refills: 0 | Status: SHIPPED | OUTPATIENT
Start: 2021-11-17 | End: 2021-11-24 | Stop reason: SDUPTHER

## 2021-11-17 RX ORDER — ACETAMINOPHEN 325 MG/1
650 TABLET ORAL EVERY 6 HOURS PRN
Start: 2021-11-17

## 2021-11-17 RX ADMIN — PANTOPRAZOLE SODIUM 40 MG: 40 TABLET, DELAYED RELEASE ORAL at 05:48

## 2021-11-17 RX ADMIN — ASPIRIN 81 MG: 81 TABLET, COATED ORAL at 08:06

## 2021-11-17 RX ADMIN — CARVEDILOL 3.12 MG: 3.12 TABLET, FILM COATED ORAL at 08:06

## 2021-11-17 RX ADMIN — AMIODARONE HYDROCHLORIDE 200 MG: 200 TABLET ORAL at 08:06

## 2021-11-17 NOTE — PLAN OF CARE
Goal Outcome Evaluation:  Plan of Care Reviewed With: patient, spouse      Pt would benefit from Home with Home Health at discharge from facility and requires no DME at discharge.   Pt desires Home with Home Health at discharge. Pt showing continued improvement in mobility. Pt did not require use of RW for ambulation and did not require assistance for steadying at any time. Pt gait speed increased this session.  Incorporated mini squats and heel raises into AROM cardiac rehab exercises. Educated pt on RPE and to rest when RPE exceeds 6/10.  Educated pt and wife on safely getting into bed and how to assist. Pt HR  during ambulation. Post-exercise, pt in afib, fluctuating from  for ~2 min before returning to normal sinus rhythm. Pt cooperative; agreeable to therapeutic recommendations and plan of care.

## 2021-11-17 NOTE — PROGRESS NOTES
"  Cardiology Progress  Note      Patient Care Team:  Kortney Ferrera MD as PCP - General  Kortney Ferrera MD as PCP - Family Medicine    PATIENT IDENTIFICATION  Name: Salinas Dill  Age: 75 y.o.  Sex: male  :  1945  MRN: [id number]             REASON FOR FOLLOW-UP:  Severe multivessel coronary artery disease  Persistent and chronic atrial fibrillation  Mitral valve insufficiency        SUBJECTIVE    Seen and examined. Chart labs reviewed.  Patient was evaluated by cardiac electrophysiology.  EP recommended continuation of his current medical management and antiarrhythmic management with follow-up as outpatient.  Ambulatory monitor recommended.  Patient's rate remains controlled today.  Discussed with CT surgery.      REVIEW OF SYSTEMS:  Pertinent items are noted in HPI, all other systems reviewed and negative    OBJECTIVE       ASSESSMENT  Severe multivessel coronary artery disease status post CABG x3 with a LIMA to the mid LAD and reverse individual saphenous vein graft to the obtuse marginal 1 and PLB branch of the right coronary artery 2021  Severe mitral valve insufficiency status post ring annuloplasty  Atrial fibrillation with right and left cryomaze procedure and left atrial appendage ligation  Postop atrial fibrillation with intermittent bradycardia  Essential hypertension  Dyslipidemia  BPH      RECOMMENDATIONS  Continue with current cardiovascular regimen  Ambulatory ECG monitor at discharge for 2 weeks  Cardiology status appropriate for discharge when okay with other services.  Follow-up with primary cardiologist in 3 weeks.          Vital Signs  Visit Vitals  /75   Pulse 79   Temp 98.3 °F (36.8 °C) (Oral)   Resp 17   Ht 180.3 cm (71\")   Wt 100 kg (220 lb 9.6 oz)   SpO2 96%   BMI 30.77 kg/m²     Oxygen Therapy  SpO2: 96 %  Pulse Oximetry Type: Continuous  Device (Oxygen Therapy): room air  Flow (L/min): 2  Oxygen Concentration (%): 70  Flowsheet Rows      First Filed Value " "  Admission Height 180.3 cm (71\") Documented at 11/11/2021 0554   Admission Weight 99.4 kg (219 lb 2.2 oz) Documented at 11/11/2021 0554        Intake & Output (last 3 days)       11/14 0701  11/15 0700 11/15 0701 11/16 0700 11/16 0701  11/17 0700 11/17 0701  11/18 0700    P.O. 1200 1560 600     I.V. (mL/kg) 533.8 (5.2)  0 (0)     Total Intake(mL/kg) 1733.8 (17) 1560 (15.6) 600 (6)     Urine (mL/kg/hr) 725 (0.3) 3050 (1.3) 1075 (0.4)     Emesis/NG output 0  0     Stool 0 0 0     Chest Tube 390       Total Output 1115 3050 1075     Net +618.8 -1490 -475             Urine Unmeasured Occurrence   1 x     Stool Unmeasured Occurrence  1 x 1 x         Lines, Drains & Airways     Active LDAs     Name Placement date Placement time Site Days    CVC Double Lumen 11/11/21 Right Internal jugular 11/11/21  0815  created via procedure documentation  Internal jugular  3    Peripheral IV 11/11/21 0629 Posterior; Right Hand 11/11/21  0629  Hand  4    Y Chest Tube 1 and 2 1 Mediastinal 28 Fr. 2 Pleural 28 Fr. 11/11/21  --   4    Pacer Wires 11/11/21  --  Atrial and Ventricular  4                       /75   Pulse 79   Temp 98.3 °F (36.8 °C) (Oral)   Resp 17   Ht 180.3 cm (71\")   Wt 100 kg (220 lb 9.6 oz)   SpO2 96%   BMI 30.77 kg/m²   Intake/Output last 3 shifts:  I/O last 3 completed shifts:  In: 1440 [P.O.:1440]  Out: 1675 [Urine:1675]  Intake/Output this shift:  No intake/output data recorded.    PHYSICAL EXAM:    General: Alert, cooperative, no distress, appears stated age  Head:  Normocephalic, atraumatic, mucous membranes moist  Eyes:  Conjunctiva/corneas clear, EOM's intact     Neck:  Supple,  no bruit  Lungs: Coarse and diminished bilaterally but otherwise clear to auscultation.  Chest wall: No tenderness.  Incisions clean dry and intact  Heart::  Regular rate and rhythm, S1 and S2 normal, 1/6 holosystolic murmur. No rub or gallop  Abdomen: Soft, non-tender, nondistended bowel sounds active  Extremities: No " cyanosis, clubbing. Trace edema right lower extremity  Pulses: 2+ and symmetric all extremities  Skin:  No rashes or lesions  Neuro/psych: A&O x3. CN II through XII are grossly intact with appropriate affect      Scheduled Meds:          Continuous Infusions:    No current facility-administered medications for this encounter.      PRN Meds:            Results Review:     I reviewed the patient's new clinical results.    CBC    Results from last 7 days   Lab Units 11/17/21  0350 11/16/21  0350 11/15/21  0451 11/14/21  0329 11/13/21  0625 11/12/21  0310 11/11/21  1730 11/11/21  1246 11/11/21  1215   WBC 10*3/mm3 8.50 9.00 10.00 12.10* 14.10* 15.20*  --   --  21.60*   HEMOGLOBIN g/dL 11.0* 10.7* 10.0* 10.3* 10.5* 10.6*  --   --  12.8*   HEMOGLOBIN, POC g/dL  --   --   --   --   --   --  11.9*   < >  --    PLATELETS 10*3/mm3 255 200 155 136* 134* 127*  --   --  160    < > = values in this interval not displayed.     Cr Clearance Estimated Creatinine Clearance: 97.6 mL/min (A) (by C-G formula based on SCr of 0.7 mg/dL (L)).  Coag   Results from last 7 days   Lab Units 11/12/21  0310 11/11/21  1215   INR  1.08 1.08   APTT seconds  --  25.6     HbA1C   Lab Results   Component Value Date    HGBA1C 5.6 11/09/2021    HGBA1C 5.8 (H) 07/13/2021    HGBA1C 5.5 03/03/2020     Blood Glucose   Glucose   Date/Time Value Ref Range Status   11/17/2021 0721 109 (H) 70 - 105 mg/dL Final     Comment:     Serial Number: [id number]:  [id number]   11/16/2021 1941 180 (H) 70 - 105 mg/dL Final     Comment:     Serial Number: [id number]:  [id number]   11/16/2021 1635 98 70 - 105 mg/dL Final     Comment:     Serial Number: [id number]:  [id number]   11/16/2021 1055 132 (H) 70 - 105 mg/dL Final     Comment:     Serial Number: [id number]:  [id number]   11/16/2021 0811 128 (H) 70 - 105 mg/dL Final     Comment:     Serial Number: [id number]:  [id number]   11/15/2021 1939 186 (H) 70 - 105 mg/dL Final     Comment:      Serial Number: [id number]:  [id number]   11/15/2021 1702 161 (H) 70 - 105 mg/dL Final     Comment:     Serial Number: [id number]:  [id number]   11/15/2021 1210 141 (H) 70 - 105 mg/dL Final     Comment:     Serial Number: [id number]:  [id number]     Infection     CMP   Results from last 7 days   Lab Units 11/17/21  0350 11/16/21  0350 11/15/21  1339 11/15/21  0451 11/14/21  0329 11/13/21  0625 11/12/21  1225 11/12/21  0310 11/12/21  0310 11/11/21  1215 11/11/21  1215   SODIUM mmol/L 137 140  --  137 137 139  --   --  144  --  143   POTASSIUM mmol/L 4.0 3.8 4.0 3.6 3.9 4.0 3.8   < > 3.5   < > 3.7   CHLORIDE mmol/L 101 103  --  102 101 106  --   --  108*  --  109*   CO2 mmol/L 26.0 27.0  --  27.0 26.0 24.0  --   --  22.0  --  22.0   BUN mg/dL 17 18  --  17 19 18  --   --  19  --  22   CREATININE mg/dL 0.70* 0.78  --  0.62* 0.56* 0.62*  --   --  0.76  --  0.87   GLUCOSE mg/dL 106* 112*  --  138* 179* 106*  --   --  106*  --  116*   ALBUMIN g/dL  --   --   --   --   --   --   --   --  4.10  --  4.00    < > = values in this interval not displayed.     ABG    Results from last 7 days   Lab Units 11/11/21  1730 11/11/21  1613 11/11/21  1345 11/11/21  1252 11/11/21  1246 11/11/21  1051 11/11/21  1008 11/11/21  0933 11/11/21  0933   PH, ARTERIAL pH units 7.349* 7.382 7.384  --  7.372 7.310* 7.280*  --  7.320*   PCO2, ARTERIAL mm Hg 45.5 41.9 38.3  --  41.5 53.8* 62.0*  --  55.7*   PO2 ART mm Hg 95.2 166.9* 110.6*  --  231.3* 457.0* 463.0*  --  401.0*   O2 SATURATION ART % 96.9 99.5* 98.3*  --  99.8* 100.0* 100.0*  --  100.0*   BASE EXCESS ART mmol/L -0.9* -0.3* -1.9* -2.7* -1.2* 1.0 2.0   < > 3.0    < > = values in this interval not displayed.     UA        KRISTA  No results found for: POCMETH, POCAMPHET, POCBARBITUR, POCBENZO, POCCOCAINE, POCOPIATES, POCOXYCODO, POCPHENCYC, POCPROPOXY, POCTHC, POCTRICYC  Lysis Labs   Results from last 7 days   Lab Units 11/17/21  0350 11/16/21  0350 11/15/21  0451  11/14/21  0329 11/13/21  0625 11/12/21  0310 11/11/21  1730 11/11/21  1246 11/11/21  1215   INR   --   --   --   --   --  1.08  --   --  1.08   APTT seconds  --   --   --   --   --   --   --   --  25.6   HEMOGLOBIN g/dL 11.0* 10.7* 10.0* 10.3* 10.5* 10.6*  --   --  12.8*   HEMOGLOBIN, POC g/dL  --   --   --   --   --   --  11.9*   < >  --    PLATELETS 10*3/mm3 255 200 155 136* 134* 127*  --   --  160   CREATININE mg/dL 0.70* 0.78 0.62* 0.56* 0.62* 0.76  --   --  0.87    < > = values in this interval not displayed.     Radiology(recent) No radiology results for the last day            Xrays, labs reviewed personally by physician.    ECG/EMG Results (most recent)     Procedure Component Value Units Date/Time    ECG 12 Lead [[id number]] Collected: 11/12/21 0511     Updated: 11/12/21 1758     QT Interval 371 ms     Narrative:      HEART RATE= 90  bpm  RR Interval= 664  ms  FL Interval= 39  ms  P Horizontal Axis= 86  deg  P Front Axis= 0  deg  QRSD Interval= 100  ms  QT Interval= 371  ms  QRS Axis= 59  deg  T Wave Axis= -34  deg  - NORMAL ECG -  Sinus rhythm  When compared with ECG of 11-Nov-2021 16:17:02,  No significant change  Electronically Signed By: Isael Courtney ([facility]) 12-Nov-2021 17:52:01  Date and Time of Study: 2021-11-12 05:11:26    ECG 12 Lead [[id number]] Collected: 11/13/21 0524     Updated: 11/13/21 1627     QT Interval 391 ms     Narrative:      HEART RATE= 79  bpm  RR Interval= 760  ms  FL Interval=   ms  P Horizontal Axis=   deg  P Front Axis=   deg  QRSD Interval= 90  ms  QT Interval= 391  ms  QRS Axis= 60  deg  T Wave Axis= -16  deg  - ABNORMAL ECG -  Sinus rhythm  When compared with ECG of 12-Nov-2021 5:11:26,  No new changes noted  Electronically Signed By: Isael Courtney ([facility]) 13-Nov-2021 16:27:23  Date and Time of Study: 2021-11-13 05:24:13    ECG 12 Lead [[id number]] Collected: 11/11/21 1617     Updated: 11/13/21 2057     QT Interval 405 ms     Narrative:      HEART RATE= 73  bpm  RR Interval= 824   ms  DE Interval= 89  ms  P Horizontal Axis= 78  deg  P Front Axis= 0  deg  QRSD Interval= 97  ms  QT Interval= 405  ms  QRS Axis= 59  deg  T Wave Axis= -8  deg  - NORMAL ECG -  Sinus rhythm  When compared with ECG of 09-Nov-2021 9:30:24,  Significant change in rhythm: previously atrial fibrillation  Electronically Signed By: Abhijit Anand ([facility]) 13-Nov-2021 20:57:10  Date and Time of Study: 2021-11-11 16:17:02    ECG 12 Lead [[id number]] Collected: 11/15/21 0527     Updated: 11/15/21 0528     QT Interval 417 ms     Narrative:      HEART RATE= 69  bpm  RR Interval= 864  ms  DE Interval= 253  ms  P Horizontal Axis=   deg  P Front Axis= 83  deg  QRSD Interval= 95  ms  QT Interval= 417  ms  QRS Axis= 65  deg  T Wave Axis= 31  deg  - ABNORMAL ECG -  Sinus rhythm  Prolonged DE interval  When compared with ECG of 14-Nov-2021 5:18:05,  No significant change  Electronically Signed By:   Date and Time of Study: 2021-11-15 05:27:14    ECG 12 Lead [[id number]] Collected: 11/16/21 0605     Updated: 11/16/21 0840     QT Interval 399 ms     Narrative:      HEART RATE= 59  bpm  RR Interval= 1021  ms  DE Interval=   ms  P Horizontal Axis=   deg  P Front Axis=   deg  QRSD Interval= 86  ms  QT Interval= 399  ms  QRS Axis= 57  deg  T Wave Axis= 18  deg  - ABNORMAL ECG -  Atrial fibrillation  When compared with ECG of 15-Nov-2021 5:27:14,  Significant change in rhythm: previously sinus  Electronically Signed By: Darren Phan ([facility]) 16-Nov-2021 08:37:58  Date and Time of Study: 2021-11-16 06:05:20    ECG 12 Lead [[id number]] Collected: 11/14/21 0518     Updated: 11/16/21 1951     QT Interval 415 ms     Narrative:      HEART RATE= 66  bpm  RR Interval= 904  ms  DE Interval= 249  ms  P Horizontal Axis=   deg  P Front Axis= 86  deg  QRSD Interval= 94  ms  QT Interval= 415  ms  QRS Axis= 73  deg  T Wave Axis= 19  deg  - ABNORMAL ECG -  Sinus rhythm  Prolonged DE interval  When compared with ECG of 13-Nov-2021 22:14:39,  Significant change  in rhythm: previously atrial fibrillation  New conduction abnormality  Electronically Signed By: Isael Courtney ([facility]) 16-Nov-2021 19:49:55  Date and Time of Study: 2021-11-14 05:18:05    ECG 12 Lead [[id number]] Collected: 11/13/21 2214     Updated: 11/16/21 1951     QT Interval 386 ms     Narrative:      HEART RATE= 79  bpm  RR Interval= 762  ms  FL Interval=   ms  P Horizontal Axis=   deg  P Front Axis=   deg  QRSD Interval= 93  ms  QT Interval= 386  ms  QRS Axis= 73  deg  T Wave Axis= 4  deg  - ABNORMAL ECG -  Sinus rhythm  When compared with ECG of 13-Nov-2021 5:24:13,  No new changes  Electronically Signed By: Isael Courtney ([facility]) 16-Nov-2021 19:50:28  Date and Time of Study: 2021-11-13 22:14:39            Medication Review:   I have reviewed the patient's current medication list  Scheduled Meds:  Continuous Infusions:No current facility-administered medications for this encounter.    PRN Meds:.    Imaging:  Imaging Results (Last 72 Hours)     Procedure Component Value Units Date/Time    SCANNED - IMAGING [[id number]] Resulted: 11/11/21     Updated: 11/16/21 2312    XR Chest 1 View [[id number]] Collected: 11/15/21 1212     Updated: 11/15/21 1216    Narrative:      DATE OF EXAM:  11/15/2021 12:04 PM     PROCEDURE:  XR CHEST 1 VW-     INDICATIONS:  s/p ct removal; I25.10-Atherosclerotic heart disease of native coronary  artery without angina pectoris       COMPARISON:  AP portable chest 11/14/2021.     TECHNIQUE:   Single radiographic view of the chest was obtained.     FINDINGS:  Heart size is mildly enlarged but stable with signs of CABG and valve  replacement. Pulmonary vascular distribution is normal. Trace bibasilar  pleural fluid is thought to be present. There is mild bibasilar  atelectasis which appears improved. No acute lung consolidation. Right  shoulder replacement. No evidence of overt pulmonary edema.     Previously described tiny left apical pneumothorax is unchanged.             Impression:         1.  "Stable tiny left apical pneumothorax.  2. Stable cardiomegaly.  3. Stable trace bibasilar pleural effusions. Mild bibasilar atelectasis  has improved.     Electronically Signed By-Ambreen Amaya MD On:11/15/2021 12:14 PM  This report was finalized on [id number] by  Ambreen Amaya MD.            Yayo Gay,   11/17/21  16:02 EST       EMR Dragon/Transcription:   \"Dictated utilizing Dragon dictation\".   "

## 2021-11-17 NOTE — OUTREACH NOTE
Prep Survey      Responses   [facility] patient discharged from? [facility]   Is LACE score < 7 ? No   Emergency Room discharge w/ pulse ox? No   Eligibility [facility]   Date of Admission 11/11/21   Date of Discharge 11/17/21   Discharge Disposition Home or Self Care   Discharge diagnosis s/p CABG x3 with LIMA/MV repair   Does the patient have one of the following disease processes/diagnoses(primary or secondary)? Cardiothoracic surgery   Does the patient have Home health ordered? Yes   [facility]    Is there a DME ordered? Yes   What DME was ordered? 3:1 BSC from [place]    Prep survey completed? Yes          Ila Alexander RN

## 2021-11-17 NOTE — NURSING NOTE
Pt IV removed. Education provided on follow up appointments, medications and at home care for incisions and post surgery restrictions. Educational materials provided and pt and pt's spouse verbalizes understanding. Holter monitor placed and education provided for pt about how to shower with Holter monitor. Belongings sent home with pt and spouse.

## 2021-11-17 NOTE — THERAPY TREATMENT NOTE
Subjective: Pt agreeable to therapeutic plan of care.    Objective:     Bed mobility - Min-A  Transfers - SBA  Ambulation - 300 feet CGA   TherEx- 10x mini squats at bed, heel raises    Cardiac Rehab Initiated  Level 4: Progressive AROM Exercises with supervision only: Ambulation 200-400 feet with assist as needed.     Sitting tolerance: >10min  Standing tolerance: >10min    Precautions:   Mid-sternal incision; avoid scapular retraction and depression.   Cardiovascular impairment post-sx; encourage energy conservation strategies.    Therapeutic Exercises: 10 reps UE and LE AROM in seated position.     MET level equivalent: 2.0-3.0 (Unlimited sitting, ambulation on level ground <2mph, light housework)      Pain: 0 VAS  Education: Provided education on importance of mobility and skilled verbal / tactile cueing throughout intervention.     Assessment: Salinas Dill presents with functional mobility impairments which indicate the need for skilled intervention. Tolerating session today without incident. Will continue to follow and progress as tolerated.     Plan/Recommendations:   Pt would benefit from Home with Home Health at discharge from facility and requires no DME at discharge.   Pt desires Home with Home Health at discharge. Pt showing continued improvement in mobility. Pt did not require use of RW for ambulation and did not require assistance for steadying at any time. Pt gait speed increased this session.  Incorporated mini squats and heel raises into AROM cardiac rehab exercises. Educated pt on RPE and to rest when RPE exceeds 6/10.  Educated pt and wife on safely getting into bed and how to assist. Pt HR  during ambulation. Post-exercise, pt in afib, fluctuating from  for ~2 min before returning to normal sinus rhythm. Pt cooperative; agreeable to therapeutic recommendations and plan of care.     Basic Mobility 6-click:  Rollin = Total, A lot = 2, A little = 3; 4 = None  Supine>Sit:   1  = Total, A lot = 2, A little = 3; 4 = None   Sit>Stand with arms:  1 = Total, A lot = 2, A little = 3; 4 = None  Bed>Chair:   1 = Total, A lot = 2, A little = 3; 4 = None  Ambulate in room:  1 = Total, A lot = 2, A little = 3; 4 = None  3-5 Steps with railin = Total, A lot = 2, A little = 3; 4 = None  Score: 18    Modified [place]: N/A = No pre-op stroke/TIA    Post-Tx Position: Up in Chair and Call light and personal items within reach  PPE: gloves, surgical mask, eyewear protection

## 2021-11-17 NOTE — DISCHARGE SUMMARY
Date of Admission: 11/11/2021  Date of Discharge: 11/17/2021    Discharge Diagnosis:   MV CAD, moderate mitral regurg, EF 60% (cath)--s/p CABG x3 with LIMA/MV repair/maze procedure ([facility])  Persistent atrial fibrillation--s/p maze procedure, Postop afib, NSVT, SB  Eliquis use preop--resume at discharge  HTN--stable  Dyslipidemia--statin    Presenting Problem/History of Present Illness  Coronary artery disease involving native coronary artery of native heart without angina pectoris [I25.10]     Hospital Course  Patient is a 75 y.o. male presented from home the morning of surgery.  On 11/11/2021, he underwent CABG x3 with LIMA, mitral valve repair, and full maze procedure per Dr. Hernandez.  Please see op note for full details.  He was extubated on POD#0.  He developed atrial fib on POD#2.  Amiodarone drip was initiated and eventually transitioned to oral amiodarone.  He did require IV diuresis postop.  Coreg was increased and pt developed asymptomatic sinus bradycardia in 40-50s.  Coreg decreased.  He continued with SB 40s, episode of NSVT, and noted to have a 2 sec pause.  Dr. Phan was asked to evaluate him.  His recommendations were for continued low dose beta blocker and low dose amiodarone.  Holter monitor ordered for discharge per cardiology.  His pacing wires were removed the day of discharge.  BHF HH following.  He is up ambulating with wife.  Currently SR on monitor.  He is using Tylenol for pain.  Eliquis d/w Dr. Gay--resume this evening.    AA&Ox4  Tele:  SR 70-80s  CTA on RA  Sternotomy healing well, SVHS healing well, ecchymosis noted  +BM  BLE trace pitting edema    Procedures Performed  Per Dr. Hernandez 11/11/2021  1.  CABG x3 with a LIMA to the mid LAD and reverse individual saphenous vein graft to the obtuse marginal 1 and PLB branch of the right coronary artery (CPT code 52358, 67557)   2.  Mitral valve repair with a 28 mm physiotwo ring annuloplasty (CPT code 74446)   3.  Right and left cryo-maze  procedure with full set of lesions (CPT code 46585)  4.  Endoscopic vein harvest of the right lower extremity (CPT code 46905)       Consults:   Consults     Date and Time Order Name Status Description    11/11/2021 12:15 PM Inpatient Cardiology Consult Completed     10/13/2021  9:08 AM Inpatient Cardiothoracic Surgery Consult Completed           Pertinent Test Results:    Lab Results   Component Value Date    WBC 8.50 11/17/2021    HGB 11.0 (L) 11/17/2021    HCT 32.5 (L) 11/17/2021    MCV 89.2 11/17/2021     11/17/2021      Lab Results   Component Value Date    GLUCOSE 106 (H) 11/17/2021    CALCIUM 8.5 (L) 11/17/2021     11/17/2021    K 4.0 11/17/2021    CO2 26.0 11/17/2021     11/17/2021    BUN 17 11/17/2021    CREATININE 0.70 (L) 11/17/2021    EGFRIFNONA 110 11/17/2021    BCR 24.3 11/17/2021    ANIONGAP 10.0 11/17/2021     Lab Results   Component Value Date    INR 1.08 11/12/2021    PROTIME 11.9 (H) 11/12/2021         Condition on Discharge: Stable for discharge to home with family, BHF .    Vital Signs  Temp:  [98.3 °F (36.8 °C)-100.5 °F (38.1 °C)] 98.3 °F (36.8 °C)  Heart Rate:  [72-91] 89  Resp:  [17-22] 17  BP: (103-142)/(44-83) 142/78      [place] [facility] [place] Medications     Discharge Medications      New Medications      Instructions Start Date   acetaminophen 325 MG tablet  Commonly known as: TYLENOL   650 mg, Oral, Every 6 Hours PRN      amiodarone 200 MG tablet  Commonly known as: PACERONE   Take 1 tablet by mouth 2 (Two) Times a Day With Meals for 7 days, THEN 1 tablet Daily for 24 days.   Start Date: November 17, 2021     atorvastatin 40 MG tablet  Commonly known as: LIPITOR   40 mg, Oral, Nightly      carvedilol 3.125 MG tablet  Commonly known as: COREG   3.125 mg, Oral, Every 12 Hours Scheduled      furosemide 40 MG tablet  Commonly known as: Lasix   40 mg, Oral, Daily      potassium chloride 10 MEQ CR capsule  Commonly known as: MICRO-K   20  mEq, Oral, Daily         Continue These Medications      Instructions Start Date   apixaban 5 MG tablet tablet  Commonly known as: Eliquis   5 mg, Oral, 2 Times Daily      aspirin 81 MG EC tablet   81 mg, Oral, Daily      melatonin 5 MG tablet tablet   5 mg, Oral, Nightly      SILDENAFIL CITRATE PO   20 mg, Oral, Daily, For enlarged prostate       vitamin b complex capsule capsule   1 capsule, Oral, Daily, LD 11/4      Vitamin D 50 MCG (2000 UT) capsule   2,000 Units, Oral, Daily, 11/4         Stop These Medications    dilTIAZem  MG 24 hr capsule  Commonly known as: CARDIZEM CD     ELDERBERRY PO     losartan 50 MG tablet  Commonly known as: COZAAR     mupirocin 2 % ointment  Commonly known as: BACTROBAN     triamterene-hydrochlorothiazide 37.5-25 MG per capsule  Commonly known as: DYAZIDE     Turmeric 500 MG capsule            Discharge Diet:   Healthy Heart Diet    Activity at Discharge:   As tolerated, no lifting > 10 pounds x 6 weeks    Follow-up Appointments  Future Appointments   Date Time Provider Department Center   12/2/2021 11:45 AM Rachel Koch APRN [facility]   12/30/2021  9:00 AM Rachel Koch APRN [facility]   1/18/2022  8:00 AM Kortney Ferrera MD [facility]   3/2/2022  2:00 PM Renato Knight MD [facility]     Additional Instructions for the Follow-ups that You Need to Schedule     Ambulatory Referral to Home Health   As directed      Face to Face Visit Date: 11/15/2021    Follow-up provider for Plan of Care?: I will be treating the patient on an ongoing basis.  Please send me the Plan of Care for signature.    Follow-up provider: CARLOS SWANSON [[id number]]    Reason/Clinical Findings: s/p CABG    Describe mobility limitations that make leaving home difficult: s/p CABG    Nursing/Therapeutic Services Requested: Skilled Nursing Physical Therapy    Skilled nursing orders: Post CABG care    PT orders:  (eval and treat)    Frequency: 1 Week 1         Discharge  Follow-up with PCP   As directed       Currently Documented PCP:    Kortney Ferrera MD    PCP Phone Number:    [phone]     Follow Up Details: in one month         Discharge Follow-up with Specialty: Cardiology; 3 Weeks   As directed      Specialty: Cardiology    Follow Up: 3 Weeks    Follow Up Details: call for appt with DR. DNO         Discharge Follow-up with Specified Provider: Cardiac Surgery; 6 Weeks   As directed      To: Cardiac Surgery    Follow Up: 6 Weeks    Follow Up Details: KWASI Hall 12/2/2021 at 11:45 am  AND  12/30/2021 at 9am         Referral to Cardiac Rehab   As directed      Call MD With Problems / Concerns    Dec 17, 2021 (Approximate)      Instructions: Call for temp >101 or any surgical wound drainage    Order Comments: Instructions: Call for temp >101 or any surgical wound drainage                Test Results Pending at Discharge:  none    STS infomation:  Pt was sent home on asa/bb/statin.  Amiodarone d/t atrial fib.  Eliquis for atrial fib.       Ning Ruiz, KWASI  11/17/21  10:46 EST

## 2021-11-18 ENCOUNTER — TRANSITIONAL CARE MANAGEMENT TELEPHONE ENCOUNTER (OUTPATIENT)
Dept: CALL CENTER | Facility: HOSPITAL | Age: 76
End: 2021-11-18

## 2021-11-18 ENCOUNTER — TELEPHONE (OUTPATIENT)
Dept: FAMILY MEDICINE CLINIC | Facility: CLINIC | Age: 76
End: 2021-11-18

## 2021-11-18 NOTE — TELEPHONE ENCOUNTER
Per wife pt doing very well s/p CABGx3, incision sites good. No chest pain, nausea, SOB. Pt up moving about well, using spirometry with ease. New meds in place. No questions at this time. Pt will keep 01/2022 appt with PCP Dr Ferrera, has fwp with Cardio for post op 12/02/2021     Note from

## 2021-11-18 NOTE — TELEPHONE ENCOUNTER
Please call patient and make sure he is doing well and that he doesn't need anything before I see.  How is back?

## 2021-11-18 NOTE — OUTREACH NOTE
Call Center TCM Note      Responses   [facility] patient discharged from? [facility]   Does the patient have one of the following disease processes/diagnoses(primary or secondary)? Cardiothoracic surgery   TCM attempt successful? Yes   Discharge diagnosis s/p CABG x3 with LIMA/MV repair   Is patient permission given to speak with other caregiver? Yes   Person spoke with today (if not patient) and relationship wife   Meds reviewed with patient/caregiver? Yes   Is the patient having any side effects they believe may be caused by any medication additions or changes? No   Does the patient have all medications related to this admission filled (includes all antibiotics, pain medications, cardiac medications, etc.) Yes   Is the patient taking all medications as directed (includes completed medication regime)? Yes   Does the patient have a primary care provider?  Yes   Does the patient have an appointment scheduled with their C/T surgeon? Yes   Comments regarding PCP Pt will keep 01/2022 appt with PCP Dr Ferrera, has fwp with Cardio for post op 12/02/2021   Has the patient kept scheduled appointments due by today? Yes   What is the Home health agency?   [facility]    Has home health visited the patient within 72 hours of discharge? Yes   Psychosocial issues? No   Did the patient receive a copy of their discharge instructions? Yes   What is the patient's perception of their health status since discharge? Improving   Nursing interventions Nurse provided patient education   Is the patient/caregiver able to teach back normal signs of recovery? Nausea and lack of appetite,  Constipation,  Depression or irritability,  Pain or discomfort at incisional site  [Pt having none of these symptons]   Is the patient /caregiver able to teach back basic post-op care? Continue use of incentive spirometry at least 1 week post discharge,  Take showers only when approved by MD-sponge bathe until then,  Do not remove steri-strips,   Lifting as instructed by MD in discharge instructions,  No tub bath, swimming, or hot tub until instructed by MD,  Practice 'cough and deep breath',  Drive as instructed by MD in discharge instructions   Is the patient/caregiver able to teach back signs and symptoms of incisional infection? Increased redness, swelling or pain at the incisonal site,  Pus or odor from incision,  Fever,  Increased drainage or bleeding,  Incisional warmth   Is the patient/caregiver able to teach back steps to recovery at home? Set small, achievable goals for return to baseline health,  Practice good oral hygiene,  Eat a well-balance diet,  Rest and rebuild strength, gradually increase activity,  Weigh daily,  Make a list of questions for surgeon's appointment   Is the patient /caregiver able to teach back the importance of cardiac rehab? Yes   Nursing interventions Provided education on importance of cardiac rehab   If the patient is a current smoker, are they able to teach back resources for cessation? Not a smoker   Is the patient/caregiver able to teach back the hierarchy of who to call/visit for symptoms/problems? PCP, Specialist, Home health nurse, Urgent Care, ED, 911 Yes   TCM call completed? Yes   Wrap up additional comments Per wife pt doing very well s/p CABGx3, incision sites good. No chest pain, nausea, SOB. Pt up moving about well, using spirometry with ease. New meds in place. No questions at this time. Pt will keep 01/2022 appt with PCP Dr Ferrera, has fwp with Cardio for post op 12/02/2021          Julia Mason MA    11/18/2021, 16:36 EST

## 2021-11-18 NOTE — PROGRESS NOTES
Spoke with patient to verify demographics and explain services. Pt is agreeable  Dr. Hernandez as following    Pharmacy: [facility]  PCP: Dr. Kortney Ferrera

## 2021-11-18 NOTE — OUTREACH NOTE
Call Center TCM Note      Responses   [facility] patient discharged from? [facility]   Does the patient have one of the following disease processes/diagnoses(primary or secondary)? Cardiothoracic surgery   TCM attempt successful? No   Unsuccessful attempts Attempt 1          Julia Mason MA    11/18/2021, 15:39 EST

## 2021-11-19 ENCOUNTER — HOME CARE VISIT (OUTPATIENT)
Dept: HOME HEALTH SERVICES | Facility: HOME HEALTHCARE | Age: 76
End: 2021-11-19

## 2021-11-19 PROCEDURE — G0299 HHS/HOSPICE OF RN EA 15 MIN: HCPCS

## 2021-11-20 VITALS
BODY MASS INDEX: 30.8 KG/M2 | WEIGHT: 220 LBS | OXYGEN SATURATION: 97 % | DIASTOLIC BLOOD PRESSURE: 80 MMHG | TEMPERATURE: 98.6 F | SYSTOLIC BLOOD PRESSURE: 138 MMHG | RESPIRATION RATE: 20 BRPM | HEIGHT: 71 IN | HEART RATE: 76 BPM

## 2021-11-20 NOTE — HOME HEALTH
75 year old male lives in 1 level home with wife prior to surgery independent in all areas of care. 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 Reports he did one of those health tests for 99.00 and found to have issue had cardiac cath showing needed bypass surgery mitral valve repair and MAZE for a fib. Incisions edges approximated some scabbing no redness drainage or swelling chest  drain sites and right leg at knee. Moving about house gait steady reports has been exercising inst. not to overdue important at this point to walk frequently gradually increasing time and distance avoid extremes in cold or hot weather. Voiced understanding. Inst. PT eval ordered will be next week in agreement. Reports appetite good inst. to watch fats and salt in diet. Wife voiced understanding rev. post op restrictions no driving lifting nothing heavier than a gallon of milk assess incisions daily for s.s. complications. Activity level. Reports has generalized arthritis. Reports has been on Eliquis since a fib dx couple of years ago. No edema bm 11/18/2021 No difficulty voiding. Denies pain did not get script for pain med didnt feel would need it discussed using Tylenol if has discomfort reports has some soreness but not pain using heart phyllis to splint with getting up or coughing. Main focus of care cardiopulmonary assessment  for heart rate regular rhythm and rate.     Hx A fib  HTN   Arthritis

## 2021-11-22 ENCOUNTER — HOME CARE VISIT (OUTPATIENT)
Dept: HOME HEALTH SERVICES | Facility: HOME HEALTHCARE | Age: 76
End: 2021-11-22

## 2021-11-22 VITALS
HEIGHT: 71 IN | TEMPERATURE: 97.2 F | HEART RATE: 86 BPM | OXYGEN SATURATION: 97 % | DIASTOLIC BLOOD PRESSURE: 78 MMHG | BODY MASS INDEX: 30.8 KG/M2 | WEIGHT: 220 LBS | SYSTOLIC BLOOD PRESSURE: 130 MMHG | RESPIRATION RATE: 20 BRPM

## 2021-11-22 PROCEDURE — G0151 HHCP-SERV OF PT,EA 15 MIN: HCPCS

## 2021-11-23 PROCEDURE — G0180 MD CERTIFICATION HHA PATIENT: HCPCS | Performed by: THORACIC SURGERY (CARDIOTHORACIC VASCULAR SURGERY)

## 2021-11-23 NOTE — CASE COMMUNICATION
PT Evaluation Summary: (No further PT planned).  The patient is a 75 year old male admitted to home health services by  on 11/19/2021 following hospitalization for s/p CABG x 3 and MAZE procedure 11/11/202    PT assessment this day (11/22/2021) found the patient to be doing very well functionally and not requiring formal PT intervention at this point. Session did include reivew of precautions post cardio-throacic surgery, assessment o f gait, transfers and balance, provision of basic lower extremity home exercise program (HEP) including walking for exercise and an explaination of a progressive ambulation program.    The patient agreed with PT regarding no apparent need for formal PT at this time. Patient remains under the care of home health skilled nursing.

## 2021-11-23 NOTE — HOME HEALTH
PT Evaluation Summary: (No further PT planned).  The patient is a 75 year old male admitted to home health services by  on 11/19/2021 following hospitalization for s/p CABG x 3 and MAZE procedure 11/11/202    Past medical history includes Afib, HTN, arthritis, CAD s/p CABG x 3 and MAZE procedure 11/11/2021.     Prior Functional Level: Independent in all mobility, very active around the home.    Social History: Lives with spouse in their home with ramp access.    PT assessment this day (11/22/2021) found the patient to be doing very well functionally and not requiring formal PT intervention at this point. Session did include reivew of precautions post cardio-throacic surgery, assessment of gait, transfers and balance, provision of basic lower extremity home exercise program (HEP) including walking for exercise and an explaination of a progressive ambulation program. The patient agreed with PT regarding no apparent need for formal PT at this time. Patient remains under the care of home health skilled nursing.

## 2021-11-24 ENCOUNTER — HOME CARE VISIT (OUTPATIENT)
Dept: HOME HEALTH SERVICES | Facility: HOME HEALTHCARE | Age: 76
End: 2021-11-24

## 2021-11-24 ENCOUNTER — TELEPHONE (OUTPATIENT)
Dept: CARDIOLOGY | Facility: CLINIC | Age: 76
End: 2021-11-24

## 2021-11-24 PROCEDURE — G0299 HHS/HOSPICE OF RN EA 15 MIN: HCPCS

## 2021-11-24 RX ORDER — POTASSIUM CHLORIDE 750 MG/1
20 CAPSULE, EXTENDED RELEASE ORAL DAILY
Qty: 30 CAPSULE | Refills: 0 | Status: SHIPPED | OUTPATIENT
Start: 2021-11-24 | End: 2021-12-03 | Stop reason: SDUPTHER

## 2021-11-24 RX ORDER — FUROSEMIDE 40 MG/1
40 TABLET ORAL DAILY
Qty: 30 TABLET | Refills: 0 | Status: SHIPPED | OUTPATIENT
Start: 2021-11-24 | End: 2021-11-30 | Stop reason: SDUPTHER

## 2021-11-24 NOTE — TELEPHONE ENCOUNTER
Called and notified Alejandra with [facility], [facility]    --------------------------    Renato Knight MD Trent, Melissa, MA    Continue Lasix for now until seen in the office   Once he starts rehab and blood pressure starts going will consider restarting Maxide   For now Maxide is too strong probably will drop his blood pressure too low             Previous Messages       ----- Message -----   From: Rosalina Hinojosa MA   Sent: 11/24/2021  11:45 AM EST   To: Renato Knight MD   Subject: Cabg                                             Alejandra with [facility] called [phone] Patient is post op Cabg.   He wants to know if he is to go back on  triamterene-hydrochlorothiazide (MAXZIDE-25) 37.5-25 MG per tablet, TAKE 1 TABLET BY MOUTH DAILY.     She said his weight is good and his b/p is 118/68.     She wants to know if you want him to go back on the maxzide or just a small dose of lasix, patient states he has been on the maxzide for over 40years..     Please advise

## 2021-11-25 VITALS
HEART RATE: 80 BPM | RESPIRATION RATE: 20 BRPM | OXYGEN SATURATION: 100 % | WEIGHT: 218 LBS | SYSTOLIC BLOOD PRESSURE: 118 MMHG | TEMPERATURE: 97.1 F | DIASTOLIC BLOOD PRESSURE: 68 MMHG | BODY MASS INDEX: 30.4 KG/M2

## 2021-11-25 NOTE — HOME HEALTH
Reports has finished Lasix and Potassium noted 1 plue edema in right ankle pt concerned over edema. Reports before surgery took Dyazide and no mention in discharge papers to restart. Called Dr. Lin office [facility] left for Rosalina about Dyazide and pt concerns given result of blood pressure today 118/68. Explained to pt that MD may order small dose diuretic and not dyazide due to not wanting to lower blood pressure aware will call as soon as I hear back from MD in agreement.     Rosalina returned call reports  going to call in Lasix will assess Dyazide when ready for cardiac rehab. Inst. to call into [facility] in [place]. Pts wife notifiied of med to be called in will . Adequate: hears normal conversation without difficulty

## 2021-11-30 RX ORDER — FUROSEMIDE 40 MG/1
40 TABLET ORAL DAILY
Qty: 30 TABLET | Refills: 0 | Status: SHIPPED | OUTPATIENT
Start: 2021-11-30 | End: 2021-12-02

## 2021-12-02 ENCOUNTER — OFFICE VISIT (OUTPATIENT)
Dept: CARDIAC SURGERY | Facility: CLINIC | Age: 76
End: 2021-12-02

## 2021-12-02 VITALS
BODY MASS INDEX: 31.22 KG/M2 | HEIGHT: 71 IN | OXYGEN SATURATION: 98 % | HEART RATE: 71 BPM | WEIGHT: 223 LBS | SYSTOLIC BLOOD PRESSURE: 142 MMHG | DIASTOLIC BLOOD PRESSURE: 89 MMHG | TEMPERATURE: 97.7 F | RESPIRATION RATE: 20 BRPM

## 2021-12-02 DIAGNOSIS — Z98.890 S/P MVR (MITRAL VALVE REPAIR): ICD-10-CM

## 2021-12-02 DIAGNOSIS — Z95.1 S/P CABG X 3: Primary | ICD-10-CM

## 2021-12-02 DIAGNOSIS — Z98.890 S/P MAZE OPERATION FOR ATRIAL FIBRILLATION: ICD-10-CM

## 2021-12-02 DIAGNOSIS — Z86.79 S/P MAZE OPERATION FOR ATRIAL FIBRILLATION: ICD-10-CM

## 2021-12-02 DIAGNOSIS — Z98.890 S/P LEFT ATRIAL APPENDAGE LIGATION: ICD-10-CM

## 2021-12-02 PROCEDURE — 99024 POSTOP FOLLOW-UP VISIT: CPT | Performed by: NURSE PRACTITIONER

## 2021-12-02 RX ORDER — TRIAMTERENE AND HYDROCHLOROTHIAZIDE 37.5; 25 MG/1; MG/1
TABLET ORAL
COMMUNITY
Start: 2021-11-22 | End: 2021-12-02

## 2021-12-02 NOTE — PATIENT INSTRUCTIONS
Continue lifting restriction of 10 lbs until 6 weeks and 50 lbs until 12 weeks from the date of surgery, no excessive jarring motions or twisting motions until 12 weeks from the date of surgery    Weigh daily.  Take Lasix (furosemide) for weight gain of 3 lbs/24 hours or 5 lbs/72 hours, take potassium pill with every lasix dose.      Prophylactic antibiotics before dental work and other surgeries lifelong with artificial valve, prosthesis, or grafting

## 2021-12-02 NOTE — PROGRESS NOTES
"CARDIOVASCULAR SURGERY FOLLOW-UP PROGRESS NOTE  Chief Complaint: Postop follow-up        HPI:   Dear Dr. Ferrera, Kortney Infante MD and colleagues:    It was nice to see Salinas Dill in follow up 3 weeks after surgery.  As you know, he is a 75 y.o. male with CAD with EF 60%, mitral regurgitation, persistent atrial fibrillation, hypertension who underwent CABG x3 with LIMA, mitral valve repair/ring, right and left cryo-maze procedure with JOSE G ligation on 11/11/2021 with Dr. Hernandez at [facility]. He had intermittent atrial fibrillation postoperatively that converted to sinus bradycardia with 2-second pauses, he was evaluated by Dr. Phan prior to discharge, he is currently wearing a monitor for ongoing rhythm evaluation.  He has an appointment on December 16 with Dr. Knight.  He continues to take Eliquis for anticoagulation and he denies any bleeding issues.  He comes in today complaining of nothing, his Lasix was extended by cardiology for lower extremity edema.  His activity level has been good, he continues with home health services.  From a surgical standpoint, the sternal incision is well approximated without erythema, edema, or drainage.  The sternum is stable to palpation, and the patient denies any popping or clicking with deep inspiration or coughing.      Risk factors:  BMI 31, ETOH-1/week, Caffeine intake-, MARK-denies, denies tobacco use  Stop Bang:    [id number] score (HTN, CAD, age, denies CVA or diabetes): 3 (annual stroke risk 3.2%)  HAS- BLED score (HTN, age, aspirin): 3 (annual bleed risk 5.8%, 10-year bleed risk 45%)    Physical Exam:         /89 (BP Location: Left arm, Patient Position: Sitting, Cuff Size: Adult)   Pulse 71   Temp 97.7 °F (36.5 °C) (Oral)   Resp 20   Ht 180.3 cm (71\")   Wt 101 kg (223 lb)   SpO2 98%   BMI 31.10 kg/m²   Heart:  regular rate and rhythm, S1, S2 normal, no murmur, click, rub or gallop  Lungs:  clear to auscultation bilaterally  Extremities:  " no edema  Incision(s):  mid chest healing well, right leg healing well, sternum stable    Assessment/Plan:     S/P CABG, mitral valve repair, Maze procedure and left atrial appendage ligation. Overall, he is doing well.    No significant post-op complications    No heavy lifting > 10 pounds for 3 more weeks  Keep incisions clean and dry  OK to drive if not taking narcotic pain medicine  Follow-up as scheduled with cardiology  Return to clinic in 3 week(s) with no new studies  Lasix as needed after 1 week, KIMI hose to lower extremity EVH leg    Continue lifting restriction of 10 lbs until 6 weeks and 50 lbs until 12 weeks from the date of surgery, no excessive jarring motions or twisting motions until 12 weeks from the date of surgery    Return to clinic if any signs or symptoms of infection or sternal instability develop       Thank you for allowing me to participate in the care of your patient.    Regards,  KWASI Hall    Current outpatient and discharge medications have been reconciled for the patient.  Reviewed by: KWASI Hall

## 2021-12-03 ENCOUNTER — READMISSION MANAGEMENT (OUTPATIENT)
Dept: CALL CENTER | Facility: HOSPITAL | Age: 76
End: 2021-12-03

## 2021-12-03 ENCOUNTER — HOME CARE VISIT (OUTPATIENT)
Dept: HOME HEALTH SERVICES | Facility: HOME HEALTHCARE | Age: 76
End: 2021-12-03

## 2021-12-03 PROBLEM — Z98.890 S/P LEFT ATRIAL APPENDAGE LIGATION: Status: ACTIVE | Noted: 2021-12-03

## 2021-12-03 RX ORDER — POTASSIUM CHLORIDE 750 MG/1
20 CAPSULE, EXTENDED RELEASE ORAL DAILY
Qty: 30 CAPSULE | Refills: 0 | Status: SHIPPED | OUTPATIENT
Start: 2021-12-03 | End: 2021-12-16

## 2021-12-03 RX ORDER — FUROSEMIDE 40 MG/1
40 TABLET ORAL DAILY PRN
Qty: 30 TABLET | Refills: 0
Start: 2021-12-03 | End: 2021-12-16 | Stop reason: SDUPTHER

## 2021-12-03 RX ORDER — POTASSIUM CHLORIDE 750 MG/1
20 CAPSULE, EXTENDED RELEASE ORAL DAILY PRN
Qty: 30 CAPSULE | Refills: 0
Start: 2021-12-03 | End: 2021-12-16

## 2021-12-03 RX ORDER — LOSARTAN POTASSIUM 50 MG/1
TABLET ORAL
Qty: 30 TABLET | Refills: 0 | OUTPATIENT
Start: 2021-12-03

## 2021-12-03 NOTE — TELEPHONE ENCOUNTER
Please call patient and make sure he is to take-looks like it was stopped when he left hospital-is he to take again?  Did he have kidney problems during surgery?

## 2021-12-03 NOTE — OUTREACH NOTE
CT Surgery Week 3 Survey      Responses   [facility] patient discharged from? [facility]   Does the patient have one of the following disease processes/diagnoses(primary or secondary)? Cardiothoracic surgery   Week 3 attempt successful? Yes   Call start time 1643   Call end time 1645   Discharge diagnosis s/p CABG x3 with LIMA/MV repair   Person spoke with today (if not patient) and relationship wife   Meds reviewed with patient/caregiver? Yes   Is the patient taking all medications as directed (includes completed medication regime)? Yes   Has the patient kept scheduled appointments due by today? Yes   Psychosocial issues? No   Comments Incisions healing well. Does get SOA with exertion. RLE edema, they kept on lasix, no s/sx of infection at incision. Chest incisions healing well. Appetite is WNL.    What is the patient's perception of their health status since discharge? Improving   Is the patient /caregiver able to teach back basic post-op care? Continue use of incentive spirometry at least 1 week post discharge,  Practice 'cough and deep breath'   Is the patient/caregiver able to teach back steps to recovery at home? Set small, achievable goals for return to baseline health,  Rest and rebuild strength, gradually increase activity   Is the patient/caregiver able to teach back the hierarchy of who to call/visit for symptoms/problems? PCP, Specialist, Home health nurse, Urgent Care, ED, 911 Yes   Week 3 call completed? Yes   Wrap up additional comments Using IS at home          [place] RN

## 2021-12-08 ENCOUNTER — TRANSCRIBE ORDERS (OUTPATIENT)
Dept: CARDIAC REHAB | Facility: HOSPITAL | Age: 76
End: 2021-12-08

## 2021-12-08 DIAGNOSIS — Z95.1 S/P CABG (CORONARY ARTERY BYPASS GRAFT): Primary | ICD-10-CM

## 2021-12-08 PROCEDURE — 93248 EXT ECG>7D<15D REV&INTERPJ: CPT | Performed by: INTERNAL MEDICINE

## 2021-12-09 ENCOUNTER — TELEPHONE (OUTPATIENT)
Dept: FAMILY MEDICINE CLINIC | Facility: CLINIC | Age: 76
End: 2021-12-09

## 2021-12-09 NOTE — TELEPHONE ENCOUNTER
HUB TO READ  ----- Message from Kortney Ferrera MD sent at 12/8/2021  5:58 PM EST -----  Make sure patient has followed up with Dr. Florence on heart moniter     Looks like HAS APPT WITH DR FLORENCE ON THE 16TH

## 2021-12-15 ENCOUNTER — OFFICE VISIT (OUTPATIENT)
Dept: CARDIAC REHAB | Facility: HOSPITAL | Age: 76
End: 2021-12-15

## 2021-12-15 DIAGNOSIS — Z95.1 S/P CABG (CORONARY ARTERY BYPASS GRAFT): Primary | ICD-10-CM

## 2021-12-15 PROCEDURE — 93798 PHYS/QHP OP CAR RHAB W/ECG: CPT

## 2021-12-16 ENCOUNTER — LAB (OUTPATIENT)
Dept: LAB | Facility: HOSPITAL | Age: 76
End: 2021-12-16

## 2021-12-16 ENCOUNTER — HOSPITAL ENCOUNTER (OUTPATIENT)
Dept: GENERAL RADIOLOGY | Facility: HOSPITAL | Age: 76
Discharge: HOME | End: 2021-12-16

## 2021-12-16 ENCOUNTER — TREATMENT (OUTPATIENT)
Dept: CARDIAC REHAB | Facility: HOSPITAL | Age: 76
End: 2021-12-16

## 2021-12-16 ENCOUNTER — OFFICE VISIT (OUTPATIENT)
Dept: CARDIOLOGY | Facility: CLINIC | Age: 76
End: 2021-12-16

## 2021-12-16 VITALS
WEIGHT: 223 LBS | SYSTOLIC BLOOD PRESSURE: 133 MMHG | DIASTOLIC BLOOD PRESSURE: 82 MMHG | HEART RATE: 67 BPM | HEIGHT: 71 IN | RESPIRATION RATE: 18 BRPM | BODY MASS INDEX: 31.22 KG/M2 | OXYGEN SATURATION: 98 %

## 2021-12-16 DIAGNOSIS — I34.0 NONRHEUMATIC MITRAL VALVE REGURGITATION: ICD-10-CM

## 2021-12-16 DIAGNOSIS — I25.83 CORONARY ARTERY DISEASE DUE TO LIPID RICH PLAQUE: ICD-10-CM

## 2021-12-16 DIAGNOSIS — R00.2 PALPITATIONS: ICD-10-CM

## 2021-12-16 DIAGNOSIS — I25.10 CORONARY ARTERY DISEASE DUE TO LIPID RICH PLAQUE: ICD-10-CM

## 2021-12-16 DIAGNOSIS — Z98.890 S/P MAZE OPERATION FOR ATRIAL FIBRILLATION: ICD-10-CM

## 2021-12-16 DIAGNOSIS — Z98.890 S/P MVR (MITRAL VALVE REPAIR): ICD-10-CM

## 2021-12-16 DIAGNOSIS — Z86.79 S/P MAZE OPERATION FOR ATRIAL FIBRILLATION: ICD-10-CM

## 2021-12-16 DIAGNOSIS — Z95.1 S/P CABG X 3: ICD-10-CM

## 2021-12-16 DIAGNOSIS — Z98.890 S/P LEFT ATRIAL APPENDAGE LIGATION: ICD-10-CM

## 2021-12-16 DIAGNOSIS — I25.10 CORONARY ARTERY DISEASE INVOLVING NATIVE CORONARY ARTERY OF NATIVE HEART WITHOUT ANGINA PECTORIS: ICD-10-CM

## 2021-12-16 DIAGNOSIS — R00.2 PALPITATIONS: Primary | ICD-10-CM

## 2021-12-16 DIAGNOSIS — E78.5 DYSLIPIDEMIA: ICD-10-CM

## 2021-12-16 DIAGNOSIS — Z95.1 S/P CABG (CORONARY ARTERY BYPASS GRAFT): Primary | ICD-10-CM

## 2021-12-16 DIAGNOSIS — I10 PRIMARY HYPERTENSION: ICD-10-CM

## 2021-12-16 LAB
ALBUMIN SERPL-MCNC: 4 G/DL (ref 3.5–5.2)
ALBUMIN/GLOB SERPL: 1.3 G/DL
ALP SERPL-CCNC: 71 U/L (ref 39–117)
ALT SERPL W P-5'-P-CCNC: 15 U/L (ref 1–41)
ANION GAP SERPL CALCULATED.3IONS-SCNC: 10.6 MMOL/L (ref 5–15)
AST SERPL-CCNC: 25 U/L (ref 1–40)
BASOPHILS # BLD AUTO: 0.06 10*3/MM3 (ref 0–0.2)
BASOPHILS NFR BLD AUTO: 0.8 % (ref 0–1.5)
BILIRUB SERPL-MCNC: 0.3 MG/DL (ref 0–1.2)
BUN SERPL-MCNC: 17 MG/DL (ref 8–23)
BUN/CREAT SERPL: 21 (ref 7–25)
CALCIUM SPEC-SCNC: 9.7 MG/DL (ref 8.6–10.5)
CHLORIDE SERPL-SCNC: 103 MMOL/L (ref 98–107)
CO2 SERPL-SCNC: 28.4 MMOL/L (ref 22–29)
CREAT SERPL-MCNC: 0.81 MG/DL (ref 0.76–1.27)
DEPRECATED RDW RBC AUTO: 41 FL (ref 37–54)
EOSINOPHIL # BLD AUTO: 0.52 10*3/MM3 (ref 0–0.4)
EOSINOPHIL NFR BLD AUTO: 7 % (ref 0.3–6.2)
ERYTHROCYTE [DISTWIDTH] IN BLOOD BY AUTOMATED COUNT: 12.5 % (ref 12.3–15.4)
GFR SERPL CREATININE-BSD FRML MDRD: 93 ML/MIN/1.73
GLOBULIN UR ELPH-MCNC: 3 GM/DL
GLUCOSE SERPL-MCNC: 89 MG/DL (ref 65–99)
HCT VFR BLD AUTO: 36.8 % (ref 37.5–51)
HGB BLD-MCNC: 11.6 G/DL (ref 13–17.7)
IMM GRANULOCYTES # BLD AUTO: 0.02 10*3/MM3 (ref 0–0.05)
IMM GRANULOCYTES NFR BLD AUTO: 0.3 % (ref 0–0.5)
LYMPHOCYTES # BLD AUTO: 0.82 10*3/MM3 (ref 0.7–3.1)
LYMPHOCYTES NFR BLD AUTO: 11 % (ref 19.6–45.3)
MCH RBC QN AUTO: 28.3 PG (ref 26.6–33)
MCHC RBC AUTO-ENTMCNC: 31.5 G/DL (ref 31.5–35.7)
MCV RBC AUTO: 89.8 FL (ref 79–97)
MONOCYTES # BLD AUTO: 1.01 10*3/MM3 (ref 0.1–0.9)
MONOCYTES NFR BLD AUTO: 13.6 % (ref 5–12)
NEUTROPHILS NFR BLD AUTO: 5 10*3/MM3 (ref 1.7–7)
NEUTROPHILS NFR BLD AUTO: 67.3 % (ref 42.7–76)
NRBC BLD AUTO-RTO: 0 /100 WBC (ref 0–0.2)
PLATELET # BLD AUTO: 284 10*3/MM3 (ref 140–450)
PMV BLD AUTO: 11.9 FL (ref 6–12)
POTASSIUM SERPL-SCNC: 3.9 MMOL/L (ref 3.5–5.2)
PROT SERPL-MCNC: 7 G/DL (ref 6–8.5)
RBC # BLD AUTO: 4.1 10*6/MM3 (ref 4.14–5.8)
SODIUM SERPL-SCNC: 142 MMOL/L (ref 136–145)
TSH SERPL DL<=0.05 MIU/L-ACNC: 3.29 UIU/ML (ref 0.27–4.2)
WBC NRBC COR # BLD: 7.43 10*3/MM3 (ref 3.4–10.8)

## 2021-12-16 PROCEDURE — 93798 PHYS/QHP OP CAR RHAB W/ECG: CPT

## 2021-12-16 PROCEDURE — 93000 ELECTROCARDIOGRAM COMPLETE: CPT | Performed by: INTERNAL MEDICINE

## 2021-12-16 PROCEDURE — 71046 X-RAY EXAM CHEST 2 VIEWS: CPT

## 2021-12-16 PROCEDURE — 84443 ASSAY THYROID STIM HORMONE: CPT

## 2021-12-16 PROCEDURE — 99214 OFFICE O/P EST MOD 30 MIN: CPT | Performed by: INTERNAL MEDICINE

## 2021-12-16 PROCEDURE — 85025 COMPLETE CBC W/AUTO DIFF WBC: CPT

## 2021-12-16 PROCEDURE — 80053 COMPREHEN METABOLIC PANEL: CPT

## 2021-12-16 RX ORDER — POTASSIUM CHLORIDE 750 MG/1
20 CAPSULE, EXTENDED RELEASE ORAL DAILY PRN
Qty: 60 CAPSULE | Refills: 0
Start: 2021-12-16 | End: 2022-02-08

## 2021-12-16 RX ORDER — FUROSEMIDE 40 MG/1
40 TABLET ORAL DAILY PRN
Qty: 30 TABLET | Refills: 0
Start: 2021-12-16 | End: 2021-12-17

## 2021-12-16 RX ORDER — CARVEDILOL 3.12 MG/1
3.12 TABLET ORAL EVERY 12 HOURS SCHEDULED
Qty: 180 TABLET | Refills: 3 | Status: SHIPPED | OUTPATIENT
Start: 2021-12-16 | End: 2022-12-05

## 2021-12-16 NOTE — PROGRESS NOTES
Cardiology Office Visit      Encounter Date:  12/16/2021    Patient ID:   Salinas Dill is a 75 y.o. male.    Reason For Followup:  Chronic atrial fibrillation  Mitral regurgitation  Hypertension  Status post coronary artery bypass surgery and mitral valve repair    Brief Clinical History:  Dear Dr. Ferrera, Kortney Infante MD    I had the pleasure of seeing Salinas Dill today. As you are well aware, this is a 75 y.o. male with past medical history significant for history of hypertension and benign prostatic hypertrophy and recent hospitalization for coronary artery disease and coronary artery bypass surgery here for further evaluation treatment options    Interpretation Summary    Extended Holter monitor study for symptoms of palpitations  Patient was monitored for 30 days from November 17, 2021 to December 1, 2021  Primary rhythm is atrial fibrillation with average heart rate of 82 bpm  Minimal heart rate of 61 bpm maximal heart rate of 113 bpm  A. fib burden of 71%  Rare supraventricular ectopy burden  Rare ventricular ectopy burden   Longest pause was 2.4 seconds  1 episode of nonsustained ventricular tachycardia lasting for 4 beats  No sustained episodes of ventricular tachycardia  Abnormal Holter monitor study  Clinical correlation is recommended      Interval History:  Complaining of some nonspecific fatigue  Tolerating cardiac rehab just got started with rehab    Assessment & Plan    Impressions:  Chronic atrial fibrillation  Hypertension  Ilan Vascor of 2  stress test with no evidence of any inducible ischemia  echocardiogram with normal LV systolic function and moderate mitral regurgitation  Severe multivessel coronary artery disease status post CABG x3 with a LIMA to the mid LAD and reverse individual saphenous vein graft to the obtuse marginal 1 and PLB branch of the right coronary artery 2021-11-11  Severe mitral valve insufficiency status post ring annuloplasty  Atrial fibrillation with right and left  "cryomaze procedure and left atrial appendage ligation  Postop atrial fibrillation with intermittent bradycardia  Essential hypertension  Dyslipidemia  BPH    Recommendations:    Likely symptoms of fatigue and weakness probably secondary to amiodarone will discontinue amiodarone  Continue current dose of beta-blockers  Continue aspirin and continue anticoagulation therapy with Eliquis  Likely consider AVERY in the next to 3 months to evaluate the effectiveness of left atrial appendage closure and then decide need for anticoagulation therapy  continue current dose Eliquis for anticoagulation  continued risk factor modification  Risk benefits of long-term anticoagulation therapy discussed the patient  Continue risk factor modification  Recent labs reviewed and discussed with the patient  Lipids at goal  Medical records from recent hospitalization reviewed and discussed with the patient and family  Check labs including CBC chemistry and chest x-ray  Continue cardiac rehab  Use the diuretics every other day and eventually as needed  Consider arising losartan in future if the blood pressure improves  Cardiogram disease the LV function and also look at the mitral valve function  Results of the recent Holter monitor reviewed and discussed    follow-up in office in 1 month  Objective:    Vitals:  Vitals:    12/16/21 0924   BP: 133/82   BP Location: Left arm   Patient Position: Sitting   Cuff Size: Large Adult   Pulse: 67   Resp: 18   SpO2: 98%   Weight: 101 kg (223 lb)   Height: 180.3 cm (71\")       Physical Exam:  General: Alert, cooperative, no distress, appears stated age  Head:  Normocephalic, atraumatic, mucous membranes moist  Eyes:  Conjunctiva/corneas clear, EOM's intact     Neck:  Supple,  no adenopathy;      Lungs: Clear to auscultation bilaterally, no wheezes rhonchi rales are noted  Chest wall: No tenderness  Heart::  Regular rate and rhythm, S1 and S2 normal, no murmur, rub or gallop  Abdomen: Soft, non-tender, " nondistended bowel sounds active  Extremities: No cyanosis, clubbing, or edema  Pulses: 2+ and symmetric all extremities  Skin:  No rashes or lesions  Neuro/psych: A&O x3. CN II through XII are grossly intact with appropriate affect  CABG site looks good with no hematoma bleeding healing well    Allergies:  Allergies   Allergen Reactions   • Lisinopril Cough       Medication Review:     Current Outpatient Medications:   •  acetaminophen (TYLENOL) 325 MG tablet, Take 2 tablets by mouth Every 6 (Six) Hours As Needed for Mild Pain ., Disp: , Rfl:   •  apixaban (Eliquis) 5 MG tablet tablet, Take 1 tablet by mouth 2 (Two) Times a Day., Disp: 180 tablet, Rfl: 3  •  aspirin (aspirin) 81 MG EC tablet, Take 1 tablet by mouth Daily., Disp: 30 tablet, Rfl: 2  •  atorvastatin (LIPITOR) 40 MG tablet, Take 1 tablet by mouth Every Night., Disp: 40 tablet, Rfl: 3  •  B Complex Vitamins (vitamin b complex) capsule capsule, Take 1 capsule by mouth Daily. LD 11/4, Disp: , Rfl:   •  carvedilol (COREG) 3.125 MG tablet, Take 1 tablet by mouth Every 12 (Twelve) Hours., Disp: 180 tablet, Rfl: 3  •  Cholecalciferol (VITAMIN D) 2000 units capsule, Take 2,000 Units by mouth Daily. 11/4, Disp: , Rfl:   •  furosemide (Lasix) 40 MG tablet, Take 1 tablet by mouth Daily As Needed (for weight gain of 3 lbs/24 hours or 5 lbs/72 hours)., Disp: 30 tablet, Rfl: 0  •  melatonin 5 MG tablet tablet, Take 5 mg by mouth Every Night., Disp: , Rfl:   •  potassium chloride (MICRO-K) 10 MEQ CR capsule, Take 2 capsules by mouth Daily As Needed (take with each Lasix (furosemide0 dose)., Disp: 60 capsule, Rfl: 0  •  SILDENAFIL CITRATE PO, Take 20 mg by mouth Daily. For enlarged prostate, Disp: , Rfl:     Family History:  Family History   Problem Relation Age of Onset   • Diabetes Mother    • Heart disease Mother    • Hypertension Sister    • Hyperlipidemia Sister    • Kidney disease Sister    • Cancer Sister    • Other Sister         weight disorder   • Diabetes  Sister    • Stroke Brother    • Hypertension Other        Past Medical History:  Past Medical History:   Diagnosis Date   • Arthritis    • Atrial fibrillation (HCC)    • BPH (benign prostatic hyperplasia)    • Bulging lumbar disc    • Difficulty maintaining body in lying position     NEEDS PILLOW UNDER KNEES IN SURGERY D/T LUMBAR ISSUE   • Hyperglycemia    • Hypertension    • OAB (overactive bladder)    • Skin mole    • Snoring    • Urinary urgency    • Vitamin D deficiency        Past surgical History:  Past Surgical History:   Procedure Laterality Date   • CARDIAC CATHETERIZATION Right 10/13/2021    Procedure: Left Heart Cath;  Surgeon: Renato Knight MD;  Location: [facility] CATH INVASIVE LOCATION;  Service: Cardiovascular;  Laterality: Right;   • CARDIAC CATHETERIZATION  10/13/2021    Procedure: Functional Flow McColl;  Surgeon: Renato Knight MD;  Location: [facility] CATH INVASIVE LOCATION;  Service: Cardiovascular;;   • COLONOSCOPY     • CORONARY ARTERY BYPASS GRAFT N/A 2021    Procedure: CORONARY ARTERY BYPASS WITH INTERNAL MAMMARY ARTERY GRAFT AND MAZE PROCEDURE;  Surgeon: Johnathan Hernandez MD;  Location: [facility];  Service: Cardiothoracic;  Laterality: N/A;  CABG x 3  2 vein grafts  1 LIMA  with intraoperative AVERY   • FINGER SURGERY Right     repair right pointer finger   • JOINT REPLACEMENT     • MITRAL VALVE REPAIR/REPLACEMENT N/A 2021    Procedure: MITRAL VALVE REPAIR/REPLACEMENT;  Surgeon: Johnathan Hernandez MD;  Location: [facility];  Service: Cardiothoracic;  Laterality: N/A;  mitral valve repair with physio II annuloplasty ring 28mm   • TOTAL SHOULDER ARTHROPLASTY      shoulder replacement       Social History:  Social History     Socioeconomic History   • Marital status:    Tobacco Use   • Smoking status: Former Smoker     Packs/day: 1.00     Years: 12.00     Pack years: 12.00     Types: Cigarettes     Quit date:      Years since quittin.9   • Smokeless  tobacco: Never Used   Vaping Use   • Vaping Use: Never used   Substance and Sexual Activity   • Alcohol use: Yes     Alcohol/week: 1.0 standard drink     Types: 1 Shots of liquor per week     Comment: 1 drink weekly   • Drug use: No   • Sexual activity: Defer       Review of Systems:  The following systems were reviewed as they relate to the cardiovascular system: Constitutional, Eyes, ENT, Cardiovascular, Respiratory, Gastrointestinal, Integumentary, Neurological, Psychiatric, Hematologic, Endocrine, Musculoskeletal, and Genitourinary. The pertinent cardiovascular findings are reported above with all other cardiovascular points within those systems being negative.    Diagnostic Study Review:     Current Electrocardiogram:    ECG 12 Lead    Date/Time: 12/16/2021 12:39 PM  Performed by: Renato Knight MD  Authorized by: Renato Knight MD   Comparison: compared with previous ECG   Similar to previous ECG  Rhythm: sinus rhythm  Ectopy: atrial premature contractions  Rate: normal  BPM: 64  Conduction: conduction normal  QRS axis: normal  Other findings: non-specific ST-T wave changes    Clinical impression: abnormal EKG              NOTE: The following portions of the patient's history were reviewed and updated this visit as appropriate: allergies, current medications, past family history, past medical history, past social history, past surgical history and problem list.

## 2021-12-17 ENCOUNTER — TELEPHONE (OUTPATIENT)
Dept: CARDIOLOGY | Facility: CLINIC | Age: 76
End: 2021-12-17

## 2021-12-17 RX ORDER — ATORVASTATIN CALCIUM 40 MG/1
40 TABLET, FILM COATED ORAL NIGHTLY
Qty: 40 TABLET | Refills: 3 | Status: SHIPPED | OUTPATIENT
Start: 2021-12-17 | End: 2022-06-06

## 2021-12-17 RX ORDER — POTASSIUM CHLORIDE 750 MG/1
TABLET, FILM COATED, EXTENDED RELEASE ORAL
Qty: 30 TABLET | Refills: 0 | Status: SHIPPED | OUTPATIENT
Start: 2021-12-17 | End: 2022-02-08 | Stop reason: SDUPTHER

## 2021-12-17 RX ORDER — FUROSEMIDE 40 MG/1
TABLET ORAL
Qty: 30 TABLET | Refills: 0 | Status: SHIPPED | OUTPATIENT
Start: 2021-12-17 | End: 2022-02-08 | Stop reason: SDUPTHER

## 2021-12-17 NOTE — TELEPHONE ENCOUNTER
Called and notified, patient verbalized understanding    -----------------  Renato Knight MD Trent, Melissa, MA      Labs and chest x-ray look good

## 2021-12-20 ENCOUNTER — TREATMENT (OUTPATIENT)
Dept: CARDIAC REHAB | Facility: HOSPITAL | Age: 76
End: 2021-12-20

## 2021-12-20 DIAGNOSIS — Z95.1 S/P CABG (CORONARY ARTERY BYPASS GRAFT): Primary | ICD-10-CM

## 2021-12-20 PROCEDURE — 93798 PHYS/QHP OP CAR RHAB W/ECG: CPT

## 2021-12-22 ENCOUNTER — TREATMENT (OUTPATIENT)
Dept: CARDIAC REHAB | Facility: HOSPITAL | Age: 76
End: 2021-12-22

## 2021-12-22 ENCOUNTER — APPOINTMENT (OUTPATIENT)
Dept: CARDIOLOGY | Facility: HOSPITAL | Age: 76
End: 2021-12-22

## 2021-12-22 DIAGNOSIS — Z95.1 S/P CABG (CORONARY ARTERY BYPASS GRAFT): Primary | ICD-10-CM

## 2021-12-22 PROCEDURE — 93798 PHYS/QHP OP CAR RHAB W/ECG: CPT

## 2021-12-23 ENCOUNTER — TREATMENT (OUTPATIENT)
Dept: CARDIAC REHAB | Facility: HOSPITAL | Age: 76
End: 2021-12-23

## 2021-12-23 ENCOUNTER — HOSPITAL ENCOUNTER (OUTPATIENT)
Dept: CARDIOLOGY | Facility: HOSPITAL | Age: 76
Discharge: HOME | End: 2021-12-23
Admitting: INTERNAL MEDICINE

## 2021-12-23 VITALS
BODY MASS INDEX: 31.17 KG/M2 | WEIGHT: 222.66 LBS | HEIGHT: 71 IN | DIASTOLIC BLOOD PRESSURE: 78 MMHG | SYSTOLIC BLOOD PRESSURE: 149 MMHG

## 2021-12-23 DIAGNOSIS — Z95.1 S/P CABG (CORONARY ARTERY BYPASS GRAFT): Primary | ICD-10-CM

## 2021-12-23 DIAGNOSIS — R00.2 PALPITATIONS: ICD-10-CM

## 2021-12-23 DIAGNOSIS — I25.83 CORONARY ARTERY DISEASE DUE TO LIPID RICH PLAQUE: ICD-10-CM

## 2021-12-23 DIAGNOSIS — I25.10 CORONARY ARTERY DISEASE DUE TO LIPID RICH PLAQUE: ICD-10-CM

## 2021-12-23 PROCEDURE — 93798 PHYS/QHP OP CAR RHAB W/ECG: CPT

## 2021-12-23 PROCEDURE — 93306 TTE W/DOPPLER COMPLETE: CPT

## 2021-12-23 PROCEDURE — 93306 TTE W/DOPPLER COMPLETE: CPT | Performed by: INTERNAL MEDICINE

## 2021-12-24 LAB
BH CV ECHO MEAS - ACS: 2.4 CM
BH CV ECHO MEAS - AO MAX PG (FULL): 3.8 MMHG
BH CV ECHO MEAS - AO MAX PG: 8.6 MMHG
BH CV ECHO MEAS - AO MEAN PG (FULL): 1.6 MMHG
BH CV ECHO MEAS - AO MEAN PG: 4.1 MMHG
BH CV ECHO MEAS - AO ROOT AREA (BSA CORRECTED): 1.8
BH CV ECHO MEAS - AO ROOT AREA: 12.9 CM^2
BH CV ECHO MEAS - AO ROOT DIAM: 4.1 CM
BH CV ECHO MEAS - AO V2 MAX: 146.9 CM/SEC
BH CV ECHO MEAS - AO V2 MEAN: 93.2 CM/SEC
BH CV ECHO MEAS - AO V2 VTI: 30.6 CM
BH CV ECHO MEAS - ASC AORTA: 3.7 CM
BH CV ECHO MEAS - AVA(I,A): 3.9 CM^2
BH CV ECHO MEAS - AVA(I,D): 3.9 CM^2
BH CV ECHO MEAS - AVA(V,A): 3.6 CM^2
BH CV ECHO MEAS - AVA(V,D): 3.6 CM^2
BH CV ECHO MEAS - BSA(HAYCOCK): 2.3 M^2
BH CV ECHO MEAS - BSA: 2.2 M^2
BH CV ECHO MEAS - BZI_BMI: 31.1 KILOGRAMS/M^2
BH CV ECHO MEAS - BZI_METRIC_HEIGHT: 180.3 CM
BH CV ECHO MEAS - BZI_METRIC_WEIGHT: 101.2 KG
BH CV ECHO MEAS - EDV(CUBED): 129.3 ML
BH CV ECHO MEAS - EDV(MOD-SP4): 97.2 ML
BH CV ECHO MEAS - EDV(TEICH): 121.4 ML
BH CV ECHO MEAS - EF(CUBED): 60.3 %
BH CV ECHO MEAS - EF(MOD-BP): 55 %
BH CV ECHO MEAS - EF(MOD-SP4): 57.9 %
BH CV ECHO MEAS - EF(TEICH): 51.6 %
BH CV ECHO MEAS - ESV(CUBED): 51.3 ML
BH CV ECHO MEAS - ESV(MOD-SP4): 40.9 ML
BH CV ECHO MEAS - ESV(TEICH): 58.8 ML
BH CV ECHO MEAS - FS: 26.5 %
BH CV ECHO MEAS - IVS/LVPW: 1
BH CV ECHO MEAS - IVSD: 1.3 CM
BH CV ECHO MEAS - LA DIMENSION: 5.4 CM
BH CV ECHO MEAS - LA/AO: 1.3
BH CV ECHO MEAS - LV DIASTOLIC VOL/BSA (35-75): 44 ML/M^2
BH CV ECHO MEAS - LV MASS(C)D: 262.4 GRAMS
BH CV ECHO MEAS - LV MASS(C)DI: 118.8 GRAMS/M^2
BH CV ECHO MEAS - LV MAX PG: 4.8 MMHG
BH CV ECHO MEAS - LV MEAN PG: 2.5 MMHG
BH CV ECHO MEAS - LV SYSTOLIC VOL/BSA (12-30): 18.5 ML/M^2
BH CV ECHO MEAS - LV V1 MAX: 109.8 CM/SEC
BH CV ECHO MEAS - LV V1 MEAN: 73 CM/SEC
BH CV ECHO MEAS - LV V1 VTI: 24.4 CM
BH CV ECHO MEAS - LVIDD: 5.1 CM
BH CV ECHO MEAS - LVIDS: 3.7 CM
BH CV ECHO MEAS - LVOT AREA: 4.9 CM^2
BH CV ECHO MEAS - LVOT DIAM: 2.5 CM
BH CV ECHO MEAS - LVPWD: 1.3 CM
BH CV ECHO MEAS - MR MAX PG: 121.3 MMHG
BH CV ECHO MEAS - MR MAX VEL: 550.5 CM/SEC
BH CV ECHO MEAS - MV MAX PG: 15.7 MMHG
BH CV ECHO MEAS - MV MEAN PG: 5.4 MMHG
BH CV ECHO MEAS - MV V2 MAX: 198 CM/SEC
BH CV ECHO MEAS - MV V2 MEAN: 103.8 CM/SEC
BH CV ECHO MEAS - MV V2 VTI: 69 CM
BH CV ECHO MEAS - MVA(TRACED): 3.5 CM^2
BH CV ECHO MEAS - MVA(VTI): 1.7 CM^2
BH CV ECHO MEAS - PA ACC TIME: 0.08 SEC
BH CV ECHO MEAS - PA MAX PG: 6.3 MMHG
BH CV ECHO MEAS - PA PR(ACCEL): 43 MMHG
BH CV ECHO MEAS - PA V2 MAX: 125.6 CM/SEC
BH CV ECHO MEAS - PI END-D VEL: 258.2 CM/SEC
BH CV ECHO MEAS - RAP SYSTOLE: 10 MMHG
BH CV ECHO MEAS - RVSP: 60 MMHG
BH CV ECHO MEAS - SI(AO): 179 ML/M^2
BH CV ECHO MEAS - SI(CUBED): 35.3 ML/M^2
BH CV ECHO MEAS - SI(LVOT): 53.9 ML/M^2
BH CV ECHO MEAS - SI(MOD-SP4): 25.5 ML/M^2
BH CV ECHO MEAS - SI(TEICH): 28.4 ML/M^2
BH CV ECHO MEAS - SV(AO): 395.4 ML
BH CV ECHO MEAS - SV(CUBED): 78 ML
BH CV ECHO MEAS - SV(LVOT): 119 ML
BH CV ECHO MEAS - SV(MOD-SP4): 56.3 ML
BH CV ECHO MEAS - SV(TEICH): 62.6 ML
BH CV ECHO MEAS - TAPSE (>1.6): 2 CM
BH CV ECHO MEAS - TR MAX VEL: 331.9 CM/SEC
BH CV XLRA - RV BASE: 4.6 CM
BH CV XLRA - RV MID: 2 CM
MAXIMAL PREDICTED HEART RATE: 146 BPM
STRESS TARGET HR: 124 BPM

## 2021-12-27 ENCOUNTER — TREATMENT (OUTPATIENT)
Dept: CARDIAC REHAB | Facility: HOSPITAL | Age: 76
End: 2021-12-27

## 2021-12-27 ENCOUNTER — TELEPHONE (OUTPATIENT)
Dept: CARDIOLOGY | Facility: CLINIC | Age: 76
End: 2021-12-27

## 2021-12-27 DIAGNOSIS — Z95.1 S/P CABG (CORONARY ARTERY BYPASS GRAFT): Primary | ICD-10-CM

## 2021-12-27 PROCEDURE — 93798 PHYS/QHP OP CAR RHAB W/ECG: CPT

## 2021-12-27 NOTE — TELEPHONE ENCOUNTER
Called and notified, patient wife verbalized understanding    ----------------------    Renato Knight MD Trent, Melissa, MA    Echocardiogram looks good with normal LV function no mitral regurgitation

## 2021-12-29 ENCOUNTER — APPOINTMENT (OUTPATIENT)
Dept: CARDIAC REHAB | Facility: HOSPITAL | Age: 76
End: 2021-12-29

## 2021-12-29 ENCOUNTER — TREATMENT (OUTPATIENT)
Dept: CARDIAC REHAB | Facility: HOSPITAL | Age: 76
End: 2021-12-29

## 2021-12-29 DIAGNOSIS — Z95.1 S/P CABG (CORONARY ARTERY BYPASS GRAFT): Primary | ICD-10-CM

## 2021-12-29 PROCEDURE — 93798 PHYS/QHP OP CAR RHAB W/ECG: CPT

## 2021-12-30 ENCOUNTER — APPOINTMENT (OUTPATIENT)
Dept: CARDIAC REHAB | Facility: HOSPITAL | Age: 76
End: 2021-12-30

## 2021-12-30 ENCOUNTER — OFFICE VISIT (OUTPATIENT)
Dept: CARDIAC SURGERY | Facility: CLINIC | Age: 76
End: 2021-12-30

## 2021-12-30 ENCOUNTER — TREATMENT (OUTPATIENT)
Dept: CARDIAC REHAB | Facility: HOSPITAL | Age: 76
End: 2021-12-30

## 2021-12-30 VITALS
TEMPERATURE: 97.1 F | OXYGEN SATURATION: 97 % | HEIGHT: 71 IN | RESPIRATION RATE: 20 BRPM | BODY MASS INDEX: 31.36 KG/M2 | WEIGHT: 224 LBS | HEART RATE: 71 BPM | SYSTOLIC BLOOD PRESSURE: 145 MMHG | DIASTOLIC BLOOD PRESSURE: 78 MMHG

## 2021-12-30 DIAGNOSIS — Z98.890 S/P LEFT ATRIAL APPENDAGE LIGATION: ICD-10-CM

## 2021-12-30 DIAGNOSIS — Z98.890 S/P MVR (MITRAL VALVE REPAIR): ICD-10-CM

## 2021-12-30 DIAGNOSIS — Z95.1 S/P CABG X 3: Primary | ICD-10-CM

## 2021-12-30 DIAGNOSIS — Z98.890 S/P MAZE OPERATION FOR ATRIAL FIBRILLATION: ICD-10-CM

## 2021-12-30 DIAGNOSIS — Z86.79 S/P MAZE OPERATION FOR ATRIAL FIBRILLATION: ICD-10-CM

## 2021-12-30 DIAGNOSIS — Z95.1 S/P CABG (CORONARY ARTERY BYPASS GRAFT): Primary | ICD-10-CM

## 2021-12-30 PROCEDURE — 99024 POSTOP FOLLOW-UP VISIT: CPT | Performed by: NURSE PRACTITIONER

## 2021-12-30 PROCEDURE — 93798 PHYS/QHP OP CAR RHAB W/ECG: CPT

## 2021-12-30 NOTE — PROGRESS NOTES
"CARDIOVASCULAR SURGERY FOLLOW-UP PROGRESS NOTE  Chief Complaint: post op         HPI:   Dear Dr. Ferrera, Kortney Infante MD and colleagues:    It was nice to see Salinas Dill in follow up 7 weeks after surgery. As you know, he is a 75 y.o. male with CAD with EF 60%, mitral regurgitation, persistent atrial fibrillation, hypertension who underwent CABG x3 with LIMA, mitral valve repair/ring, right and left cryo-maze procedure with JOSE G ligation on 11/11/2021 with Dr. Hernandez at [facility].   He continues to take Eliquis for anticoagulation and he denies any bleeding issues.  He comes in today complaining of nothing.    His activity level has been good, he continues with home health services.  From a surgical standpoint, the sternal incision is well approximated without erythema, edema, or drainage.  The sternum is stable to palpation, and the patient denies any popping or clicking with deep inspiration or coughing.      Physical Exam:         /78 (BP Location: Left arm, Patient Position: Sitting, Cuff Size: Adult)   Pulse 71   Temp 97.1 °F (36.2 °C) (Oral)   Resp 20   Ht 180.3 cm (71\")   Wt 102 kg (224 lb)   SpO2 97%   BMI 31.24 kg/m²   Heart:  regular rate and rhythm  Lungs:  clear to auscultation bilaterally  Extremities:  no edema  Incision(s):  sternum stable, incisions healed    Assessment/Plan:     S/P CABG, mitral valve repair and Maze procedure. Overall, he is doing well.    No significant post-op complications    OK to drive if not taking narcotic pain medicine  OK to begin cardiac rehab  Follow-up as scheduled with cardiology  Follow-up as scheduled with PCP  Return to clinic in 1 year(s) with EKG.   No lifting more than 50 lbs for 3 months post operatively        Thank you for allowing me to participate in the care of your   patient.  Regards,  KWASI Worthington      "

## 2022-01-03 ENCOUNTER — TREATMENT (OUTPATIENT)
Dept: CARDIAC REHAB | Facility: HOSPITAL | Age: 77
End: 2022-01-03

## 2022-01-03 ENCOUNTER — APPOINTMENT (OUTPATIENT)
Dept: CARDIAC REHAB | Facility: HOSPITAL | Age: 77
End: 2022-01-03

## 2022-01-03 DIAGNOSIS — Z95.1 S/P CABG (CORONARY ARTERY BYPASS GRAFT): Primary | ICD-10-CM

## 2022-01-03 PROCEDURE — 93798 PHYS/QHP OP CAR RHAB W/ECG: CPT

## 2022-01-05 ENCOUNTER — TREATMENT (OUTPATIENT)
Dept: CARDIAC REHAB | Facility: HOSPITAL | Age: 77
End: 2022-01-05

## 2022-01-05 ENCOUNTER — APPOINTMENT (OUTPATIENT)
Dept: CARDIAC REHAB | Facility: HOSPITAL | Age: 77
End: 2022-01-05

## 2022-01-05 DIAGNOSIS — Z95.1 S/P CABG (CORONARY ARTERY BYPASS GRAFT): Primary | ICD-10-CM

## 2022-01-05 PROCEDURE — 93798 PHYS/QHP OP CAR RHAB W/ECG: CPT

## 2022-01-06 ENCOUNTER — APPOINTMENT (OUTPATIENT)
Dept: CARDIAC REHAB | Facility: HOSPITAL | Age: 77
End: 2022-01-06

## 2022-01-10 ENCOUNTER — TREATMENT (OUTPATIENT)
Dept: CARDIAC REHAB | Facility: HOSPITAL | Age: 77
End: 2022-01-10

## 2022-01-10 ENCOUNTER — APPOINTMENT (OUTPATIENT)
Dept: CARDIAC REHAB | Facility: HOSPITAL | Age: 77
End: 2022-01-10

## 2022-01-10 DIAGNOSIS — Z95.1 S/P CABG (CORONARY ARTERY BYPASS GRAFT): Primary | ICD-10-CM

## 2022-01-10 PROCEDURE — 93798 PHYS/QHP OP CAR RHAB W/ECG: CPT

## 2022-01-12 ENCOUNTER — TREATMENT (OUTPATIENT)
Dept: CARDIAC REHAB | Facility: HOSPITAL | Age: 77
End: 2022-01-12

## 2022-01-12 DIAGNOSIS — Z95.1 S/P CABG (CORONARY ARTERY BYPASS GRAFT): Primary | ICD-10-CM

## 2022-01-12 PROCEDURE — 93798 PHYS/QHP OP CAR RHAB W/ECG: CPT

## 2022-01-25 ENCOUNTER — TELEPHONE (OUTPATIENT)
Dept: CARDIAC SURGERY | Facility: CLINIC | Age: 77
End: 2022-01-25

## 2022-02-07 NOTE — PATIENT INSTRUCTIONS
Health Maintenance Due   Topic Date Due   • URINE MICROALBUMIN  Never done   • DIABETIC FOOT EXAM  Never done   • TDAP/TD VACCINES (2 - Tdap) 10/23/2021   • PROSTATE CANCER SCREENING  12/03/2021     cONSIDER dR. Mingo Garcia UROLOGIST

## 2022-02-08 ENCOUNTER — OFFICE VISIT (OUTPATIENT)
Dept: CARDIOLOGY | Facility: CLINIC | Age: 77
End: 2022-02-08

## 2022-02-08 ENCOUNTER — OFFICE VISIT (OUTPATIENT)
Dept: FAMILY MEDICINE CLINIC | Facility: CLINIC | Age: 77
End: 2022-02-08

## 2022-02-08 VITALS
BODY MASS INDEX: 43.59 KG/M2 | SYSTOLIC BLOOD PRESSURE: 135 MMHG | WEIGHT: 222 LBS | HEIGHT: 60 IN | DIASTOLIC BLOOD PRESSURE: 82 MMHG | HEART RATE: 79 BPM | OXYGEN SATURATION: 99 %

## 2022-02-08 VITALS
WEIGHT: 222.6 LBS | TEMPERATURE: 99.3 F | DIASTOLIC BLOOD PRESSURE: 86 MMHG | SYSTOLIC BLOOD PRESSURE: 131 MMHG | HEART RATE: 73 BPM | OXYGEN SATURATION: 98 % | BODY MASS INDEX: 31.16 KG/M2 | HEIGHT: 71 IN

## 2022-02-08 DIAGNOSIS — I10 PRIMARY HYPERTENSION: ICD-10-CM

## 2022-02-08 DIAGNOSIS — I48.11 LONGSTANDING PERSISTENT ATRIAL FIBRILLATION: Primary | ICD-10-CM

## 2022-02-08 DIAGNOSIS — M54.50 ACUTE LOW BACK PAIN WITHOUT SCIATICA, UNSPECIFIED BACK PAIN LATERALITY: ICD-10-CM

## 2022-02-08 DIAGNOSIS — I34.0 NONRHEUMATIC MITRAL VALVE REGURGITATION: ICD-10-CM

## 2022-02-08 DIAGNOSIS — N40.1 BENIGN PROSTATIC HYPERPLASIA WITH URINARY OBSTRUCTION: ICD-10-CM

## 2022-02-08 DIAGNOSIS — E53.8 B12 DEFICIENCY: ICD-10-CM

## 2022-02-08 DIAGNOSIS — D22.9 SKIN MOLE: ICD-10-CM

## 2022-02-08 DIAGNOSIS — N13.8 BENIGN PROSTATIC HYPERPLASIA WITH URINARY OBSTRUCTION: ICD-10-CM

## 2022-02-08 DIAGNOSIS — Z95.1 S/P CABG X 3: ICD-10-CM

## 2022-02-08 DIAGNOSIS — E78.5 DYSLIPIDEMIA: ICD-10-CM

## 2022-02-08 DIAGNOSIS — Z86.79 S/P MAZE OPERATION FOR ATRIAL FIBRILLATION: ICD-10-CM

## 2022-02-08 DIAGNOSIS — R73.9 HYPERGLYCEMIA: ICD-10-CM

## 2022-02-08 DIAGNOSIS — Z98.890 S/P LEFT ATRIAL APPENDAGE LIGATION: ICD-10-CM

## 2022-02-08 DIAGNOSIS — I25.10 CORONARY ARTERY DISEASE INVOLVING NATIVE CORONARY ARTERY OF NATIVE HEART WITHOUT ANGINA PECTORIS: ICD-10-CM

## 2022-02-08 DIAGNOSIS — Z98.890 S/P MVR (MITRAL VALVE REPAIR): ICD-10-CM

## 2022-02-08 DIAGNOSIS — Z98.890 S/P MAZE OPERATION FOR ATRIAL FIBRILLATION: ICD-10-CM

## 2022-02-08 LAB
ALBUMIN SERPL-MCNC: 4 G/DL (ref 3.5–5.2)
ALBUMIN/GLOB SERPL: 1.3 G/DL
ALP SERPL-CCNC: 63 U/L (ref 39–117)
ALT SERPL W P-5'-P-CCNC: 22 U/L (ref 1–41)
ANION GAP SERPL CALCULATED.3IONS-SCNC: 8.2 MMOL/L (ref 5–15)
AST SERPL-CCNC: 19 U/L (ref 1–40)
BASOPHILS # BLD AUTO: 0.04 10*3/MM3 (ref 0–0.2)
BASOPHILS NFR BLD AUTO: 0.7 % (ref 0–1.5)
BILIRUB SERPL-MCNC: 0.3 MG/DL (ref 0–1.2)
BUN SERPL-MCNC: 21 MG/DL (ref 8–23)
BUN/CREAT SERPL: 28.8 (ref 7–25)
CALCIUM SPEC-SCNC: 9.7 MG/DL (ref 8.6–10.5)
CHLORIDE SERPL-SCNC: 104 MMOL/L (ref 98–107)
CHOLEST SERPL-MCNC: 112 MG/DL (ref 0–200)
CO2 SERPL-SCNC: 28.8 MMOL/L (ref 22–29)
CREAT SERPL-MCNC: 0.73 MG/DL (ref 0.76–1.27)
DEPRECATED RDW RBC AUTO: 41.1 FL (ref 37–54)
EOSINOPHIL # BLD AUTO: 0.25 10*3/MM3 (ref 0–0.4)
EOSINOPHIL NFR BLD AUTO: 4.2 % (ref 0.3–6.2)
ERYTHROCYTE [DISTWIDTH] IN BLOOD BY AUTOMATED COUNT: 13.2 % (ref 12.3–15.4)
GFR SERPL CREATININE-BSD FRML MDRD: 105 ML/MIN/1.73
GLOBULIN UR ELPH-MCNC: 3 GM/DL
GLUCOSE SERPL-MCNC: 106 MG/DL (ref 65–99)
HCT VFR BLD AUTO: 37.1 % (ref 37.5–51)
HDLC SERPL-MCNC: 47 MG/DL (ref 40–60)
HGB BLD-MCNC: 11.8 G/DL (ref 13–17.7)
IMM GRANULOCYTES # BLD AUTO: 0.01 10*3/MM3 (ref 0–0.05)
IMM GRANULOCYTES NFR BLD AUTO: 0.2 % (ref 0–0.5)
LDLC SERPL CALC-MCNC: 53 MG/DL (ref 0–100)
LDLC/HDLC SERPL: 1.15 {RATIO}
LYMPHOCYTES # BLD AUTO: 0.79 10*3/MM3 (ref 0.7–3.1)
LYMPHOCYTES NFR BLD AUTO: 13.3 % (ref 19.6–45.3)
MCH RBC QN AUTO: 27.3 PG (ref 26.6–33)
MCHC RBC AUTO-ENTMCNC: 31.8 G/DL (ref 31.5–35.7)
MCV RBC AUTO: 85.9 FL (ref 79–97)
MONOCYTES # BLD AUTO: 0.85 10*3/MM3 (ref 0.1–0.9)
MONOCYTES NFR BLD AUTO: 14.3 % (ref 5–12)
NEUTROPHILS NFR BLD AUTO: 4.01 10*3/MM3 (ref 1.7–7)
NEUTROPHILS NFR BLD AUTO: 67.3 % (ref 42.7–76)
NRBC BLD AUTO-RTO: 0 /100 WBC (ref 0–0.2)
PLATELET # BLD AUTO: 318 10*3/MM3 (ref 140–450)
PMV BLD AUTO: 12.5 FL (ref 6–12)
POTASSIUM SERPL-SCNC: 4.4 MMOL/L (ref 3.5–5.2)
PROT SERPL-MCNC: 7 G/DL (ref 6–8.5)
RBC # BLD AUTO: 4.32 10*6/MM3 (ref 4.14–5.8)
SODIUM SERPL-SCNC: 141 MMOL/L (ref 136–145)
TRIGL SERPL-MCNC: 54 MG/DL (ref 0–150)
TSH SERPL DL<=0.05 MIU/L-ACNC: 2.37 UIU/ML (ref 0.27–4.2)
VIT B12 BLD-MCNC: 561 PG/ML (ref 211–946)
VLDLC SERPL-MCNC: 12 MG/DL (ref 5–40)
WBC NRBC COR # BLD: 5.95 10*3/MM3 (ref 3.4–10.8)

## 2022-02-08 PROCEDURE — 93000 ELECTROCARDIOGRAM COMPLETE: CPT | Performed by: INTERNAL MEDICINE

## 2022-02-08 PROCEDURE — 80061 LIPID PANEL: CPT | Performed by: PREVENTIVE MEDICINE

## 2022-02-08 PROCEDURE — 80053 COMPREHEN METABOLIC PANEL: CPT | Performed by: PREVENTIVE MEDICINE

## 2022-02-08 PROCEDURE — 83036 HEMOGLOBIN GLYCOSYLATED A1C: CPT | Performed by: PREVENTIVE MEDICINE

## 2022-02-08 PROCEDURE — 84443 ASSAY THYROID STIM HORMONE: CPT | Performed by: PREVENTIVE MEDICINE

## 2022-02-08 PROCEDURE — 99214 OFFICE O/P EST MOD 30 MIN: CPT | Performed by: INTERNAL MEDICINE

## 2022-02-08 PROCEDURE — 82607 VITAMIN B-12: CPT | Performed by: PREVENTIVE MEDICINE

## 2022-02-08 PROCEDURE — 85025 COMPLETE CBC W/AUTO DIFF WBC: CPT | Performed by: PREVENTIVE MEDICINE

## 2022-02-08 PROCEDURE — 99214 OFFICE O/P EST MOD 30 MIN: CPT | Performed by: PREVENTIVE MEDICINE

## 2022-02-08 RX ORDER — POTASSIUM CHLORIDE 750 MG/1
20 TABLET, FILM COATED, EXTENDED RELEASE ORAL DAILY
Qty: 30 TABLET | Refills: 0 | Status: SHIPPED | OUTPATIENT
Start: 2022-02-08 | End: 2022-02-22

## 2022-02-08 RX ORDER — FUROSEMIDE 40 MG/1
40 TABLET ORAL DAILY
Qty: 30 TABLET | Refills: 0 | Status: SHIPPED | OUTPATIENT
Start: 2022-02-08 | End: 2022-02-09

## 2022-02-08 NOTE — PROGRESS NOTES
Venipuncture Blood Specimen Collection  Venipuncture performed in RIGHT ARM by Sybil Hung MA with good hemostasis. Patient tolerated the procedure well without complications.   02/08/22   Sybil Hung MA

## 2022-02-08 NOTE — PROGRESS NOTES
"Subjective   Salinas Dill is a 76 y.o. male presents for   Chief Complaint   Patient presents with   • Hypertension     6 MONTH CHECK UP    Patient presents today for 6-month follow-up and does seem to be doing very well post valve replacement and atrial filled correction as well as coronary artery bypass.  He does note that he gets short of breath when he exercises out in the cold but he is up to exercising an hour a day and can resume all of his normal activities next week.  Patient does not notice any heart palpitations and has had no syncope.  Patient has been erroneously marked as diabetic. Based on the available clinical information, he does not have diabetes and should therefore be excluded from diabetic health maintenance and quality measures for the remainder of the reporting period.          Health Maintenance Due   Topic Date Due   • URINE MICROALBUMIN  Never done   • DIABETIC FOOT EXAM  Never done   • TDAP/TD VACCINES (2 - Tdap) 10/23/2021   • PROSTATE CANCER SCREENING  12/03/2021       History of Present Illness     Vitals:    02/08/22 1020 02/08/22 1022   BP: 131/80 131/86   BP Location: Left arm Right arm   Patient Position: Sitting Sitting   Cuff Size: Adult Adult   Pulse: 73    Temp: 99.3 °F (37.4 °C)    SpO2: 98%    Weight: 101 kg (222 lb 9.6 oz)    Height: 180.3 cm (71\")      Body mass index is 31.05 kg/m².    Current Outpatient Medications on File Prior to Visit   Medication Sig Dispense Refill   • acetaminophen (TYLENOL) 325 MG tablet Take 2 tablets by mouth Every 6 (Six) Hours As Needed for Mild Pain .     • apixaban (Eliquis) 5 MG tablet tablet Take 1 tablet by mouth 2 (Two) Times a Day. 180 tablet 3   • aspirin (aspirin) 81 MG EC tablet Take 1 tablet by mouth Daily. 30 tablet 2   • atorvastatin (LIPITOR) 40 MG tablet Take 1 tablet by mouth Every Night. 40 tablet 3   • B Complex Vitamins (vitamin b complex) capsule capsule Take 1 capsule by mouth Daily. LD 11/4     • carvedilol (COREG) 3.125 " MG tablet Take 1 tablet by mouth Every 12 (Twelve) Hours. 180 tablet 3   • Cholecalciferol (VITAMIN D) 2000 units capsule Take 2,000 Units by mouth Daily. 11/4     • melatonin 5 MG tablet tablet Take 5 mg by mouth Every Night.     • Mirabegron ER (MYRBETRIQ) 25 MG tablet sustained-release 24 hour 24 hr tablet Take 25 mg by mouth Daily.     • SILDENAFIL CITRATE PO Take 20 mg by mouth Daily. For enlarged prostate     • furosemide (LASIX) 40 MG tablet Take 1 tablet by mouth Daily. 30 tablet 0   • potassium chloride 10 MEQ CR tablet Take 2 tablets by mouth Daily. 30 tablet 0     No current facility-administered medications on file prior to visit.       The following portions of the patient's history were reviewed and updated as appropriate: allergies, current medications, past family history, past medical history, past social history, past surgical history and problem list.    Review of Systems   Respiratory: Positive for shortness of breath.    Genitourinary: Positive for frequency and nocturia.       Objective   Physical Exam  Vitals reviewed.   Constitutional:       General: He is not in acute distress.     Appearance: Normal appearance. He is well-developed. He is not ill-appearing or toxic-appearing.   HENT:      Head: Normocephalic and atraumatic.      Right Ear: Tympanic membrane, ear canal and external ear normal.      Left Ear: Tympanic membrane, ear canal and external ear normal.      Nose: Nose normal.   Eyes:      Extraocular Movements: Extraocular movements intact.      Conjunctiva/sclera: Conjunctivae normal.      Pupils: Pupils are equal, round, and reactive to light.   Cardiovascular:      Rate and Rhythm: Normal rate and regular rhythm.      Heart sounds: Normal heart sounds.   Pulmonary:      Effort: Pulmonary effort is normal.      Breath sounds: Normal breath sounds.   Abdominal:      General: Bowel sounds are normal. There is no distension.      Palpations: Abdomen is soft. There is no mass.       Tenderness: There is no abdominal tenderness.   Musculoskeletal:         General: Normal range of motion.      Cervical back: Neck supple.   Skin:     General: Skin is warm.   Neurological:      General: No focal deficit present.      Mental Status: He is alert and oriented to person, place, and time.   Psychiatric:         Mood and Affect: Mood normal.         Behavior: Behavior normal.       PHQ-9 Total Score:      Assessment/Plan   Diagnoses and all orders for this visit:    1. Longstanding persistent atrial fibrillation (HCC) (Primary)  Comments:  Patient does not notice any palpitations.  Orders:  -     TSH    2. B12 deficiency  Comments:  Takes daily.  Orders:  -     Vitamin B12    3. Benign prostatic hyperplasia with urinary obstruction  Comments:  Just got a prescription for my there take.  Patient wishes to switch urologist have referred him to Dr. Sagar Chen.  Orders:  -     Ambulatory Referral to Urology    4. Hyperglycemia  Comments:  Find to eat less carbohydrates  Orders:  -     Comprehensive Metabolic Panel  -     Hemoglobin A1c    5. Primary hypertension  Comments:  Controlled no headache or chest pain  Orders:  -     CBC Auto Differential    6. Skin mole  Comments:  Problem will be resolved and the patient does not remember any bothersome moles.    7. Dyslipidemia  Comments:  Patient trying to eat less saturated fats  Orders:  -     Lipid Panel    8. Nonrheumatic mitral valve regurgitation  Comments:  Does get short of breath when he exercises outside in the cold inside no shortness of breath.    9. S/P CABG x 3 with CHOPRA per Dr. Hernandez 11/11/2021  Comments:  Patient will follow up with Dr. White later on this afternoon.    10. Acute low back pain without sciatica, unspecified back pain laterality  Comments:  Patient's back pain has resolved since he has had his heart surgery.        Patient Instructions     Health Maintenance Due   Topic Date Due   • URINE MICROALBUMIN  Never done   • DIABETIC FOOT  EXAM  Never done   • TDAP/TD VACCINES (2 - Tdap) 10/23/2021   • PROSTATE CANCER SCREENING  12/03/2021     cONSIDER dR. Mingo Garcia UROLOGIST

## 2022-02-08 NOTE — PROGRESS NOTES
Cardiology Office Visit      Encounter Date:  02/08/2022    Patient ID:   Salinas Dill is a 76 y.o. male.      Reason For Followup:  Chronic atrial fibrillation  Mitral regurgitation  Hypertension  Status post coronary artery bypass surgery and mitral valve repair    Brief Clinical History:  Dear Dr. Ferrera, Kortney Infante MD    I had the pleasure of seeing Salinas Dill today. As you are well aware, this is a 76 y.o. male with past medical history significant for history of hypertension and benign prostatic hypertrophy and recent hospitalization for coronary artery disease and coronary artery bypass surgery here for further evaluation treatment options    Interpretation Summary    Extended Holter monitor study for symptoms of palpitations  Patient was monitored for 30 days from November 17, 2021 to December 1, 2021  Primary rhythm is atrial fibrillation with average heart rate of 82 bpm  Minimal heart rate of 61 bpm maximal heart rate of 113 bpm  A. fib burden of 71%  Rare supraventricular ectopy burden  Rare ventricular ectopy burden   Longest pause was 2.4 seconds  1 episode of nonsustained ventricular tachycardia lasting for 4 beats  No sustained episodes of ventricular tachycardia  Abnormal Holter monitor study  Clinical correlation is recommended      Interval History:  Clinically feels better denies any new complaints      Assessment & Plan    Impressions:  Chronic atrial fibrillation/currently in sinus rhythm  Hypertension  Ilan Vascor of 2  stress test with no evidence of any inducible ischemia  echocardiogram with normal LV systolic function and moderate mitral regurgitation  Severe multivessel coronary artery disease status post CABG x3 with a LIMA to the mid LAD and reverse individual saphenous vein graft to the obtuse marginal 1 and PLB branch of the right coronary artery 2021-11-11  Severe mitral valve insufficiency status post ring annuloplasty  Atrial fibrillation with right and left cryomaze  "procedure and left atrial appendage ligation  Postop atrial fibrillation status post surgical maze currently in sinus rhythm  Essential hypertension  Dyslipidemia  BPH    Recommendations:    Continue aspirin and continue anticoagulation therapy with Eliquis  Likely consider AVERY in the next to 3 months to evaluate the effectiveness of left atrial appendage closure and then decide need for anticoagulation therapy  continue current dose Eliquis for anticoagulation  continued risk factor modification  Risk benefits of long-term anticoagulation therapy discussed the patient  Continue risk factor modification  Recent labs reviewed and discussed with the patient  Lipids at goal  Medical records from recent hospitalization reviewed and discussed with the patient and family  Continue cardiac rehab  Change diuretics to as needed along with potassium  Echocardiogram with normal LV systolic function normal functioning of the valve  Results of the recent Holter monitor reviewed and discussed  Schedule for a AVERY to evaluate the effectiveness of recent surgical left atrial appendage ligation  Patient had labs done today we will follow-up on the results  follow-up in office in 3 months    Objective:    Vitals:  Vitals:    02/08/22 1326   BP: 135/82   Pulse: 79   SpO2: 99%   Weight: 101 kg (222 lb)   Height: 71 cm (27.95\")     Body mass index is 199.76 kg/m².      Physical Exam:    General: Alert, cooperative, no distress, appears stated age  Head:  Normocephalic, atraumatic, mucous membranes moist  Eyes:  Conjunctiva/corneas clear, EOM's intact     Neck:  Supple,  no bruit    Lungs: Clear to auscultation bilaterally, no wheezes rhonchi rales are noted  Chest wall: No tenderness  Heart::  Regular rate and rhythm, S1 and S2 normal, no murmur, rub or gallop  Abdomen: Soft, non-tender, nondistended bowel sounds active  Extremities: No cyanosis, clubbing, or edema  Pulses: 2+ and symmetric all extremities  Skin:  No rashes or " lesions  Neuro/psych: A&O x3. CN II through XII are grossly intact with appropriate affect      Allergies:  Allergies   Allergen Reactions   • Lisinopril Cough       Medication Review:     Current Outpatient Medications:   •  acetaminophen (TYLENOL) 325 MG tablet, Take 2 tablets by mouth Every 6 (Six) Hours As Needed for Mild Pain ., Disp: , Rfl:   •  apixaban (Eliquis) 5 MG tablet tablet, Take 1 tablet by mouth 2 (Two) Times a Day., Disp: 180 tablet, Rfl: 3  •  aspirin (aspirin) 81 MG EC tablet, Take 1 tablet by mouth Daily., Disp: 30 tablet, Rfl: 2  •  atorvastatin (LIPITOR) 40 MG tablet, Take 1 tablet by mouth Every Night., Disp: 40 tablet, Rfl: 3  •  B Complex Vitamins (vitamin b complex) capsule capsule, Take 1 capsule by mouth Daily. LD 11/4, Disp: , Rfl:   •  carvedilol (COREG) 3.125 MG tablet, Take 1 tablet by mouth Every 12 (Twelve) Hours., Disp: 180 tablet, Rfl: 3  •  Cholecalciferol (VITAMIN D) 2000 units capsule, Take 2,000 Units by mouth Daily. 11/4, Disp: , Rfl:   •  furosemide (LASIX) 40 MG tablet, TAKE 1 TABLET BY MOUTH EVERY DAY (Patient taking differently: Every Other Day.), Disp: 30 tablet, Rfl: 0  •  melatonin 5 MG tablet tablet, Take 5 mg by mouth Every Night., Disp: , Rfl:   •  Mirabegron ER (MYRBETRIQ) 25 MG tablet sustained-release 24 hour 24 hr tablet, Take 25 mg by mouth Daily., Disp: , Rfl:   •  potassium chloride (MICRO-K) 10 MEQ CR capsule, Take 2 capsules by mouth Daily As Needed (take with each Lasix (furosemide0 dose)., Disp: 60 capsule, Rfl: 0  •  potassium chloride 10 MEQ CR tablet, TAKE 2 CAPSULES BY MOUTH DAILY., Disp: 30 tablet, Rfl: 0  •  SILDENAFIL CITRATE PO, Take 20 mg by mouth Daily. For enlarged prostate, Disp: , Rfl:     Family History:  Family History   Problem Relation Age of Onset   • Diabetes Mother    • Heart disease Mother    • Hypertension Sister    • Hyperlipidemia Sister    • Kidney disease Sister    • Cancer Sister    • Other Sister         weight disorder   •  Diabetes Sister    • Stroke Brother    • Hypertension Other        Past Medical History:  Past Medical History:   Diagnosis Date   • Arthritis    • Atrial fibrillation (HCC)    • Bulging lumbar disc    • Difficulty maintaining body in lying position     NEEDS PILLOW UNDER KNEES IN SURGERY D/T LUMBAR ISSUE   • Hyperglycemia    • Hypertension    • Skin mole    • Snoring    • Vitamin D deficiency        Past Surgical History:  Past Surgical History:   Procedure Laterality Date   • CARDIAC CATHETERIZATION Right 10/13/2021    Procedure: Left Heart Cath;  Surgeon: Renato Knight MD;  Location: [facility] CATH INVASIVE LOCATION;  Service: Cardiovascular;  Laterality: Right;   • CARDIAC CATHETERIZATION  10/13/2021    Procedure: Functional Flow [place];  Surgeon: Renato Knight MD;  Location: [facility] CATH INVASIVE LOCATION;  Service: Cardiovascular;;   • COLONOSCOPY     • CORONARY ARTERY BYPASS GRAFT N/A 2021    Procedure: CORONARY ARTERY BYPASS WITH INTERNAL MAMMARY ARTERY GRAFT AND MAZE PROCEDURE;  Surgeon: Johnathan Hernandez MD;  Location: [facility];  Service: Cardiothoracic;  Laterality: N/A;  CABG x 3  2 vein grafts  1 LIMA  with intraoperative AVERY   • FINGER SURGERY Right     repair right pointer finger   • JOINT REPLACEMENT     • MITRAL VALVE REPAIR/REPLACEMENT N/A 2021    Procedure: MITRAL VALVE REPAIR/REPLACEMENT;  Surgeon: Johnathan Hernandez MD;  Location: [facility];  Service: Cardiothoracic;  Laterality: N/A;  mitral valve repair with physio II annuloplasty ring 28mm   • TOTAL SHOULDER ARTHROPLASTY      shoulder replacement       Social History:  Social History     Socioeconomic History   • Marital status:    Tobacco Use   • Smoking status: Former Smoker     Packs/day: 1.00     Years: 12.00     Pack years: 12.00     Types: Cigarettes     Quit date:      Years since quittin.1   • Smokeless tobacco: Never Used   Vaping Use   • Vaping Use: Never used   Substance and Sexual  Activity   • Alcohol use: Yes     Alcohol/week: 1.0 standard drink     Types: 1 Shots of liquor per week     Comment: 1 drink weekly   • Drug use: No   • Sexual activity: Defer       Review of Systems:  The following systems were reviewed as they relate to the cardiovascular system: Constitutional, Eyes, ENT, Cardiovascular, Respiratory, Gastrointestinal, Integumentary, Neurological, Psychiatric, Hematologic, Endocrine, Musculoskeletal, and Genitourinary. The pertinent cardiovascular findings are reported above with all other cardiovascular points within those systems being negative.    Diagnostic Study Review:     Current Electrocardiogram:    ECG 12 Lead    Date/Time: 2/8/2022 1:55 PM  Performed by: Renato Knight MD  Authorized by: Renato Knight MD   Comparison: compared with previous ECG   Similar to previous ECG  Rhythm: sinus rhythm  Rate: normal  BPM: 78  Conduction: conduction normal  QRS axis: normal  Other findings: non-specific ST-T wave changes    Clinical impression: abnormal EKG            Laboratory Data:  Lab Results   Component Value Date    GLUCOSE 89 12/16/2021    BUN 17 12/16/2021    CREATININE 0.81 12/16/2021    EGFRIFNONA 93 12/16/2021    BCR 21.0 12/16/2021    K 3.9 12/16/2021    CO2 28.4 12/16/2021    CALCIUM 9.7 12/16/2021    ALBUMIN 4.00 12/16/2021    LABIL2 1.2 03/20/2018    AST 25 12/16/2021    ALT 15 12/16/2021     Lab Results   Component Value Date    GLUCOSE 89 12/16/2021    CALCIUM 9.7 12/16/2021     12/16/2021    K 3.9 12/16/2021    CO2 28.4 12/16/2021     12/16/2021    BUN 17 12/16/2021    CREATININE 0.81 12/16/2021    EGFRIFNONA 93 12/16/2021    BCR 21.0 12/16/2021    ANIONGAP 10.6 12/16/2021     Lab Results   Component Value Date    WBC 7.43 12/16/2021    HGB 11.6 (L) 12/16/2021    HCT 36.8 (L) 12/16/2021    MCV 89.8 12/16/2021     12/16/2021     Lab Results   Component Value Date    CHOL 157 11/09/2021    TRIG 69 11/09/2021    HDL 42  11/09/2021     (H) 11/09/2021     Lab Results   Component Value Date    HGBA1C 5.6 11/09/2021     Lab Results   Component Value Date    INR 1.08 11/12/2021    INR 1.08 11/11/2021    INR 0.98 11/09/2021    PROTIME 11.9 (H) 11/12/2021    PROTIME 11.9 (H) 11/11/2021    PROTIME 10.9 11/09/2021       Most Recent Echo:  Results for orders placed during the hospital encounter of 12/23/21    Adult Transthoracic Echo Complete W/ Cont if Necessary Per Protocol    Interpretation Summary  · Estimated left ventricular EF was in agreement with the calculated left ventricular EF. Left ventricular ejection fraction appears to be 56 - 60%. Left ventricular systolic function is normal.  · Estimated right ventricular systolic pressure from tricuspid regurgitation is markedly elevated (>55 mmHg). Calculated right ventricular systolic pressure from tricuspid regurgitation is 60 mmHg.  · Left ventricular wall thickness is consistent with mild to moderate concentric hypertrophy.  · There is calcification of the aortic valve mainly affecting the non-coronary, left coronary and right coronary cusp(s).  · There is a mitral valve ring present.       Most Recent Stress Test:       Most Recent Cardiac Catheterization:   Results for orders placed during the hospital encounter of 10/13/21    Cardiac Catheterization/Vascular Study    Narrative  Cardiac Catheterization Operative Report    Salinas LOOMIS Aniyah  [id number]  10/13/2021  @PCP@    He underwent cardiac catheterization.    Indications for the procedure include: shortness of breath.    Procedure Details:  The risks, benefits, complications, treatment options, and expected outcomes were discussed with the patient. The patient and/or family concurred with the proposed plan, giving informed consent.    After informed consent the patient was brought to the cath lab after appropriate IV hydration was begun and oral premedication was given. He was further sedated with fentanyl. He was prepped  and draped in the usual manner. Using the modified Seldinger access technique, a 6 [place] sheath was placed in the femoral artery. A left heart catheterization with coronary arteriography was performed. Findings are discussed below.    For the IFR evaluation we used a  guide catheter with a [place] pressure wire to measure the IFR value of the mid LAD and also midportion of the ramus branch of the circumflex and also the left main coronary artery.  Patient tolerated procedure well with no immediate possible complications plan to obtain hemostasis by manual pressure.  Procedural anticoagulation was heparin.    After the procedure was completed, sedation was stopped and the sheaths and catheters were all removed. Hemostasis was achieved per established hospital protocols.    Conscious sedation:  Conscious sedation was performed according to protocol.  I supervised and directed an independent trained observer with the assistance of monitoring the patient's level of consciousness and physiologic status throughout the procedure.  Intraoperative service time was 60 minutes.    Findings:    Hemodynamics Central aortic pressure systolic 102 and diastolic 60 with a mean pressure of 79 mmHg  LV end-diastolic pressure was 4 mmHg  There was no gradient across the aortic valve on the pullback of the pigtail catheter  Left Main  left main coronary artery is large caliber vessel extensively calcified distal left main coronary artery has angiographic 50% stenosis before it trifurcates into left anterior descending ramus intermediate and left circumflex artery  RCA  large-caliber dominant vessel.  Right coronary artery has a proximal angiographic 30 to 40% stenosis mid angiographic 30 to 40% stenosis  Distal right coronary artery before the PDA PLB branches is angiographically 60% stenosis provides large-caliber PDA and PLB branch  PLB branch of the right coronary artery has a mid focal area of 90% stenosis  PDA branch of the  right coronary artery has apical angiographic 70% stenosis  LAD  large-caliber vessel.  Mid LAD has a focal area of angiographic 70% stenosis that is calcified after the diagonal branch  First diagonal branch is a small to moderate-sized vessel has a mid focal area of 99% stenosis  Second diagonal branch is a moderate-sized vessel angiographically normal  Circ  moderate to large caliber vessel has a proximal ostial angiographic 80% stenosis  Marginal branch of the circumflex has a proximal ostial angiographic calcified 90% stenosis  There is a large caliber ramus intermediate branch that has made a focal area of angiographic 70% stenosis and mid to distal second area of angiographic 80% stenosis before the bifurcation  LV  normal LV systolic function normal wall motion estimate LV ejection fraction of 60%  Coronary Dominance  right coronary artery    IFR evaluation of the mid LAD showing IFR value of 0.89 suggestive of physiologically significant stenosis involving the mid LAD  IFR evaluation of the ramus branch of the circumflex showing IFR value of 0.88 suggestive of physiologically significant stenosis involving the ramus branch of the circumflex  IFR evaluation of the left main showing IFR value of 0.94 suggestive of no physiologically significant stenosis involving the distal left main        Estimated Blood Loss:  Minimal    Specimens: None    Complications:  None; patient tolerated the procedure well.    Disposition: PACU - hemodynamically stable.    Condition: stable    Impressions:  IFR evaluation of the mid LAD showing IFR value of 0.89 suggestive of physiologically significant stenosis involving the mid LAD  IFR evaluation of the ramus branch of the circumflex showing IFR value of 0.88 suggestive of physiologically significant stenosis involving the ramus branch of the circumflex  IFR evaluation of the left main showing IFR value of 0.94 suggestive of no physiologically significant stenosis involving the  distal left main  Severe three-vessel coronary artery disease  Extensive calcification of the multiple coronary arteries segments  Normal LV systolic function normal wall motion normal left-sided filling pressures estimate LV ejection fraction of 60%  Chronic atrial fibrillation    Recommendations:  Surgical evaluation for consideration for coronary artery bypass surgery surgical maze and ligation of the left atrial appendage  Test results reviewed and discussed with the patient and family       NOTE: The following portions of the patient's note were reviewed, confirmed and/or updated this visit as appropriate: History of present illness/Interval history, physical examination, assessment & plan, allergies, current medications, past family history, past medical history, past social history, past surgical history and problem list.

## 2022-02-09 ENCOUNTER — TELEPHONE (OUTPATIENT)
Dept: FAMILY MEDICINE CLINIC | Facility: CLINIC | Age: 77
End: 2022-02-09

## 2022-02-09 LAB — HBA1C MFR BLD: 5.8 % (ref 3.5–5.6)

## 2022-02-09 RX ORDER — FUROSEMIDE 40 MG/1
TABLET ORAL
Qty: 30 TABLET | Refills: 0 | Status: SHIPPED | OUTPATIENT
Start: 2022-02-09 | End: 2022-02-22

## 2022-02-09 NOTE — PROGRESS NOTES
Still slightly anemic but improving.  Gluciose 106 with A1C showing excess risk for DM but no caridad DM at 5.8.  Watch carbs and keep exercise up

## 2022-02-09 NOTE — TELEPHONE ENCOUNTER
----- Message from Kortney Ferrera MD sent at 2/9/2022 12:13 PM EST -----  HUB TO READ    Still slightly anemic but improving.  Gluciose 106 with A1C showing excess risk for DM but no caridad DM at 5.8.  Watch carbs and keep exercise up

## 2022-02-19 ENCOUNTER — LAB (OUTPATIENT)
Dept: LAB | Facility: HOSPITAL | Age: 77
End: 2022-02-19

## 2022-02-19 ENCOUNTER — TRANSCRIBE ORDERS (OUTPATIENT)
Dept: ADMINISTRATIVE | Facility: HOSPITAL | Age: 77
End: 2022-02-19

## 2022-02-19 DIAGNOSIS — Z98.890 S/P MAZE OPERATION FOR ATRIAL FIBRILLATION: ICD-10-CM

## 2022-02-19 DIAGNOSIS — I34.0 NONRHEUMATIC MITRAL VALVE REGURGITATION: ICD-10-CM

## 2022-02-19 DIAGNOSIS — Z01.818 PRE-OP TESTING: ICD-10-CM

## 2022-02-19 DIAGNOSIS — Z01.818 PRE-OP TESTING: Primary | ICD-10-CM

## 2022-02-19 DIAGNOSIS — Z95.1 S/P CABG X 3: ICD-10-CM

## 2022-02-19 DIAGNOSIS — Z86.79 S/P MAZE OPERATION FOR ATRIAL FIBRILLATION: ICD-10-CM

## 2022-02-19 DIAGNOSIS — Z98.890 S/P MVR (MITRAL VALVE REPAIR): ICD-10-CM

## 2022-02-19 DIAGNOSIS — E78.5 DYSLIPIDEMIA: ICD-10-CM

## 2022-02-19 DIAGNOSIS — Z98.890 S/P LEFT ATRIAL APPENDAGE LIGATION: ICD-10-CM

## 2022-02-19 DIAGNOSIS — I48.11 LONGSTANDING PERSISTENT ATRIAL FIBRILLATION: ICD-10-CM

## 2022-02-19 DIAGNOSIS — I25.10 CORONARY ARTERY DISEASE INVOLVING NATIVE CORONARY ARTERY OF NATIVE HEART WITHOUT ANGINA PECTORIS: ICD-10-CM

## 2022-02-19 DIAGNOSIS — I10 PRIMARY HYPERTENSION: ICD-10-CM

## 2022-02-19 LAB
ANION GAP SERPL CALCULATED.3IONS-SCNC: 9 MMOL/L (ref 5–15)
BUN SERPL-MCNC: 19 MG/DL (ref 8–23)
BUN/CREAT SERPL: 27.1 (ref 7–25)
CALCIUM SPEC-SCNC: 9.3 MG/DL (ref 8.6–10.5)
CHLORIDE SERPL-SCNC: 104 MMOL/L (ref 98–107)
CO2 SERPL-SCNC: 26 MMOL/L (ref 22–29)
CREAT SERPL-MCNC: 0.7 MG/DL (ref 0.76–1.27)
DEPRECATED RDW RBC AUTO: 40.8 FL (ref 37–54)
ERYTHROCYTE [DISTWIDTH] IN BLOOD BY AUTOMATED COUNT: 13.3 % (ref 12.3–15.4)
GFR SERPL CREATININE-BSD FRML MDRD: 110 ML/MIN/1.73
GLUCOSE SERPL-MCNC: 86 MG/DL (ref 65–99)
HCT VFR BLD AUTO: 36.8 % (ref 37.5–51)
HGB BLD-MCNC: 11.8 G/DL (ref 13–17.7)
INR PPP: 1.01 (ref 0.93–1.1)
MCH RBC QN AUTO: 27 PG (ref 26.6–33)
MCHC RBC AUTO-ENTMCNC: 32.1 G/DL (ref 31.5–35.7)
MCV RBC AUTO: 84.2 FL (ref 79–97)
PLATELET # BLD AUTO: 228 10*3/MM3 (ref 140–450)
PMV BLD AUTO: 12.5 FL (ref 6–12)
POTASSIUM SERPL-SCNC: 4.1 MMOL/L (ref 3.5–5.2)
PROTHROMBIN TIME: 11.2 SECONDS (ref 9.6–11.7)
RBC # BLD AUTO: 4.37 10*6/MM3 (ref 4.14–5.8)
SODIUM SERPL-SCNC: 139 MMOL/L (ref 136–145)
WBC NRBC COR # BLD: 6.96 10*3/MM3 (ref 3.4–10.8)

## 2022-02-19 PROCEDURE — 85027 COMPLETE CBC AUTOMATED: CPT

## 2022-02-19 PROCEDURE — C9803 HOPD COVID-19 SPEC COLLECT: HCPCS

## 2022-02-19 PROCEDURE — 36415 COLL VENOUS BLD VENIPUNCTURE: CPT

## 2022-02-19 PROCEDURE — U0005 INFEC AGEN DETEC AMPLI PROBE: HCPCS

## 2022-02-19 PROCEDURE — 80048 BASIC METABOLIC PNL TOTAL CA: CPT

## 2022-02-19 PROCEDURE — 85610 PROTHROMBIN TIME: CPT

## 2022-02-19 PROCEDURE — U0004 COV-19 TEST NON-CDC HGH THRU: HCPCS

## 2022-02-20 LAB — SARS-COV-2 ORF1AB RESP QL NAA+PROBE: NOT DETECTED

## 2022-02-21 ENCOUNTER — ANESTHESIA EVENT (OUTPATIENT)
Dept: CARDIOLOGY | Facility: HOSPITAL | Age: 77
End: 2022-02-21

## 2022-02-22 ENCOUNTER — HOSPITAL ENCOUNTER (OUTPATIENT)
Dept: CARDIOLOGY | Facility: HOSPITAL | Age: 77
Discharge: HOME | End: 2022-02-22

## 2022-02-22 ENCOUNTER — ANESTHESIA (OUTPATIENT)
Dept: CARDIOLOGY | Facility: HOSPITAL | Age: 77
End: 2022-02-22

## 2022-02-22 VITALS
RESPIRATION RATE: 16 BRPM | HEART RATE: 69 BPM | TEMPERATURE: 98.1 F | BODY MASS INDEX: 31.3 KG/M2 | HEIGHT: 71 IN | DIASTOLIC BLOOD PRESSURE: 72 MMHG | OXYGEN SATURATION: 95 % | SYSTOLIC BLOOD PRESSURE: 139 MMHG | WEIGHT: 223.6 LBS

## 2022-02-22 DIAGNOSIS — Z98.890 S/P MAZE OPERATION FOR ATRIAL FIBRILLATION: ICD-10-CM

## 2022-02-22 DIAGNOSIS — Z86.79 S/P MAZE OPERATION FOR ATRIAL FIBRILLATION: ICD-10-CM

## 2022-02-22 DIAGNOSIS — Z95.1 S/P CABG X 3: ICD-10-CM

## 2022-02-22 DIAGNOSIS — I34.0 NONRHEUMATIC MITRAL VALVE REGURGITATION: ICD-10-CM

## 2022-02-22 DIAGNOSIS — E78.5 DYSLIPIDEMIA: ICD-10-CM

## 2022-02-22 DIAGNOSIS — Z98.890 S/P MVR (MITRAL VALVE REPAIR): ICD-10-CM

## 2022-02-22 DIAGNOSIS — I10 PRIMARY HYPERTENSION: ICD-10-CM

## 2022-02-22 DIAGNOSIS — I48.11 LONGSTANDING PERSISTENT ATRIAL FIBRILLATION: ICD-10-CM

## 2022-02-22 DIAGNOSIS — Z98.890 S/P LEFT ATRIAL APPENDAGE LIGATION: ICD-10-CM

## 2022-02-22 DIAGNOSIS — I25.10 CORONARY ARTERY DISEASE INVOLVING NATIVE CORONARY ARTERY OF NATIVE HEART WITHOUT ANGINA PECTORIS: ICD-10-CM

## 2022-02-22 PROCEDURE — 93325 DOPPLER ECHO COLOR FLOW MAPG: CPT | Performed by: INTERNAL MEDICINE

## 2022-02-22 PROCEDURE — 93320 DOPPLER ECHO COMPLETE: CPT | Performed by: INTERNAL MEDICINE

## 2022-02-22 PROCEDURE — 93325 DOPPLER ECHO COLOR FLOW MAPG: CPT

## 2022-02-22 PROCEDURE — 25010000002 PROPOFOL 200 MG/20ML EMULSION: Performed by: ANESTHESIOLOGIST ASSISTANT

## 2022-02-22 PROCEDURE — 93312 ECHO TRANSESOPHAGEAL: CPT | Performed by: INTERNAL MEDICINE

## 2022-02-22 PROCEDURE — 93312 ECHO TRANSESOPHAGEAL: CPT

## 2022-02-22 PROCEDURE — 93320 DOPPLER ECHO COMPLETE: CPT

## 2022-02-22 RX ORDER — PROPOFOL 10 MG/ML
INJECTION, EMULSION INTRAVENOUS AS NEEDED
Status: DISCONTINUED | OUTPATIENT
Start: 2022-02-22 | End: 2022-02-22 | Stop reason: SURG

## 2022-02-22 RX ORDER — SODIUM CHLORIDE 9 MG/ML
30 INJECTION, SOLUTION INTRAVENOUS CONTINUOUS
Status: DISCONTINUED | OUTPATIENT
Start: 2022-02-22 | End: 2022-02-23 | Stop reason: HOSPADM

## 2022-02-22 RX ADMIN — SODIUM CHLORIDE 30 ML/HR: 9 INJECTION, SOLUTION INTRAVENOUS at 08:56

## 2022-02-22 RX ADMIN — PROPOFOL 300 MG: 10 INJECTION, EMULSION INTRAVENOUS at 10:06

## 2022-02-22 NOTE — DISCHARGE INSTRUCTIONS
AVERY DC Instructions    1) The medication, which was used to put the patient to sleep, will be acting in your body for the next twenty-four (24) hours, so you might feel a little sleepy; this feeling will slowly wear off.  Because the medicine is still in your system for the next twenty-four (24) hours, the adult patient SHOULD NOT:    a. Drive a car, operate machinery, or power tools  b. Drink any alcoholic drinks (not even beer)  c. Make any important decisions such as to sign important papers    2) We strongly suggest that a responsible adult be with the patient the rest of the day.    3) Any problems with:    a. EXCESSIVE MUCOUS  b. SPITTING UP BLOOD  c. SORE THROAT AT MORE THAN 72 HOURS    NOTIFY DR. Gay [phone] OR CALL THE [facility] [phone].

## 2022-02-22 NOTE — ANESTHESIA PREPROCEDURE EVALUATION
Anesthesia Evaluation     Patient summary reviewed and Nursing notes reviewed   NPO Solid Status: > 8 hours  NPO Liquid Status: > 8 hours           Airway   Mallampati: II  TM distance: >3 FB  Neck ROM: full  No difficulty expected  Dental - normal exam     Pulmonary - negative pulmonary ROS and normal exam    breath sounds clear to auscultation  Cardiovascular - normal exam  Exercise tolerance: unable to assess    ECG reviewed  Rhythm: regular  Rate: normal    (+) hypertension, valvular problems/murmurs, CAD, CABG, dysrhythmias Atrial Fib,     ROS comment: Interpretation Summary    · Estimated left ventricular EF was in agreement with the calculated left ventricular EF. Left ventricular ejection fraction appears to be 56 - 60%. Left ventricular systolic function is normal.  · Estimated right ventricular systolic pressure from tricuspid regurgitation is markedly elevated (>55 mmHg). Calculated right ventricular systolic pressure from tricuspid regurgitation is 60 mmHg.  · Left ventricular wall thickness is consistent with mild to moderate concentric hypertrophy.  · There is calcification of the aortic valve mainly affecting the non-coronary, left coronary and right coronary cusp(s).  · There is a mitral valve ring present.        Neuro/Psych- negative ROS  GI/Hepatic/Renal/Endo    (+) obesity,       Musculoskeletal     Abdominal  - normal exam   Substance History - negative use     OB/GYN negative ob/gyn ROS         Other   arthritis,                      Anesthesia Plan    ASA 3     MAC     intravenous induction     Anesthetic plan, all risks, benefits, and alternatives have been provided, discussed and informed consent has been obtained with: patient.        CODE STATUS:

## 2022-02-23 LAB
MAXIMAL PREDICTED HEART RATE: 145 BPM
STRESS TARGET HR: 123 BPM

## 2022-03-07 RX ORDER — FUROSEMIDE 40 MG/1
40 TABLET ORAL DAILY
Qty: 90 TABLET | Refills: 0 | Status: SHIPPED | OUTPATIENT
Start: 2022-03-07 | End: 2022-04-14

## 2022-04-03 NOTE — PATIENT INSTRUCTIONS
Health Maintenance Due   Topic Date Due    TDAP/TD VACCINES (2 - Tdap) 10/23/2021    PROSTATE CANCER SCREENING  12/03/2021    Check blood pressure cuff for accuracy and send 10 blood pressures over 2 weeks.  Watch sodium, alcohol and weight     Rest and fluids.  Call if fever, increased breathing problems, thick sputum

## 2022-04-04 ENCOUNTER — TELEPHONE (OUTPATIENT)
Dept: FAMILY MEDICINE CLINIC | Facility: CLINIC | Age: 77
End: 2022-04-04

## 2022-04-04 ENCOUNTER — OFFICE VISIT (OUTPATIENT)
Dept: FAMILY MEDICINE CLINIC | Facility: CLINIC | Age: 77
End: 2022-04-04

## 2022-04-04 VITALS
TEMPERATURE: 98.2 F | HEART RATE: 85 BPM | DIASTOLIC BLOOD PRESSURE: 73 MMHG | BODY MASS INDEX: 31.81 KG/M2 | OXYGEN SATURATION: 98 % | HEIGHT: 71 IN | SYSTOLIC BLOOD PRESSURE: 145 MMHG | WEIGHT: 227.2 LBS

## 2022-04-04 DIAGNOSIS — M48.061 SPINAL STENOSIS OF LUMBAR REGION WITH RADICULOPATHY: ICD-10-CM

## 2022-04-04 DIAGNOSIS — R09.89 PULSE IRREGULARITY: ICD-10-CM

## 2022-04-04 DIAGNOSIS — Z12.5 SCREENING FOR PROSTATE CANCER: Primary | ICD-10-CM

## 2022-04-04 DIAGNOSIS — R05.9 COUGH: ICD-10-CM

## 2022-04-04 DIAGNOSIS — M54.16 SPINAL STENOSIS OF LUMBAR REGION WITH RADICULOPATHY: ICD-10-CM

## 2022-04-04 DIAGNOSIS — M25.559 HIP PAIN: ICD-10-CM

## 2022-04-04 DIAGNOSIS — N13.8 BENIGN PROSTATIC HYPERPLASIA WITH URINARY OBSTRUCTION: ICD-10-CM

## 2022-04-04 DIAGNOSIS — E66.09 CLASS 1 OBESITY DUE TO EXCESS CALORIES WITH SERIOUS COMORBIDITY AND BODY MASS INDEX (BMI) OF 31.0 TO 31.9 IN ADULT: ICD-10-CM

## 2022-04-04 DIAGNOSIS — I48.11 LONGSTANDING PERSISTENT ATRIAL FIBRILLATION: ICD-10-CM

## 2022-04-04 DIAGNOSIS — I10 PRIMARY HYPERTENSION: ICD-10-CM

## 2022-04-04 DIAGNOSIS — Z95.1 S/P CABG X 3: ICD-10-CM

## 2022-04-04 DIAGNOSIS — N40.1 BENIGN PROSTATIC HYPERPLASIA WITH URINARY OBSTRUCTION: ICD-10-CM

## 2022-04-04 LAB
EXPIRATION DATE: NORMAL
FLUAV AG NPH QL: NEGATIVE
FLUBV AG NPH QL: NEGATIVE
INTERNAL CONTROL: NORMAL
Lab: NORMAL

## 2022-04-04 PROCEDURE — U0005 INFEC AGEN DETEC AMPLI PROBE: HCPCS | Performed by: PREVENTIVE MEDICINE

## 2022-04-04 PROCEDURE — 93000 ELECTROCARDIOGRAM COMPLETE: CPT | Performed by: PREVENTIVE MEDICINE

## 2022-04-04 PROCEDURE — 87804 INFLUENZA ASSAY W/OPTIC: CPT | Performed by: PREVENTIVE MEDICINE

## 2022-04-04 PROCEDURE — 99214 OFFICE O/P EST MOD 30 MIN: CPT | Performed by: PREVENTIVE MEDICINE

## 2022-04-04 PROCEDURE — U0004 COV-19 TEST NON-CDC HGH THRU: HCPCS | Performed by: PREVENTIVE MEDICINE

## 2022-04-04 RX ORDER — VIT C/B6/B5/MAGNESIUM/HERB 173 50-5-6-5MG
500 CAPSULE ORAL DAILY
COMMUNITY

## 2022-04-04 RX ORDER — TAMSULOSIN HYDROCHLORIDE 0.4 MG/1
1 CAPSULE ORAL DAILY
COMMUNITY
Start: 2022-02-28

## 2022-04-04 RX ORDER — POTASSIUM CHLORIDE 400 MEQ/1000ML
20 INJECTION, SOLUTION INTRAVENOUS ONCE
COMMUNITY
End: 2022-04-14

## 2022-04-04 NOTE — PROGRESS NOTES
"Subjective   Salinas Dill is a 76 y.o. male presents for   Chief Complaint   Patient presents with   • Hip Pain     Right hip pain for apx 3-4 weeks. Feet and toe tingling. When pt does a lot of walking or standing it hurts more.   =.  Patient presents today for right hip pain this is been going on for the last 3 to 4 weeks does have numbness and tingling in toes 1 2 and 3 of his right foot recent trip to [place] and he had to ride the cart in the airport due to pain in his hip buttocks and numbness in his foot.  Patient has spinal stenosis at L3-L4 with bilateral nerve root impingement at this level.  He is having no groin pain we feel as though the pain is coming from the back but will actually x-ray the right hip as well.  Problem #2 is new cough and congestion that started yesterday he did do a home Covid test yesterday and it was negative we will do a PCR and a rapid flu test to make sure that he does not have either condition.  If he develops fever thickening or color to his sputum he will call back chest x-ray is pending as well.  Finally patient is still short of breath and feels weak and tired climbing stairs and climbing uphill.  EKG was done today due to irregular pulse and was found to have an indeterminate rhythm no caridad atrial flutter but will have him seen by cardiology sooner.  Dr. Whitney was contacted by text and he will have his office call to set up an appointment.  Holter monitor has been ordered.    Health Maintenance Due   Topic Date Due   • TDAP/TD VACCINES (2 - Tdap) 10/23/2021   • PROSTATE CANCER SCREENING  12/03/2021       History of Present Illness     Vitals:    04/04/22 0826 04/04/22 0831   BP: 137/71 145/73   BP Location: Right arm Left arm   Patient Position: Sitting Sitting   Cuff Size: Large Adult Large Adult   Pulse: 85    Temp: 98.2 °F (36.8 °C)    TempSrc: Temporal    SpO2: 98%    Weight: 103 kg (227 lb 3.2 oz)    Height: 180.3 cm (71\")      Body mass index is 31.69 " kg/m².    Current Outpatient Medications on File Prior to Visit   Medication Sig Dispense Refill   • acetaminophen (TYLENOL) 325 MG tablet Take 2 tablets by mouth Every 6 (Six) Hours As Needed for Mild Pain .     • apixaban (Eliquis) 5 MG tablet tablet Take 1 tablet by mouth 2 (Two) Times a Day. 180 tablet 3   • aspirin (aspirin) 81 MG EC tablet Take 1 tablet by mouth Daily. 30 tablet 2   • atorvastatin (LIPITOR) 40 MG tablet Take 1 tablet by mouth Every Night. 40 tablet 3   • B Complex Vitamins (vitamin b complex) capsule capsule Take 1 capsule by mouth Daily. LD 11/4     • BLACK ELDERBERRY PO Take 158 mg by mouth Daily.     • carvedilol (COREG) 3.125 MG tablet Take 1 tablet by mouth Every 12 (Twelve) Hours. 180 tablet 3   • Cholecalciferol (VITAMIN D) 2000 units capsule Take 2,000 Units by mouth Daily. 11/4     • furosemide (LASIX) 40 MG tablet Take 1 tablet by mouth Daily. 90 tablet 0   • melatonin 5 MG tablet tablet Take 5 mg by mouth Every Night.     • potassium chloride 0.4 MEQ/ML Infuse 20 mEq into a venous catheter 1 (One) Time.     • SILDENAFIL CITRATE PO Take 20 mg by mouth Daily. For enlarged prostate     • tamsulosin (FLOMAX) 0.4 MG capsule 24 hr capsule 1 CAPSULES ORAL TWICE A DAY     • Turmeric 500 MG capsule Take 500 mg by mouth Daily.     • [DISCONTINUED] Mirabegron ER (MYRBETRIQ) 25 MG tablet sustained-release 24 hour 24 hr tablet Take 25 mg by mouth Daily.       No current facility-administered medications on file prior to visit.       The following portions of the patient's history were reviewed and updated as appropriate: allergies, current medications, past family history, past medical history, past social history, past surgical history and problem list.    Review of Systems   HENT: Positive for congestion and sinus pressure. Negative for sore throat.    Respiratory: Positive for cough and shortness of breath.    Cardiovascular: Positive for leg swelling.   Gastrointestinal: Negative for  abdominal pain and diarrhea.   Genitourinary: Positive for nocturia.        Nocturia and urine dribbling have improved   Musculoskeletal: Positive for arthralgias, back pain and gait problem.   Neurological: Positive for numbness.       Objective   Physical Exam  Vitals reviewed.   Constitutional:       General: He is not in acute distress.     Appearance: He is well-developed. He is obese. He is not ill-appearing or toxic-appearing.   HENT:      Head: Normocephalic and atraumatic.      Right Ear: Tympanic membrane, ear canal and external ear normal.      Left Ear: Tympanic membrane, ear canal and external ear normal.      Nose: Nose normal.      Mouth/Throat:      Mouth: Mucous membranes are moist.      Pharynx: No posterior oropharyngeal erythema.   Eyes:      Extraocular Movements: Extraocular movements intact.      Conjunctiva/sclera: Conjunctivae normal.      Pupils: Pupils are equal, round, and reactive to light.   Cardiovascular:      Rate and Rhythm: Normal rate. Rhythm irregular.      Heart sounds: Normal heart sounds.   Pulmonary:      Effort: Pulmonary effort is normal.      Breath sounds: Normal breath sounds.   Abdominal:      General: Bowel sounds are normal. There is no distension.      Palpations: Abdomen is soft. There is no mass.      Tenderness: There is no abdominal tenderness.   Musculoskeletal:         General: Normal range of motion.      Cervical back: Neck supple.      Right lower leg: Edema present.      Comments: Full range of motion today of the thoracolumbar spine toe heel walk and squat was strong straight leg raising was negative there was no pain with hip extension decreased sensation in toes 1-3 of the right foot as compared to the left foot.  Knee reflexes were 2+ and equal bilaterally absent left ankle reflex +1 right ankle reflex.  Trendelenburg was negative did have some mid back pain with hip internal rotation.   Skin:     General: Skin is warm.   Neurological:      General: No  focal deficit present.      Mental Status: He is alert and oriented to person, place, and time.   Psychiatric:         Mood and Affect: Mood normal.         Behavior: Behavior normal.       PHQ-9 Total Score:      Assessment/Plan   Diagnoses and all orders for this visit:    1. Screening for prostate cancer (Primary)  Comments:  Patient is being followed by urology.    2. Hip pain  Comments:  Xray and will refer to Neurosurgery  Orders:  -     XR Hip With or Without Pelvis 2 - 3 View Right; Future    3. Longstanding persistent atrial fibrillation (HCC)  Comments:  Irregularities in pulse today but less than 6/min.  Holter monitor pending  Orders:  -     Holter Monitor - 72 Hour Up To 15 Days; Future    4. Benign prostatic hyperplasia with urinary obstruction  Comments:  Continue follow-up with urology    5. Primary hypertension  Comments:  Not controlled-will home monitor    6. S/P CABG x 3 with CHOPRA per Dr. Hernandez 11/11/2021  Comments:  Patient will follow up with Dr. White contacted by text.    7. Spinal stenosis of lumbar region with radiculopathy  Comments:  Follow-up neurosurgeon due to spinal stenosis and nerve root impingements at several levels and numb right medial foot.  Orders:  -     Ambulatory Referral to Neurosurgery    8. Cough  Comments:  Cough last couple of days recent trip to [place] home Covid test was negative PCR Covid is pending.  Flu a and B are pending.  Call if sputum colors or gets feve  Orders:  -     XR Chest PA & Lateral  -     POCT Influenza A/B  -     COVID-19,APTIMA PANTHER(JAEL),[facility], NP/OP SWAB IN UTM/VTM/[facility] TRANSPORT MEDIA,24 HR TAT - Swab, Nasopharynx; Future  -     COVID-19,APTIMA PANTHER(JAEL),[facility], NP/OP SWAB IN UTM/VTM/[facility] TRANSPORT MEDIA,24 HR TAT - Swab, Nasopharynx    9. Class 1 obesity due to excess calories with serious comorbidity and body mass index (BMI) of 31.0 to 31.9 in adult    10. Pulse irregularity  -     ECG 12 Lead    Other orders  -      SCANNED EKG        Patient Instructions     Health Maintenance Due   Topic Date Due   • TDAP/TD VACCINES (2 - Tdap) 10/23/2021   • PROSTATE CANCER SCREENING  12/03/2021    Check blood pressure cuff for accuracy and send 10 blood pressures over 2 weeks.  Watch sodium, alcohol and weight     Rest and fluids.  Call if fever, increased breathing problems, thick sputum

## 2022-04-04 NOTE — PROGRESS NOTES
Procedure     ECG 12 Lead    Date/Time: 4/4/2022 1:01 PM  Performed by: Kortney Ferrera MD  Authorized by: Kortney Ferrear MD   Rhythm comments: Indeterminate  Rate: normal    Clinical impression: abnormal EKG

## 2022-04-04 NOTE — TELEPHONE ENCOUNTER
----- Message from Kortney Ferrera MD sent at 4/4/2022  1:24 PM EDT -----    HUB TO READ    Chest x-ray is clear and shows no pneumonia.  Hip x-ray shows some mild arthritis.  I still feel the bulk of his leg pain and hip pain is coming from his back.  After he sees the neurosurgeon if we still have some concerns we will have him follow-up with orthopedics.  Influenza a and B were both negative by rapid technique and COVID PCR test is pending we will let him know what that looks like tomorrow.

## 2022-04-05 ENCOUNTER — TELEPHONE (OUTPATIENT)
Dept: FAMILY MEDICINE CLINIC | Facility: CLINIC | Age: 77
End: 2022-04-05

## 2022-04-05 LAB — SARS-COV-2 ORF1AB RESP QL NAA+PROBE: DETECTED

## 2022-04-05 NOTE — TELEPHONE ENCOUNTER
----- Message from Salinas Dill sent at 4/5/2022  9:14 AM EDT -----  Regarding: jesus alberto larios  Robel(son)would like to talk to you, can you call him at your convenience.  [phone]   your office phones are not working   Tuesday  9:15 am

## 2022-04-05 NOTE — PROGRESS NOTES
Patient informed he is Covid positive.  Since he has recently been in the [place] he probably has the PA to omicron variant which is 9 responsive to the IV monoclonal antibody treatment soltrimab.  Patient is agreeable to start Paxil instead if doses can be found.   checked and will try calling prescription to [facility] at [facility] point.  Patient knows to home quarantine.

## 2022-04-05 NOTE — TELEPHONE ENCOUNTER
Spoke with Patient's sonand wife and he has decided not to take Paxlovid due to data only on nonvaccinated people and his underlying conditions

## 2022-04-13 NOTE — PROGRESS NOTES
"Neurosurgical Consultation      Salinas Dill is a 76 y.o. male is being seen for consultation today at the request of Kortney Ferrera MD for spinal stenosis of the lumbar region with radiculopathy.     Chief Complaint   Patient presents with   • Back Pain     Low back pain      • Numbness     Right foot        Previous treatment:    HPI: This is a 76-year-old gentleman who underwent cardiac surgery on November 11, 2021.  Postoperatively he noticed back and bilateral hip pain.  This is eased off with limited treatment however he went on a trip including an airplane and cruise trip.  This exacerbated his symptoms.  He has right and left-sided hip pain that is described as a \"sharp ache\".  Walking can worsen his pain.  He has been restricting his activity secondary to pain.  He does have intermittent numbness into his big toe on the right side.  He is managing his symptoms with Tylenol.  He has never had any physical therapy for his back or hips.  He has not had any injections in his back.  He is on Eliquis for recent cardiac surgery.  He does have a remote history of smoking but quit 45 years ago.    Past Medical History:   Diagnosis Date   • Arthritis    • Atrial fibrillation (HCC)    • Bulging lumbar disc    • Difficulty maintaining body in lying position     NEEDS PILLOW UNDER KNEES IN SURGERY D/T LUMBAR ISSUE   • Hyperglycemia    • Hypertension    • Skin mole    • Snoring    • Vitamin D deficiency         Past Surgical History:   Procedure Laterality Date   • CARDIAC CATHETERIZATION Right 10/13/2021    Procedure: Left Heart Cath;  Surgeon: Renato Knight MD;  Location: [facility] CATH INVASIVE LOCATION;  Service: Cardiovascular;  Laterality: Right;   • CARDIAC CATHETERIZATION  10/13/2021    Procedure: Functional Flow [place];  Surgeon: Renato Knight MD;  Location: [facility] CATH INVASIVE LOCATION;  Service: Cardiovascular;;   • COLONOSCOPY     • CORONARY ARTERY BYPASS GRAFT N/A 11/11/2021    " Procedure: CORONARY ARTERY BYPASS WITH INTERNAL MAMMARY ARTERY GRAFT AND MAZE PROCEDURE;  Surgeon: Johnathan Hernandez MD;  Location: [facility];  Service: Cardiothoracic;  Laterality: N/A;  CABG x 3  2 vein grafts  1 LIMA  with intraoperative AVERY   • FINGER SURGERY Right     repair right pointer finger   • JOINT REPLACEMENT     • MITRAL VALVE REPAIR/REPLACEMENT N/A 11/11/2021    Procedure: MITRAL VALVE REPAIR/REPLACEMENT;  Surgeon: Johnathan Hernandez MD;  Location: [facility];  Service: Cardiothoracic;  Laterality: N/A;  mitral valve repair with physio II annuloplasty ring 28mm   • TOTAL SHOULDER ARTHROPLASTY      shoulder replacement        Current Outpatient Medications on File Prior to Visit   Medication Sig Dispense Refill   • acetaminophen (TYLENOL) 325 MG tablet Take 2 tablets by mouth Every 6 (Six) Hours As Needed for Mild Pain .     • apixaban (Eliquis) 5 MG tablet tablet Take 1 tablet by mouth 2 (Two) Times a Day. 180 tablet 3   • aspirin (aspirin) 81 MG EC tablet Take 1 tablet by mouth Daily. 30 tablet 2   • atorvastatin (LIPITOR) 40 MG tablet Take 1 tablet by mouth Every Night. 40 tablet 3   • B Complex Vitamins (vitamin b complex) capsule capsule Take 1 capsule by mouth Daily. LD 11/4     • BLACK ELDERBERRY PO Take 158 mg by mouth Daily.     • carvedilol (COREG) 3.125 MG tablet Take 1 tablet by mouth Every 12 (Twelve) Hours. 180 tablet 3   • Cholecalciferol (VITAMIN D) 2000 units capsule Take 2,000 Units by mouth Daily. 11/4     • melatonin 5 MG tablet tablet Take 5 mg by mouth Every Night.     • SILDENAFIL CITRATE PO Take 20 mg by mouth Daily. For enlarged prostate     • tamsulosin (FLOMAX) 0.4 MG capsule 24 hr capsule 1 CAPSULES ORAL TWICE A DAY     • Turmeric 500 MG capsule Take 500 mg by mouth Daily.     • [DISCONTINUED] furosemide (LASIX) 40 MG tablet Take 1 tablet by mouth Daily. 90 tablet 0   • [DISCONTINUED] potassium chloride 0.4 MEQ/ML Infuse 20 mEq into a venous catheter 1 (One) Time.       No  "current facility-administered medications on file prior to visit.        Allergies   Allergen Reactions   • Lisinopril Cough        Social History     Socioeconomic History   • Marital status:    Tobacco Use   • Smoking status: Former Smoker     Packs/day: 1.00     Years: 12.00     Pack years: 12.00     Types: Cigarettes     Quit date:      Years since quittin.3   • Smokeless tobacco: Never Used   Vaping Use   • Vaping Use: Never used   Substance and Sexual Activity   • Alcohol use: Yes     Alcohol/week: 1.0 standard drink     Types: 1 Shots of liquor per week     Comment: 1 drink weekly   • Drug use: No   • Sexual activity: Defer          Review of Systems   Constitutional: Positive for activity change.   HENT: Negative.    Eyes: Negative.    Respiratory: Negative.  Negative for chest tightness and shortness of breath.    Cardiovascular: Negative.  Negative for chest pain.   Gastrointestinal: Negative.    Endocrine: Negative.    Genitourinary: Negative.    Musculoskeletal: Positive for arthralgias ( bilateral hips), back pain and myalgias.   Skin: Negative.    Allergic/Immunologic: Negative.    Neurological: Positive for numbness ( right foot).   Hematological: Negative.    Psychiatric/Behavioral: Positive for sleep disturbance.        Physical Examination:     Vitals:    22 1020   BP: 168/88   Pulse: 68   Resp: 16   Temp: 98.2 °F (36.8 °C)   SpO2: 98%   Weight: 103 kg (227 lb)   Height: 180.3 cm (71\")        Physical Exam  Eyes:      General: Lids are normal.      Extraocular Movements: Extraocular movements intact.      Pupils: Pupils are equal, round, and reactive to light.   Neurological:      Deep Tendon Reflexes: Strength normal.      Reflex Scores:       Patellar reflexes are 1+ on the right side and 1+ on the left side.       Achilles reflexes are 0 on the right side and 0 on the left side.  Psychiatric:         Speech: Speech normal.          Neurological Exam  Mental Status  Awake, " alert and oriented to person, place and time. Speech is normal. Language is fluent with no aphasia. Attention and concentration are normal.    Cranial Nerves  CN II: Visual acuity is normal. Visual fields full to confrontation.  CN III, IV, VI: Extraocular movements intact bilaterally. Normal lids and orbits bilaterally. Pupils equal round and reactive to light bilaterally.  CN V: Facial sensation is normal.  CN VII: Full and symmetric facial movement.  CN IX, X: Palate elevates symmetrically. Normal gag reflex.  CN XI: Shoulder shrug strength is normal.  CN XII: Tongue midline without atrophy or fasciculations.    Motor  Normal muscle bulk throughout. No fasciculations present. Strength is 5/5 throughout all four extremities.    Sensory  Numbness in the medial right toes.    Reflexes                                            Right                      Left  Patellar                                1+                         1+  Achilles                                0                         0    Right pathological reflexes: Mela's absent.  Left pathological reflexes: Mela's absent.    Coordination  Right: Finger-to-nose normal.Left: Finger-to-nose normal.    Gait  Casual gait is normal including stance, stride, and arm swing.     Negative straight leg raise bilaterally.  Negative SI joint evocative maneuvers.    Result Review  The following data was reviewed by: Josh Cox MD on 04/14/2022:    Data reviewed: Radiologic studies MRI of the lumbar spine from October 2021 shows significant degenerative disc disease worst at L3-4 with significant central canal and neuroforaminal stenosis.  There is retro and lateral listhesis at this level.     Assessment/plan:  This is a 76-year-old gentleman with activity associated back and bilateral hip pain.  There is no clear radiculopathy or neurogenic claudication.  I recommend a full course of physical therapy.  I have ordered this intervention.  He can consider  a nonsteroidal anti-inflammatory as this may assist with some arthritic pain.  I will defer to his primary care doctor with his cardiac issues and making sure there is no medication interaction issue.  He should follow-up with me after a full completion of physical therapy if his symptoms persist.    Diagnoses and all orders for this visit:    1. Chronic midline low back pain with bilateral sciatica (Primary)  -     Ambulatory Referral to Physical Therapy Evaluate and treat         Return if symptoms worsen or fail to improve.            Josh Cox MD

## 2022-04-14 ENCOUNTER — OFFICE VISIT (OUTPATIENT)
Dept: NEUROSURGERY | Facility: CLINIC | Age: 77
End: 2022-04-14

## 2022-04-14 VITALS
SYSTOLIC BLOOD PRESSURE: 168 MMHG | OXYGEN SATURATION: 98 % | HEART RATE: 68 BPM | TEMPERATURE: 98.2 F | WEIGHT: 227 LBS | RESPIRATION RATE: 16 BRPM | BODY MASS INDEX: 31.78 KG/M2 | HEIGHT: 71 IN | DIASTOLIC BLOOD PRESSURE: 88 MMHG

## 2022-04-14 DIAGNOSIS — M54.41 CHRONIC MIDLINE LOW BACK PAIN WITH BILATERAL SCIATICA: Primary | ICD-10-CM

## 2022-04-14 DIAGNOSIS — G89.29 CHRONIC MIDLINE LOW BACK PAIN WITH BILATERAL SCIATICA: Primary | ICD-10-CM

## 2022-04-14 DIAGNOSIS — M54.42 CHRONIC MIDLINE LOW BACK PAIN WITH BILATERAL SCIATICA: Primary | ICD-10-CM

## 2022-04-14 PROCEDURE — 99204 OFFICE O/P NEW MOD 45 MIN: CPT | Performed by: NEUROLOGICAL SURGERY

## 2022-04-15 ENCOUNTER — PATIENT ROUNDING (BHMG ONLY) (OUTPATIENT)
Dept: NEUROSURGERY | Facility: CLINIC | Age: 77
End: 2022-04-15

## 2022-04-18 ENCOUNTER — HOSPITAL ENCOUNTER (OUTPATIENT)
Dept: RESPIRATORY THERAPY | Facility: HOSPITAL | Age: 77
Discharge: HOME | End: 2022-04-18
Admitting: PREVENTIVE MEDICINE

## 2022-04-18 DIAGNOSIS — I48.11 LONGSTANDING PERSISTENT ATRIAL FIBRILLATION: ICD-10-CM

## 2022-04-18 PROCEDURE — 93246 EXT ECG>7D<15D RECORDING: CPT

## 2022-04-28 ENCOUNTER — TREATMENT (OUTPATIENT)
Dept: PHYSICAL THERAPY | Facility: CLINIC | Age: 77
End: 2022-04-28

## 2022-04-28 DIAGNOSIS — G89.29 CHRONIC RIGHT-SIDED LOW BACK PAIN WITH RIGHT-SIDED SCIATICA: ICD-10-CM

## 2022-04-28 DIAGNOSIS — M54.41 CHRONIC RIGHT-SIDED LOW BACK PAIN WITH RIGHT-SIDED SCIATICA: ICD-10-CM

## 2022-04-28 DIAGNOSIS — G89.29 CHRONIC LEFT-SIDED LOW BACK PAIN WITHOUT SCIATICA: Primary | ICD-10-CM

## 2022-04-28 DIAGNOSIS — M54.16 RADICULOPATHY, LUMBAR REGION: ICD-10-CM

## 2022-04-28 DIAGNOSIS — M54.50 CHRONIC LEFT-SIDED LOW BACK PAIN WITHOUT SCIATICA: Primary | ICD-10-CM

## 2022-04-28 PROCEDURE — G0283 ELEC STIM OTHER THAN WOUND: HCPCS | Performed by: PHYSICAL THERAPIST

## 2022-04-28 PROCEDURE — 97110 THERAPEUTIC EXERCISES: CPT | Performed by: PHYSICAL THERAPIST

## 2022-04-28 PROCEDURE — 97162 PT EVAL MOD COMPLEX 30 MIN: CPT | Performed by: PHYSICAL THERAPIST

## 2022-04-28 NOTE — PROGRESS NOTES
Physical Therapy Initial Evaluation and Plan of Care    Patient: Salinas Dill   : 1945  Diagnosis/ICD-10 Code:  Chronic midline low back pain with bilateral sciatica [M54.41, M54.42, G89.29]  Referring practitioner: Josh Cox MD  Date of Initial Visit: 2022  Today's Date: 2022  Patient seen for 1 sessions           Subjective Questionnaire: PT Functional Test: Oswestry = 21/42, indicating 42% impairment      Subjective Evaluation    History of Present Illness  Mechanism of injury: Pt had CABG 2021. Prior to heart surgery, pt started having pain in L low back that resolved after heart surgery but then returned after going to the [facility]. Then he started walking more and participating in exercises and developed R posterior hip pain. Having continued R hip pain with prolonged standing and walking and wakes him at night. Having numbness in R foot also that is constant. Was just in ball of foot but now in whole foot for a couple days. Recently went on 14-day cruise and was working out every day. Then had COVID after getting home and was short of breath. Still having some difficulty with shortness of breath with activity (going up stairs). Pt likes to exercise at the [facility] weekly. Takes ibuprofen room. Difficulty sleeping, sleeps ~3-4 hr/night and rest of night in recliner. Completed cardiac rehab.      Patient Occupation: retired Pain  Current pain ratin  At best pain ratin  At worst pain ratin  Location: R buttock, L low back  Quality: dull ache, needle-like and discomfort  Relieving factors: medications  Aggravating factors: ambulation, standing, stairs, sleeping, prolonged positioning and repetitive movement    Social Support  Lives with: spouse    Diagnostic Tests  MRI studies: abnormal (see chart)    Patient Goals  Patient goals for therapy: decreased pain, improved balance, increased motion, increased strength, independence with ADLs/IADLs and return to sport/leisure  activities             Objective          Postural Observations  Seated posture: fair  Standing posture: fair    Additional Postural Observation Details  Forward head, rounded shoulders, increased thoracic kyphosis, decreased lumbar lordosis with posterior pelvic tilt.    Palpation   Left   Tenderness of the lumbar paraspinals and quadratus lumborum.     Right   Muscle spasm in the piriformis. Tenderness of the lumbar paraspinals and piriformis.     Tenderness     Left Hip   Tenderness in the PSIS.     Right Hip   Tenderness in the PSIS and sacroiliac joint.     Additional Tenderness Details  R anterior innominate present.    Active Range of Motion     Additional Active Range of Motion Details  Pt with 50-75% limitation in flexion, lateral flexion B, and rotation B. Pain with L lateral flexion.   Extension 75+% limited.    Strength/Myotome Testing     Left Hip   Planes of Motion   Flexion: 5  Abduction: 5    Right Hip   Planes of Motion   Flexion: 5  Abduction: 4+    Left Knee   Flexion: 5  Extension: 5    Right Knee   Flexion: 5  Extension: 5    Left Ankle/Foot   Dorsiflexion: 5    Right Ankle/Foot   Dorsiflexion: 5    Muscle Activation   Patient able to activate left transverse abdominals and right transverse abdominals.     Tests     Lumbar     Left   Negative passive SLR.     Right   Negative passive SLR.     Lumbar Flexibility Comments:   R piriformis tightness. Significant B hamstring tightness. B hip flexor tightness.          Assessment & Plan     Assessment  Impairments: abnormal gait, abnormal muscle firing, abnormal muscle tone, abnormal or restricted ROM, activity intolerance, impaired physical strength, lacks appropriate home exercise program and pain with function  Functional Limitations: carrying objects, lifting, sleeping, walking, pulling, uncomfortable because of pain, moving in bed, sitting, standing, stooping and unable to perform repetitive tasks  Assessment details: Pt is a 76 y.o. male with L  sided low back pain and R buttock pain with radicular R foot numbness/tingling. Pt presents with decreased lumbar AROM and core strength. Pt with pelvic assymmetry. Pt with tenderness and tightness of R piriformis and significant B hamstring tightness. Pt is having difficulty with prolonged standing and walking. Decreased endurance since CABG x3. Difficulty sleeping. Oswestry indicates 42% impairment.    Patient presents with the impairments listed above and based on the objective findings and the physical therapy evaluation, the patient’s condition has the potential to improve in response to therapy.   The patient’s condition and/or services required are at a level of complexity that necessitates the skill & supervision of a physical therapist.    Prognosis: good    Goals  Plan Goals: STG to be met in 3 wk:  - Pt to report 50% improvement in R foot radicular numbness/tingling.  - Pt to report 25% or better improvement in pain with activity.  - Pt to be independent with HEP.  LTG to be met in 12 wk:  - Improve Oswestry to 25% or less impairment.  - Increase R hip abd strength to 5/5.  - Pt to rate max pain at 2-3/10 with activity.  - Pt to report no pain with sitting >1 hr.    Plan  Therapy options: will be seen for skilled therapy services  Planned modality interventions: electrical stimulation/Russian stimulation, thermotherapy (hydrocollator packs) and dry needling  Other planned modality interventions: modalities as indicated  Planned therapy interventions: manual therapy, abdominal trunk stabilization, balance/weight-bearing training, body mechanics training, flexibility, functional ROM exercises, home exercise program, joint mobilization, neuromuscular re-education, postural training, soft tissue mobilization, spinal/joint mobilization, strengthening, stretching, therapeutic activities and transfer training  Frequency: 2x week  Duration in weeks: 12  Treatment plan discussed with: patient        Timed:          Manual Therapy:    5     mins  23955;     Therapeutic Exercise:    15     mins  23939;     Neuromuscular Ritu:        mins  71918;    Therapeutic Activity:          mins  52807;     Gait Training:           mins  64470;     Ultrasound:          mins  18276;    Ionto                                   mins   57468  Self - Care                          mins  08995    Un-Timed:  Electrical Stimulation:    15     mins  41707 ( );  Dry Needling          54558/05930  Traction          mins 22674  Can Repos          mins 98497  Low Eval          Mins  12963  Mod Eval     20     Mins  18998  High Eval                            Mins  61531      Timed Treatment:   20   mins   Total Treatment:     55   mins      PT SIGNATURE: Nasrin Mar PT, CLT  [place] Lic # [id number]  Electronically signed by Nasrin Mar PT, 04/28/22, 2:06 PM EDT    Medicare Initial Certification  Certification Period: 4/28/2022 thru 7/26/2022  I certify that the therapy services are furnished while this patient is under my care.  The services outlined above are required by this patient, and will be reviewed every 90 days.     PHYSICIAN: Josh Cox MD _____________________________________________________  NPI: [id number]                                      DATE:    Please sign and return via fax to [phone]. Thank you, [facility].

## 2022-05-06 PROCEDURE — 93248 EXT ECG>7D<15D REV&INTERPJ: CPT | Performed by: INTERNAL MEDICINE

## 2022-05-08 ENCOUNTER — TELEPHONE (OUTPATIENT)
Dept: FAMILY MEDICINE CLINIC | Facility: CLINIC | Age: 77
End: 2022-05-08

## 2022-05-08 NOTE — TELEPHONE ENCOUNTER
Holter showed some runs of rapid heartbeat-so sent Holter to Dr. Solis-I think you're seeing this week-but if not-call their office to discuss

## 2022-05-09 RX ORDER — SENNOSIDES 8.6 MG
CAPSULE ORAL
Status: ON HOLD | COMMUNITY
End: 2022-06-29

## 2022-05-09 RX ORDER — ASPIRIN 81 MG/1
81 TABLET, CHEWABLE ORAL DAILY
COMMUNITY
End: 2022-08-17

## 2022-05-11 ENCOUNTER — OFFICE VISIT (OUTPATIENT)
Dept: CARDIOLOGY | Facility: CLINIC | Age: 77
End: 2022-05-11

## 2022-05-11 VITALS
HEIGHT: 71 IN | RESPIRATION RATE: 18 BRPM | DIASTOLIC BLOOD PRESSURE: 87 MMHG | OXYGEN SATURATION: 97 % | BODY MASS INDEX: 31.36 KG/M2 | SYSTOLIC BLOOD PRESSURE: 147 MMHG | WEIGHT: 224 LBS | HEART RATE: 64 BPM

## 2022-05-11 DIAGNOSIS — Z86.79 S/P MAZE OPERATION FOR ATRIAL FIBRILLATION: ICD-10-CM

## 2022-05-11 DIAGNOSIS — I25.10 CORONARY ARTERY DISEASE INVOLVING NATIVE CORONARY ARTERY OF NATIVE HEART WITHOUT ANGINA PECTORIS: ICD-10-CM

## 2022-05-11 DIAGNOSIS — Z98.890 S/P MVR (MITRAL VALVE REPAIR): ICD-10-CM

## 2022-05-11 DIAGNOSIS — I10 PRIMARY HYPERTENSION: ICD-10-CM

## 2022-05-11 DIAGNOSIS — E78.5 DYSLIPIDEMIA: ICD-10-CM

## 2022-05-11 DIAGNOSIS — I34.0 NONRHEUMATIC MITRAL VALVE REGURGITATION: ICD-10-CM

## 2022-05-11 DIAGNOSIS — I48.11 LONGSTANDING PERSISTENT ATRIAL FIBRILLATION: Primary | ICD-10-CM

## 2022-05-11 DIAGNOSIS — Z95.1 S/P CABG X 3: ICD-10-CM

## 2022-05-11 DIAGNOSIS — Z98.890 S/P LEFT ATRIAL APPENDAGE LIGATION: ICD-10-CM

## 2022-05-11 DIAGNOSIS — Z98.890 S/P MAZE OPERATION FOR ATRIAL FIBRILLATION: ICD-10-CM

## 2022-05-11 PROCEDURE — 93000 ELECTROCARDIOGRAM COMPLETE: CPT | Performed by: INTERNAL MEDICINE

## 2022-05-11 PROCEDURE — 99214 OFFICE O/P EST MOD 30 MIN: CPT | Performed by: INTERNAL MEDICINE

## 2022-05-11 NOTE — PROGRESS NOTES
Cardiology Office Visit      Encounter Date:  05/11/2022    Patient ID:   Salinas Dill is a 76 y.o. male.    Reason For Followup:  Chronic atrial fibrillation  Mitral regurgitation  Hypertension  Status post coronary artery bypass surgery and mitral valve repair    Brief Clinical History:  Dear Dr. Ferrera, Kortney Infante MD    I had the pleasure of seeing Salinas Dill today. As you are well aware, this is a 76 y.o. male with past medical history significant for history of hypertension and benign prostatic hypertrophy and recent hospitalization for coronary artery disease and coronary artery bypass surgery here for further evaluation treatment options    Interpretation Summary    · An abnormal monitor study.  · Normal sinus rhythm  · Sinus bradycardia with slowest rate of 56 bpm occurring at 2:34 AM  · Sinus tachycardia with maximum rate of 121 bpm occurring at 11:51 AM  · Occasional PACs  · Occasional PVCs occurring in isolation, and bigeminal pattern  · Multiple runs of supraventricular tachycardia with maximum heart rate of 135 bpm and longest episode of 17 beats  · Multiple pauses the longest of which was 2.1 seconds occurring on 4/26/2022 at 2:45 AM  · Multiple runs of nonsustained ventricular tachycardia with a maximum heart rate of 188 bpm. Longest run was 19 beats occurring at 1:05 PM on 4/21/2022    Interpretation Summary    · Left ventricular ejection fraction appears to be 56 - 60%. Left ventricular systolic function is normal.  · There is a mitral valve ring present.  · Saline test results are negative.  · Left atrial appendage ligation noted.  · Left atrial appendage is ligated and demonstrates continued closure.  · No evidence of communication with left atrial cavity.           Interval History:  Complaining of episodes of fatigue and shortness of breath      Assessment & Plan    Impressions:  Chronic atrial fibrillation/currently in sinus rhythm  Hypertension  Ilan Vascor of 2  stress test with no  evidence of any inducible ischemia  echocardiogram with normal LV systolic function and moderate mitral regurgitation  Severe multivessel coronary artery disease status post CABG x3 with a LIMA to the mid LAD and reverse individual saphenous vein graft to the obtuse marginal 1 and PLB branch of the right coronary artery 2021-11-11  Severe mitral valve insufficiency status post ring annuloplasty  Atrial fibrillation with right and left cryomaze procedure and left atrial appendage ligation  Postop atrial fibrillation status post surgical maze currently in sinus rhythm  Essential hypertension  Dyslipidemia  BPH  Extended Holter monitor with nonsustained ventricular tachycardia episodes  Multiple episodes of brief salvos of supraventricular tachycardia    Recommendations:    Continue aspirin for anticoagulation therapy  Discontinue Eliquis  Risk benefits and alternatives reviewed and discussed the patient  EP evaluation for further evaluation and treatment options for recurrent episodes of nonsustained supraventricular and also ventricular tachycardia  Patient is currently on low-dose beta-blockers but cannot tolerate any higher doses secondary to blood pressure issues  Recent echocardiogram and also AVERY showing no significant residual valve pathology only residual mild mitral regurgitation no significant mitral stenosis and a complete seal of the left atrial appendage  Continue risk factor modification  Recent labs reviewed and discussed with the patient  Lipids at goal  Medical records from recent hospitalization reviewed and discussed with the patient and family  Continue cardiac rehab  Change diuretics to as needed along with potassium  Echocardiogram with normal LV systolic function normal functioning of the valve  Results of the recent Holter monitor reviewed and discussed  EP evaluation for nonsustained episodes of ventricular tachycardia and supraventricular tachycardia multiple episodes  follow-up in office in  "3 months    Objective:    Vitals:  Vitals:    05/11/22 1259   BP: 147/87   Pulse: 64   Resp: 18   SpO2: 97%   Weight: 102 kg (224 lb)   Height: 180.3 cm (71\")       Physical Exam:    General: Alert, cooperative, no distress, appears stated age  Head:  Normocephalic, atraumatic, mucous membranes moist  Eyes:  Conjunctiva/corneas clear, EOM's intact     Neck:  Supple,  no adenopathy;      Lungs: Clear to auscultation bilaterally, no wheezes rhonchi rales are noted  Chest wall: No tenderness  Heart::  Regular rate and rhythm, S1 and S2 normal, no murmur, rub or gallop  Abdomen: Soft, non-tender, nondistended bowel sounds active  Extremities: No cyanosis, clubbing, or edema  Pulses: 2+ and symmetric all extremities  Skin:  No rashes or lesions  Neuro/psych: A&O x3. CN II through XII are grossly intact with appropriate affect      Allergies:  Allergies   Allergen Reactions   • Lisinopril Cough       Medication Review:     Current Outpatient Medications:   •  acetaminophen (TYLENOL) 325 MG tablet, Take 2 tablets by mouth Every 6 (Six) Hours As Needed for Mild Pain ., Disp: , Rfl:   •  acetaminophen (TYLENOL) 650 MG 8 hr tablet, , Disp: , Rfl:   •  aspirin (aspirin) 81 MG EC tablet, Take 1 tablet by mouth Daily., Disp: 30 tablet, Rfl: 2  •  aspirin 81 MG chewable tablet, , Disp: , Rfl:   •  atorvastatin (LIPITOR) 40 MG tablet, Take 1 tablet by mouth Every Night., Disp: 40 tablet, Rfl: 3  •  B Complex Vitamins (vitamin b complex) capsule capsule, Take 1 capsule by mouth Daily. LD 11/4, Disp: , Rfl:   •  BLACK ELDERBERRY PO, Take 158 mg by mouth Daily., Disp: , Rfl:   •  carvedilol (COREG) 3.125 MG tablet, Take 1 tablet by mouth Every 12 (Twelve) Hours., Disp: 180 tablet, Rfl: 3  •  Cholecalciferol (VITAMIN D) 2000 units capsule, Take 2,000 Units by mouth Daily. 11/4, Disp: , Rfl:   •  melatonin 5 MG tablet tablet, Take 5 mg by mouth Every Night., Disp: , Rfl:   •  SILDENAFIL CITRATE PO, Take 20 mg by mouth Daily. For " enlarged prostate, Disp: , Rfl:   •  tamsulosin (FLOMAX) 0.4 MG capsule 24 hr capsule, 1 CAPSULES ORAL TWICE A DAY, Disp: , Rfl:   •  Turmeric 500 MG capsule, Take 500 mg by mouth Daily., Disp: , Rfl:     Family History:  Family History   Problem Relation Age of Onset   • Diabetes Mother    • Heart disease Mother    • Hypertension Sister    • Hyperlipidemia Sister    • Kidney disease Sister    • Cancer Sister    • Other Sister         weight disorder   • Diabetes Sister    • Stroke Brother    • Hypertension Other        Past Medical History:  Past Medical History:   Diagnosis Date   • Arthritis    • Atrial fibrillation (HCC)    • Bulging lumbar disc    • Coronary artery disease    • Difficulty maintaining body in lying position     NEEDS PILLOW UNDER KNEES IN SURGERY D/T LUMBAR ISSUE   • Heart valve disease    • Hyperglycemia    • Hyperlipidemia    • Hypertension    • Skin mole    • Snoring    • Vitamin D deficiency        Past surgical History:  Past Surgical History:   Procedure Laterality Date   • CARDIAC CATHETERIZATION Right 10/13/2021    Procedure: Left Heart Cath;  Surgeon: Renato Knigth MD;  Location: [facility] CATH INVASIVE LOCATION;  Service: Cardiovascular;  Laterality: Right;   • CARDIAC CATHETERIZATION  10/13/2021    Procedure: Functional Flow Hudson;  Surgeon: Renato Knight MD;  Location: [facility] CATH INVASIVE LOCATION;  Service: Cardiovascular;;   • CATARACT EXTRACTION  05/2022   • COLONOSCOPY     • CORONARY ARTERY BYPASS GRAFT N/A 11/11/2021    Procedure: CORONARY ARTERY BYPASS WITH INTERNAL MAMMARY ARTERY GRAFT AND MAZE PROCEDURE;  Surgeon: Johnathan Hernandez MD;  Location: [facility] CVOR;  Service: Cardiothoracic;  Laterality: N/A;  CABG x 3  2 vein grafts  1 LIMA  with intraoperative AVERY   • FINGER SURGERY Right     repair right pointer finger   • JOINT REPLACEMENT     • MITRAL VALVE REPAIR/REPLACEMENT N/A 11/11/2021    Procedure: MITRAL VALVE REPAIR/REPLACEMENT;  Surgeon: Mary  MD Johnathan;  Location: [facility];  Service: Cardiothoracic;  Laterality: N/A;  mitral valve repair with physio II annuloplasty ring 28mm   • MITRAL VALVE REPAIR/REPLACEMENT     • TOTAL SHOULDER ARTHROPLASTY      shoulder replacement       Social History:  Social History     Socioeconomic History   • Marital status:    Tobacco Use   • Smoking status: Former Smoker     Packs/day: 1.00     Years: 12.00     Pack years: 12.00     Types: Cigarettes     Quit date: 1978     Years since quittin.3   • Smokeless tobacco: Former User     Quit date:    Vaping Use   • Vaping Use: Never used   Substance and Sexual Activity   • Alcohol use: Yes     Alcohol/week: 1.0 standard drink     Types: 1 Shots of liquor per week     Comment: 1 drink weekly   • Drug use: No   • Sexual activity: Yes     Partners: Female     Birth control/protection: None       Review of Systems:  The following systems were reviewed as they relate to the cardiovascular system: Constitutional, Eyes, ENT, Cardiovascular, Respiratory, Gastrointestinal, Integumentary, Neurological, Psychiatric, Hematologic, Endocrine, Musculoskeletal, and Genitourinary. The pertinent cardiovascular findings are reported above with all other cardiovascular points within those systems being negative.    Diagnostic Study Review:     Current Electrocardiogram:    ECG 12 Lead    Date/Time: 2022 5:13 PM  Performed by: Renato Knight MD  Authorized by: Renato Knight MD   Comparison: compared with previous ECG   Similar to previous ECG  Rhythm: sinus rhythm  Rate: normal  BPM: 64  Conduction: conduction normal  QRS axis: normal  Other findings: non-specific ST-T wave changes    Clinical impression: abnormal EKG              NOTE: The following portions of the patient's history were reviewed and updated this visit as appropriate: allergies, current medications, past family history, past medical history, past social history, past surgical  history and problem list.

## 2022-05-18 ENCOUNTER — TREATMENT (OUTPATIENT)
Dept: PHYSICAL THERAPY | Facility: CLINIC | Age: 77
End: 2022-05-18

## 2022-05-18 DIAGNOSIS — G89.29 CHRONIC RIGHT-SIDED LOW BACK PAIN WITH RIGHT-SIDED SCIATICA: ICD-10-CM

## 2022-05-18 DIAGNOSIS — M54.41 CHRONIC RIGHT-SIDED LOW BACK PAIN WITH RIGHT-SIDED SCIATICA: ICD-10-CM

## 2022-05-18 DIAGNOSIS — M54.16 RADICULOPATHY, LUMBAR REGION: ICD-10-CM

## 2022-05-18 DIAGNOSIS — G89.29 CHRONIC LEFT-SIDED LOW BACK PAIN WITHOUT SCIATICA: Primary | ICD-10-CM

## 2022-05-18 DIAGNOSIS — M54.50 CHRONIC LEFT-SIDED LOW BACK PAIN WITHOUT SCIATICA: Primary | ICD-10-CM

## 2022-05-18 PROCEDURE — 97110 THERAPEUTIC EXERCISES: CPT | Performed by: PHYSICAL THERAPIST

## 2022-05-18 PROCEDURE — 97112 NEUROMUSCULAR REEDUCATION: CPT | Performed by: PHYSICAL THERAPIST

## 2022-05-18 NOTE — PROGRESS NOTES
Physical Therapy Daily Progress Note      Patient: Salinas Dill   : 1945  Diagnosis/ICD-10 Code:  Chronic left-sided low back pain without sciatica [M54.50, G89.29]  Referring practitioner: Josh Cox MD  Date of Initial Visit: 2022  Today's Date: 2022  Patient seen for 2 sessions.  POC 2x/wk x 12 weeks, exp 22    PMHX:  CABG x 3        VISIT#: 2      Subjective :  No pain currently. He is doing HEP but reports that when he does the piriformis stretch the pain in his R buttock/hip gets worse and lasts for longer period. He not able to walk as far before pain starts since starting this stretch. Electrical stimulation did not help.       Objective     See Exercise, Manual, and Modality Logs for complete treatment. Modified piriformis stretch to supine. Progression as noted.       Patient Education:  HEP modified and issued.  See chart.     Exercises  Supine Figure 4 Piriformis Stretch - 2 x daily - 7 x weekly - 1 sets - 3 reps - 20 sec hold      Assessment/Plan:  Pt. With increased pain in R hip/buttock with seated piriformis stretch, modified to supine and pt. Able to perform without pain.  Pt. With poor posture and limited lumbar mobility.  He was able to complete PT without pain/symptoms.      Progress per Plan of Care            Timed:         Manual Therapy:         mins  57611;     Therapeutic Exercise:   25      mins  55937;     Neuromuscular Ritu:  15      mins  07423;    Therapeutic Activity:          mins  80594;     Gait Training:           mins  70883;     Ultrasound:          mins  39978;    Ionto                                   mins   21917  Self Care                            mins   61873  Aquatic                               mins 32857    Un-Timed:  Electrical Stimulation:         mins  21160 ( );  Traction          mins 56512      Timed Treatment:    40  mins   Total Treatment:     40   mins    Bela Mendoza PTA  Physical Therapist Assistant   [place] license:   #[id number]

## 2022-05-23 ENCOUNTER — TREATMENT (OUTPATIENT)
Dept: PHYSICAL THERAPY | Facility: CLINIC | Age: 77
End: 2022-05-23

## 2022-05-23 DIAGNOSIS — G89.29 CHRONIC LEFT-SIDED LOW BACK PAIN WITHOUT SCIATICA: Primary | ICD-10-CM

## 2022-05-23 DIAGNOSIS — M54.50 CHRONIC LEFT-SIDED LOW BACK PAIN WITHOUT SCIATICA: Primary | ICD-10-CM

## 2022-05-23 DIAGNOSIS — M54.41 CHRONIC RIGHT-SIDED LOW BACK PAIN WITH RIGHT-SIDED SCIATICA: ICD-10-CM

## 2022-05-23 DIAGNOSIS — G89.29 CHRONIC RIGHT-SIDED LOW BACK PAIN WITH RIGHT-SIDED SCIATICA: ICD-10-CM

## 2022-05-23 DIAGNOSIS — M54.16 RADICULOPATHY, LUMBAR REGION: ICD-10-CM

## 2022-05-23 PROCEDURE — 97110 THERAPEUTIC EXERCISES: CPT | Performed by: PHYSICAL THERAPIST

## 2022-05-23 PROCEDURE — 97112 NEUROMUSCULAR REEDUCATION: CPT | Performed by: PHYSICAL THERAPIST

## 2022-05-23 NOTE — PROGRESS NOTES
"Physical Therapy Daily Progress Note      Patient: Salinas Dill   : 1945  Diagnosis/ICD-10 Code:  Chronic left-sided low back pain without sciatica [M54.50, G89.29]  Referring practitioner: Josh Cox MD  Date of Initial Visit: 2022  Today's Date: 2022  Patient seen for 3 sessions.  POC 2x/wk x 12 weeks, exp 22    PMHX:  CABG x 3        VISIT#: 3      Subjective :  \"Twinges\" in L low back, 3/10 when they happen.  Right foot still numb.  He reports that he is doing better.  He is able to sleep without having to get up due to pain.  Also he is able to walk further without pain. He went back to performing seated piriformis stretch, just less aggressively and thus avoiding the pain with it. He returned to [facility], only doing aerobic activities and noticed some twinges in left low back afterward.     Objective     See Exercise, Manual, and Modality Logs for complete treatment.  Progression as noted.       Patient Education:  HEP progressed and issued.  See chart.     Exercises  Hooklying Sacroiliac Joint Isometric - 1 x daily - 7 x weekly - 1 sets - 4 reps - 5 seconds hold      Assessment/Plan:  Pt. Independently correcting posture when needed. He is consistent with performing HEP, going to [facility] and may need to be cued to avoid over doing activity/HEP.       Progress per Plan of Care:  Monitor response, continue as appropriate.             Timed:         Manual Therapy:         mins  10624;     Therapeutic Exercise:   30      mins  58548;     Neuromuscular Ritu:  15      mins  36617;    Therapeutic Activity:          mins  59698;     Gait Training:           mins  25905;     Ultrasound:          mins  33733;    Ionto                                   mins   37678  Self Care                            mins   35531  Aquatic                               mins 34348    Un-Timed:  Electrical Stimulation:         mins  29241 (MC );  Traction          mins 94517      Timed Treatment:    45  mins "   Total Treatment:     45   mins    Bela Mendoza PTA  Physical Therapist Assistant   [place] license:  #[id number]

## 2022-06-01 ENCOUNTER — TREATMENT (OUTPATIENT)
Dept: PHYSICAL THERAPY | Facility: CLINIC | Age: 77
End: 2022-06-01

## 2022-06-01 DIAGNOSIS — M54.41 CHRONIC RIGHT-SIDED LOW BACK PAIN WITH RIGHT-SIDED SCIATICA: ICD-10-CM

## 2022-06-01 DIAGNOSIS — M54.50 CHRONIC LEFT-SIDED LOW BACK PAIN WITHOUT SCIATICA: Primary | ICD-10-CM

## 2022-06-01 DIAGNOSIS — G89.29 CHRONIC RIGHT-SIDED LOW BACK PAIN WITH RIGHT-SIDED SCIATICA: ICD-10-CM

## 2022-06-01 DIAGNOSIS — G89.29 CHRONIC LEFT-SIDED LOW BACK PAIN WITHOUT SCIATICA: Primary | ICD-10-CM

## 2022-06-01 DIAGNOSIS — M54.16 RADICULOPATHY, LUMBAR REGION: ICD-10-CM

## 2022-06-01 PROCEDURE — 97110 THERAPEUTIC EXERCISES: CPT | Performed by: PHYSICAL THERAPIST

## 2022-06-01 PROCEDURE — 97112 NEUROMUSCULAR REEDUCATION: CPT | Performed by: PHYSICAL THERAPIST

## 2022-06-01 NOTE — PROGRESS NOTES
Physical Therapy Daily Progress Note      Patient: Salinas Dill   : 1945  Diagnosis/ICD-10 Code:  Chronic left-sided low back pain without sciatica [M54.50, G89.29]  Referring practitioner: Josh Cox MD  Date of Initial Visit: 2022  Today's Date: 2022  Patient seen for 4 sessions.  POC 2x/wk x 12 weeks, exp 22    PMHX:  CABG x 3        VISIT#: 4      Subjective :   Pt. Reports that he cannot do his exercises on the floor because he has  increased pain and difficulty with standing back up. He want to learn how to get up from floor to stand.     Objective     See Exercise, Manual, and Modality Logs for complete treatment.   Instructed and performed stand to hooklying on floor transfers.       Patient Education:  HEP progressed and issued.      Exercises    Standing Lat Pull Down with Resistance - Elbows Bent - 1 x daily - 4 x weekly - 2 sets - 10 reps  Standing Bilateral Low Shoulder Row with Anchored Resistance - 1 x daily - 4 x weekly - 2 sets - 10 reps        Assessment/Plan:  Pt. Did well with floor to stand transfers after instruction.   He has increased pain with rolling/twisting.  He was discouraged from twisting and instructed in log rolling.     Progress per Plan of Care:  Monitor response and continue as tolerated.             Timed:         Manual Therapy:         mins  94729;     Therapeutic Exercise:   25      mins  62993;     Neuromuscular Ritu:  20      mins  75569;    Therapeutic Activity:          mins  42209;     Gait Training:           mins  27380;     Ultrasound:          mins  35678;    Ionto                                   mins   14716  Self Care                            mins   19816  Aquatic                               mins 34403    Un-Timed:  Electrical Stimulation:         mins  59421 ( );  Traction          mins 97551      Timed Treatment:    45  mins   Total Treatment:     45   mins    Bela Mendoza PTA  Physical Therapist Assistant   [place]  license:  #[id number]

## 2022-06-02 ENCOUNTER — OFFICE VISIT (OUTPATIENT)
Dept: CARDIOLOGY | Facility: CLINIC | Age: 77
End: 2022-06-02

## 2022-06-02 ENCOUNTER — PREP FOR SURGERY (OUTPATIENT)
Dept: OTHER | Facility: HOSPITAL | Age: 77
End: 2022-06-02

## 2022-06-02 VITALS
DIASTOLIC BLOOD PRESSURE: 80 MMHG | HEIGHT: 71 IN | WEIGHT: 224 LBS | RESPIRATION RATE: 18 BRPM | OXYGEN SATURATION: 96 % | HEART RATE: 63 BPM | BODY MASS INDEX: 31.36 KG/M2 | SYSTOLIC BLOOD PRESSURE: 144 MMHG

## 2022-06-02 DIAGNOSIS — Z95.1 S/P CABG X 3: Primary | ICD-10-CM

## 2022-06-02 DIAGNOSIS — Z86.79 S/P MAZE OPERATION FOR ATRIAL FIBRILLATION: ICD-10-CM

## 2022-06-02 DIAGNOSIS — Z95.1 S/P CABG X 3: ICD-10-CM

## 2022-06-02 DIAGNOSIS — I10 PRIMARY HYPERTENSION: ICD-10-CM

## 2022-06-02 DIAGNOSIS — I47.29 NONSUSTAINED VENTRICULAR TACHYCARDIA: ICD-10-CM

## 2022-06-02 DIAGNOSIS — Z98.890 S/P LEFT ATRIAL APPENDAGE LIGATION: ICD-10-CM

## 2022-06-02 DIAGNOSIS — I34.0 NONRHEUMATIC MITRAL VALVE REGURGITATION: ICD-10-CM

## 2022-06-02 DIAGNOSIS — Z98.890 S/P MVR (MITRAL VALVE REPAIR): ICD-10-CM

## 2022-06-02 DIAGNOSIS — Z86.79 S/P MAZE OPERATION FOR ATRIAL FIBRILLATION: Primary | ICD-10-CM

## 2022-06-02 DIAGNOSIS — I25.10 CORONARY ARTERY DISEASE INVOLVING NATIVE CORONARY ARTERY OF NATIVE HEART WITHOUT ANGINA PECTORIS: ICD-10-CM

## 2022-06-02 DIAGNOSIS — E78.5 DYSLIPIDEMIA: ICD-10-CM

## 2022-06-02 DIAGNOSIS — Z98.890 S/P MAZE OPERATION FOR ATRIAL FIBRILLATION: ICD-10-CM

## 2022-06-02 DIAGNOSIS — Z98.890 S/P MAZE OPERATION FOR ATRIAL FIBRILLATION: Primary | ICD-10-CM

## 2022-06-02 PROCEDURE — 99214 OFFICE O/P EST MOD 30 MIN: CPT | Performed by: INTERNAL MEDICINE

## 2022-06-02 RX ORDER — SODIUM CHLORIDE 0.9 % (FLUSH) 0.9 %
3-10 SYRINGE (ML) INJECTION AS NEEDED
Status: CANCELLED | OUTPATIENT
Start: 2022-06-02

## 2022-06-02 RX ORDER — SODIUM CHLORIDE 0.9 % (FLUSH) 0.9 %
3 SYRINGE (ML) INJECTION EVERY 12 HOURS SCHEDULED
Status: CANCELLED | OUTPATIENT
Start: 2022-06-02

## 2022-06-02 NOTE — PROGRESS NOTES
CC--- post mitral valve surgery and nonsustained VT          Sub--76-year-old male patient has multivessel coronary artery disease with severe mitral regurgitation and at least persistent to permanent atrial fibrillation for last 2 years on anticoagulation.  Patient underwent multivessel bypass surgery mitral valve annuloplasty ring and left atrial appendage ligation with an left and right cryo-maze procedure.  Post surgery he is having intermittent atrial fibrillation and sinus bradycardia and he had nonsustained VT.  Currently patient is on low-dose of carvedilol .  Patient denies any caridad syncope prior to this surgery.  Chronic medical problems include atrial fibrillation, mitral regurgitation and coronary artery disease.   Patient underwent AVERY which revealed left atrial appendage ligated with normal EF and presence of mitral valve ring without any significant mitral regurgitation.  Further underwent Holter monitor the results are attached below.      · An abnormal monitor study.  · Normal sinus rhythm  · Sinus bradycardia with slowest rate of 56 bpm occurring at 2:34 AM  · Sinus tachycardia with maximum rate of 121 bpm occurring at 11:51 AM  · Occasional PACs  · Occasional PVCs occurring in isolation, and bigeminal pattern  · Multiple runs of supraventricular tachycardia with maximum heart rate of 135 bpm and longest episode of 17 beats  · Multiple pauses the longest of which was 2.1 seconds occurring on 4/26/2022 at 2:45 AM  · Multiple runs of nonsustained ventricular tachycardia with a maximum heart rate of 188 bpm. Longest run was 19 beats occurring at 1:05 PM on 4/21/2022      Patient had COVID infection back in April and has noticed exertional shortness of breath when he is walking uphill.  He denied any orthopnea or paroxysmal nocturnal dyspnea or unusual productive cough or hemoptysis and denies any significant dyspnea on bending down.  Is fairly active  Patient has been referred back to me after  hospitalization evaluation back in 2021 for Holter monitor being abnormal.           Medical History        Past Medical History:   Diagnosis Date   • Arthritis     • Atrial fibrillation (HCC)     • BPH (benign prostatic hyperplasia)     • Bulging lumbar disc     • Difficulty maintaining body in lying position       NEEDS PILLOW UNDER KNEES IN SURGERY D/T LUMBAR ISSUE   • Hyperglycemia     • Hypertension     • OAB (overactive bladder)     • Skin mole     • Snoring     • Urinary urgency     • Vitamin D deficiency           Surgical History         Past Surgical History:   Procedure Laterality Date   • CARDIAC CATHETERIZATION Right 10/13/2021     Procedure: Left Heart Cath;  Surgeon: Renato Knight MD;  Location: [facility] CATH INVASIVE LOCATION;  Service: Cardiovascular;  Laterality: Right;   • CARDIAC CATHETERIZATION   10/13/2021     Procedure: Functional Flow [place];  Surgeon: Renato Knight MD;  Location: [facility] CATH INVASIVE LOCATION;  Service: Cardiovascular;;   • COLONOSCOPY       • FINGER SURGERY Right       repair right pointer finger   • JOINT REPLACEMENT       • TOTAL SHOULDER ARTHROPLASTY         shoulder replacement               Family History   Problem Relation Age of Onset   • Diabetes Mother     • Heart disease Mother     • Hypertension Sister     • Hyperlipidemia Sister     • Kidney disease Sister     • Cancer Sister     • Other Sister           weight disorder   • Diabetes Sister     • Stroke Brother     • Hypertension Other        Social History            Tobacco Use   • Smoking status: Former Smoker       Packs/day: 1.00       Years: 12.00       Pack years: 12.00       Types: Cigarettes       Quit date:        Years since quittin.9   • Smokeless tobacco: Never Used   Vaping Use   • Vaping Use: Never used   Substance Use Topics   • Alcohol use: Yes       Alcohol/week: 1.0 standard drink       Types: 1 Shots of liquor per week       Comment: 1 drink weekly   • Drug  use: No         Review of Systems   General:  no for fatigue and tiredness  Eyes: No redness  Cardiovascular: No chest pain, no palpitations  Respiratory:  Mild exertional shortness of breath on walking hills  Gastrointestinal: No nausea or vomiting, bleeding  Genitourinary: no hematuria or dysuria  Musculoskeletal: No arthralgia or myalgia  Skin: No rash  Neurologic: No numbness, tingling, syncope  Hematologic/Lymphatic: No abnormal bleeding        Physical Exam  VITALS REVIEWED     General:      well developed, well nourished, in no acute distress.    Head:      normocephalic and atraumatic.    Eyes:      PERRL/EOM intact, conjunctiva and sclera clear with out nystagmus.    Neck:      no masses, thyromegaly,  trachea central with normal respiratory effort   Lungs:      clear bilaterally to auscultation.    Heart:       Sinus rhythm without any murmurs or gallops  Msk:      no deformity or scoliosis noted of thoracic or lumbar spine.    Pulses:      pulses normal in all 4 extremities.    Extremities:       no cyanosis or clubbing-edema   neurologic:      no focal deficits.   alert oriented x3  Skin:      intact without lesions or rashes.    Psych:      alert and cooperative; normal mood and affect; normal attention span and concentration.                  Assessment and plan        Post multivessel coronary artery disease surgery along with mitral annuloplasty ring placement and left atrial appendage ligation and right and left cryo-maze procedure for persistent atrial fibrillation--- currently in sinus rhythm  Recent COVID infection back in April 2022 without any long-term sequelae apart from mild exertional dyspnea  Multivessel coronary artery disease  Mitral regurgitation--post mitral valve ring placement  Longstanding atrial fibrillation--post maze to sinus rhythm  Essential hypertension  Holter reviewed showing nonsustained rapid monomorphic VT    I would recommend EP study to exclude any scar related  sustained ventricular arrhythmias for prognostication  Risks and benefits and outcomes of EP study discussed with the patient and family and orders placed  In the interim patient to continue on current medications    Prior TSH was normal with normal creatinine and potassium        Electronically signed by Darren Phan MD, 06/02/22, 5:11 PM EDT.

## 2022-06-03 ENCOUNTER — TREATMENT (OUTPATIENT)
Dept: PHYSICAL THERAPY | Facility: CLINIC | Age: 77
End: 2022-06-03

## 2022-06-03 DIAGNOSIS — G89.29 CHRONIC LEFT-SIDED LOW BACK PAIN WITHOUT SCIATICA: Primary | ICD-10-CM

## 2022-06-03 DIAGNOSIS — G89.29 CHRONIC RIGHT-SIDED LOW BACK PAIN WITH RIGHT-SIDED SCIATICA: ICD-10-CM

## 2022-06-03 DIAGNOSIS — M54.41 CHRONIC RIGHT-SIDED LOW BACK PAIN WITH RIGHT-SIDED SCIATICA: ICD-10-CM

## 2022-06-03 DIAGNOSIS — M54.16 RADICULOPATHY, LUMBAR REGION: ICD-10-CM

## 2022-06-03 DIAGNOSIS — M54.50 CHRONIC LEFT-SIDED LOW BACK PAIN WITHOUT SCIATICA: Primary | ICD-10-CM

## 2022-06-03 PROCEDURE — 97110 THERAPEUTIC EXERCISES: CPT | Performed by: PHYSICAL THERAPIST

## 2022-06-03 PROCEDURE — 97140 MANUAL THERAPY 1/> REGIONS: CPT | Performed by: PHYSICAL THERAPIST

## 2022-06-06 RX ORDER — ATORVASTATIN CALCIUM 40 MG/1
TABLET, FILM COATED ORAL
Qty: 90 TABLET | Refills: 0 | Status: SHIPPED | OUTPATIENT
Start: 2022-06-06 | End: 2022-09-08

## 2022-06-06 NOTE — PROGRESS NOTES
Physical Therapy Daily Progress Note      Patient: Salinas Dill   : 1945  Diagnosis/ICD-10 Code:  Chronic left-sided low back pain without sciatica [M54.50, G89.29]   Problems Addressed this Visit        Musculoskeletal and Injuries    Low back pain - Primary      Other Visit Diagnoses     Radiculopathy, lumbar region          Diagnoses       Codes Comments    Chronic left-sided low back pain without sciatica    -  Primary ICD-10-CM: M54.50, G89.29  ICD-9-CM: 724.2, 338.29     Radiculopathy, lumbar region     ICD-10-CM: M54.16  ICD-9-CM: 724.4     Chronic right-sided low back pain with right-sided sciatica     ICD-10-CM: M54.41, G89.29  ICD-9-CM: 724.2, 724.3, 338.29         Referring practitioner: Josh Cox MD  Date of Initial Visit: Type: THERAPY  Noted: 2022   Today's Date: 6/3/2022    VISIT#: 5    Subjective : Pt reports he is feeling a little better. Pt states he was thinking about quitting PT if he was just coming in to sessions and doing same exercises he does at home.    Objective : PT performed manual techniques for low back and B hip muscular stretching. Pt reported stretching felt good and was better than what he can do on his own at home. Pt declined need for modalities at end of session.    See Exercise, Manual, and Modality Logs for complete treatment.     Assessment/Plan : Pt responded well to manual techniques and reported decreased pain with stretching. Pt will benefit from continued PT services to progress lumbar and hip flexibility and progress of core strengthening as tolerated.    Progress per Plan of Care and Progress strengthening /stabilization /functional activity         Timed:         Manual Therapy:    20     mins  39505;     Therapeutic Exercise:    10     mins  75351;     Neuromuscular Ritu:        mins  19874;    Therapeutic Activity:          mins  04732;     Gait Training:           mins  63156;     Ultrasound:          mins  49705;    Ionto                                    mins   22329  Self Care                            mins   38408    Un-Timed:  Electrical Stimulation:         mins  12270 ( );  Dry Needling          mins 78136/07690  Traction          mins 58006  Canalith Repos                   mins  75687  Low Eval          Mins  19567  Mod Eval          Mins  15763  High Eval                            Mins  27422  Re-Eval                               mins  26383    Timed Treatment:   30   mins   Total Treatment:     30   mins    Nasrin Mar, PT, CLT, Cert DN  Physical Therapist  [place] Lic # [id number]

## 2022-06-07 PROBLEM — I47.29 NONSUSTAINED VENTRICULAR TACHYCARDIA (HCC): Status: ACTIVE | Noted: 2022-06-07

## 2022-06-08 ENCOUNTER — TREATMENT (OUTPATIENT)
Dept: PHYSICAL THERAPY | Facility: CLINIC | Age: 77
End: 2022-06-08

## 2022-06-08 DIAGNOSIS — G89.29 CHRONIC RIGHT-SIDED LOW BACK PAIN WITH RIGHT-SIDED SCIATICA: ICD-10-CM

## 2022-06-08 DIAGNOSIS — M54.50 CHRONIC LEFT-SIDED LOW BACK PAIN WITHOUT SCIATICA: Primary | ICD-10-CM

## 2022-06-08 DIAGNOSIS — M54.41 CHRONIC RIGHT-SIDED LOW BACK PAIN WITH RIGHT-SIDED SCIATICA: ICD-10-CM

## 2022-06-08 DIAGNOSIS — G89.29 CHRONIC LEFT-SIDED LOW BACK PAIN WITHOUT SCIATICA: Primary | ICD-10-CM

## 2022-06-08 DIAGNOSIS — M54.16 RADICULOPATHY, LUMBAR REGION: ICD-10-CM

## 2022-06-08 PROCEDURE — 97110 THERAPEUTIC EXERCISES: CPT | Performed by: PHYSICAL THERAPIST

## 2022-06-08 PROCEDURE — 97530 THERAPEUTIC ACTIVITIES: CPT | Performed by: PHYSICAL THERAPIST

## 2022-06-08 PROCEDURE — 97140 MANUAL THERAPY 1/> REGIONS: CPT | Performed by: PHYSICAL THERAPIST

## 2022-06-09 NOTE — PROGRESS NOTES
Physical Therapy Daily Progress Note      Patient: Salinas Dill   : 1945  Diagnosis/ICD-10 Code:  Chronic left-sided low back pain without sciatica [M54.50, G89.29]   Problems Addressed this Visit        Musculoskeletal and Injuries    Low back pain - Primary      Other Visit Diagnoses     Radiculopathy, lumbar region          Diagnoses       Codes Comments    Chronic left-sided low back pain without sciatica    -  Primary ICD-10-CM: M54.50, G89.29  ICD-9-CM: 724.2, 338.29     Radiculopathy, lumbar region     ICD-10-CM: M54.16  ICD-9-CM: 724.4     Chronic right-sided low back pain with right-sided sciatica     ICD-10-CM: M54.41, G89.29  ICD-9-CM: 724.2, 724.3, 338.29         Referring practitioner: Josh Cox MD  Date of Initial Visit: Type: THERAPY  Noted: 2022  Today's Date: 2022    VISIT#: 6    Subjective : Pt reports he is feeling better. States he cannot get as good HS stretch at home as when PT stretches him. Tried using some of the cybex machines at the [facility] that he has been avoiding. Started on light weight and did not have any pain or issues with exercises. Asking if he should start using the torso rotation machine, was doing at 110# previously.    Objective : Educated that if he returns to torso rotation machine, to use at much lower weight (~20#). Instructed pt in anti-rotation pressout with Tband for core strengthening. Pt demonstrated good understanding. Provided copy of exercise for HEP.    See Exercise, Manual, and Modality Logs for complete treatment.     Assessment/Plan : Decreased pain at start of session. Good tolerance to all manual techniques with decreased pain and improved motion reported post-tx. Pt with good understanding of exercise at [facility]. Pt will benefit from continued stretching and ther ex to improve core stabilization and further decrease pain.    Progress per Plan of Care and Progress strengthening /stabilization /functional activity         Timed:          Manual Therapy:    20     mins  04624;     Therapeutic Exercise:    15     mins  60107;     Neuromuscular Ritu:        mins  62228;    Therapeutic Activity:     10     mins  50757;     Gait Training:           mins  41164;     Ultrasound:          mins  20335;    Ionto                                   mins   57515  Self Care                            mins   48655    Un-Timed:  Electrical Stimulation:         mins  01134 ( );  Dry Needling          mins 36636/71521  Traction          mins 00785  Canalith Repos                   mins  35874  Low Eval          Mins  10538  Mod Eval          Mins  79839  High Eval                            Mins  87537  Re-Eval                               mins  30865    Timed Treatment:   45   mins   Total Treatment:     45   mins    Nasrin Mar, PT, CLT, Cert DN  Physical Therapist  [place] Lic # [id number]

## 2022-06-13 ENCOUNTER — TREATMENT (OUTPATIENT)
Dept: PHYSICAL THERAPY | Facility: CLINIC | Age: 77
End: 2022-06-13

## 2022-06-13 DIAGNOSIS — G89.29 CHRONIC RIGHT-SIDED LOW BACK PAIN WITH RIGHT-SIDED SCIATICA: ICD-10-CM

## 2022-06-13 DIAGNOSIS — M54.16 RADICULOPATHY, LUMBAR REGION: ICD-10-CM

## 2022-06-13 DIAGNOSIS — G89.29 CHRONIC LEFT-SIDED LOW BACK PAIN WITHOUT SCIATICA: Primary | ICD-10-CM

## 2022-06-13 DIAGNOSIS — M54.50 CHRONIC LEFT-SIDED LOW BACK PAIN WITHOUT SCIATICA: Primary | ICD-10-CM

## 2022-06-13 DIAGNOSIS — M54.41 CHRONIC RIGHT-SIDED LOW BACK PAIN WITH RIGHT-SIDED SCIATICA: ICD-10-CM

## 2022-06-13 PROCEDURE — 97140 MANUAL THERAPY 1/> REGIONS: CPT | Performed by: PHYSICAL THERAPIST

## 2022-06-13 PROCEDURE — 97110 THERAPEUTIC EXERCISES: CPT | Performed by: PHYSICAL THERAPIST

## 2022-06-13 PROCEDURE — 97112 NEUROMUSCULAR REEDUCATION: CPT | Performed by: PHYSICAL THERAPIST

## 2022-06-13 NOTE — PROGRESS NOTES
Physical Therapy Daily Progress Note      Patient: Salinas Dill   : 1945  Diagnosis/ICD-10 Code:  Chronic left-sided low back pain without sciatica [M54.50, G89.29]  Referring practitioner: Josh Cox MD  Date of Initial Visit: 2022  Today's Date: 2022  Patient seen for 7 sessions.  POC 2x/wk x 12 weeks, exp 22    PMHX:  CABG x 3        VISIT#: 7      Subjective :   No pain currently. He notes continued pain with floor to stand transfer and rolling in bed. He does not have another appointment for 3 weeks and is thinking that he is possibly ready for discharge.     Objective     See Exercise, Manual, and Modality Logs for complete treatment.   Progression as noted.       Patient Education:        Assessment/Plan:  Pt. Improved with mechanics during there ex.  He has some discomfort with rolling and lumbar rotation remains tight. Discussed possible discharge with patient and it was decided that he would try HEP for 3 weeks, attend next appointment and then decide about discharge.       Anticipate DC next Visit:  Possible discharge if patient able to maintain status on his own. :              Timed:         Manual Therapy:    15     mins  07694;     Therapeutic Exercise:   20      mins  84272;     Neuromuscular Ritu:  10      mins  90855;    Therapeutic Activity:          mins  27772;     Gait Training:           mins  79901;     Ultrasound:          mins  67282;    Ionto                                   mins   54238  Self Care                            mins   55618  Aquatic                               mins 91453    Un-Timed:  Electrical Stimulation:         mins  19002 ( );  Traction          mins 65829      Timed Treatment:    45  mins   Total Treatment:     45   mins    Bela Mendoza PTA  Physical Therapist Assistant   [place] license:  #[id number]

## 2022-06-27 ENCOUNTER — LAB (OUTPATIENT)
Dept: LAB | Facility: HOSPITAL | Age: 77
End: 2022-06-27

## 2022-06-27 DIAGNOSIS — Z95.1 S/P CABG X 3: ICD-10-CM

## 2022-06-27 DIAGNOSIS — Z98.890 S/P MVR (MITRAL VALVE REPAIR): ICD-10-CM

## 2022-06-27 DIAGNOSIS — Z86.79 S/P MAZE OPERATION FOR ATRIAL FIBRILLATION: ICD-10-CM

## 2022-06-27 DIAGNOSIS — I47.29 NONSUSTAINED VENTRICULAR TACHYCARDIA: ICD-10-CM

## 2022-06-27 DIAGNOSIS — Z98.890 S/P MAZE OPERATION FOR ATRIAL FIBRILLATION: ICD-10-CM

## 2022-06-27 LAB
ALBUMIN SERPL-MCNC: 3.9 G/DL (ref 3.5–5.2)
ALBUMIN/GLOB SERPL: 1.4 G/DL
ALP SERPL-CCNC: 56 U/L (ref 39–117)
ALT SERPL W P-5'-P-CCNC: 17 U/L (ref 1–41)
ANION GAP SERPL CALCULATED.3IONS-SCNC: 8.5 MMOL/L (ref 5–15)
AST SERPL-CCNC: 21 U/L (ref 1–40)
BASOPHILS # BLD AUTO: 0.04 10*3/MM3 (ref 0–0.2)
BASOPHILS NFR BLD AUTO: 0.7 % (ref 0–1.5)
BILIRUB SERPL-MCNC: 0.4 MG/DL (ref 0–1.2)
BUN SERPL-MCNC: 16 MG/DL (ref 8–23)
BUN/CREAT SERPL: 21.3 (ref 7–25)
CALCIUM SPEC-SCNC: 9.4 MG/DL (ref 8.6–10.5)
CHLORIDE SERPL-SCNC: 103 MMOL/L (ref 98–107)
CO2 SERPL-SCNC: 26.5 MMOL/L (ref 22–29)
CREAT SERPL-MCNC: 0.75 MG/DL (ref 0.76–1.27)
DEPRECATED RDW RBC AUTO: 45.6 FL (ref 37–54)
EGFRCR SERPLBLD CKD-EPI 2021: 94.1 ML/MIN/1.73
EOSINOPHIL # BLD AUTO: 0.26 10*3/MM3 (ref 0–0.4)
EOSINOPHIL NFR BLD AUTO: 4.6 % (ref 0.3–6.2)
ERYTHROCYTE [DISTWIDTH] IN BLOOD BY AUTOMATED COUNT: 14.7 % (ref 12.3–15.4)
GLOBULIN UR ELPH-MCNC: 2.7 GM/DL
GLUCOSE SERPL-MCNC: 82 MG/DL (ref 65–99)
HCT VFR BLD AUTO: 38.3 % (ref 37.5–51)
HGB BLD-MCNC: 12.4 G/DL (ref 13–17.7)
IMM GRANULOCYTES # BLD AUTO: 0.01 10*3/MM3 (ref 0–0.05)
IMM GRANULOCYTES NFR BLD AUTO: 0.2 % (ref 0–0.5)
LYMPHOCYTES # BLD AUTO: 0.78 10*3/MM3 (ref 0.7–3.1)
LYMPHOCYTES NFR BLD AUTO: 13.7 % (ref 19.6–45.3)
MAGNESIUM SERPL-MCNC: 2.2 MG/DL (ref 1.6–2.4)
MCH RBC QN AUTO: 27.8 PG (ref 26.6–33)
MCHC RBC AUTO-ENTMCNC: 32.4 G/DL (ref 31.5–35.7)
MCV RBC AUTO: 85.9 FL (ref 79–97)
MONOCYTES # BLD AUTO: 0.77 10*3/MM3 (ref 0.1–0.9)
MONOCYTES NFR BLD AUTO: 13.5 % (ref 5–12)
NEUTROPHILS NFR BLD AUTO: 3.85 10*3/MM3 (ref 1.7–7)
NEUTROPHILS NFR BLD AUTO: 67.3 % (ref 42.7–76)
NRBC BLD AUTO-RTO: 0 /100 WBC (ref 0–0.2)
PLATELET # BLD AUTO: 235 10*3/MM3 (ref 140–450)
PMV BLD AUTO: 12.3 FL (ref 6–12)
POTASSIUM SERPL-SCNC: 4 MMOL/L (ref 3.5–5.2)
PROT SERPL-MCNC: 6.6 G/DL (ref 6–8.5)
RBC # BLD AUTO: 4.46 10*6/MM3 (ref 4.14–5.8)
SODIUM SERPL-SCNC: 138 MMOL/L (ref 136–145)
WBC NRBC COR # BLD: 5.71 10*3/MM3 (ref 3.4–10.8)

## 2022-06-27 PROCEDURE — 85025 COMPLETE CBC W/AUTO DIFF WBC: CPT

## 2022-06-27 PROCEDURE — U0004 COV-19 TEST NON-CDC HGH THRU: HCPCS

## 2022-06-27 PROCEDURE — 83735 ASSAY OF MAGNESIUM: CPT

## 2022-06-27 PROCEDURE — 36415 COLL VENOUS BLD VENIPUNCTURE: CPT

## 2022-06-27 PROCEDURE — 80053 COMPREHEN METABOLIC PANEL: CPT

## 2022-06-27 PROCEDURE — U0005 INFEC AGEN DETEC AMPLI PROBE: HCPCS

## 2022-06-27 PROCEDURE — C9803 HOPD COVID-19 SPEC COLLECT: HCPCS

## 2022-06-28 ENCOUNTER — ANESTHESIA EVENT (OUTPATIENT)
Dept: CARDIOLOGY | Facility: HOSPITAL | Age: 77
End: 2022-06-28

## 2022-06-28 LAB — SARS-COV-2 ORF1AB RESP QL NAA+PROBE: NOT DETECTED

## 2022-06-29 ENCOUNTER — HOSPITAL ENCOUNTER (OUTPATIENT)
Facility: HOSPITAL | Age: 77
Setting detail: HOSPITAL OUTPATIENT SURGERY
Discharge: HOME | End: 2022-06-29
Attending: INTERNAL MEDICINE | Admitting: INTERNAL MEDICINE

## 2022-06-29 ENCOUNTER — ANESTHESIA (OUTPATIENT)
Dept: CARDIOLOGY | Facility: HOSPITAL | Age: 77
End: 2022-06-29

## 2022-06-29 VITALS
SYSTOLIC BLOOD PRESSURE: 134 MMHG | WEIGHT: 222.22 LBS | HEIGHT: 71 IN | HEART RATE: 75 BPM | BODY MASS INDEX: 31.11 KG/M2 | TEMPERATURE: 98.2 F | OXYGEN SATURATION: 97 % | RESPIRATION RATE: 15 BRPM | DIASTOLIC BLOOD PRESSURE: 73 MMHG

## 2022-06-29 DIAGNOSIS — I47.29 NONSUSTAINED VENTRICULAR TACHYCARDIA: ICD-10-CM

## 2022-06-29 DIAGNOSIS — Z98.890 S/P MAZE OPERATION FOR ATRIAL FIBRILLATION: ICD-10-CM

## 2022-06-29 DIAGNOSIS — Z86.79 S/P MAZE OPERATION FOR ATRIAL FIBRILLATION: ICD-10-CM

## 2022-06-29 DIAGNOSIS — Z95.1 S/P CABG X 3: ICD-10-CM

## 2022-06-29 DIAGNOSIS — Z98.890 S/P MVR (MITRAL VALVE REPAIR): ICD-10-CM

## 2022-06-29 PROCEDURE — 25010000002 CEFAZOLIN PER 500 MG

## 2022-06-29 PROCEDURE — C1894 INTRO/SHEATH, NON-LASER: HCPCS | Performed by: INTERNAL MEDICINE

## 2022-06-29 PROCEDURE — 93620 COMP EP EVL R AT VEN PAC&REC: CPT | Performed by: INTERNAL MEDICINE

## 2022-06-29 PROCEDURE — 25010000002 FENTANYL CITRATE (PF) 100 MCG/2ML SOLUTION

## 2022-06-29 PROCEDURE — C1730 CATH, EP, 19 OR FEW ELECT: HCPCS | Performed by: INTERNAL MEDICINE

## 2022-06-29 PROCEDURE — 25010000002 PROPOFOL 10 MG/ML EMULSION

## 2022-06-29 RX ORDER — NALOXONE HCL 0.4 MG/ML
0.4 VIAL (ML) INJECTION AS NEEDED
Status: DISCONTINUED | OUTPATIENT
Start: 2022-06-29 | End: 2022-06-29 | Stop reason: HOSPADM

## 2022-06-29 RX ORDER — ACETAMINOPHEN 650 MG/1
650 SUPPOSITORY RECTAL ONCE AS NEEDED
Status: DISCONTINUED | OUTPATIENT
Start: 2022-06-29 | End: 2022-06-29 | Stop reason: HOSPADM

## 2022-06-29 RX ORDER — SODIUM CHLORIDE 0.9 % (FLUSH) 0.9 %
3-10 SYRINGE (ML) INJECTION AS NEEDED
Status: DISCONTINUED | OUTPATIENT
Start: 2022-06-29 | End: 2022-06-29 | Stop reason: HOSPADM

## 2022-06-29 RX ORDER — ONDANSETRON 2 MG/ML
4 INJECTION INTRAMUSCULAR; INTRAVENOUS EVERY 6 HOURS PRN
Status: DISCONTINUED | OUTPATIENT
Start: 2022-06-29 | End: 2022-06-29 | Stop reason: HOSPADM

## 2022-06-29 RX ORDER — SODIUM CHLORIDE 0.9 % (FLUSH) 0.9 %
3 SYRINGE (ML) INJECTION EVERY 12 HOURS SCHEDULED
Status: DISCONTINUED | OUTPATIENT
Start: 2022-06-29 | End: 2022-06-29

## 2022-06-29 RX ORDER — MEPERIDINE HYDROCHLORIDE 25 MG/ML
12.5 INJECTION INTRAMUSCULAR; INTRAVENOUS; SUBCUTANEOUS
Status: DISCONTINUED | OUTPATIENT
Start: 2022-06-29 | End: 2022-06-29 | Stop reason: HOSPADM

## 2022-06-29 RX ORDER — LIDOCAINE HYDROCHLORIDE 20 MG/ML
INJECTION, SOLUTION INFILTRATION; PERINEURAL AS NEEDED
Status: DISCONTINUED | OUTPATIENT
Start: 2022-06-29 | End: 2022-06-29 | Stop reason: HOSPADM

## 2022-06-29 RX ORDER — SODIUM CHLORIDE 9 MG/ML
9 INJECTION, SOLUTION INTRAVENOUS CONTINUOUS PRN
Status: DISCONTINUED | OUTPATIENT
Start: 2022-06-29 | End: 2022-06-29 | Stop reason: HOSPADM

## 2022-06-29 RX ORDER — PHENYLEPHRINE HCL IN 0.9% NACL 1 MG/10 ML
SYRINGE (ML) INTRAVENOUS AS NEEDED
Status: DISCONTINUED | OUTPATIENT
Start: 2022-06-29 | End: 2022-06-29 | Stop reason: SURG

## 2022-06-29 RX ORDER — SODIUM CHLORIDE 0.9 % (FLUSH) 0.9 %
10 SYRINGE (ML) INJECTION AS NEEDED
Status: DISCONTINUED | OUTPATIENT
Start: 2022-06-29 | End: 2022-06-29 | Stop reason: HOSPADM

## 2022-06-29 RX ORDER — FENTANYL CITRATE 50 UG/ML
50 INJECTION, SOLUTION INTRAMUSCULAR; INTRAVENOUS
Status: DISCONTINUED | OUTPATIENT
Start: 2022-06-29 | End: 2022-06-29 | Stop reason: HOSPADM

## 2022-06-29 RX ORDER — SODIUM CHLORIDE 9 MG/ML
30 INJECTION, SOLUTION INTRAVENOUS CONTINUOUS
Status: DISCONTINUED | OUTPATIENT
Start: 2022-06-29 | End: 2022-06-29 | Stop reason: HOSPADM

## 2022-06-29 RX ORDER — HYDROCODONE BITARTRATE AND ACETAMINOPHEN 5; 325 MG/1; MG/1
1 TABLET ORAL EVERY 4 HOURS PRN
Status: DISCONTINUED | OUTPATIENT
Start: 2022-06-29 | End: 2022-06-29 | Stop reason: HOSPADM

## 2022-06-29 RX ORDER — MIDAZOLAM HYDROCHLORIDE 1 MG/ML
0.5 INJECTION INTRAMUSCULAR; INTRAVENOUS
Status: DISCONTINUED | OUTPATIENT
Start: 2022-06-29 | End: 2022-06-29 | Stop reason: HOSPADM

## 2022-06-29 RX ORDER — FLUMAZENIL 0.1 MG/ML
0.2 INJECTION INTRAVENOUS AS NEEDED
Status: DISCONTINUED | OUTPATIENT
Start: 2022-06-29 | End: 2022-06-29 | Stop reason: HOSPADM

## 2022-06-29 RX ORDER — ACETAMINOPHEN 650 MG/1
650 SUPPOSITORY RECTAL EVERY 4 HOURS PRN
Status: DISCONTINUED | OUTPATIENT
Start: 2022-06-29 | End: 2022-06-29 | Stop reason: HOSPADM

## 2022-06-29 RX ORDER — FENTANYL CITRATE 50 UG/ML
25 INJECTION, SOLUTION INTRAMUSCULAR; INTRAVENOUS
Status: DISCONTINUED | OUTPATIENT
Start: 2022-06-29 | End: 2022-06-29 | Stop reason: HOSPADM

## 2022-06-29 RX ORDER — DROPERIDOL 2.5 MG/ML
0.62 INJECTION, SOLUTION INTRAMUSCULAR; INTRAVENOUS ONCE AS NEEDED
Status: DISCONTINUED | OUTPATIENT
Start: 2022-06-29 | End: 2022-06-29 | Stop reason: HOSPADM

## 2022-06-29 RX ORDER — PROMETHAZINE HYDROCHLORIDE 25 MG/1
25 SUPPOSITORY RECTAL ONCE AS NEEDED
Status: DISCONTINUED | OUTPATIENT
Start: 2022-06-29 | End: 2022-06-29 | Stop reason: HOSPADM

## 2022-06-29 RX ORDER — CEFAZOLIN SODIUM 1 G/3ML
INJECTION, POWDER, FOR SOLUTION INTRAMUSCULAR; INTRAVENOUS AS NEEDED
Status: DISCONTINUED | OUTPATIENT
Start: 2022-06-29 | End: 2022-06-29 | Stop reason: SURG

## 2022-06-29 RX ORDER — PROCHLORPERAZINE EDISYLATE 5 MG/ML
10 INJECTION INTRAMUSCULAR; INTRAVENOUS ONCE AS NEEDED
Status: DISCONTINUED | OUTPATIENT
Start: 2022-06-29 | End: 2022-06-29 | Stop reason: HOSPADM

## 2022-06-29 RX ORDER — PROMETHAZINE HYDROCHLORIDE 25 MG/1
25 TABLET ORAL ONCE AS NEEDED
Status: DISCONTINUED | OUTPATIENT
Start: 2022-06-29 | End: 2022-06-29 | Stop reason: HOSPADM

## 2022-06-29 RX ORDER — SODIUM CHLORIDE 0.9 % (FLUSH) 0.9 %
10 SYRINGE (ML) INJECTION EVERY 12 HOURS SCHEDULED
Status: DISCONTINUED | OUTPATIENT
Start: 2022-06-29 | End: 2022-06-29

## 2022-06-29 RX ORDER — OXYCODONE HYDROCHLORIDE 5 MG/1
10 TABLET ORAL ONCE AS NEEDED
Status: DISCONTINUED | OUTPATIENT
Start: 2022-06-29 | End: 2022-06-29 | Stop reason: HOSPADM

## 2022-06-29 RX ORDER — ACETAMINOPHEN 325 MG/1
650 TABLET ORAL EVERY 4 HOURS PRN
Status: DISCONTINUED | OUTPATIENT
Start: 2022-06-29 | End: 2022-06-29 | Stop reason: HOSPADM

## 2022-06-29 RX ORDER — IPRATROPIUM BROMIDE AND ALBUTEROL SULFATE 2.5; .5 MG/3ML; MG/3ML
3 SOLUTION RESPIRATORY (INHALATION) ONCE AS NEEDED
Status: DISCONTINUED | OUTPATIENT
Start: 2022-06-29 | End: 2022-06-29 | Stop reason: HOSPADM

## 2022-06-29 RX ORDER — HYDRALAZINE HYDROCHLORIDE 20 MG/ML
5 INJECTION INTRAMUSCULAR; INTRAVENOUS
Status: DISCONTINUED | OUTPATIENT
Start: 2022-06-29 | End: 2022-06-29 | Stop reason: HOSPADM

## 2022-06-29 RX ORDER — LABETALOL HYDROCHLORIDE 5 MG/ML
5 INJECTION, SOLUTION INTRAVENOUS
Status: DISCONTINUED | OUTPATIENT
Start: 2022-06-29 | End: 2022-06-29 | Stop reason: HOSPADM

## 2022-06-29 RX ORDER — ACETAMINOPHEN 325 MG/1
650 TABLET ORAL ONCE AS NEEDED
Status: DISCONTINUED | OUTPATIENT
Start: 2022-06-29 | End: 2022-06-29 | Stop reason: HOSPADM

## 2022-06-29 RX ORDER — OXYCODONE HYDROCHLORIDE 5 MG/1
15 TABLET ORAL EVERY 4 HOURS PRN
Status: DISCONTINUED | OUTPATIENT
Start: 2022-06-29 | End: 2022-06-29 | Stop reason: HOSPADM

## 2022-06-29 RX ORDER — FENTANYL CITRATE 50 UG/ML
INJECTION, SOLUTION INTRAMUSCULAR; INTRAVENOUS AS NEEDED
Status: DISCONTINUED | OUTPATIENT
Start: 2022-06-29 | End: 2022-06-29 | Stop reason: SURG

## 2022-06-29 RX ADMIN — PROPOFOL 80 MCG/KG/MIN: 10 INJECTION, EMULSION INTRAVENOUS at 12:33

## 2022-06-29 RX ADMIN — CEFAZOLIN SODIUM 2 G: 1 INJECTION, POWDER, FOR SOLUTION INTRAMUSCULAR; INTRAVENOUS at 12:36

## 2022-06-29 RX ADMIN — Medication 200 MCG: at 13:10

## 2022-06-29 RX ADMIN — FENTANYL CITRATE 25 MCG: 50 INJECTION, SOLUTION INTRAMUSCULAR; INTRAVENOUS at 13:03

## 2022-06-29 RX ADMIN — FENTANYL CITRATE 50 MCG: 50 INJECTION, SOLUTION INTRAMUSCULAR; INTRAVENOUS at 12:33

## 2022-06-29 RX ADMIN — Medication 100 MCG: at 13:06

## 2022-06-29 RX ADMIN — SODIUM CHLORIDE: 0.9 INJECTION, SOLUTION INTRAVENOUS at 12:26

## 2022-06-29 RX ADMIN — Medication 200 MCG: at 13:12

## 2022-06-29 RX ADMIN — FENTANYL CITRATE 25 MCG: 50 INJECTION, SOLUTION INTRAMUSCULAR; INTRAVENOUS at 12:42

## 2022-06-29 RX ADMIN — SODIUM CHLORIDE 30 ML/HR: 9 INJECTION, SOLUTION INTRAVENOUS at 11:40

## 2022-06-29 NOTE — ANESTHESIA POSTPROCEDURE EVALUATION
Patient: Salinas Dill    Procedure Summary     Date: 06/29/22 Room / Location: [facility] CATH LAB 3 / [facility] CATH INVASIVE LOCATION    Anesthesia Start: 1226 Anesthesia Stop: 1329    Procedure: EP/CRM Study [place] aware (Right ) Diagnosis:       S/P Maze operation for atrial fibrillation      S/P CABG x 3      S/P MVR (mitral valve repair)      Nonsustained ventricular tachycardia (HCC)      (post EP study without complications)    Providers: Darren Phan MD Provider: José Sprague MD    Anesthesia Type: MAC ASA Status: 3          Anesthesia Type: MAC    Vitals  Vitals Value Taken Time   /83 06/29/22 1447   Temp     Pulse 59 06/29/22 1451   Resp 15 06/29/22 1330   SpO2 97 % 06/29/22 1451   Vitals shown include unvalidated device data.        Post Anesthesia Care and Evaluation    Patient location during evaluation: PACU  Patient participation: complete - patient participated  Level of consciousness: awake  Pain scale: See nurse's notes for pain score.  Pain management: adequate    Airway patency: patent  Anesthetic complications: No anesthetic complications  PONV Status: none  Cardiovascular status: acceptable  Respiratory status: acceptable  Hydration status: acceptable    Comments: Patient seen and examined postoperatively; vital signs stable; SpO2 greater than or equal to 90%; cardiopulmonary status stable; nausea/vomiting adequately controlled; pain adequately controlled; no apparent anesthesia complications; patient discharged from anesthesia care when discharge criteria were met

## 2022-06-29 NOTE — ANESTHESIA PREPROCEDURE EVALUATION
Anesthesia Evaluation     Patient summary reviewed and Nursing notes reviewed   NPO Solid Status: > 8 hours  NPO Liquid Status: > 8 hours           Airway   Mallampati: II  TM distance: >3 FB  Neck ROM: full  No difficulty expected  Dental - normal exam     Pulmonary    Cardiovascular     (+) hypertension, valvular problems/murmurs MR, CAD, CABG, dysrhythmias Atrial Fib, Tachycardia, hyperlipidemia,       Neuro/Psych  (+) numbness,    GI/Hepatic/Renal/Endo    (+) obesity,       Musculoskeletal     Abdominal    Substance History      OB/GYN          Other   arthritis,      ROS/Med Hx Other: Hx afib and  nonsustained ventricular tachycardia     · Left ventricular ejection fraction appears to be 56 - 60%. Left ventricular systolic function is normal.  · There is a mitral valve ring present.  · Saline test results are negative.  · Left atrial appendage ligation noted.  · Left atrial appendage is ligated and demonstrates continued closure.  · No evidence of communication with left atrial cavity.                    Anesthesia Plan    ASA 3     MAC     intravenous induction     Anesthetic plan, risks, benefits, and alternatives have been provided, discussed and informed consent has been obtained with: patient.    Plan discussed with CAA.        CODE STATUS:

## 2022-07-08 ENCOUNTER — TREATMENT (OUTPATIENT)
Dept: PHYSICAL THERAPY | Facility: CLINIC | Age: 77
End: 2022-07-08

## 2022-07-08 DIAGNOSIS — G89.29 CHRONIC LEFT-SIDED LOW BACK PAIN WITHOUT SCIATICA: Primary | ICD-10-CM

## 2022-07-08 DIAGNOSIS — M54.50 CHRONIC LEFT-SIDED LOW BACK PAIN WITHOUT SCIATICA: Primary | ICD-10-CM

## 2022-07-08 DIAGNOSIS — M54.16 RADICULOPATHY, LUMBAR REGION: ICD-10-CM

## 2022-07-08 PROCEDURE — 97110 THERAPEUTIC EXERCISES: CPT | Performed by: PHYSICAL THERAPIST

## 2022-07-08 PROCEDURE — 97530 THERAPEUTIC ACTIVITIES: CPT | Performed by: PHYSICAL THERAPIST

## 2022-07-08 PROCEDURE — 97140 MANUAL THERAPY 1/> REGIONS: CPT | Performed by: PHYSICAL THERAPIST

## 2022-07-08 NOTE — PROGRESS NOTES
Physical Therapy Progress Note      Patient: Salinas Dill   : 1945  Diagnosis/ICD-10 Code:  Chronic left-sided low back pain without sciatica [M54.50, G89.29]   Problems Addressed this Visit        Musculoskeletal and Injuries    Low back pain - Primary      Other Visit Diagnoses     Radiculopathy, lumbar region          Diagnoses       Codes Comments    Chronic left-sided low back pain without sciatica    -  Primary ICD-10-CM: M54.50, G89.29  ICD-9-CM: 724.2, 338.29     Radiculopathy, lumbar region     ICD-10-CM: M54.16  ICD-9-CM: 724.4         Referring practitioner: Josh Cox MD  Date of Initial Visit: Type: THERAPY  Noted: 2022  Today's Date: 2022    VISIT#: 8    Subjective : Pt reports he is having no pain currently. Still has numbness in R foot. Doing HEP and feels good with it. Feels like he is ready for discharge to [facility]. Still has pain with getting up from floor and has been doing HEP from his bed.    Oswestry: Not completed this date in error.    Objective :     See Exercise, Manual, and Modality Logs for complete treatment.     Assessment/Plan :    STG to be met in 3 wk:  - Pt to report 50% improvement in R foot radicular numbness/tingling. - Progressing  - Pt to report 25% or better improvement in pain with activity. - MET  - Pt to be independent with HEP. - Progressing   LTG to be met in 12 wk:  - Improve Oswestry to 25% or less impairment.  - Increase R hip abd strength to 5/5.  - Pt to rate max pain at 2-3/10 with activity.  - Pt to report no pain with sitting >1 hr.    {PT PLAN (SOAP Note):27922}         Timed:         Manual Therapy:    ***     mins  49769;     Therapeutic Exercise:    ***     mins  19923;     Neuromuscular Ritu:    ***    mins  05657;    Therapeutic Activity:     ***     mins  44688;     Gait Training:      ***     mins  46603;     Ultrasound:     ***     mins  32333;    Ionto                               ***    mins   32850  Self Care                        ***     mins   67620    Un-Timed:  Electrical Stimulation:    ***     mins  01012 ( );  Dry Needling     ***     mins 56134/73971  Traction     ***     mins 51483  Canalith Repos               ***    mins  07394  Low Eval     ***     Mins  30931  Mod Eval     ***     Mins  20314  High Eval                       ***     Mins  08362  Re-Eval                           ***    mins  38834    Timed Treatment:   ***   mins   Total Treatment:     ***   mins    Nasrin Mar, PT, CLT, Cert DN  Physical Therapist  [place] Lic # [id number]

## 2022-07-11 NOTE — PROGRESS NOTES
Physical Therapy Progress Note/Reassessment    Patient: Salinas Dill   : 1945  Diagnosis/ICD-10 Code:  Chronic left-sided low back pain without sciatica [M54.50, G89.29]  Referring practitioner: Josh Cox MD  Date of Initial Evaluation:  Type: THERAPY  Noted: 2022   Treatment date: 2022  Patient seen for 8 sessions      Subjective:   Visit Diagnoses:    ICD-10-CM ICD-9-CM   1. Chronic left-sided low back pain without sciatica  M54.50 724.2    G89.29 338.29   2. Radiculopathy, lumbar region  M54.16 724.4         Salinas Dill reports Pt reports he is having no pain currently. Still has numbness in R foot. Doing HEP and feels good with it. Feels like he is ready for discharge to [facility]. Still has pain with getting up from floor and has been doing HEP from his bed.  Subjective Questionnaire: Oswestry: not completed this date in error  Clinical Progress: improved  Home Program Compliance: Yes  Treatment has included: therapeutic exercise, neuromuscular re-education, manual therapy, therapeutic activity, gait training, electrical stimulation and moist heat      Objective : Significant tightness B hamstrings, L>R. B piriformis tightness.   R anterior innominate corrected with MET.  Lumbar AROM: Pt with 50% limitation in flexion, lateral flexion B, and rotation B. Extension 50% limited.    R hip abd = 4+/5 to 5/5.    See Exercise, Manual, and Modality Logs for complete treatment.     Assessment/Plan : Pt has made good progress toward goals with decreased pain and improved sitting tolerance. Pt with continued hamstring tightness B and B piriformis tightness. Pt needs continued core strengthening to improve lumbar stabilization for decreased pain with all functional activity.    STG to be met in 3 wk:  - Pt to report 50% improvement in R foot radicular numbness/tingling. - Progressing  - Pt to report 25% or better improvement in pain with activity. - MET  - Pt to be independent with HEP. - Progressing    LTG to be met in 12 wk:  - Improve Oswestry to 25% or less impairment. - Progressing  - Increase R hip abd strength to 5/5. - Progressing  - Pt to rate max pain at 2-3/10 with activity. - MET  - Pt to report no pain with sitting >1 hr. - MET       Recommendations: Continue as planned at 1-2x/wk  Timeframe: 2 months  Prognosis to achieve goals: good      Timed:         Manual Therapy:    15     mins  12869;     Therapeutic Exercise:    15     mins  91253;     Neuromuscular Ritu:        mins  33733;    Therapeutic Activity:     10     mins  79689;     Gait Training:           mins  43926;     Ultrasound:          mins  78918;    Ionto                                   mins   21986  Self Care                            mins   39531  Canalith Repos         mins 26934      Un-Timed:  Electrical Stimulation:         mins  56374 ( );  Dry Needling          mins self-pay  Traction          mins 54009      Timed Treatment:   40   mins   Total Treatment:     40   mins    PT Signature: Nasrin Mar, PT, CLT  PT License: [place] Lic # [id number]

## 2022-07-12 ENCOUNTER — TREATMENT (OUTPATIENT)
Dept: PHYSICAL THERAPY | Facility: CLINIC | Age: 77
End: 2022-07-12

## 2022-07-12 DIAGNOSIS — M54.50 CHRONIC LEFT-SIDED LOW BACK PAIN WITHOUT SCIATICA: Primary | ICD-10-CM

## 2022-07-12 DIAGNOSIS — M54.16 RADICULOPATHY, LUMBAR REGION: ICD-10-CM

## 2022-07-12 DIAGNOSIS — G89.29 CHRONIC RIGHT-SIDED LOW BACK PAIN WITH RIGHT-SIDED SCIATICA: ICD-10-CM

## 2022-07-12 DIAGNOSIS — M54.41 CHRONIC RIGHT-SIDED LOW BACK PAIN WITH RIGHT-SIDED SCIATICA: ICD-10-CM

## 2022-07-12 DIAGNOSIS — G89.29 CHRONIC LEFT-SIDED LOW BACK PAIN WITHOUT SCIATICA: Primary | ICD-10-CM

## 2022-07-12 PROCEDURE — 97530 THERAPEUTIC ACTIVITIES: CPT | Performed by: PHYSICAL THERAPIST

## 2022-07-12 PROCEDURE — 97110 THERAPEUTIC EXERCISES: CPT | Performed by: PHYSICAL THERAPIST

## 2022-07-12 PROCEDURE — 97140 MANUAL THERAPY 1/> REGIONS: CPT | Performed by: PHYSICAL THERAPIST

## 2022-07-14 NOTE — PROGRESS NOTES
Physical Therapy Daily Progress Note      Patient: Salinas Dill   : 1945  Diagnosis/ICD-10 Code:  Chronic left-sided low back pain without sciatica [M54.50, G89.29]   Problems Addressed this Visit        Musculoskeletal and Injuries    Low back pain - Primary      Other Visit Diagnoses     Radiculopathy, lumbar region          Diagnoses       Codes Comments    Chronic left-sided low back pain without sciatica    -  Primary ICD-10-CM: M54.50, G89.29  ICD-9-CM: 724.2, 338.29     Radiculopathy, lumbar region     ICD-10-CM: M54.16  ICD-9-CM: 724.4     Chronic right-sided low back pain with right-sided sciatica     ICD-10-CM: M54.41, G89.29  ICD-9-CM: 724.2, 724.3, 338.29         Referring practitioner: Josh Cox MD  Date of Initial Visit: Type: THERAPY  Noted: 2022  Today's Date: 2022    VISIT#: 9    Subjective : Pt reports his back is feeling good. Currently having no pain.     Objective : Mild sciatic neural tension B LE, L>R. Instructed pt in sciatic nerve glide.   Instructed pt in hooklying piriformis stretch. Pt demonstrated good understanding.  See Exercise, Manual, and Modality Logs for complete treatment.     Assessment/Plan : No back pain at start of session. Good tolerance to ther ex and manual techniques this date with no increase in symptoms. Pt will benefit from continued PT to progress core strength to improve tolerance to all functional tasks, including yard work, with no pain.    STG to be met in 3 wk:  - Pt to report 50% improvement in R foot radicular numbness/tingling. - Progressing  - Pt to report 25% or better improvement in pain with activity. - MET  - Pt to be independent with HEP. - Progressing   LTG to be met in 12 wk:  - Improve Oswestry to 25% or less impairment. - Progressing  - Increase R hip abd strength to 5/5. - Progressing  - Pt to rate max pain at 2-3/10 with activity. - MET  - Pt to report no pain with sitting >1 hr. - MET    Progress per Plan of Care and  Progress strengthening /stabilization /functional activity         Timed:         Manual Therapy:    15     mins  87205;     Therapeutic Exercise:    15     mins  94075;     Neuromuscular Ritu:        mins  67408;    Therapeutic Activity:     10     mins  75622;     Gait Training:           mins  76963;     Ultrasound:          mins  90424;    Ionto                                   mins   79101  Self Care                            mins   82524    Un-Timed:  Electrical Stimulation:         mins  15184 ( );  Dry Needling          mins 30023/83134  Traction          mins 72702  Canalith Repos                   mins  31655  Low Eval          Mins  63594  Mod Eval          Mins  95372  High Eval                            Mins  83101  Re-Eval                               mins  61620    Timed Treatment:   40   mins   Total Treatment:     40   mins    Nasrin Mar, PT, CLT, Cert DN  Physical Therapist  [place] Lic # [id number]

## 2022-07-19 ENCOUNTER — TREATMENT (OUTPATIENT)
Dept: PHYSICAL THERAPY | Facility: CLINIC | Age: 77
End: 2022-07-19

## 2022-07-19 DIAGNOSIS — G89.29 CHRONIC RIGHT-SIDED LOW BACK PAIN WITH RIGHT-SIDED SCIATICA: ICD-10-CM

## 2022-07-19 DIAGNOSIS — M54.16 RADICULOPATHY, LUMBAR REGION: ICD-10-CM

## 2022-07-19 DIAGNOSIS — M54.41 CHRONIC RIGHT-SIDED LOW BACK PAIN WITH RIGHT-SIDED SCIATICA: ICD-10-CM

## 2022-07-19 DIAGNOSIS — M54.50 CHRONIC LEFT-SIDED LOW BACK PAIN WITHOUT SCIATICA: Primary | ICD-10-CM

## 2022-07-19 DIAGNOSIS — G89.29 CHRONIC LEFT-SIDED LOW BACK PAIN WITHOUT SCIATICA: Primary | ICD-10-CM

## 2022-07-19 PROCEDURE — 97140 MANUAL THERAPY 1/> REGIONS: CPT | Performed by: PHYSICAL THERAPIST

## 2022-07-19 PROCEDURE — 97110 THERAPEUTIC EXERCISES: CPT | Performed by: PHYSICAL THERAPIST

## 2022-07-19 PROCEDURE — 97530 THERAPEUTIC ACTIVITIES: CPT | Performed by: PHYSICAL THERAPIST

## 2022-07-19 NOTE — PROGRESS NOTES
Physical Therapy Progress Note/ DISCHARGE SUMMARY    Patient: Salinas Dill   : 1945  Diagnosis/ICD-10 Code:  Chronic left-sided low back pain without sciatica [M54.50, G89.29]  Referring practitioner: Josh Cox MD  Date of Initial Evaluation:  Type: THERAPY  Noted: 2022  Patient seen for 10 sessions      Subjective:   Visit Diagnoses:    ICD-10-CM ICD-9-CM   1. Chronic left-sided low back pain without sciatica  M54.50 724.2    G89.29 338.29   2. Radiculopathy, lumbar region  M54.16 724.4   3. Chronic right-sided low back pain with right-sided sciatica  M54.41 724.2    G89.29 724.3     338.29         Salinas Dill reports no back pain currently. States he has noticed increased numbness in R foot after laying with knees bent. Going to [facility] at least 3x/wk. Rode his bike (recumbent) x50 min over the weekend with no back pain on small grades. [place] really good about that. Pt feels like he is ready for PT D/C today.  Subjective Questionnaire: Oswestry: , indicating 24% impairment.  Clinical Progress: improved  Home Program Compliance: Yes  Treatment has included: therapeutic exercise, neuromuscular re-education, manual therapy, therapeutic activity, electrical stimulation and moist heat    Objective : R hip abd = 5/5    See Exercise, Manual, and Modality Logs for complete treatment.     Assessment/Plan : No pain at start of session. Pt exercising at [facility] 3x/wk and has returned to riding his recumbent trike with no back pain. Pt compliant with HEP. Pt has met all goals at this time and expresses readiness for D/C.    STG to be met in 3 wk:  - Pt to report 50% improvement in R foot radicular numbness/tingling. - MET  - Pt to report 25% or better improvement in pain with activity. - MET  - Pt to be independent with HEP. - MET  LTG to be met in 12 wk:  - Improve Oswestry to 25% or less impairment. - MET  - Increase R hip abd strength to 5/5. - MET  - Pt to rate max pain at 2-3/10 with activity. - MET  -  Pt to report no pain with sitting >1 hr. - MET     Recommendations: Discharge to [facility]      Timed:         Manual Therapy:    15     mins  95982;     Therapeutic Exercise:    15     mins  86449;     Neuromuscular Ritu:        mins  81495;    Therapeutic Activity:     10     mins  57244;     Gait Training:           mins  12975;     Ultrasound:          mins  85958;    Ionto                                   mins   43362  Self Care                            mins   77664  Canalith Repos         mins 41578      Un-Timed:  Electrical Stimulation:         mins  53469 ( );  Dry Needling          mins self-pay  Traction          mins 58102      Timed Treatment:   40   mins   Total Treatment:     40   mins    PT Signature: Nasrin Mar PT, CLT  PT License: [place] Lic # [id number]

## 2022-08-17 ENCOUNTER — OFFICE VISIT (OUTPATIENT)
Dept: CARDIOLOGY | Facility: CLINIC | Age: 77
End: 2022-08-17

## 2022-08-17 VITALS
SYSTOLIC BLOOD PRESSURE: 167 MMHG | RESPIRATION RATE: 18 BRPM | BODY MASS INDEX: 32.5 KG/M2 | DIASTOLIC BLOOD PRESSURE: 85 MMHG | OXYGEN SATURATION: 97 % | WEIGHT: 227 LBS | HEIGHT: 70 IN | HEART RATE: 70 BPM

## 2022-08-17 DIAGNOSIS — E78.5 DYSLIPIDEMIA: ICD-10-CM

## 2022-08-17 DIAGNOSIS — Z86.79 S/P MAZE OPERATION FOR ATRIAL FIBRILLATION: ICD-10-CM

## 2022-08-17 DIAGNOSIS — I34.0 NONRHEUMATIC MITRAL VALVE REGURGITATION: ICD-10-CM

## 2022-08-17 DIAGNOSIS — I25.10 CORONARY ARTERY DISEASE INVOLVING NATIVE CORONARY ARTERY OF NATIVE HEART WITHOUT ANGINA PECTORIS: Primary | ICD-10-CM

## 2022-08-17 DIAGNOSIS — I48.11 LONGSTANDING PERSISTENT ATRIAL FIBRILLATION: ICD-10-CM

## 2022-08-17 DIAGNOSIS — Z98.890 S/P MVR (MITRAL VALVE REPAIR): ICD-10-CM

## 2022-08-17 DIAGNOSIS — Z98.890 S/P MAZE OPERATION FOR ATRIAL FIBRILLATION: ICD-10-CM

## 2022-08-17 DIAGNOSIS — I47.29 NONSUSTAINED VENTRICULAR TACHYCARDIA: ICD-10-CM

## 2022-08-17 DIAGNOSIS — Z98.890 S/P LEFT ATRIAL APPENDAGE LIGATION: ICD-10-CM

## 2022-08-17 DIAGNOSIS — Z95.1 S/P CABG X 3: ICD-10-CM

## 2022-08-17 DIAGNOSIS — I10 PRIMARY HYPERTENSION: ICD-10-CM

## 2022-08-17 PROCEDURE — 93000 ELECTROCARDIOGRAM COMPLETE: CPT | Performed by: INTERNAL MEDICINE

## 2022-08-17 PROCEDURE — 99214 OFFICE O/P EST MOD 30 MIN: CPT | Performed by: INTERNAL MEDICINE

## 2022-08-17 NOTE — PROGRESS NOTES
Cardiology Office Visit      Encounter Date:  08/17/2022    Patient ID:   Salinas Dill is a 76 y.o. male.    Reason For Followup:  Chronic atrial fibrillation  Mitral regurgitation  Hypertension  Status post coronary artery bypass surgery and mitral valve repair    Brief Clinical History:  Dear Dr. Ferrera, Kortney Infante MD    I had the pleasure of seeing Salinas Dill today. As you are well aware, this is a 76 y.o. male with past medical history significant for history of hypertension and benign prostatic hypertrophy and recent hospitalization for coronary artery disease and coronary artery bypass surgery here for further evaluation treatment options    Interpretation Summary    · An abnormal monitor study.  · Normal sinus rhythm  · Sinus bradycardia with slowest rate of 56 bpm occurring at 2:34 AM  · Sinus tachycardia with maximum rate of 121 bpm occurring at 11:51 AM  · Occasional PACs  · Occasional PVCs occurring in isolation, and bigeminal pattern  · Multiple runs of supraventricular tachycardia with maximum heart rate of 135 bpm and longest episode of 17 beats  · Multiple pauses the longest of which was 2.1 seconds occurring on 4/26/2022 at 2:45 AM  · Multiple runs of nonsustained ventricular tachycardia with a maximum heart rate of 188 bpm. Longest run was 19 beats occurring at 1:05 PM on 4/21/2022    Interpretation Summary    · Left ventricular ejection fraction appears to be 56 - 60%. Left ventricular systolic function is normal.  · There is a mitral valve ring present.  · Saline test results are negative.  · Left atrial appendage ligation noted.  · Left atrial appendage is ligated and demonstrates continued closure.  · No evidence of communication with left atrial cavity.           Interval History:  Denies any new complaints  Complaint on medical therapy    Assessment & Plan    Impressions:  Chronic atrial fibrillation/currently in sinus rhythm  Hypertension  Ilan Vascor of 2  stress test with no evidence  of any inducible ischemia  echocardiogram with normal LV systolic function and moderate mitral regurgitation  Severe multivessel coronary artery disease status post CABG x3 with a LIMA to the mid LAD and reverse individual saphenous vein graft to the obtuse marginal 1 and PLB branch of the right coronary artery 2021-11-11  Severe mitral valve insufficiency status post ring annuloplasty  Atrial fibrillation with right and left cryomaze procedure and left atrial appendage ligation  Postop atrial fibrillation status post surgical maze currently in sinus rhythm  Essential hypertension  Dyslipidemia  BPH  Extended Holter monitor with nonsustained ventricular tachycardia episodes  Multiple episodes of brief salvos of supraventricular tachycardia    Recommendations:    Continue aspirin for anticoagulation therapy  Discontinue Eliquis  Risk benefits and alternatives reviewed and discussed the patient  EP evaluation for further evaluation and treatment options for recurrent episodes of nonsustained supraventricular and also ventricular tachycardia  Patient is currently on low-dose beta-blockers but cannot tolerate any higher doses secondary to blood pressure issues  Recent echocardiogram and also AVERY showing no significant residual valve pathology only residual mild mitral regurgitation no significant mitral stenosis and a complete seal of the left atrial appendage  Continue risk factor modification  Recent labs reviewed and discussed with the patient  Lipids at goal  Medical records from recent hospitalization reviewed and discussed with the patient and family  Continue cardiac rehab  Change diuretics to as needed along with potassium  Echocardiogram with normal LV systolic function normal functioning of the valve  Recent EP study with no induction of SVT or ventricular tachycardia  Patient clinically feels better  Continue current medical therapy with aspirin 81 mg p.o. once a day Lipitor 40 mg p.o. once a day Coreg 3.125  "mg p.o. twice daily  follow-up in office in 6 months      Objective:    Vitals:  Vitals:    08/17/22 1424   BP: 167/85   BP Location: Left arm   Patient Position: Sitting   Cuff Size: Large Adult   Pulse: 70   Resp: 18   SpO2: 97%   Weight: 103 kg (227 lb)   Height: 177.8 cm (70\")       Physical Exam:    General: Alert, cooperative, no distress, appears stated age  Head:  Normocephalic, atraumatic, mucous membranes moist  Eyes:  Conjunctiva/corneas clear, EOM's intact     Neck:  Supple,  no adenopathy;      Lungs: Clear to auscultation bilaterally, no wheezes rhonchi rales are noted  Chest wall: No tenderness  Heart::  Regular rate and rhythm, S1 and S2 normal, no murmur, rub or gallop  Abdomen: Soft, non-tender, nondistended bowel sounds active  Extremities: No cyanosis, clubbing, or edema  Pulses: 2+ and symmetric all extremities  Skin:  No rashes or lesions  Neuro/psych: A&O x3. CN II through XII are grossly intact with appropriate affect      Allergies:  Allergies   Allergen Reactions   • Lisinopril Cough       Medication Review:     Current Outpatient Medications:   •  acetaminophen (TYLENOL) 325 MG tablet, Take 2 tablets by mouth Every 6 (Six) Hours As Needed for Mild Pain ., Disp: , Rfl:   •  aspirin (aspirin) 81 MG EC tablet, Take 1 tablet by mouth Daily., Disp: 30 tablet, Rfl: 2  •  atorvastatin (LIPITOR) 40 MG tablet, TAKE 1 TABLET DAILY AT BEDTIME., Disp: 90 tablet, Rfl: 0  •  B Complex Vitamins (vitamin b complex) capsule capsule, Take 1 capsule by mouth Daily. LD 11/4, Disp: , Rfl:   •  BLACK ELDERBERRY PO, Take 158 mg by mouth Every Night., Disp: , Rfl:   •  carvedilol (COREG) 3.125 MG tablet, Take 1 tablet by mouth Every 12 (Twelve) Hours., Disp: 180 tablet, Rfl: 3  •  Cholecalciferol (VITAMIN D) 2000 units capsule, Take 50,000 Units by mouth 1 (One) Time Per Week. mondays, Disp: , Rfl:   •  melatonin 5 MG tablet tablet, Take 5 mg by mouth Every Night., Disp: , Rfl:   •  SILDENAFIL CITRATE PO, Take 20 " mg by mouth Daily. For enlarged prostate, Disp: , Rfl:   •  tamsulosin (FLOMAX) 0.4 MG capsule 24 hr capsule, Take 1 capsule by mouth Daily., Disp: , Rfl:   •  Turmeric 500 MG capsule, Take 500 mg by mouth Daily., Disp: , Rfl:     Family History:  Family History   Problem Relation Age of Onset   • Diabetes Mother    • Heart disease Mother    • Hypertension Sister    • Hyperlipidemia Sister    • Kidney disease Sister    • Cancer Sister    • Other Sister         weight disorder   • Diabetes Sister    • Stroke Brother    • Hypertension Other        Past Medical History:  Past Medical History:   Diagnosis Date   • Abnormal ECG may 2022   • Arthritis    • Atrial fibrillation (HCC)    • Bulging lumbar disc    • Coronary artery disease    • Difficulty maintaining body in lying position     NEEDS PILLOW UNDER KNEES IN SURGERY D/T LUMBAR ISSUE   • Heart valve disease    • Hyperglycemia    • Hyperlipidemia    • Hypertension    • Skin mole    • Snoring    • Vitamin D deficiency        Past surgical History:  Past Surgical History:   Procedure Laterality Date   • CARDIAC CATHETERIZATION Right 10/13/2021    Procedure: Left Heart Cath;  Surgeon: Renato Knight MD;  Location: [facility] CATH INVASIVE LOCATION;  Service: Cardiovascular;  Laterality: Right;   • CARDIAC CATHETERIZATION  10/13/2021    Procedure: Functional Flow [place];  Surgeon: Renato Knight MD;  Location: [facility] CATH INVASIVE LOCATION;  Service: Cardiovascular;;   • CARDIAC ELECTROPHYSIOLOGY PROCEDURE Right 06/29/2022    Procedure: EP/CRM Study [place];  Surgeon: Darren Phan MD;  Location: [facility] CATH INVASIVE LOCATION;  Service: Cardiovascular;  Laterality: Right;   • CATARACT EXTRACTION  05/2022   • COLONOSCOPY     • CORONARY ARTERY BYPASS GRAFT N/A 11/11/2021    Procedure: CORONARY ARTERY BYPASS WITH INTERNAL MAMMARY ARTERY GRAFT AND MAZE PROCEDURE;  Surgeon: Johnathan Hernandez MD;  Location: [facility] CVOR;  Service: Cardiothoracic;   Laterality: N/A;  CABG x 3  2 vein grafts  1 LIMA  with intraoperative AVERY   • FINGER SURGERY Right     repair right pointer finger   • JOINT REPLACEMENT     • MAZE PROCEDURE Bilateral 2021    done with CABG + mintral ring placement + L atrial appendage ligation   • MITRAL VALVE REPAIR/REPLACEMENT N/A 2021    Procedure: MITRAL VALVE REPAIR/REPLACEMENT;  Surgeon: Johnathan Hernandez MD;  Location: [facility];  Service: Cardiothoracic;  Laterality: N/A;  mitral valve repair with physio II annuloplasty ring 28mm   • MITRAL VALVE REPAIR/REPLACEMENT     • TOTAL SHOULDER ARTHROPLASTY      shoulder replacement       Social History:  Social History     Socioeconomic History   • Marital status:    Tobacco Use   • Smoking status: Former Smoker     Packs/day: 1.00     Years: 12.00     Pack years: 12.00     Types: Cigarettes     Quit date: 1978     Years since quittin.6   • Smokeless tobacco: Former User     Quit date:    Vaping Use   • Vaping Use: Never used   Substance and Sexual Activity   • Alcohol use: Yes     Alcohol/week: 1.0 standard drink     Types: 1 Shots of liquor per week     Comment: [place] margaritas on Monday nights   • Drug use: No   • Sexual activity: Yes     Partners: Female     Birth control/protection: None       Review of Systems:  The following systems were reviewed as they relate to the cardiovascular system: Constitutional, Eyes, ENT, Cardiovascular, Respiratory, Gastrointestinal, Integumentary, Neurological, Psychiatric, Hematologic, Endocrine, Musculoskeletal, and Genitourinary. The pertinent cardiovascular findings are reported above with all other cardiovascular points within those systems being negative.    Diagnostic Study Review:     Current Electrocardiogram:    ECG 12 Lead    Date/Time: 2022 6:40 PM  Performed by: Renato Knight MD  Authorized by: Renato Knight MD   Comparison: compared with previous ECG   Similar to previous  ECG  Rhythm: sinus rhythm  Rate: normal  BPM: 71  Conduction: conduction normal  QRS axis: normal  Other findings: non-specific ST-T wave changes    Clinical impression: abnormal EKG              NOTE: The following portions of the patient's history were reviewed and updated this visit as appropriate: allergies, current medications, past family history, past medical history, past social history, past surgical history and problem list.

## 2022-08-22 ENCOUNTER — OFFICE VISIT (OUTPATIENT)
Dept: CARDIOLOGY | Facility: CLINIC | Age: 77
End: 2022-08-22

## 2022-08-22 VITALS
WEIGHT: 218 LBS | HEIGHT: 70 IN | BODY MASS INDEX: 31.21 KG/M2 | HEART RATE: 68 BPM | SYSTOLIC BLOOD PRESSURE: 138 MMHG | OXYGEN SATURATION: 96 % | DIASTOLIC BLOOD PRESSURE: 87 MMHG

## 2022-08-22 DIAGNOSIS — R00.2 PALPITATIONS: ICD-10-CM

## 2022-08-22 DIAGNOSIS — Z86.79 S/P MAZE OPERATION FOR ATRIAL FIBRILLATION: ICD-10-CM

## 2022-08-22 DIAGNOSIS — Z95.1 S/P CABG X 3: ICD-10-CM

## 2022-08-22 DIAGNOSIS — I10 PRIMARY HYPERTENSION: ICD-10-CM

## 2022-08-22 DIAGNOSIS — Z98.890 S/P LEFT ATRIAL APPENDAGE LIGATION: ICD-10-CM

## 2022-08-22 DIAGNOSIS — I47.29 NONSUSTAINED VENTRICULAR TACHYCARDIA: ICD-10-CM

## 2022-08-22 DIAGNOSIS — I34.0 NONRHEUMATIC MITRAL VALVE REGURGITATION: Primary | ICD-10-CM

## 2022-08-22 DIAGNOSIS — Z98.890 S/P MVR (MITRAL VALVE REPAIR): ICD-10-CM

## 2022-08-22 DIAGNOSIS — I48.0 PAROXYSMAL ATRIAL FIBRILLATION: ICD-10-CM

## 2022-08-22 DIAGNOSIS — I25.10 CORONARY ARTERY DISEASE INVOLVING NATIVE CORONARY ARTERY OF NATIVE HEART WITHOUT ANGINA PECTORIS: ICD-10-CM

## 2022-08-22 DIAGNOSIS — Z98.890 S/P MAZE OPERATION FOR ATRIAL FIBRILLATION: ICD-10-CM

## 2022-08-22 PROCEDURE — 99214 OFFICE O/P EST MOD 30 MIN: CPT | Performed by: INTERNAL MEDICINE

## 2022-08-22 PROCEDURE — 93000 ELECTROCARDIOGRAM COMPLETE: CPT | Performed by: INTERNAL MEDICINE

## 2022-08-22 NOTE — PATIENT INSTRUCTIONS
Health Maintenance Due   Topic Date Due    TDAP/TD VACCINES (2 - Tdap) 10/23/2021    PROSTATE CANCER SCREENING  12/03/2021    ANNUAL WELLNESS VISIT  07/12/2022    12 hour fast for labs

## 2022-08-22 NOTE — PROGRESS NOTES
CC--- history of mitral valve surgery and nonsustained ventricular tachycardia           Sub-- 76-year-old male patient has a history of multivessel coronary artery disease with severe and significant mitral regurgitation and persistent or permanent atrial fibrillation and patient underwent multivessel bypass surgery with mitral valve annuloplasty and a left atrial appendage ligation and maze procedure.  Postprocedure he had nonsustained ventricular arrhythmias and intermittent atrial arrhythmias.  A AVERY revealed left atrial appendage ligation without any MR. Patient had prior COVID infection in April/22  Patient underwent EP study for restratification which was essentially normal back in June 2022 and comes in for follow-up.  No new symptoms of syncope or angina.            Medical History        Past Medical History:   Diagnosis Date   • Arthritis     • Atrial fibrillation (HCC)     • BPH (benign prostatic hyperplasia)     • Bulging lumbar disc     • Difficulty maintaining body in lying position       NEEDS PILLOW UNDER KNEES IN SURGERY D/T LUMBAR ISSUE   • Hyperglycemia     • Hypertension     • OAB (overactive bladder)     • Skin mole     • Snoring     • Urinary urgency     • Vitamin D deficiency           Surgical History         Past Surgical History:   Procedure Laterality Date   • CARDIAC CATHETERIZATION Right 10/13/2021     Procedure: Left Heart Cath;  Surgeon: Renato Knight MD;  Location: [facility] CATH INVASIVE LOCATION;  Service: Cardiovascular;  Laterality: Right;   • CARDIAC CATHETERIZATION   10/13/2021     Procedure: Functional Flow [place];  Surgeon: Renato Knight MD;  Location: [facility] CATH INVASIVE LOCATION;  Service: Cardiovascular;;   • COLONOSCOPY       • FINGER SURGERY Right       repair right pointer finger   • JOINT REPLACEMENT       • TOTAL SHOULDER ARTHROPLASTY         shoulder replacement               Family History   Problem Relation Age of Onset   • Diabetes Mother     •  Heart disease Mother     • Hypertension Sister     • Hyperlipidemia Sister     • Kidney disease Sister     • Cancer Sister     • Other Sister           weight disorder   • Diabetes Sister     • Stroke Brother     • Hypertension Other         Review of Systems   General:  no for fatigue and tiredness  Eyes: No redness  Cardiovascular: No chest pain, no palpitations     Physical Exam    General:      well developed, well nourished, in no acute distress.    Head:      normocephalic and atraumatic.    Eyes:      PERRL/EOM intact, conjunctivae and sclerae clear without nystagmus.    Neck:      no   Lungs:      clear bilaterally to auscultation.    Heart:       regular rate and rhythm, S1, S2 without murmurs, rubs, or gallops  Skin:      intact without lesions or rashes.    Psych:      alert and cooperative; normal mood and affect; normal attention span and concentration.             Assessment and plan    History of multivessel coronary artery disease needing prior bypass surgery  Severe mitral regurgitation status post mitral valve annuloplasty ring with residual mild MR  Atrial fibrillation with prior Maze procedure in sinus rhythm  Nonsustained ventricular arrhythmias with a negative EP study for induction of any sustained ventricular arrhythmias.  Essential hypertension well-controlled  Hyperlipidemia treated with statins  Hypertension controlled with carvedilol  Normal LV systolic function  Prior normal potassium and magnesium levels  Follow up in 6 months  LDL of 53  EP study results reviewed with patient and family  Medications reviewed and renewal of medications was done electronically    ECG 12 Lead    Date/Time: 8/22/2022 8:55 AM  Performed by: Darren Phan MD  Authorized by: Darren Phan MD   Comparison: compared with previous ECG   Similar to previous ECG  Rhythm: sinus rhythm  Rate: normal  Conduction: conduction normal  QRS axis: normal  Other findings: non-specific ST-T wave  changes          Electronically signed by Darren Phan MD, 08/22/22, 8:54 AM EDT.

## 2022-08-22 NOTE — PROGRESS NOTES
The ABCs of the Annual Wellness Visit  Subsequent Medicare Wellness Visit  Patient is also here to follow-up on multiple chronic health conditions most of which are stable he has recently seen the cardiologist and the EP doctor and is found to have lost his atrial fibrillation and they feel as though he is doing quite well blood pressure was slightly elevated today so he will home monitor and send us the results we did refer him to dermatology for an atypical mole on his left forearm.  He states that he exercises a couple of hours daily and does feel as though he is beginning to regain some of his strength but not to where he was prior to his heart surgery.  He does feel as though he still has a cough that persists after his COVID but it is improving and he will call if it persists.  Chief Complaint   Patient presents with   • Medicare Wellness-subsequent     Patient is not fasting       Subjective    History of Present Illness:  Salinas Dill is a 76 y.o. male who presents for a Subsequent Medicare Wellness Visit.    The following portions of the patient's history were reviewed and   updated as appropriate: allergies, current medications, past family history, past medical history, past social history, past surgical history and problem list.    Compared to one year ago, the patient feels his physical   health is worse.    Compared to one year ago, the patient feels his mental   health is better.    Recent Hospitalizations:  This patient has had a [facility] admission record on file within the last 365 days.    Current Medical Providers:  Patient Care Team:  Kortney Ferrera MD as PCP - Taurus Shaw MD as Consulting Physician (Urology)    Outpatient Medications Prior to Visit   Medication Sig Dispense Refill   • acetaminophen (TYLENOL) 325 MG tablet Take 2 tablets by mouth Every 6 (Six) Hours As Needed for Mild Pain .     • aspirin (aspirin) 81 MG EC tablet Take 1 tablet by mouth Daily. 30  tablet 2   • atorvastatin (LIPITOR) 40 MG tablet TAKE 1 TABLET DAILY AT BEDTIME. 90 tablet 0   • B Complex Vitamins (vitamin b complex) capsule capsule Take 1 capsule by mouth Daily. LD 11/4     • BLACK ELDERBERRY PO Take 158 mg by mouth Every Night.     • carvedilol (COREG) 3.125 MG tablet Take 1 tablet by mouth Every 12 (Twelve) Hours. 180 tablet 3   • Cholecalciferol (VITAMIN D) 2000 units capsule Take 50,000 Units by mouth 1 (One) Time Per Week. mondays     • melatonin 5 MG tablet tablet Take 5 mg by mouth Every Night.     • SILDENAFIL CITRATE PO Take 20 mg by mouth Daily. For enlarged prostate     • tamsulosin (FLOMAX) 0.4 MG capsule 24 hr capsule Take 1 capsule by mouth Daily.     • Turmeric 500 MG capsule Take 500 mg by mouth Daily.     • aspirin 81 MG chewable tablet Chew 81 mg Daily.       No facility-administered medications prior to visit.       No opioid medication identified on active medication list. I have reviewed chart for other potential  high risk medication/s and harmful drug interactions in the elderly.          Aspirin is on active medication list. Aspirin use is indicated based on review of current medical condition/s. Pros and cons of this therapy have been discussed today. Benefits of this medication outweigh potential harm.  Patient has been encouraged to continue taking this medication.  .      Patient Active Problem List   Diagnosis   • Plantar fasciitis   • Atrial fibrillation (HCC)   • B12 deficiency   • Benign prostatic hyperplasia with urinary obstruction   • Drug-induced impotence   • Hyperglycemia   • Hypertension   • Increased frequency of urination   • Low back pain   • Presence of artificial shoulder joint   • Snoring   • Vitamin D deficiency   • Right wrist pain   • Localized swelling on hand   • Elbow pain, left   • Nuclear senile cataract   • Presbyopia   • Dyslipidemia   • Obesity   • Class 1 obesity due to excess calories with serious comorbidity and body mass index (BMI) of  "31.0 to 31.9 in adult   • Nonrheumatic mitral valve regurgitation   • Coronary artery disease involving native coronary artery of native heart without angina pectoris   • S/P CABG x 3 with CHOPRA per Dr. Hernandez 11/11/2021   • S/P MVR (mitral valve repair) per Dr. Hernandez 11/11/2021   • S/P Maze operation for atrial fibrillation per Dr. Hernandez 11/11/2021   • S/P left atrial appendage ligation   • Arthritis   • Bulging lumbar disc   • Difficulty maintaining body in lying position   • Spinal stenosis of lumbar region with radiculopathy   • Nonsustained ventricular tachycardia (HCC)   • Cough     Advance Care Planning  Advance Directive is on file.  ACP discussion was held with the patient during this visit. Patient has an advance directive in EMR which is still valid.     Review of Systems   Respiratory: Negative for shortness of breath.    Cardiovascular: Negative for chest pain, palpitations and leg swelling.        Objective    Vitals:    08/23/22 1046 08/23/22 1052   BP: 147/88 141/87   BP Location: Right arm Left arm   Patient Position: Sitting Sitting   Cuff Size: Adult Adult   Pulse: 69    Temp: 98.4 °F (36.9 °C)    TempSrc: Temporal    SpO2: 95%    Weight: 100 kg (221 lb 3.2 oz)    Height: 177.8 cm (70\")    PainSc: 0-No pain      Estimated body mass index is 31.74 kg/m² as calculated from the following:    Height as of this encounter: 177.8 cm (70\").    Weight as of this encounter: 100 kg (221 lb 3.2 oz).    BMI is >= 30 and <35. (Class 1 Obesity). The following options were offered after discussion;: exercise counseling/recommendations and nutrition counseling/recommendations      Does the patient have evidence of cognitive impairment? No    Physical Exam  Vitals reviewed.   Constitutional:       General: He is not in acute distress.     Appearance: He is well-developed. He is obese. He is not ill-appearing or toxic-appearing.   HENT:      Head: Normocephalic and atraumatic.      Right Ear: Tympanic membrane, ear " canal and external ear normal.      Left Ear: Tympanic membrane, ear canal and external ear normal.      Nose: Nose normal.   Eyes:      Extraocular Movements: Extraocular movements intact.      Conjunctiva/sclera: Conjunctivae normal.      Pupils: Pupils are equal, round, and reactive to light.   Cardiovascular:      Rate and Rhythm: Normal rate and regular rhythm.      Pulses: Normal pulses.      Heart sounds: Normal heart sounds.   Pulmonary:      Effort: Pulmonary effort is normal.      Breath sounds: Normal breath sounds.   Abdominal:      General: Bowel sounds are normal. There is no distension.      Palpations: Abdomen is soft. There is no mass.      Tenderness: There is no abdominal tenderness.   Musculoskeletal:         General: Normal range of motion.      Cervical back: Neck supple.   Skin:     General: Skin is warm.   Neurological:      General: No focal deficit present.      Mental Status: He is alert and oriented to person, place, and time.   Psychiatric:         Mood and Affect: Mood normal.         Behavior: Behavior normal.                 HEALTH RISK ASSESSMENT    Smoking Status:  Social History     Tobacco Use   Smoking Status Former Smoker   • Packs/day: 1.00   • Years: 12.00   • Pack years: 12.00   • Types: Cigarettes   • Quit date: 1978   • Years since quittin.6   Smokeless Tobacco Former User   • Quit date:      Alcohol Consumption:  Social History     Substance and Sexual Activity   Alcohol Use Yes   • Alcohol/week: 1.0 standard drink   • Types: 1 Shots of liquor per week    Comment: Catherine aguilartas on Monday nights     Fall Risk Screen:    YARAADI Fall Risk Assessment was completed, and patient is at MODERATE risk for falls. Assessment completed on:2022    Depression Screening:  PHQ-2/PHQ-9 Depression Screening 2022   Retired PHQ-9 Total Score -   Retired Total Score -   Little Interest or Pleasure in Doing Things 0-->not at all   Feeling Down, Depressed or  Hopeless 0-->not at all   PHQ-9: Brief Depression Severity Measure Score 0       Health Habits and Functional and Cognitive Screening:  Functional & Cognitive Status 8/23/2022   Do you have difficulty preparing food and eating? No   Do you have difficulty bathing yourself, getting dressed or grooming yourself? No   Do you have difficulty using the toilet? No   Do you have difficulty moving around from place to place? No   Do you have trouble with steps or getting out of a bed or a chair? No   Current Diet Well Balanced Diet   Dental Exam Up to date   Eye Exam Up to date   Exercise (times per week) 6 times per week   Current Exercises Include Aerobics;Weightlifting;Walking;Bicycling Outdoors   Current Exercise Activities Include -   Do you need help using the phone?  No   Are you deaf or do you have serious difficulty hearing?  No   Do you need help with transportation? No   Do you need help shopping? No   Do you need help preparing meals?  No   Do you need help with housework?  No   Do you need help with laundry? No   Do you need help taking your medications? No   Do you need help managing money? No   Do you ever drive or ride in a car without wearing a seat belt? No   Have you felt unusual stress, anger or loneliness in the last month? No   Who do you live with? Spouse   If you need help, do you have trouble finding someone available to you? No   Have you been bothered in the last four weeks by sexual problems? No   Do you have difficulty concentrating, remembering or making decisions? Yes       Age-appropriate Screening Schedule:  Refer to the list below for future screening recommendations based on patient's age, sex and/or medical conditions. Orders for these recommended tests are listed in the plan section. The patient has been provided with a written plan.    Health Maintenance   Topic Date Due   • TDAP/TD VACCINES (2 - Tdap) 10/23/2021   • PROSTATE CANCER SCREENING  12/03/2021   • INFLUENZA VACCINE   10/01/2022   • LIPID PANEL  02/08/2023   • ZOSTER VACCINE  Completed              Assessment & Plan   CMS Preventative Services Quick Reference  Risk Factors Identified During Encounter  None Identified  The above risks/problems have been discussed with the patient.  Follow up actions/plans if indicated are seen below in the Assessment/Plan Section.  Pertinent information has been shared with the patient in the After Visit Summary.    Diagnoses and all orders for this visit:    1. Encounter for annual general medical examination with abnormal findings in adult (Primary)    2. Vitamin D deficiency  Comments:  Patient does not take vitamin D regularly  Orders:  -     Vitamin D 25 Hydroxy    3. S/P MVR (mitral valve repair) per Dr. Hernandez 11/11/2021  Comments:  Recent evaluation by Dr. Jem robert and by Dr. Brown may seem to be think he is doing well    4. Primary hypertension  Comments:  Not controlled-will home monitor  Orders:  -     CBC Auto Differential    5. Hyperglycemia  Comments:  Trying to watch carbohydrates  Orders:  -     Comprehensive Metabolic Panel  -     Hemoglobin A1c    6. Dyslipidemia  Comments:  Trying to limit saturated fats  Orders:  -     Lipid Panel    7. Benign prostatic hyperplasia with urinary obstruction  Comments:  Evaluation by Dr. Chen and placed on tamsulosin and this seems to have made a great deal of difference to his urinary symptoms.    8. B12 deficiency  Comments:  Takes vitamin B12 regularly  Orders:  -     Vitamin B12    9. Longstanding persistent atrial fibrillation (HCC)  Comments:  Resolved  Orders:  -     TSH    10. Snoring  Comments:  Snoring seems to have lessened    11. Spinal stenosis of lumbar region with radiculopathy  Comments:  Still a twinge occasionally Not impairing lifestyle    12. Atypical mole  Comments:  We will follow-up with Dr. Asencio.  Mole is on the flexor side radial portion of the mid left forearm approximately if 3 to 4 mm darkish in color with some  hard  Orders:  -     Ambulatory Referral to Dermatology    13. Class 1 obesity due to excess calories with serious comorbidity and body mass index (BMI) of 31.0 to 31.9 in adult    14. Cough        Follow Up:   No follow-ups on file.     An After Visit Summary and PPPS were made available to the patient.

## 2022-08-23 ENCOUNTER — OFFICE VISIT (OUTPATIENT)
Dept: FAMILY MEDICINE CLINIC | Facility: CLINIC | Age: 77
End: 2022-08-23

## 2022-08-23 VITALS
HEIGHT: 70 IN | SYSTOLIC BLOOD PRESSURE: 141 MMHG | OXYGEN SATURATION: 95 % | HEART RATE: 69 BPM | BODY MASS INDEX: 31.67 KG/M2 | WEIGHT: 221.2 LBS | TEMPERATURE: 98.4 F | DIASTOLIC BLOOD PRESSURE: 87 MMHG

## 2022-08-23 DIAGNOSIS — Z00.01 ENCOUNTER FOR ANNUAL GENERAL MEDICAL EXAMINATION WITH ABNORMAL FINDINGS IN ADULT: Primary | ICD-10-CM

## 2022-08-23 DIAGNOSIS — E53.8 B12 DEFICIENCY: ICD-10-CM

## 2022-08-23 DIAGNOSIS — M54.16 SPINAL STENOSIS OF LUMBAR REGION WITH RADICULOPATHY: ICD-10-CM

## 2022-08-23 DIAGNOSIS — Z98.890 S/P MVR (MITRAL VALVE REPAIR): ICD-10-CM

## 2022-08-23 DIAGNOSIS — R06.83 SNORING: ICD-10-CM

## 2022-08-23 DIAGNOSIS — E55.9 VITAMIN D DEFICIENCY: ICD-10-CM

## 2022-08-23 DIAGNOSIS — I48.11 LONGSTANDING PERSISTENT ATRIAL FIBRILLATION: ICD-10-CM

## 2022-08-23 DIAGNOSIS — N40.1 BENIGN PROSTATIC HYPERPLASIA WITH URINARY OBSTRUCTION: ICD-10-CM

## 2022-08-23 DIAGNOSIS — D22.9 ATYPICAL MOLE: ICD-10-CM

## 2022-08-23 DIAGNOSIS — R05.9 COUGH: ICD-10-CM

## 2022-08-23 DIAGNOSIS — E78.5 DYSLIPIDEMIA: ICD-10-CM

## 2022-08-23 DIAGNOSIS — M48.061 SPINAL STENOSIS OF LUMBAR REGION WITH RADICULOPATHY: ICD-10-CM

## 2022-08-23 DIAGNOSIS — I10 PRIMARY HYPERTENSION: ICD-10-CM

## 2022-08-23 DIAGNOSIS — N13.8 BENIGN PROSTATIC HYPERPLASIA WITH URINARY OBSTRUCTION: ICD-10-CM

## 2022-08-23 DIAGNOSIS — R73.9 HYPERGLYCEMIA: ICD-10-CM

## 2022-08-23 DIAGNOSIS — E66.09 CLASS 1 OBESITY DUE TO EXCESS CALORIES WITH SERIOUS COMORBIDITY AND BODY MASS INDEX (BMI) OF 31.0 TO 31.9 IN ADULT: ICD-10-CM

## 2022-08-23 PROCEDURE — 1170F FXNL STATUS ASSESSED: CPT | Performed by: PREVENTIVE MEDICINE

## 2022-08-23 PROCEDURE — G0439 PPPS, SUBSEQ VISIT: HCPCS | Performed by: PREVENTIVE MEDICINE

## 2022-08-23 PROCEDURE — PTNOCHG PR CUSTOM PT NO CHARGE VISIT: Performed by: PREVENTIVE MEDICINE

## 2022-08-23 PROCEDURE — 1159F MED LIST DOCD IN RCRD: CPT | Performed by: PREVENTIVE MEDICINE

## 2022-08-23 PROCEDURE — 1126F AMNT PAIN NOTED NONE PRSNT: CPT | Performed by: PREVENTIVE MEDICINE

## 2022-08-23 PROCEDURE — 99213 OFFICE O/P EST LOW 20 MIN: CPT | Performed by: PREVENTIVE MEDICINE

## 2022-08-25 ENCOUNTER — CLINICAL SUPPORT (OUTPATIENT)
Dept: FAMILY MEDICINE CLINIC | Facility: CLINIC | Age: 77
End: 2022-08-25

## 2022-08-25 DIAGNOSIS — I48.11 LONGSTANDING PERSISTENT ATRIAL FIBRILLATION: ICD-10-CM

## 2022-08-25 LAB
25(OH)D3 SERPL-MCNC: 82.6 NG/ML (ref 30–100)
ALBUMIN SERPL-MCNC: 3.9 G/DL (ref 3.5–5.2)
ALBUMIN/GLOB SERPL: 1.5 G/DL
ALP SERPL-CCNC: 55 U/L (ref 39–117)
ALT SERPL W P-5'-P-CCNC: 16 U/L (ref 1–41)
ANION GAP SERPL CALCULATED.3IONS-SCNC: 10.3 MMOL/L (ref 5–15)
AST SERPL-CCNC: 21 U/L (ref 1–40)
BASOPHILS # BLD AUTO: 0.06 10*3/MM3 (ref 0–0.2)
BASOPHILS NFR BLD AUTO: 1.2 % (ref 0–1.5)
BILIRUB SERPL-MCNC: 0.4 MG/DL (ref 0–1.2)
BUN SERPL-MCNC: 16 MG/DL (ref 8–23)
BUN/CREAT SERPL: 23.2 (ref 7–25)
CALCIUM SPEC-SCNC: 9.3 MG/DL (ref 8.6–10.5)
CHLORIDE SERPL-SCNC: 104 MMOL/L (ref 98–107)
CHOLEST SERPL-MCNC: 117 MG/DL (ref 0–200)
CO2 SERPL-SCNC: 23.7 MMOL/L (ref 22–29)
CREAT SERPL-MCNC: 0.69 MG/DL (ref 0.76–1.27)
DEPRECATED RDW RBC AUTO: 44 FL (ref 37–54)
EGFRCR SERPLBLD CKD-EPI 2021: 96.5 ML/MIN/1.73
EOSINOPHIL # BLD AUTO: 0.27 10*3/MM3 (ref 0–0.4)
EOSINOPHIL NFR BLD AUTO: 5.4 % (ref 0.3–6.2)
ERYTHROCYTE [DISTWIDTH] IN BLOOD BY AUTOMATED COUNT: 13.7 % (ref 12.3–15.4)
GLOBULIN UR ELPH-MCNC: 2.6 GM/DL
GLUCOSE SERPL-MCNC: 92 MG/DL (ref 65–99)
HCT VFR BLD AUTO: 39.5 % (ref 37.5–51)
HDLC SERPL-MCNC: 46 MG/DL (ref 40–60)
HGB BLD-MCNC: 12.5 G/DL (ref 13–17.7)
IMM GRANULOCYTES # BLD AUTO: 0.01 10*3/MM3 (ref 0–0.05)
IMM GRANULOCYTES NFR BLD AUTO: 0.2 % (ref 0–0.5)
LDLC SERPL CALC-MCNC: 60 MG/DL (ref 0–100)
LDLC/HDLC SERPL: 1.34 {RATIO}
LYMPHOCYTES # BLD AUTO: 0.91 10*3/MM3 (ref 0.7–3.1)
LYMPHOCYTES NFR BLD AUTO: 18.2 % (ref 19.6–45.3)
MCH RBC QN AUTO: 28.2 PG (ref 26.6–33)
MCHC RBC AUTO-ENTMCNC: 31.6 G/DL (ref 31.5–35.7)
MCV RBC AUTO: 89.2 FL (ref 79–97)
MONOCYTES # BLD AUTO: 0.65 10*3/MM3 (ref 0.1–0.9)
MONOCYTES NFR BLD AUTO: 13 % (ref 5–12)
NEUTROPHILS NFR BLD AUTO: 3.11 10*3/MM3 (ref 1.7–7)
NEUTROPHILS NFR BLD AUTO: 62 % (ref 42.7–76)
NRBC BLD AUTO-RTO: 0 /100 WBC (ref 0–0.2)
PLATELET # BLD AUTO: 238 10*3/MM3 (ref 140–450)
PMV BLD AUTO: 12.8 FL (ref 6–12)
POTASSIUM SERPL-SCNC: 3.9 MMOL/L (ref 3.5–5.2)
PROT SERPL-MCNC: 6.5 G/DL (ref 6–8.5)
RBC # BLD AUTO: 4.43 10*6/MM3 (ref 4.14–5.8)
SODIUM SERPL-SCNC: 138 MMOL/L (ref 136–145)
TRIGL SERPL-MCNC: 47 MG/DL (ref 0–150)
TSH SERPL DL<=0.05 MIU/L-ACNC: 2.54 UIU/ML (ref 0.27–4.2)
VIT B12 BLD-MCNC: 678 PG/ML (ref 211–946)
VLDLC SERPL-MCNC: 11 MG/DL (ref 5–40)
WBC NRBC COR # BLD: 5.01 10*3/MM3 (ref 3.4–10.8)

## 2022-08-25 PROCEDURE — 85025 COMPLETE CBC W/AUTO DIFF WBC: CPT | Performed by: PREVENTIVE MEDICINE

## 2022-08-25 PROCEDURE — 82607 VITAMIN B-12: CPT | Performed by: PREVENTIVE MEDICINE

## 2022-08-25 PROCEDURE — 80053 COMPREHEN METABOLIC PANEL: CPT | Performed by: PREVENTIVE MEDICINE

## 2022-08-25 PROCEDURE — 80061 LIPID PANEL: CPT | Performed by: PREVENTIVE MEDICINE

## 2022-08-25 PROCEDURE — 83036 HEMOGLOBIN GLYCOSYLATED A1C: CPT | Performed by: PREVENTIVE MEDICINE

## 2022-08-25 PROCEDURE — 84443 ASSAY THYROID STIM HORMONE: CPT | Performed by: PREVENTIVE MEDICINE

## 2022-08-25 PROCEDURE — 82306 VITAMIN D 25 HYDROXY: CPT | Performed by: PREVENTIVE MEDICINE

## 2022-08-25 PROCEDURE — 36415 COLL VENOUS BLD VENIPUNCTURE: CPT | Performed by: PREVENTIVE MEDICINE

## 2022-08-25 NOTE — PROGRESS NOTES
Venipuncture Blood Specimen Collection  Venipuncture performed in right hand by Sybil Hung MA with good hemostasis. Patient tolerated the procedure well without complications.   08/25/22   PHU Strange MD

## 2022-08-26 ENCOUNTER — TELEPHONE (OUTPATIENT)
Dept: FAMILY MEDICINE CLINIC | Facility: CLINIC | Age: 77
End: 2022-08-26

## 2022-08-26 LAB — HBA1C MFR BLD: 5.7 % (ref 3.5–5.6)

## 2022-08-26 NOTE — TELEPHONE ENCOUNTER
HUB TO READ:  ----- Message from Kortney Ferrera MD sent at 8/26/2022  1:05 PM EDT -----  Glucose was 92 A1c still shows increased risk for diabetes at 5.7.  He is still somewhat anemic so do try to increase the iron vitamin C and B12 that he is ingesting.  Has he noted any blood loss in stool urine or increase in heartburn should be repeated again in 3 months and may need to order additional studies if he is not getting pretty much back to normal.  Call if any other questions or concerns

## 2022-08-26 NOTE — PROGRESS NOTES
Glucose was 92 A1c still shows increased risk for diabetes at 5.7.  He is still somewhat anemic so do try to increase the iron vitamin C and B12 that he is ingesting.  Has he noted any blood loss in stool urine or increase in heartburn should be repeated again in 3 months and may need to order additional studies if he is not getting pretty much back to normal.  Call if any other questions or concerns

## 2022-08-29 RX ORDER — LOSARTAN POTASSIUM 25 MG/1
25 TABLET ORAL DAILY
Qty: 30 TABLET | Refills: 0 | Status: SHIPPED | OUTPATIENT
Start: 2022-08-29 | End: 2022-09-26

## 2022-09-08 RX ORDER — ATORVASTATIN CALCIUM 40 MG/1
TABLET, FILM COATED ORAL
Qty: 90 TABLET | Refills: 2 | Status: SHIPPED | OUTPATIENT
Start: 2022-09-08

## 2022-09-26 RX ORDER — LOSARTAN POTASSIUM 25 MG/1
25 TABLET ORAL DAILY
Qty: 90 TABLET | Refills: 1 | Status: SHIPPED | OUTPATIENT
Start: 2022-09-26 | End: 2023-02-24 | Stop reason: SDUPTHER

## 2022-11-01 ENCOUNTER — CLINICAL SUPPORT (OUTPATIENT)
Dept: FAMILY MEDICINE CLINIC | Facility: CLINIC | Age: 77
End: 2022-11-01

## 2022-11-01 DIAGNOSIS — Z12.5 SCREENING PSA (PROSTATE SPECIFIC ANTIGEN): ICD-10-CM

## 2022-11-01 DIAGNOSIS — N13.8 BENIGN PROSTATIC HYPERPLASIA WITH URINARY OBSTRUCTION: Primary | ICD-10-CM

## 2022-11-01 DIAGNOSIS — N40.1 BENIGN PROSTATIC HYPERPLASIA WITH URINARY OBSTRUCTION: Primary | ICD-10-CM

## 2022-11-01 LAB — PSA SERPL-MCNC: 2.38 NG/ML (ref 0–4)

## 2022-11-01 PROCEDURE — 36415 COLL VENOUS BLD VENIPUNCTURE: CPT | Performed by: PREVENTIVE MEDICINE

## 2022-11-01 PROCEDURE — G0103 PSA SCREENING: HCPCS | Performed by: UROLOGY

## 2022-11-01 NOTE — PROGRESS NOTES
Venipuncture Blood Specimen Collection  Venipuncture performed in right hand by Sybil Hung MA with good hemostasis. Patient tolerated the procedure well without complications.   11/01/22   PHU Strange MD

## 2022-12-05 RX ORDER — CARVEDILOL 3.12 MG/1
3.12 TABLET ORAL EVERY 12 HOURS SCHEDULED
Qty: 180 TABLET | Refills: 2 | Status: SHIPPED | OUTPATIENT
Start: 2022-12-05

## 2023-02-21 NOTE — PATIENT INSTRUCTIONS
Health Maintenance Due   Topic Date Due    URINE MICROALBUMIN  Never done    Call Monday if not informed about Liters for incentive spirometry.    Check blood pressure cuff for accuracy and send 10 blood pressures over 2 weeks.  Watch sodium, alcohol and weight     Try timed released melatonin.    Lamisil AF twice daily for 2 weeks and call if no change

## 2023-02-23 ENCOUNTER — OFFICE VISIT (OUTPATIENT)
Dept: FAMILY MEDICINE CLINIC | Facility: CLINIC | Age: 78
End: 2023-02-23
Payer: MEDICARE

## 2023-02-23 VITALS
BODY MASS INDEX: 32.35 KG/M2 | OXYGEN SATURATION: 98 % | HEIGHT: 70 IN | TEMPERATURE: 98.4 F | SYSTOLIC BLOOD PRESSURE: 147 MMHG | HEART RATE: 67 BPM | WEIGHT: 226 LBS | DIASTOLIC BLOOD PRESSURE: 84 MMHG

## 2023-02-23 DIAGNOSIS — N13.8 BENIGN PROSTATIC HYPERPLASIA WITH URINARY OBSTRUCTION: ICD-10-CM

## 2023-02-23 DIAGNOSIS — E53.8 B12 DEFICIENCY: ICD-10-CM

## 2023-02-23 DIAGNOSIS — N40.1 BENIGN PROSTATIC HYPERPLASIA WITH URINARY OBSTRUCTION: ICD-10-CM

## 2023-02-23 DIAGNOSIS — R73.9 HYPERGLYCEMIA: ICD-10-CM

## 2023-02-23 DIAGNOSIS — I10 PRIMARY HYPERTENSION: Primary | ICD-10-CM

## 2023-02-23 DIAGNOSIS — E66.09 CLASS 1 OBESITY DUE TO EXCESS CALORIES WITH SERIOUS COMORBIDITY AND BODY MASS INDEX (BMI) OF 32.0 TO 32.9 IN ADULT: ICD-10-CM

## 2023-02-23 DIAGNOSIS — R05.9 COUGH, UNSPECIFIED TYPE: ICD-10-CM

## 2023-02-23 DIAGNOSIS — I48.11 LONGSTANDING PERSISTENT ATRIAL FIBRILLATION: ICD-10-CM

## 2023-02-23 DIAGNOSIS — E78.5 DYSLIPIDEMIA: ICD-10-CM

## 2023-02-23 DIAGNOSIS — R06.83 SNORING: ICD-10-CM

## 2023-02-23 DIAGNOSIS — I25.10 CORONARY ARTERY DISEASE INVOLVING NATIVE CORONARY ARTERY OF NATIVE HEART WITHOUT ANGINA PECTORIS: ICD-10-CM

## 2023-02-23 DIAGNOSIS — I34.0 NONRHEUMATIC MITRAL VALVE REGURGITATION: ICD-10-CM

## 2023-02-23 PROCEDURE — 99214 OFFICE O/P EST MOD 30 MIN: CPT | Performed by: PREVENTIVE MEDICINE

## 2023-02-23 NOTE — PROGRESS NOTES
"Subjective   Salinas Dill is a 77 y.o. male presents for   Chief Complaint   Patient presents with   • Follow-up     6 month   Patient presents today for follow-up on multiple chronic health conditions in general he does feel fairly well he does get short of breath when he first starts to become active but then he can exercise for an hour without any breathing problems.  He is trying to use incentive breather and wants to know how high he should try to reach.  It appears that he is in excess of the 3-600 that is advised.  Patient is not really watching his saturated fats or his carbohydrates.  He still has a slight cough and did have COVID in April 2022 will be following up with the heart doctor and we may have him follow-up with the lung doctor if persists.  We will of course order a CT of his chest and then pulmonary function studies.    Health Maintenance Due   Topic Date Due   • URINE MICROALBUMIN  Never done       History of Present Illness     Vitals:    02/23/23 0928 02/23/23 0933   BP: 149/90 147/84   BP Location: Right arm Left arm   Patient Position: Sitting Sitting   Cuff Size: Adult Adult   Pulse: 67    Temp: 98.4 °F (36.9 °C)    TempSrc: Tympanic    SpO2: 98%    Weight: 103 kg (226 lb)    Height: 177.8 cm (70\")      Body mass index is 32.43 kg/m².    Current Outpatient Medications on File Prior to Visit   Medication Sig Dispense Refill   • acetaminophen (TYLENOL) 325 MG tablet Take 2 tablets by mouth Every 6 (Six) Hours As Needed for Mild Pain .     • aspirin (aspirin) 81 MG EC tablet Take 1 tablet by mouth Daily. 30 tablet 2   • atorvastatin (LIPITOR) 40 MG tablet TAKE 1 TABLET DAILY AT BEDTIME. 90 tablet 2   • B Complex Vitamins (vitamin b complex) capsule capsule Take 1 capsule by mouth Daily. LD 11/4     • BLACK ELDERBERRY PO Take 158 mg by mouth Every Night.     • carvedilol (COREG) 3.125 MG tablet TAKE 1 TABLET BY MOUTH EVERY 12 (TWELVE) HOURS. 180 tablet 2   • Cholecalciferol (VITAMIN D) 2000 " units capsule Take 50,000 Units by mouth 1 (One) Time Per Week. mondays     • losartan (COZAAR) 25 MG tablet TAKE 1 TABLET BY MOUTH DAILY. 90 tablet 1   • melatonin 5 MG tablet tablet Take 5 mg by mouth Every Night.     • SILDENAFIL CITRATE PO Take 20 mg by mouth Daily. For enlarged prostate     • tamsulosin (FLOMAX) 0.4 MG capsule 24 hr capsule Take 1 capsule by mouth Daily.     • Turmeric 500 MG capsule Take 500 mg by mouth Daily.       No current facility-administered medications on file prior to visit.       The following portions of the patient's history were reviewed and updated as appropriate: allergies, current medications, past family history, past medical history, past social history, past surgical history and problem list.    Review of Systems   Respiratory: Positive for shortness of breath.        Objective   Physical Exam  Vitals reviewed.   Constitutional:       General: He is not in acute distress.     Appearance: He is well-developed. He is obese. He is not ill-appearing or toxic-appearing.   HENT:      Head: Normocephalic and atraumatic.      Right Ear: Tympanic membrane, ear canal and external ear normal.      Left Ear: Tympanic membrane, ear canal and external ear normal.      Nose: Nose normal.      Mouth/Throat:      Mouth: Mucous membranes are moist.      Pharynx: No posterior oropharyngeal erythema.   Eyes:      Extraocular Movements: Extraocular movements intact.      Conjunctiva/sclera: Conjunctivae normal.      Pupils: Pupils are equal, round, and reactive to light.   Cardiovascular:      Rate and Rhythm: Normal rate and regular rhythm.      Heart sounds: Murmur heard.   Pulmonary:      Effort: Pulmonary effort is normal.      Breath sounds: Normal breath sounds.   Abdominal:      General: Bowel sounds are normal. There is no distension.      Palpations: Abdomen is soft. There is no mass.      Tenderness: There is no abdominal tenderness.   Musculoskeletal:         General: Normal range of  motion.      Cervical back: Neck supple.   Skin:     General: Skin is warm.   Neurological:      General: No focal deficit present.      Mental Status: He is alert and oriented to person, place, and time.   Psychiatric:         Mood and Affect: Mood normal.         Behavior: Behavior normal.       PHQ-9 Total Score: 0    Assessment & Plan   Diagnoses and all orders for this visit:    1. Primary hypertension (Primary)  Comments:  Blood pressure elevated today patient will monitor 10 readings at home and submit them to us.    2. Longstanding persistent atrial fibrillation (HCC)  Comments:  No heart flutters and was out of atrial fibs when cardiology checked last  Orders:  -     Magnesium; Future  -     TSH; Future    3. B12 deficiency  Comments:  Taking B complex  Orders:  -     CBC Auto Differential; Future  -     Vitamin B12; Future    4. Benign prostatic hyperplasia with urinary obstruction  Comments:  Improved with Flomax    5. Hyperglycemia  Comments:  Watching carbs but not well  Orders:  -     Comprehensive Metabolic Panel; Future  -     Hemoglobin A1c; Future    6. Dyslipidemia  Comments:  Not watching Sat fats as much  Orders:  -     Lipid Panel; Future    7. Nonrheumatic mitral valve regurgitation  Comments:  Slight murmur auscultated today.    8. Coronary artery disease involving native coronary artery of native heart without angina pectoris  Comments:  No chest pressure but does get short of breath when he first starts activities.  Can then exercise for an hour without shortness of breath    9. Cough, unspecified type  Comments:  Still cough and Covid 4/22    10. Snoring  Comments:  Wife not mentioned    11. Class 1 obesity due to excess calories with serious comorbidity and body mass index (BMI) of 32.0 to 32.9 in adult        Patient Instructions     Health Maintenance Due   Topic Date Due   • URINE MICROALBUMIN  Never done    Call Monday if not informed about Liters for incentive spirometry.    Check  blood pressure cuff for accuracy and send 10 blood pressures over 2 weeks.  Watch sodium, alcohol and weight     Try timed released melatonin.    Lamisil AF twice daily for 2 weeks and call if no change

## 2023-02-24 ENCOUNTER — OFFICE VISIT (OUTPATIENT)
Dept: CARDIOLOGY | Facility: CLINIC | Age: 78
End: 2023-02-24
Payer: MEDICARE

## 2023-02-24 VITALS
SYSTOLIC BLOOD PRESSURE: 148 MMHG | DIASTOLIC BLOOD PRESSURE: 87 MMHG | HEIGHT: 70 IN | HEART RATE: 68 BPM | OXYGEN SATURATION: 97 % | BODY MASS INDEX: 32.21 KG/M2 | WEIGHT: 225 LBS

## 2023-02-24 DIAGNOSIS — I34.0 NONRHEUMATIC MITRAL VALVE REGURGITATION: ICD-10-CM

## 2023-02-24 DIAGNOSIS — I25.10 CORONARY ARTERY DISEASE INVOLVING NATIVE CORONARY ARTERY OF NATIVE HEART WITHOUT ANGINA PECTORIS: ICD-10-CM

## 2023-02-24 DIAGNOSIS — Z86.79 S/P MAZE OPERATION FOR ATRIAL FIBRILLATION: ICD-10-CM

## 2023-02-24 DIAGNOSIS — I47.29 NONSUSTAINED VENTRICULAR TACHYCARDIA: ICD-10-CM

## 2023-02-24 DIAGNOSIS — R00.2 PALPITATIONS: Primary | ICD-10-CM

## 2023-02-24 DIAGNOSIS — Z98.890 S/P LEFT ATRIAL APPENDAGE LIGATION: ICD-10-CM

## 2023-02-24 DIAGNOSIS — Z98.890 S/P MVR (MITRAL VALVE REPAIR): ICD-10-CM

## 2023-02-24 DIAGNOSIS — Z98.890 S/P MAZE OPERATION FOR ATRIAL FIBRILLATION: ICD-10-CM

## 2023-02-24 PROCEDURE — 99214 OFFICE O/P EST MOD 30 MIN: CPT | Performed by: INTERNAL MEDICINE

## 2023-02-24 PROCEDURE — 93000 ELECTROCARDIOGRAM COMPLETE: CPT | Performed by: INTERNAL MEDICINE

## 2023-02-24 RX ORDER — LOSARTAN POTASSIUM 50 MG/1
50 TABLET ORAL DAILY
Qty: 90 TABLET | Refills: 1 | Status: SHIPPED | OUTPATIENT
Start: 2023-02-24 | End: 2023-03-20 | Stop reason: DRUGHIGH

## 2023-02-24 NOTE — PROGRESS NOTES
CC--- history of mitral valve surgery and nonsustained ventricular tachycardia           Sub--77-year-old pleasant patient had multivessel coronary disease with severe mitral regurgitation and permanent atrial fibrillation and patient underwent multivessel bypass surgery with mitral valve annuloplasty and a left atrial appendage ligation and maze procedure.  Postprocedure he had nonsustained ventricular arrhythmias and intermittent atrial arrhythmias.  A AVERY revealed left atrial appendage ligation without any MR.  Patient also had COVID infection in April 2022.  He also underwent an EP study in the past for restratification without any significant inducible arrhythmias in June 2022 and comes in for a follow-up.  He denies any syncope.            Medical History        Past Medical History:   Diagnosis Date   • Arthritis     • Atrial fibrillation (HCC)     • BPH (benign prostatic hyperplasia)     • Bulging lumbar disc     • Difficulty maintaining body in lying position       NEEDS PILLOW UNDER KNEES IN SURGERY D/T LUMBAR ISSUE   • Hyperglycemia     • Hypertension     • OAB (overactive bladder)     • Skin mole     • Snoring     • Urinary urgency     • Vitamin D deficiency           Surgical History         Past Surgical History:   Procedure Laterality Date   • CARDIAC CATHETERIZATION Right 10/13/2021     Procedure: Left Heart Cath;  Surgeon: Renato Knight MD;  Location: [facility] CATH INVASIVE LOCATION;  Service: Cardiovascular;  Laterality: Right;   • CARDIAC CATHETERIZATION   10/13/2021     Procedure: Functional Flow [place];  Surgeon: Renato Knight MD;  Location: [facility] CATH INVASIVE LOCATION;  Service: Cardiovascular;;   • COLONOSCOPY       • FINGER SURGERY Right       repair right pointer finger   • JOINT REPLACEMENT       • TOTAL SHOULDER ARTHROPLASTY         shoulder replacement               Family History   Problem Relation Age of Onset   • Diabetes Mother     • Heart disease Mother     •  Hypertension Sister     • Hyperlipidemia Sister     • Kidney disease Sister     • Cancer Sister     • Other Sister           weight disorder   • Diabetes Sister     • Stroke Brother     • Hypertension Other               Physical Exam    General:      well developed, well nourished, in no acute distress.    Head:      normocephalic and atraumatic.    Eyes:      PERRL/EOM intact, conjunctivae and sclerae clear without nystagmus.    Neck:      no  thyromegaly, trachea central with normal respiratory effort  Lungs:      clear bilaterally to auscultation.    Heart:       regular rate and rhythm, S1, S2 without murmurs, rubs, or gallops  Skin:      intact without lesions or rashes.    Psych:      alert and cooperative; normal mood and affect; normal attention span and concentration.             Assessment and plan    History of multivessel coronary artery disease with severe MR needing prior surgery  Longstanding atrial fibrillation with prior Maze procedure and left atrial appendage ligation in sinus rhythm  History of mitral valve annuloplasty ring in the past  Nonsustained ventricular arrhythmias without any significant induction during EP study  Normal LV systolic function  Hyperlipidemia treated with statins followed by primary care physician  Essential hypertension--not well controlled---increase cozaar to 50 mg a day--prescription sent  Check bmp  Medications reviewed and follow-up appointments made  Labs potassium 3.9 with normal creatinine.  LDL is 60 and TSH is normal      ECG 12 Lead    Date/Time: 2/24/2023 8:49 AM  Performed by: Darren Phan MD  Authorized by: Darren Phan MD   Comparison: compared with previous ECG   Similar to previous ECG  Rhythm: sinus rhythm  Ectopy: atrial premature contractions  Rate: normal  Conduction: conduction normal              Electronically signed by Darren Phan MD, 02/24/23, 8:49 AM EST.

## 2023-03-01 ENCOUNTER — CLINICAL SUPPORT (OUTPATIENT)
Dept: FAMILY MEDICINE CLINIC | Facility: CLINIC | Age: 78
End: 2023-03-01
Payer: MEDICARE

## 2023-03-01 ENCOUNTER — OFFICE VISIT (OUTPATIENT)
Dept: CARDIOLOGY | Facility: CLINIC | Age: 78
End: 2023-03-01
Payer: MEDICARE

## 2023-03-01 ENCOUNTER — TELEPHONE (OUTPATIENT)
Dept: FAMILY MEDICINE CLINIC | Facility: CLINIC | Age: 78
End: 2023-03-01
Payer: MEDICARE

## 2023-03-01 VITALS
OXYGEN SATURATION: 97 % | DIASTOLIC BLOOD PRESSURE: 80 MMHG | HEIGHT: 70 IN | SYSTOLIC BLOOD PRESSURE: 151 MMHG | BODY MASS INDEX: 32.21 KG/M2 | WEIGHT: 225 LBS | HEART RATE: 66 BPM

## 2023-03-01 DIAGNOSIS — I48.11 LONGSTANDING PERSISTENT ATRIAL FIBRILLATION: ICD-10-CM

## 2023-03-01 DIAGNOSIS — I47.29 NONSUSTAINED VENTRICULAR TACHYCARDIA: ICD-10-CM

## 2023-03-01 DIAGNOSIS — E78.5 DYSLIPIDEMIA: ICD-10-CM

## 2023-03-01 DIAGNOSIS — I10 PRIMARY HYPERTENSION: ICD-10-CM

## 2023-03-01 DIAGNOSIS — Z86.79 S/P MAZE OPERATION FOR ATRIAL FIBRILLATION: ICD-10-CM

## 2023-03-01 DIAGNOSIS — E53.8 B12 DEFICIENCY: ICD-10-CM

## 2023-03-01 DIAGNOSIS — I34.0 NONRHEUMATIC MITRAL VALVE REGURGITATION: Primary | ICD-10-CM

## 2023-03-01 DIAGNOSIS — R00.2 PALPITATIONS: ICD-10-CM

## 2023-03-01 DIAGNOSIS — R73.9 HYPERGLYCEMIA: ICD-10-CM

## 2023-03-01 DIAGNOSIS — I25.10 CORONARY ARTERY DISEASE INVOLVING NATIVE CORONARY ARTERY OF NATIVE HEART WITHOUT ANGINA PECTORIS: ICD-10-CM

## 2023-03-01 DIAGNOSIS — Z95.1 S/P CABG X 3: ICD-10-CM

## 2023-03-01 DIAGNOSIS — Z98.890 S/P MVR (MITRAL VALVE REPAIR): ICD-10-CM

## 2023-03-01 DIAGNOSIS — Z98.890 S/P MAZE OPERATION FOR ATRIAL FIBRILLATION: ICD-10-CM

## 2023-03-01 DIAGNOSIS — Z98.890 S/P LEFT ATRIAL APPENDAGE LIGATION: ICD-10-CM

## 2023-03-01 LAB
ALBUMIN SERPL-MCNC: 4.1 G/DL (ref 3.5–5.2)
ALBUMIN/GLOB SERPL: 1.5 G/DL
ALP SERPL-CCNC: 53 U/L (ref 39–117)
ALT SERPL W P-5'-P-CCNC: 22 U/L (ref 1–41)
ANION GAP SERPL CALCULATED.3IONS-SCNC: 7 MMOL/L (ref 5–15)
AST SERPL-CCNC: 23 U/L (ref 1–40)
BASOPHILS # BLD AUTO: 0.05 10*3/MM3 (ref 0–0.2)
BASOPHILS NFR BLD AUTO: 0.8 % (ref 0–1.5)
BILIRUB SERPL-MCNC: 0.4 MG/DL (ref 0–1.2)
BUN SERPL-MCNC: 16 MG/DL (ref 8–23)
BUN/CREAT SERPL: 19.3 (ref 7–25)
CALCIUM SPEC-SCNC: 9.7 MG/DL (ref 8.6–10.5)
CHLORIDE SERPL-SCNC: 105 MMOL/L (ref 98–107)
CHOLEST SERPL-MCNC: 122 MG/DL (ref 0–200)
CO2 SERPL-SCNC: 30 MMOL/L (ref 22–29)
CREAT SERPL-MCNC: 0.83 MG/DL (ref 0.76–1.27)
DEPRECATED RDW RBC AUTO: 44.4 FL (ref 37–54)
EGFRCR SERPLBLD CKD-EPI 2021: 90.7 ML/MIN/1.73
EOSINOPHIL # BLD AUTO: 0.32 10*3/MM3 (ref 0–0.4)
EOSINOPHIL NFR BLD AUTO: 5.4 % (ref 0.3–6.2)
ERYTHROCYTE [DISTWIDTH] IN BLOOD BY AUTOMATED COUNT: 13.1 % (ref 12.3–15.4)
GLOBULIN UR ELPH-MCNC: 2.8 GM/DL
GLUCOSE SERPL-MCNC: 86 MG/DL (ref 65–99)
HBA1C MFR BLD: 5.5 % (ref 3.5–5.6)
HCT VFR BLD AUTO: 42.1 % (ref 37.5–51)
HDLC SERPL-MCNC: 49 MG/DL (ref 40–60)
HGB BLD-MCNC: 13.9 G/DL (ref 13–17.7)
IMM GRANULOCYTES # BLD AUTO: 0.01 10*3/MM3 (ref 0–0.05)
IMM GRANULOCYTES NFR BLD AUTO: 0.2 % (ref 0–0.5)
LDLC SERPL CALC-MCNC: 61 MG/DL (ref 0–100)
LDLC/HDLC SERPL: 1.26 {RATIO}
LYMPHOCYTES # BLD AUTO: 0.89 10*3/MM3 (ref 0.7–3.1)
LYMPHOCYTES NFR BLD AUTO: 15 % (ref 19.6–45.3)
MAGNESIUM SERPL-MCNC: 2.2 MG/DL (ref 1.6–2.4)
MCH RBC QN AUTO: 30.2 PG (ref 26.6–33)
MCHC RBC AUTO-ENTMCNC: 33 G/DL (ref 31.5–35.7)
MCV RBC AUTO: 91.5 FL (ref 79–97)
MONOCYTES # BLD AUTO: 0.78 10*3/MM3 (ref 0.1–0.9)
MONOCYTES NFR BLD AUTO: 13.2 % (ref 5–12)
NEUTROPHILS NFR BLD AUTO: 3.88 10*3/MM3 (ref 1.7–7)
NEUTROPHILS NFR BLD AUTO: 65.4 % (ref 42.7–76)
NRBC BLD AUTO-RTO: 0 /100 WBC (ref 0–0.2)
PLATELET # BLD AUTO: 248 10*3/MM3 (ref 140–450)
PMV BLD AUTO: 12.8 FL (ref 6–12)
POTASSIUM SERPL-SCNC: 4.4 MMOL/L (ref 3.5–5.2)
PROT SERPL-MCNC: 6.9 G/DL (ref 6–8.5)
RBC # BLD AUTO: 4.6 10*6/MM3 (ref 4.14–5.8)
SODIUM SERPL-SCNC: 142 MMOL/L (ref 136–145)
TRIGL SERPL-MCNC: 56 MG/DL (ref 0–150)
TSH SERPL DL<=0.05 MIU/L-ACNC: 2.6 UIU/ML (ref 0.27–4.2)
VIT B12 BLD-MCNC: 707 PG/ML (ref 211–946)
VLDLC SERPL-MCNC: 12 MG/DL (ref 5–40)
WBC NRBC COR # BLD: 5.93 10*3/MM3 (ref 3.4–10.8)

## 2023-03-01 PROCEDURE — 82607 VITAMIN B-12: CPT | Performed by: PREVENTIVE MEDICINE

## 2023-03-01 PROCEDURE — 99214 OFFICE O/P EST MOD 30 MIN: CPT | Performed by: INTERNAL MEDICINE

## 2023-03-01 PROCEDURE — 36415 COLL VENOUS BLD VENIPUNCTURE: CPT | Performed by: PREVENTIVE MEDICINE

## 2023-03-01 PROCEDURE — 83036 HEMOGLOBIN GLYCOSYLATED A1C: CPT | Performed by: PREVENTIVE MEDICINE

## 2023-03-01 PROCEDURE — 80061 LIPID PANEL: CPT | Performed by: PREVENTIVE MEDICINE

## 2023-03-01 PROCEDURE — 84443 ASSAY THYROID STIM HORMONE: CPT | Performed by: PREVENTIVE MEDICINE

## 2023-03-01 PROCEDURE — 83735 ASSAY OF MAGNESIUM: CPT | Performed by: PREVENTIVE MEDICINE

## 2023-03-01 PROCEDURE — 80053 COMPREHEN METABOLIC PANEL: CPT | Performed by: PREVENTIVE MEDICINE

## 2023-03-01 PROCEDURE — 85025 COMPLETE CBC W/AUTO DIFF WBC: CPT | Performed by: PREVENTIVE MEDICINE

## 2023-03-01 NOTE — TELEPHONE ENCOUNTER
Please let him know to take it 10 times and pulse as well over next couple of weeks and let us know results

## 2023-03-01 NOTE — PROGRESS NOTES
Cardiology Office Visit      Encounter Date:  03/01/2023    Patient ID:   Salinas Dill is a 77 y.o. male.    Reason For Followup:  Chronic atrial fibrillation  Mitral regurgitation  Hypertension  Status post coronary artery bypass surgery and mitral valve repair    Brief Clinical History:  Dear Dr. Ferrera, Kortney Infante MD    I had the pleasure of seeing Salinas Dill today. As you are well aware, this is a 77 y.o. male with past medical history significant for history of hypertension and benign prostatic hypertrophy and recent hospitalization for coronary artery disease and coronary artery bypass surgery here for further evaluation treatment options    Interpretation Summary    · An abnormal monitor study.  · Normal sinus rhythm  · Sinus bradycardia with slowest rate of 56 bpm occurring at 2:34 AM  · Sinus tachycardia with maximum rate of 121 bpm occurring at 11:51 AM  · Occasional PACs  · Occasional PVCs occurring in isolation, and bigeminal pattern  · Multiple runs of supraventricular tachycardia with maximum heart rate of 135 bpm and longest episode of 17 beats  · Multiple pauses the longest of which was 2.1 seconds occurring on 4/26/2022 at 2:45 AM  · Multiple runs of nonsustained ventricular tachycardia with a maximum heart rate of 188 bpm. Longest run was 19 beats occurring at 1:05 PM on 4/21/2022    Interpretation Summary    · Left ventricular ejection fraction appears to be 56 - 60%. Left ventricular systolic function is normal.  · There is a mitral valve ring present.  · Saline test results are negative.  · Left atrial appendage ligation noted.  · Left atrial appendage is ligated and demonstrates continued closure.  · No evidence of communication with left atrial cavity.           Interval History:  Denies any new complaints  Complaint on medical therapy  Some nonspecific exertional shortness of breath but there is no functional limitation with exercise and activity  When he tries to do things in a rapid  fashion or quickly he feels some shortness of breath  Assessment & Plan    Impressions:  Chronic atrial fibrillation/currently in sinus rhythm  Hypertension  Ilan Vascor of 2  stress test with no evidence of any inducible ischemia  echocardiogram with normal LV systolic function and moderate mitral regurgitation  Severe multivessel coronary artery disease status post CABG x3 with a LIMA to the mid LAD and reverse individual saphenous vein graft to the obtuse marginal 1 and PLB branch of the right coronary artery  Severe mitral valve insufficiency status post ring annuloplasty  Atrial fibrillation with right and left cryomaze procedure and left atrial appendage ligation  Postop atrial fibrillation status post surgical maze currently in sinus rhythm  Essential hypertension  Dyslipidemia  BPH  Extended Holter monitor with nonsustained ventricular tachycardia episodes  Multiple episodes of brief salvos of supraventricular tachycardia  Status post EP study with no inducible arrhythmia    Recommendations:    Continue aspirin for anticoagulation therapy  Patient is currently on low-dose beta-blockers but cannot tolerate any higher doses secondary to blood pressure issues  Recent echocardiogram and also AVERY showing no significant residual valve pathology only residual mild mitral regurgitation no significant mitral stenosis and a complete seal of the left atrial appendage  Continue risk factor modification  Recent labs reviewed and discussed with the patient  Lipids at goal  AVERY and echocardiogram with normal functioning of the valve  Echocardiogram with normal LV systolic function normal functioning of the valve  Recent EP study with no induction of SVT or ventricular tachycardia  Repeat extended Holter monitor to further evaluate the cardiac arrhythmias  Continue current medical therapy with aspirin 81 mg p.o. once a day Lipitor 40 mg p.o. once a day Coreg 3.125 mg p.o. twice daily  follow-up in office in 3  "months    Objective:    Vitals:  Vitals:    03/01/23 1257   BP: 151/80   Pulse: 66   SpO2: 97%   Weight: 102 kg (225 lb)   Height: 177.8 cm (70\")       Physical Exam:    General: Alert, cooperative, no distress, appears stated age  Head:  Normocephalic, atraumatic, mucous membranes moist  Eyes:  Conjunctiva/corneas clear, EOM's intact     Neck:  Supple,  no adenopathy;      Lungs: Clear to auscultation bilaterally, no wheezes rhonchi rales are noted  Chest wall: No tenderness  Heart::  Regular rate and rhythm, S1 and S2 normal, no murmur, rub or gallop  Abdomen: Soft, non-tender, nondistended bowel sounds active  Extremities: No cyanosis, clubbing, or edema  Pulses: 2+ and symmetric all extremities  Skin:  No rashes or lesions  Neuro/psych: A&O x3. CN II through XII are grossly intact with appropriate affect      Allergies:  Allergies   Allergen Reactions   • Lisinopril Cough       Medication Review:     Current Outpatient Medications:   •  acetaminophen (TYLENOL) 325 MG tablet, Take 2 tablets by mouth Every 6 (Six) Hours As Needed for Mild Pain ., Disp: , Rfl:   •  aspirin (aspirin) 81 MG EC tablet, Take 1 tablet by mouth Daily., Disp: 30 tablet, Rfl: 2  •  atorvastatin (LIPITOR) 40 MG tablet, TAKE 1 TABLET DAILY AT BEDTIME., Disp: 90 tablet, Rfl: 2  •  B Complex Vitamins (vitamin b complex) capsule capsule, Take 1 capsule by mouth Daily. LD 11/4, Disp: , Rfl:   •  BLACK ELDERBERRY PO, Take 158 mg by mouth Every Night., Disp: , Rfl:   •  carvedilol (COREG) 3.125 MG tablet, TAKE 1 TABLET BY MOUTH EVERY 12 (TWELVE) HOURS., Disp: 180 tablet, Rfl: 2  •  Cholecalciferol (VITAMIN D) 2000 units capsule, Take 25 capsules by mouth 1 (One) Time Per Week. mondays, Disp: , Rfl:   •  losartan (COZAAR) 50 MG tablet, Take 1 tablet by mouth Daily., Disp: 90 tablet, Rfl: 1  •  melatonin 5 MG tablet tablet, Take 1 tablet by mouth Every Night., Disp: , Rfl:   •  SILDENAFIL CITRATE PO, Take 20 mg by mouth Daily. For enlarged prostate, " Disp: , Rfl:   •  tamsulosin (FLOMAX) 0.4 MG capsule 24 hr capsule, Take 1 capsule by mouth Daily., Disp: , Rfl:   •  Turmeric 500 MG capsule, Take 1 capsule by mouth Daily., Disp: , Rfl:     Family History:  Family History   Problem Relation Age of Onset   • Diabetes Mother    • Heart disease Mother    • Hypertension Sister    • Hyperlipidemia Sister    • Kidney disease Sister    • Cancer Sister    • Other Sister         weight disorder   • Diabetes Sister    • Stroke Brother    • Hypertension Other        Past Medical History:  Past Medical History:   Diagnosis Date   • Abnormal ECG may 2022   • Arthritis    • Asthma april 2022   • Atrial fibrillation (HCC)    • Bulging lumbar disc    • Coronary artery disease    • Difficulty maintaining body in lying position     NEEDS PILLOW UNDER KNEES IN SURGERY D/T LUMBAR ISSUE   • Heart valve disease    • Hyperglycemia    • Hyperlipidemia    • Hypertension    • Skin mole    • Snoring    • Vitamin D deficiency        Past surgical History:  Past Surgical History:   Procedure Laterality Date   • CARDIAC CATHETERIZATION Right 10/13/2021    Procedure: Left Heart Cath;  Surgeon: Renato Knight MD;  Location: [facility] CATH INVASIVE LOCATION;  Service: Cardiovascular;  Laterality: Right;   • CARDIAC CATHETERIZATION  10/13/2021    Procedure: Functional Flow [place];  Surgeon: Renato Knight MD;  Location: [facility] CATH INVASIVE LOCATION;  Service: Cardiovascular;;   • CARDIAC ELECTROPHYSIOLOGY PROCEDURE Right 06/29/2022    Procedure: EP/CRM Study [place] aware;  Surgeon: Darren Phan MD;  Location: [facility] CATH INVASIVE LOCATION;  Service: Cardiovascular;  Laterality: Right;   • CATARACT EXTRACTION  05/2022   • COLONOSCOPY     • CORONARY ARTERY BYPASS GRAFT N/A 11/11/2021    Procedure: CORONARY ARTERY BYPASS WITH INTERNAL MAMMARY ARTERY GRAFT AND MAZE PROCEDURE;  Surgeon: Johnathan Hernandez MD;  Location: [facility] CVOR;  Service: Cardiothoracic;  Laterality: N/A;   CABG x 3  2 vein grafts  1 LIMA  with intraoperative AVERY   • FINGER SURGERY Right     repair right pointer finger   • JOINT REPLACEMENT     • MAZE PROCEDURE Bilateral 2021    done with CABG + mintral ring placement + L atrial appendage ligation   • MITRAL VALVE REPAIR/REPLACEMENT N/A 2021    Procedure: MITRAL VALVE REPAIR/REPLACEMENT;  Surgeon: Johnathan Hernandez MD;  Location: [facility];  Service: Cardiothoracic;  Laterality: N/A;  mitral valve repair with physio II annuloplasty ring 28mm   • MITRAL VALVE REPAIR/REPLACEMENT     • TOTAL SHOULDER ARTHROPLASTY      shoulder replacement       Social History:  Social History     Socioeconomic History   • Marital status:    Tobacco Use   • Smoking status: Former     Packs/day: 1.00     Years: 12.00     Pack years: 12.00     Types: Cigarettes     Quit date: 1978     Years since quittin.1   • Smokeless tobacco: Former     Quit date:    Vaping Use   • Vaping Use: Never used   Substance and Sexual Activity   • Alcohol use: Yes     Alcohol/week: 1.0 standard drink     Types: 1 Shots of liquor per week     Comment: [place] margaritas on Monday nights   • Drug use: No   • Sexual activity: Yes     Partners: Female     Birth control/protection: None       Review of Systems:  The following systems were reviewed as they relate to the cardiovascular system: Constitutional, Eyes, ENT, Cardiovascular, Respiratory, Gastrointestinal, Integumentary, Neurological, Psychiatric, Hematologic, Endocrine, Musculoskeletal, and Genitourinary. The pertinent cardiovascular findings are reported above with all other cardiovascular points within those systems being negative.    Diagnostic Study Review:     Current Electrocardiogram:  Procedures      NOTE: The following portions of the patient's history were reviewed and updated this visit as appropriate: allergies, current medications, past family history, past medical history, past social history, past surgical  history and problem list.

## 2023-03-01 NOTE — PROGRESS NOTES
Venipuncture performed on Right Arm by Chanda Hernandez  with good hemostasis. Patient tolerated well. 03/01/23   Chanda Ferrera MD

## 2023-03-01 NOTE — TELEPHONE ENCOUNTER
Salinas came in for a Lab draw. While he was here he ask for his BP to be checked. Has been running high in the AM  Today Lt arm 146/82 Rt arm 144/87 P69. He said he feels fine. Seen Dr Phan last week.

## 2023-03-02 ENCOUNTER — TELEPHONE (OUTPATIENT)
Dept: FAMILY MEDICINE CLINIC | Facility: CLINIC | Age: 78
End: 2023-03-02
Payer: MEDICARE

## 2023-03-02 NOTE — PROGRESS NOTES
Labs look good and A1c is now in the no increased risk for diabetes range keep up the exercise I think it is definitely helping.  Call if any other questions or concerns

## 2023-03-02 NOTE — TELEPHONE ENCOUNTER
HUB TO READ:  ----- Message from Kortney Ferrera MD sent at 3/2/2023  7:48 AM EST -----  Labs look good and A1c is now in the no increased risk for diabetes range keep up the exercise I think it is definitely helping.  Call if any other questions or concerns

## 2023-03-20 ENCOUNTER — TELEPHONE (OUTPATIENT)
Dept: FAMILY MEDICINE CLINIC | Facility: CLINIC | Age: 78
End: 2023-03-20
Payer: MEDICARE

## 2023-03-20 ENCOUNTER — HOSPITAL ENCOUNTER (OUTPATIENT)
Dept: RESPIRATORY THERAPY | Facility: HOSPITAL | Age: 78
Discharge: HOME | End: 2023-03-20
Payer: MEDICARE

## 2023-03-20 DIAGNOSIS — R00.2 PALPITATIONS: ICD-10-CM

## 2023-03-20 DIAGNOSIS — I34.0 NONRHEUMATIC MITRAL VALVE REGURGITATION: ICD-10-CM

## 2023-03-20 RX ORDER — LOSARTAN POTASSIUM 100 MG/1
100 TABLET ORAL DAILY
Qty: 90 TABLET | Refills: 3 | Status: SHIPPED | OUTPATIENT
Start: 2023-03-20

## 2023-03-20 NOTE — TELEPHONE ENCOUNTER
Would advise that patient increase his losartan to 100 mg to see if we can get him down to 140/80 or below.  After he has been on the losartan for couple of weeks lets remonitor 10 more blood pressures and pulses.   No

## 2023-05-23 ENCOUNTER — OFFICE VISIT (OUTPATIENT)
Dept: PODIATRY | Facility: CLINIC | Age: 78
End: 2023-05-23
Payer: MEDICARE

## 2023-05-23 VITALS — RESPIRATION RATE: 20 BRPM | WEIGHT: 225 LBS | BODY MASS INDEX: 32.21 KG/M2 | HEIGHT: 70 IN

## 2023-05-23 DIAGNOSIS — M79.672 LEFT FOOT PAIN: Primary | ICD-10-CM

## 2023-05-23 DIAGNOSIS — M21.622 TAILOR'S BUNIONETTE, LEFT: ICD-10-CM

## 2023-05-23 DIAGNOSIS — L85.2 PUNCTATE KERATOSIS: ICD-10-CM

## 2023-05-23 NOTE — PROGRESS NOTES
05/23/2023  Foot and Ankle Surgery - New Patient   Provider: Dr. Hunter Davila DPM  Location: [facility]    Subjective:  Salinas Dill is a 77 y.o. male.     Chief Complaint   Patient presents with   • Left Foot - Pain   • Initial Evaluation     SOURAV Shaver md  11/1/2022        HPI:     The patient is a 77-year-old male who presents to the office today for evaluation of left foot pain. He is accompanied by an adult female today.    The patient reports significant left foot pain while ambulating. He localizes the pain to the plantar and lateral aspects of his left foot. He states his pain began in 04/2023. The patient denies previous issues with his left foot. He reports working out hard on a treadmill while on a cruise. He notes his pain was not severe at that time; however, his pain worsened after he returned home. The patient reports having an x-ray performed today. He denies being able to visualize the plantar aspect of his left foot. He obtained arch supports from [place], [place] approximately 3 to 4 years ago.    Allergies   Allergen Reactions   • Lisinopril Cough       Past Medical History:   Diagnosis Date   • Abnormal ECG may 2022   • Arthritis    • Asthma april 2022   • Atrial fibrillation    • Bulging lumbar disc    • Coronary artery disease    • Difficulty maintaining body in lying position     NEEDS PILLOW UNDER KNEES IN SURGERY D/T LUMBAR ISSUE   • Difficulty walking april 2023   • Heart valve disease    • Hyperglycemia    • Hyperlipidemia    • Hypertension    • Pulmonary arterial hypertension    • Skin mole    • Snoring    • Vitamin D deficiency        Past Surgical History:   Procedure Laterality Date   • CARDIAC CATHETERIZATION Right 10/13/2021    Procedure: Left Heart Cath;  Surgeon: Renato Knight MD;  Location: [facility] INVASIVE LOCATION;  Service: Cardiovascular;  Laterality: Right;   • CARDIAC CATHETERIZATION  10/13/2021    Procedure: Functional Flow [place];   Surgeon: Renato Knight MD;  Location: [facility] CATH INVASIVE LOCATION;  Service: Cardiovascular;;   • CARDIAC ELECTROPHYSIOLOGY PROCEDURE Right 2022    Procedure: EP/CRM Study Utong aware;  Surgeon: Darren Phan MD;  Location: [facility] CATH INVASIVE LOCATION;  Service: Cardiovascular;  Laterality: Right;   • CATARACT EXTRACTION  2022   • COLONOSCOPY     • CORONARY ARTERY BYPASS GRAFT N/A 2021    Procedure: CORONARY ARTERY BYPASS WITH INTERNAL MAMMARY ARTERY GRAFT AND MAZE PROCEDURE;  Surgeon: Johnathan Hernandez MD;  Location: [facility] CVOR;  Service: Cardiothoracic;  Laterality: N/A;  CABG x 3  2 vein grafts  1 LIMA  with intraoperative AVERY   • FINGER SURGERY Right     repair right pointer finger   • JOINT REPLACEMENT     • MAZE PROCEDURE Bilateral 2021    done with CABG + mintral ring placement + L atrial appendage ligation   • MITRAL VALVE REPAIR/REPLACEMENT N/A 2021    Procedure: MITRAL VALVE REPAIR/REPLACEMENT;  Surgeon: Johnathan Hernandez MD;  Location: [facility];  Service: Cardiothoracic;  Laterality: N/A;  mitral valve repair with physio II annuloplasty ring 28mm   • MITRAL VALVE REPAIR/REPLACEMENT     • TOTAL SHOULDER ARTHROPLASTY      shoulder replacement       Family History   Problem Relation Age of Onset   • Diabetes Mother    • Heart disease Mother    • Hypertension Sister    • Hyperlipidemia Sister    • Kidney disease Sister    • Cancer Sister    • Other Sister         weight disorder   • Diabetes Sister    • Stroke Brother    • Hypertension Other        Social History     Socioeconomic History   • Marital status:    Tobacco Use   • Smoking status: Former     Packs/day: 1.00     Years: 12.00     Pack years: 12.00     Types: Cigarettes     Quit date: 1978     Years since quittin.4   • Smokeless tobacco: Former     Quit date:    Vaping Use   • Vaping Use: Never used   Substance and Sexual Activity   • Alcohol use: Yes     Alcohol/week: 1.0  standard drink     Types: 1 Shots of liquor per week     Comment: [employer] aguilartas on Monday nights   • Drug use: No   • Sexual activity: Yes     Partners: Female     Birth control/protection: None        Current Outpatient Medications on File Prior to Visit   Medication Sig Dispense Refill   • acetaminophen (TYLENOL) 325 MG tablet Take 2 tablets by mouth Every 6 (Six) Hours As Needed for Mild Pain .     • aspirin (aspirin) 81 MG EC tablet Take 1 tablet by mouth Daily. 30 tablet 2   • atorvastatin (LIPITOR) 40 MG tablet TAKE 1 TABLET DAILY AT BEDTIME. 90 tablet 2   • B Complex Vitamins (vitamin b complex) capsule capsule Take 1 capsule by mouth Daily. LD 11/4     • BLACK ELDERBERRY PO Take 158 mg by mouth Every Night.     • carvedilol (COREG) 3.125 MG tablet TAKE 1 TABLET BY MOUTH EVERY 12 (TWELVE) HOURS. 180 tablet 2   • Cholecalciferol (VITAMIN D) 2000 units capsule Take 25 capsules by mouth 1 (One) Time Per Week. mondays     • losartan (Cozaar) 100 MG tablet Take 1 tablet by mouth Daily. 90 tablet 3   • melatonin 5 MG tablet tablet Take 1 tablet by mouth Every Night.     • SILDENAFIL CITRATE PO Take 20 mg by mouth Daily. For enlarged prostate     • tamsulosin (FLOMAX) 0.4 MG capsule 24 hr capsule Take 1 capsule by mouth Daily.     • Turmeric 500 MG capsule Take 1 capsule by mouth Daily.       No current facility-administered medications on file prior to visit.       Review of Systems:  General: Denies fever, chills, fatigue, and weakness.  Eyes: Denies vision loss, blurry vision, and excessive redness.  ENT: Denies hearing issues and difficulty swallowing.  Cardiovascular: Denies palpitations, chest pain, or syncopal episodes.  Respiratory: Denies shortness of breath, wheezing, and coughing.  GI: Denies abdominal pain, nausea, and vomiting.   : Denies frequency, hematuria, and urgency.  Musculoskeletal: Denies muscle cramps, joint pains, and stiffness.  Derm: Denies rash, open wounds, or suspicious  "lesions.  Neuro: Denies headaches, numbness, loss of coordination, and tremors.  Psych: Denies anxiety and depression.  Endocrine: Denies temperature intolerance and changes in appetite.  Heme: Denies bleeding disorders or abnormal bruising.     Objective   Resp 20   Ht 177.8 cm (70\")   Wt 102 kg (225 lb)   BMI 32.28 kg/m²     Foot/Ankle Exam    GENERAL  Orientation:  AAOx3  Affect:  appropriate    VASCULAR     Right Foot Vascularity   Normal vascular exam    Dorsalis pedis:  2+  Posterior tibial:  2+  Skin temperature:  warm  Edema grading:  None  CFT:  < 3 seconds  Pedal hair growth:  Present  Varicosities:  none     Left Foot Vascularity   Normal vascular exam    Dorsalis pedis:  2+  Posterior tibial:  2+  Skin temperature:  warm  Edema grading:  None  CFT:  < 3 seconds  Pedal hair growth:  Present  Varicosities:  none     NEUROLOGIC     Right Foot Neurologic   Light touch sensation: normal  Hot/Cold sensation: normal  Achilles reflex:  2+     Left Foot Neurologic   Light touch sensation: normal  Hot/Cold sensation:  normal  Achilles reflex:  2+    MUSCULOSKELETAL     Right Foot Musculoskeletal   Arch:  Normal     Left Foot Musculoskeletal   Arch:  Normal    MUSCLE STRENGTH     Right Foot Muscle Strength   Normal strength    Foot dorsiflexion:  5  Foot plantar flexion:  5  Foot inversion:  5  Foot eversion:  5     Left Foot Muscle Strength   Normal strength    Foot dorsiflexion:  5  Foot plantar flexion:  5  Foot inversion:  5  Foot eversion:  5    DERMATOLOGIC      Right Foot Dermatologic   Skin  Right foot skin is intact.      Left Foot Dermatologic   Skin  Left foot skin is intact.     TESTS     Right Foot Tests   Anterior drawer: negative  Varus tilt: negative     Left Foot Tests   Anterior drawer: negative  Varus tilt: negative     Left foot additional comments: Moderate discomfort with palpation involving the plantar lateral aspect of the fifth metatarsal head region. After evaluation, hyperkeratotic " lesion noted to the plantar aspect of foot with punctate keratosis. Tailor's bunion deformity noted to this region. Mild soft tissue rigidity and equinus contracture. No gross deformity or instability.      Assessment & Plan   Diagnoses and all orders for this visit:    1. Left foot pain (Primary)  -     XR Foot 3+ View Left    2. Tailor's bunionette, left    3. Punctate keratosis      The patient is a 77-year-old male who presents to the office today for evaluation of left foot pain. Imaging was independently reviewed showing punctate keratosis. We discussed the etiology and biomechanics involved with his left foot pain. I recommend over-the-counter wart remover and moisturizing his feet on a routine basis. We discussed avoidance of ambulating while barefoot as well as avoidance of wearing flip-flops. The patient will return to the office in 2 weeks for reevaluation.    Cantharone application under occlusion: Left foot    Consent and time out was performed before proceeding with the procedure. Debridement was performed with a 15 blade and Cantharone was applied in the standard fashion to the base of the lesion and occluded with moleskin. We did review aftercare instructions. Patient tolerated the procedure well.    Greater than 30 minutes was spent before, during, and after evaluation for patient care.    Orders Placed This Encounter   Procedures   • XR Foot 3+ View Left     Order Specific Question:   Reason for Exam:     Answer:   plantar pain since april   rool 15  wb     Order Specific Question:   Does this patient have a diabetic monitoring/medication delivering device on?     Answer:   No     Order Specific Question:   Release to patient     Answer:   Routine Release        Note is dictated utilizing voice recognition software. Unfortunately this leads to occasional typographical errors. I apologize in advance if the situation occurs. If questions occur please do not hesitate to call our office.    Transcribed  from ambient dictation for ANNA Davila DPM by Rodrigo Rivera.  05/23/23   11:02 EDT    Patient or patient representative verbalized consent to the visit recording.  I have personally performed the services described in this document as transcribed by the above individual, and it is both accurate and complete.

## 2023-05-26 ENCOUNTER — PATIENT ROUNDING (BHMG ONLY) (OUTPATIENT)
Dept: PODIATRY | Facility: CLINIC | Age: 78
End: 2023-05-26
Payer: MEDICARE

## 2023-06-06 ENCOUNTER — OFFICE VISIT (OUTPATIENT)
Dept: PODIATRY | Facility: CLINIC | Age: 78
End: 2023-06-06
Payer: MEDICARE

## 2023-06-06 VITALS — OXYGEN SATURATION: 99 % | HEIGHT: 70 IN | WEIGHT: 225 LBS | BODY MASS INDEX: 32.21 KG/M2 | HEART RATE: 77 BPM

## 2023-06-06 DIAGNOSIS — M21.622 TAILOR'S BUNIONETTE, LEFT: ICD-10-CM

## 2023-06-06 DIAGNOSIS — M79.672 LEFT FOOT PAIN: Primary | ICD-10-CM

## 2023-06-06 DIAGNOSIS — L85.2 PUNCTATE KERATOSIS: ICD-10-CM

## 2023-06-06 NOTE — PROGRESS NOTES
"06/06/2023  Foot and Ankle Surgery - Established Patient/Follow-up  Provider: Dr. Hunter Davila DPM  Location: [facility]    Subjective:  Salinas Dill is a 77 y.o. male.     Chief Complaint   Patient presents with    Foot Pain     Left Foot Pain       SALINAS DILL is a 77-year-old male who presents to the office today for a follow-up regarding his left foot.    The patient is doing well overall, and notes improvement in his left foot. He reports a large \"chunk\" of callus came off on 06/04/2023. He has used orthotics in the past.      Allergies   Allergen Reactions    Lisinopril Cough       Current Outpatient Medications on File Prior to Visit   Medication Sig Dispense Refill    acetaminophen (TYLENOL) 325 MG tablet Take 2 tablets by mouth Every 6 (Six) Hours As Needed for Mild Pain .      aspirin (aspirin) 81 MG EC tablet Take 1 tablet by mouth Daily. 30 tablet 2    atorvastatin (LIPITOR) 40 MG tablet TAKE 1 TABLET DAILY AT BEDTIME. 90 tablet 2    B Complex Vitamins (vitamin b complex) capsule capsule Take 1 capsule by mouth Daily. LD 11/4      BLACK ELDERBERRY PO Take 158 mg by mouth Every Night.      carvedilol (COREG) 3.125 MG tablet TAKE 1 TABLET BY MOUTH EVERY 12 (TWELVE) HOURS. 180 tablet 2    Cholecalciferol (VITAMIN D) 2000 units capsule Take 25 capsules by mouth 1 (One) Time Per Week. mondays      losartan (Cozaar) 100 MG tablet Take 1 tablet by mouth Daily. 90 tablet 3    melatonin 5 MG tablet tablet Take 1 tablet by mouth Every Night.      SILDENAFIL CITRATE PO Take 20 mg by mouth Daily. For enlarged prostate      tamsulosin (FLOMAX) 0.4 MG capsule 24 hr capsule Take 1 capsule by mouth Daily.      Turmeric 500 MG capsule Take 1 capsule by mouth Daily.       No current facility-administered medications on file prior to visit.       Objective   Pulse 77   Ht 177.8 cm (70\")   Wt 102 kg (225 lb)   SpO2 99%   BMI 32.28 kg/m²     Foot/Ankle Exam    GENERAL  Orientation:  AAOx3  Affect:  " appropriate    VASCULAR     Right Foot Vascularity   Normal vascular exam    Dorsalis pedis:  2+  Posterior tibial:  2+  Skin temperature:  warm  Edema grading:  None  CFT:  < 3 seconds  Pedal hair growth:  Present  Varicosities:  none     Left Foot Vascularity   Normal vascular exam    Dorsalis pedis:  2+  Posterior tibial:  2+  Skin temperature:  warm  Edema grading:  None  CFT:  < 3 seconds  Pedal hair growth:  Present  Varicosities:  none     NEUROLOGIC     Right Foot Neurologic   Light touch sensation: normal  Hot/Cold sensation: normal  Achilles reflex:  2+     Left Foot Neurologic   Light touch sensation: normal  Hot/Cold sensation:  normal  Achilles reflex:  2+    MUSCULOSKELETAL     Right Foot Musculoskeletal   Arch:  Normal     Left Foot Musculoskeletal   Arch:  Normal    MUSCLE STRENGTH     Right Foot Muscle Strength   Normal strength    Foot dorsiflexion:  5  Foot plantar flexion:  5  Foot inversion:  5  Foot eversion:  5     Left Foot Muscle Strength   Normal strength    Foot dorsiflexion:  5  Foot plantar flexion:  5  Foot inversion:  5  Foot eversion:  5    DERMATOLOGIC      Right Foot Dermatologic   Skin  Right foot skin is intact.      Left Foot Dermatologic   Skin  Left foot skin is intact.     TESTS     Right Foot Tests   Anterior drawer: negative  Varus tilt: negative     Left Foot Tests   Anterior drawer: negative  Varus tilt: negative     Left foot additional comments: Moderate discomfort with palpation involving the plantar lateral aspect of the fifth metatarsal head region. After evaluation, hyperkeratotic lesion noted to the plantar aspect of foot with punctate keratosis. Tailor's bunion deformity noted to this region. Mild soft tissue rigidity and equinus contracture. No gross deformity or instability.    06/06/2023  Punctate callus has resolved. No further concerns or issues at this time.    Assessment & Plan   Diagnoses and all orders for this visit:    1. Left foot pain (Primary)    2.  Reina casiano, left    3. Punctate keratosis        The patient is a 77-year-old male who presents to the office today for a follow-up regarding his left foot. No results were obtained or interpreted today. The punctate callus has resolved. I recommend to keep feet moisturized with proper support. I discussed the importance of avoiding ambulating barefoot, in flip flops, in sandals, or socks. I discussed repeating the process if necessary. No further concerns or issues at this time. Pumice stone for callus care is recommended. I advise to call if needed.    Greater than 20 minutes was spent before, during, and after evaluation for patient care.    No orders of the defined types were placed in this encounter.         Note is dictated utilizing voice recognition software. Unfortunately this leads to occasional typographical errors. I apologize in advance if the situation occurs. If questions occur please do not hesitate to call our office.    Transcribed from ambient dictation for ANNA Davila DPM by Nicole Finnegan.  06/06/23   16:03 EDT    Patient or patient representative verbalized consent to the visit recording.  I have personally performed the services described in this document as transcribed by the above individual, and it is both accurate and complete.

## 2023-06-09 ENCOUNTER — TELEPHONE (OUTPATIENT)
Dept: FAMILY MEDICINE CLINIC | Facility: CLINIC | Age: 78
End: 2023-06-09
Payer: MEDICARE

## 2023-06-09 DIAGNOSIS — M54.41 CHRONIC RIGHT-SIDED LOW BACK PAIN WITH RIGHT-SIDED SCIATICA: Primary | ICD-10-CM

## 2023-06-09 DIAGNOSIS — G89.29 CHRONIC RIGHT-SIDED LOW BACK PAIN WITH RIGHT-SIDED SCIATICA: Primary | ICD-10-CM

## 2023-06-09 NOTE — TELEPHONE ENCOUNTER
Patient called with return of chronic low back pain without fever chills night sweats weight loss trouble controlling bowel or bladder or injury and does wish to restart physical therapy does have some numbness into his right foot that is not new but is his usual problem.  Wife is undergoing surgery soon so he is not coming in before he starts therapy but we will see as in 2 months.  Upon further investigation he has not had any imaging and of his low back in over a year and a half so we will order at least some plain film x-rays prior to starting PT patient will be informed.

## 2023-06-12 RX ORDER — ATORVASTATIN CALCIUM 40 MG/1
TABLET, FILM COATED ORAL
Qty: 90 TABLET | Refills: 1 | Status: SHIPPED | OUTPATIENT
Start: 2023-06-12

## 2023-06-15 ENCOUNTER — HOSPITAL ENCOUNTER (OUTPATIENT)
Dept: GENERAL RADIOLOGY | Facility: HOSPITAL | Age: 78
Discharge: HOME | End: 2023-06-15
Payer: MEDICARE

## 2023-06-15 DIAGNOSIS — G89.29 CHRONIC RIGHT-SIDED LOW BACK PAIN WITH RIGHT-SIDED SCIATICA: ICD-10-CM

## 2023-06-15 DIAGNOSIS — M54.41 CHRONIC RIGHT-SIDED LOW BACK PAIN WITH RIGHT-SIDED SCIATICA: ICD-10-CM

## 2023-06-15 PROCEDURE — 72110 X-RAY EXAM L-2 SPINE 4/>VWS: CPT

## 2023-06-16 ENCOUNTER — TELEPHONE (OUTPATIENT)
Dept: FAMILY MEDICINE CLINIC | Facility: CLINIC | Age: 78
End: 2023-06-16
Payer: MEDICARE

## 2023-06-16 NOTE — PROGRESS NOTES
Plain film x-rays show multilevel degenerative changes of the spine with a curve to the right similar to the one that was on the previous study please continue PT and keep a recheck in about 6 weeks

## 2023-06-16 NOTE — TELEPHONE ENCOUNTER
HUB TO READ:  ----- Message from Kortney Ferrera MD sent at 6/16/2023 12:52 PM EDT -----  Plain film x-rays show multilevel degenerative changes of the spine with a curve to the right similar to the one that was on the previous study please continue PT and keep a recheck in about 6 weeks

## 2023-07-25 ENCOUNTER — TREATMENT (OUTPATIENT)
Dept: PHYSICAL THERAPY | Facility: CLINIC | Age: 78
End: 2023-07-25
Payer: MEDICARE

## 2023-07-25 DIAGNOSIS — M25.551 PAIN OF RIGHT HIP: Primary | ICD-10-CM

## 2023-07-25 DIAGNOSIS — G89.29 CHRONIC RIGHT-SIDED LOW BACK PAIN WITH RIGHT-SIDED SCIATICA: ICD-10-CM

## 2023-07-25 DIAGNOSIS — M54.41 CHRONIC RIGHT-SIDED LOW BACK PAIN WITH RIGHT-SIDED SCIATICA: ICD-10-CM

## 2023-07-25 PROCEDURE — 97140 MANUAL THERAPY 1/> REGIONS: CPT | Performed by: PHYSICAL THERAPIST

## 2023-07-25 PROCEDURE — 97110 THERAPEUTIC EXERCISES: CPT | Performed by: PHYSICAL THERAPIST

## 2023-07-25 PROCEDURE — 97012 MECHANICAL TRACTION THERAPY: CPT | Performed by: PHYSICAL THERAPIST

## 2023-07-25 NOTE — PROGRESS NOTES
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  He has been able to walk a mile over unlevel ground without complaints of back or hip pain.      Objective     See Exercise, Manual, and Modality Logs for complete treatment.     Assessment/Plan Patient tolerated increased resistance with the leg press and clamshells/reverse clamshells without complaints of pain.  He did note improved stretch felt with unilateral long axis distraction to RLE this date.  Finished with mechanical traction this date with increased stretch felt into his R low back and hip this date.     Progress per Plan of Care and Progress strengthening /stabilization /functional activity         Timed:         Manual Therapy:    10     mins  28434;     Therapeutic Exercise:    21     mins  84595;     Neuromuscular Ritu:        mins  54383;    Therapeutic Activity:          mins  56144;     Gait Training:           mins  27317;     Ultrasound:          mins  65918;    Ionto                                   mins   30189    Un-Timed:  Electrical Stimulation:         mins  17534 ( );  Dry Needling          mins self-pay ;   Traction     15     mins  13528      Timed Treatment:   31   mins   Total Treatment:     46   mins    Julia Pal PT  [place] License # [id number]  Physical Therapist

## 2023-07-26 ENCOUNTER — TELEPHONE (OUTPATIENT)
Dept: CARDIOLOGY | Facility: CLINIC | Age: 78
End: 2023-07-26

## 2023-07-26 NOTE — TELEPHONE ENCOUNTER
Caller: Salinas Dill    Relationship: Self    Best call back number:     What is the best time to reach you: ANYTIME    Who are you requesting to speak with (clinical staff, provider,  specific staff member): CLINICAL        What was the call regarding: PT HAD MISSED CALL ON PHONE YESTERDAY.  HE DOES NOT CARRY PHONE.  COULD YOU LEAVE MESSAGE IN MY CHART?      Is it okay if the provider responds through MyChart: YES

## 2023-07-27 ENCOUNTER — TREATMENT (OUTPATIENT)
Dept: PHYSICAL THERAPY | Facility: CLINIC | Age: 78
End: 2023-07-27
Payer: MEDICARE

## 2023-07-27 DIAGNOSIS — G89.29 CHRONIC RIGHT-SIDED LOW BACK PAIN WITH RIGHT-SIDED SCIATICA: ICD-10-CM

## 2023-07-27 DIAGNOSIS — M54.41 CHRONIC RIGHT-SIDED LOW BACK PAIN WITH RIGHT-SIDED SCIATICA: ICD-10-CM

## 2023-07-27 DIAGNOSIS — M25.551 PAIN OF RIGHT HIP: Primary | ICD-10-CM

## 2023-07-27 PROCEDURE — 97012 MECHANICAL TRACTION THERAPY: CPT | Performed by: PHYSICAL THERAPIST

## 2023-07-27 PROCEDURE — 97140 MANUAL THERAPY 1/> REGIONS: CPT | Performed by: PHYSICAL THERAPIST

## 2023-07-27 PROCEDURE — 97110 THERAPEUTIC EXERCISES: CPT | Performed by: PHYSICAL THERAPIST

## 2023-08-01 ENCOUNTER — TREATMENT (OUTPATIENT)
Dept: PHYSICAL THERAPY | Facility: CLINIC | Age: 78
End: 2023-08-01
Payer: MEDICARE

## 2023-08-01 DIAGNOSIS — M54.41 CHRONIC RIGHT-SIDED LOW BACK PAIN WITH RIGHT-SIDED SCIATICA: ICD-10-CM

## 2023-08-01 DIAGNOSIS — M25.551 PAIN OF RIGHT HIP: Primary | ICD-10-CM

## 2023-08-01 DIAGNOSIS — G89.29 CHRONIC RIGHT-SIDED LOW BACK PAIN WITH RIGHT-SIDED SCIATICA: ICD-10-CM

## 2023-08-01 PROCEDURE — 97140 MANUAL THERAPY 1/> REGIONS: CPT | Performed by: PHYSICAL THERAPIST

## 2023-08-01 PROCEDURE — 97012 MECHANICAL TRACTION THERAPY: CPT | Performed by: PHYSICAL THERAPIST

## 2023-08-01 PROCEDURE — 97110 THERAPEUTIC EXERCISES: CPT | Performed by: PHYSICAL THERAPIST

## 2023-08-03 ENCOUNTER — TREATMENT (OUTPATIENT)
Dept: PHYSICAL THERAPY | Facility: CLINIC | Age: 78
End: 2023-08-03
Payer: MEDICARE

## 2023-08-03 DIAGNOSIS — M54.41 CHRONIC RIGHT-SIDED LOW BACK PAIN WITH RIGHT-SIDED SCIATICA: ICD-10-CM

## 2023-08-03 DIAGNOSIS — M25.551 PAIN OF RIGHT HIP: Primary | ICD-10-CM

## 2023-08-03 DIAGNOSIS — G89.29 CHRONIC RIGHT-SIDED LOW BACK PAIN WITH RIGHT-SIDED SCIATICA: ICD-10-CM

## 2023-08-03 PROCEDURE — 97140 MANUAL THERAPY 1/> REGIONS: CPT | Performed by: PHYSICAL THERAPIST

## 2023-08-03 PROCEDURE — 97110 THERAPEUTIC EXERCISES: CPT | Performed by: PHYSICAL THERAPIST

## 2023-08-03 PROCEDURE — 97012 MECHANICAL TRACTION THERAPY: CPT | Performed by: PHYSICAL THERAPIST

## 2023-08-15 ENCOUNTER — TREATMENT (OUTPATIENT)
Dept: PHYSICAL THERAPY | Facility: CLINIC | Age: 78
End: 2023-08-15
Payer: MEDICARE

## 2023-08-15 DIAGNOSIS — M25.551 PAIN OF RIGHT HIP: Primary | ICD-10-CM

## 2023-08-15 DIAGNOSIS — M54.41 CHRONIC RIGHT-SIDED LOW BACK PAIN WITH RIGHT-SIDED SCIATICA: ICD-10-CM

## 2023-08-15 DIAGNOSIS — G89.29 CHRONIC RIGHT-SIDED LOW BACK PAIN WITH RIGHT-SIDED SCIATICA: ICD-10-CM

## 2023-08-15 PROCEDURE — 97012 MECHANICAL TRACTION THERAPY: CPT | Performed by: PHYSICAL THERAPIST

## 2023-08-15 PROCEDURE — 97110 THERAPEUTIC EXERCISES: CPT | Performed by: PHYSICAL THERAPIST

## 2023-08-15 PROCEDURE — 97112 NEUROMUSCULAR REEDUCATION: CPT | Performed by: PHYSICAL THERAPIST

## 2023-08-15 NOTE — PROGRESS NOTES
Physical Therapy Daily Treatment Note  [address], [place] [address]      Patient: Salinas Dill   : 1945  Diagnosis/ICD-10 Code:  Pain of right hip [M25.551]   Problems Addressed this Visit          Musculoskeletal and Injuries    Low back pain     Other Visit Diagnoses       Pain of right hip    -  Primary          Diagnoses         Codes Comments    Pain of right hip    -  Primary ICD-10-CM: M25.551  ICD-9-CM: 719.45     Chronic right-sided low back pain with right-sided sciatica     ICD-10-CM: M54.41, G89.29  ICD-9-CM: 724.2, 724.3, 338.29            Referring practitioner: Kortney Ferrera MD  Date of Initial Visit: Type: THERAPY  Noted: 2023  Today's Date: 8/15/2023    VISIT#: 10    Subjective Patient reports he has been sleeping better and has not had to sleep with his feet off the end of the bed.  He feels like he is not 100% better but states it has much improved.  Patient verbalizes readiness to transition to HEP.      Objective   R hip internal rotation AROM 32 degrees  R hip external rotation AROM 50 degrees  L hip external rotation 45 degrees  L hip internal rotation 34 degrees  B hip flexion 5/5  B hip abduction 4+/5  B hip extension 5/5  See Exercise, Manual, and Modality Logs for complete treatment.     Assessment/Plan  STG:  -Patient will report a reduction in R hip pain by >/=25% for improved sleep pattern in 3-4 weeks. MET  -Patient will be independent with HEP in 3 weeks. MET  -Patient will be able to tolerate standing/walking for 30 minutes without limping to indicate symptoms are improving with functional mobility in 4 weeks. MET     LTG:  -Patient will report min to no R hip pain for improved sleep pattern and ability to ambulate community distances in 12 weeks. NOT MET, progressing  -Patient will demonstrate increased R hip internal rotation AROM to 30 degrees in 12 weeks. MET  -Patient be able to tolerate >/=60 minutes of standing/ambulation with R hip  pain to at worst 2/10 for improved functional mobility in 12 weeks. MET  -Patient will demonstrate increased B hip strength to 5/5 in 12 weeks. PARTIALLY MET    Patient has attended 10 PT sessions with steady progress towards goals.  Patient demonstrates increased B hip rotation internally and externally making it easier to don socks/shoes.  Patient also reports pain levels have improved with most pain noted in bed.  He has been able to sleep in bed all night but does wake up with some pain in his R low back/hip in the morning.  He demonstrates increased strength of B hips and is able to ambulate community distances without pain.  Progress discussed with patient.   Plan to transition to [facility] at this time.  Patient to follow up within 30 days if needed.  If not appointments made, plan to discharge patient's chart.         Timed:         Manual Therapy:         mins  55692;     Therapeutic Exercise:    15     mins  17215;     Neuromuscular Ritu:    15    mins  68392;    Therapeutic Activity:          mins  08200;     Gait Training:           mins  76261;     Ultrasound:          mins  08616;    Ionto                                   mins   92050    Un-Timed:  Electrical Stimulation:         mins  77915 ( );  Dry Needling          mins self-pay 20560; 20561  Traction     15     mins 97295      Timed Treatment:   30   mins   Total Treatment:     45   mins    Julia Pal PT  [place] License # [id number]  Physical Therapist

## 2023-08-23 NOTE — PROGRESS NOTES
The ABCs of the Annual Wellness Visit  Subsequent Medicare Wellness Visit    Subjective    History of Present Illness:  Salinas Dill is a 77 y.o. male who presents for a Subsequent Medicare Wellness Visit.    The following portions of the patient's history were reviewed and   updated as appropriate: allergies, current medications, past family history, past medical history, past social history, past surgical history, and problem list.    Compared to one year ago, the patient feels his physical   health is better.    Compared to one year ago, the patient feels his mental   health is the same.    Recent Hospitalizations:  He was not admitted to the hospital during the last year.       Current Medical Providers:  Patient Care Team:  Kortney Ferrera MD as PCP - General  Earl, Taurus NUNES MD as Consulting Physician (Urology)  Bharti Asencio MD (Dermatology)    Outpatient Medications Prior to Visit   Medication Sig Dispense Refill    acetaminophen (TYLENOL) 325 MG tablet Take 2 tablets by mouth Every 6 (Six) Hours As Needed for Mild Pain .      aspirin (aspirin) 81 MG EC tablet Take 1 tablet by mouth Daily. 30 tablet 2    atorvastatin (LIPITOR) 40 MG tablet TAKE 1 TABLET DAILY AT BEDTIME. 90 tablet 1    B Complex Vitamins (vitamin b complex) capsule capsule Take 1 capsule by mouth Daily. LD 11/4      BLACK ELDERBERRY PO Take 158 mg by mouth Every Night.      carvedilol (COREG) 3.125 MG tablet TAKE 1 TABLET BY MOUTH EVERY 12 (TWELVE) HOURS. 180 tablet 2    Cholecalciferol (VITAMIN D) 2000 units capsule Take 25 capsules by mouth 1 (One) Time Per Week. mondays      losartan (Cozaar) 100 MG tablet Take 1 tablet by mouth Daily. 90 tablet 3    melatonin 5 MG tablet tablet Take 1 tablet by mouth Every Night.      SILDENAFIL CITRATE PO Take 20 mg by mouth Daily. For enlarged prostate      tamsulosin (FLOMAX) 0.4 MG capsule 24 hr capsule Take 2 capsules by mouth Daily.      Turmeric 500 MG capsule Take 1 capsule by mouth  Daily.       No facility-administered medications prior to visit.       No opioid medication identified on active medication list. I have reviewed chart for other potential  high risk medication/s and harmful drug interactions in the elderly.        Aspirin is on active medication list. Aspirin use is indicated based on review of current medical condition/s. Pros and cons of this therapy have been discussed today. Benefits of this medication outweigh potential harm.  Patient has been encouraged to continue taking this medication.  .    Patient Active Problem List   Diagnosis    Plantar fasciitis    Atrial fibrillation    B12 deficiency    Benign prostatic hyperplasia with urinary obstruction    Drug-induced impotence    Hyperglycemia    Hypertension    Increased frequency of urination    Low back pain    Presence of artificial shoulder joint    Snoring    Vitamin D deficiency    Right wrist pain    Localized swelling on hand    Elbow pain, left    Nuclear senile cataract    Presbyopia    Dyslipidemia    Obesity    Class 1 obesity due to excess calories with serious comorbidity and body mass index (BMI) of 31.0 to 31.9 in adult    Nonrheumatic mitral valve regurgitation    Coronary artery disease involving native coronary artery of native heart without angina pectoris    S/P CABG x 3 with CHOPRA per Dr. 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 Hernandez 11/11/2021    S/P Maze operation for atrial fibrillation per Dr. Hernandez 11/11/2021    S/P left atrial appendage ligation    Arthritis    Bulging lumbar disc    Difficulty maintaining body in lying position    Spinal stenosis of lumbar region with radiculopathy    Nonsustained ventricular tachycardia    Cough    Skin lesion     Advance Care Planning  Advance Directive is on file.  ACP discussion was held with the patient during this visit. Patient has an advance directive in EMR which is still valid.      Objective    Vitals:    08/24/23 1452 08/24/23 1500  "  BP: 133/80 129/66   BP Location: Right arm Left arm   Patient Position: Sitting Sitting   Cuff Size: Large Adult Large Adult   Pulse: 83    Temp: 98.9 øF (37.2 øC)    TempSrc: Tympanic    SpO2: 96%    Weight: 101 kg (222 lb 9.6 oz)    Height: 177.8 cm (70\")      Estimated body mass index is 31.94 kg/mý as calculated from the following:    Height as of this encounter: 177.8 cm (70\").    Weight as of this encounter: 101 kg (222 lb 9.6 oz).    BMI is >= 30 and <35. (Class 1 Obesity). The following options were offered after discussion;: exercise counseling/recommendations and nutrition counseling/recommendations      Does the patient have evidence of cognitive impairment? No          HEALTH RISK ASSESSMENT    Smoking Status:  Social History     Tobacco Use   Smoking Status Former    Packs/day: 1.00    Years: 12.00    Pack years: 12.00    Types: Cigarettes    Quit date: 1978    Years since quittin.6   Smokeless Tobacco Former    Quit date:      Alcohol Consumption:  Social History     Substance and Sexual Activity   Alcohol Use Yes    Alcohol/week: 1.0 standard drink    Types: 1 Shots of liquor per week    Comment: [place]      Fall Risk Screen:    ALIE Fall Risk Assessment was completed, and patient is at LOW risk for falls.Assessment completed on:2023    Depression Screenin/24/2023     2:53 PM   PHQ-2/PHQ-9 Depression Screening   Little Interest or Pleasure in Doing Things 0-->not at all   Feeling Down, Depressed or Hopeless 0-->not at all   PHQ-9: Brief Depression Severity Measure Score 0       Health Habits and Functional and Cognitive Screenin/24/2023     2:53 PM   Functional & Cognitive Status   Do you have difficulty preparing food and eating? No   Do you have difficulty bathing yourself, getting dressed or grooming yourself? No   Do you have difficulty using the toilet? No   Do you have difficulty moving around from place to place? No   Do you " have trouble with steps or getting out of a bed or a chair? No   Current Diet Well Balanced Diet   Dental Exam Up to date   Eye Exam Up to date   Exercise (times per week) 6 times per week   Current Exercises Include Yard Work;Gardening   Do you need help using the phone?  No   Are you deaf or do you have serious difficulty hearing?  No   Do you need help to go to places out of walking distance? No   Do you need help shopping? No   Do you need help preparing meals?  No   Do you need help with housework?  No   Do you need help with laundry? No   Do you need help taking your medications? No   Do you need help managing money? No   Do you ever drive or ride in a car without wearing a seat belt? No   Have you felt unusual stress, anger or loneliness in the last month? No   Who do you live with? Spouse   If you need help, do you have trouble finding someone available to you? No   Have you been bothered in the last four weeks by sexual problems? No   Do you have difficulty concentrating, remembering or making decisions? Yes       Age-appropriate Screening Schedule:  Refer to the list below for future screening recommendations based on patient's age, sex and/or medical conditions. Orders for these recommended tests are listed in the plan section. The patient has been provided with a written plan.    Health Maintenance   Topic Date Due    URINE MICROALBUMIN  Never done    COVID-19 Vaccine (6 - Pfizer series) 01/28/2023    HEMOGLOBIN A1C  09/01/2023    INFLUENZA VACCINE  10/01/2023    PROSTATE CANCER SCREENING  11/01/2023    LIPID PANEL  03/01/2024    DIABETIC EYE EXAM  03/06/2024    ANNUAL WELLNESS VISIT  08/24/2024    TDAP/TD VACCINES (3 - Td or Tdap) 08/25/2032    HEPATITIS C SCREENING  Completed    Pneumococcal Vaccine 65+  Completed    ZOSTER VACCINE  Completed    COLORECTAL CANCER SCREENING  Discontinued                CMS Preventative Services Quick Reference  Risk Factors Identified During Encounter  None  Identified  The above risks/problems have been discussed with the patient.  Follow up actions/plans if indicated are seen below in the Assessment/Plan Section.  Pertinent information has been shared with the patient in the After Visit Summary.  An After Visit Summary and PPPS were made available to the patient.    Follow Up:   Next Medicare Wellness visit to be scheduled in 1 year.         Additional E&M Note during same encounter follows:  Patient has multiple medical problems which are significant and separately identifiable that require additional work above and beyond the Medicare Wellness Visit.        Chief Complaint  Medicare Wellness-subsequent    Subjective        HPI  Salinas Dill also presents   Chief Complaint   Patient presents with    Medicare Wellness-subsequent   .  Salinas Dill presents today for his annual wellness exam for Medicare. He is also going to have a 6-month follow-up for status post CABG, hypertension, hyperglycemia, dyslipidemia, cough, benign prostatic hypertrophy with urinary obstruction, B12 deficiency, atrial fibrillation, class I obesity with serious comorbidities. Allergies lisinopril.    The patient reports his physical health is better than as last year and his mental health is the same. He denies being hospitalized in the past 365 days. He states taking aspirin daily. He notes having a advanced directive in place. He denies watching carbohydrates and saturated fats. He reports he is taking B12 and B complex daily. He states he is current on eye and dental exams.    The patient reports he is not having urination difficulties. He states he increased his Flomax to 2 capsules daily. He denies having cardiac palpitations.    The patient reports he has a round spot on his right ankle for 2-3 weeks. He denies itching.    The patient reports the physical therapy has helped with the back pain during the day. He states he goes to bed around 10 and wakes at 3 with back and hip pain that  "keeps him from going back to sleep in the bed, and he ends up sleep in his easy chair. He notes he gets right foot numbness when he lays down in bed for the past 6-7 months.  He reports he sleeps on his back or side and uses a pillow between his legs.    Review of Systems   Constitutional:  Negative for chills and fever.   HENT:  Negative for hearing loss and trouble swallowing.    Respiratory:  Negative for cough.    Cardiovascular:  Negative for chest pain and palpitations.   Gastrointestinal:  Negative for blood in stool, constipation and diarrhea.   Genitourinary:  Negative for dysuria, hematuria and urgency.   Musculoskeletal:  Positive for back pain.   Neurological:  Negative for seizures and syncope.     Objective   Vital Signs:  /66 (BP Location: Left arm, Patient Position: Sitting, Cuff Size: Large Adult)   Pulse 83   Temp 98.9 øF (37.2 øC) (Tympanic)   Ht 177.8 cm (70\")   Wt 101 kg (222 lb 9.6 oz)   SpO2 96%   BMI 31.94 kg/mý     Physical Exam  Constitutional:       Appearance: Normal appearance.   HENT:      Head: Normocephalic and atraumatic.      Right Ear: Tympanic membrane normal.      Left Ear: Tympanic membrane normal.      Mouth/Throat:      Mouth: Mucous membranes are moist.   Eyes:      Pupils: Pupils are equal, round, and reactive to light.   Neck:      Vascular: No carotid bruit.      Comments: No thyromegaly, thyroid, masses, cervical, or supraclavicular adenopathy.  Cardiovascular:      Rate and Rhythm: Normal rate and regular rhythm.      Heart sounds: No murmur heard.  Pulmonary:      Effort: Pulmonary effort is normal.      Breath sounds: Normal breath sounds.   Abdominal:      General: Bowel sounds are normal. There is no distension.      Palpations: There is no mass.   Musculoskeletal:      Right lower leg: No edema.      Left lower leg: No edema.   Skin:     Findings: Lesion present.      Comments: 1 cm dark, raised, round area with scales in the center on the right instep "   Neurological:      Mental Status: He is alert.   Psychiatric:         Behavior: Behavior normal.                       Assessment and Plan   Diagnoses and all orders for this visit:    1. encounter (Primary)    2. S/P CABG x 3 with KEYSHA per Dr. Hernandez 11/11/2021    3. Primary hypertension  -     Comprehensive Metabolic Panel; Future  -     CBC Auto Differential; Future    4. Hyperglycemia  -     Hemoglobin A1c; Future    5. Dyslipidemia  -     Lipid Panel; Future    6. Cough, unspecified type    7. Benign prostatic hyperplasia with urinary obstruction    8. B12 deficiency  -     Vitamin B12; Future    9. Paroxysmal atrial fibrillation  -     Magnesium; Future  -     TSH; Future    10. Class 1 obesity due to excess calories with serious comorbidity and body mass index (BMI) of 31.0 to 31.9 in adult    11. Skin lesion    12. Spinal stenosis of lumbar region with radiculopathy             Follow Up   Return in about 6 months (around 2/24/2024).  Patient was given instructions and counseling regarding his condition or for health maintenance advice. Please see specific information pulled into the AVS if appropriate.     Transcribed from ambient dictation for Kortney Ferrera MD by Aura Teague.  08/24/23   16:47 EDT    Patient or patient representative verbalized consent to the visit recording.  I have personally performed the services described in this document as transcribed by the above individual, and it is both accurate and complete.

## 2023-08-23 NOTE — PATIENT INSTRUCTIONS
Health Maintenance Due   Topic Date Due    URINE MICROALBUMIN  Never done    COVID-19 Vaccine (6 - Pfizer series) 01/28/2023    ANNUAL WELLNESS VISIT  08/23/2023    Use Lamisil AF twice daily and call 3 weeks if not starting to improve.    Patient to call if numbness worsens, gets leg weakness or loses control of bowel or bladder and will repeat Mri-call in 3 months to report symptoms

## 2023-08-24 ENCOUNTER — OFFICE VISIT (OUTPATIENT)
Dept: FAMILY MEDICINE CLINIC | Facility: CLINIC | Age: 78
End: 2023-08-24
Payer: MEDICARE

## 2023-08-24 VITALS
SYSTOLIC BLOOD PRESSURE: 129 MMHG | BODY MASS INDEX: 31.87 KG/M2 | TEMPERATURE: 98.9 F | OXYGEN SATURATION: 96 % | WEIGHT: 222.6 LBS | HEART RATE: 83 BPM | DIASTOLIC BLOOD PRESSURE: 66 MMHG | HEIGHT: 70 IN

## 2023-08-24 DIAGNOSIS — N13.8 BENIGN PROSTATIC HYPERPLASIA WITH URINARY OBSTRUCTION: ICD-10-CM

## 2023-08-24 DIAGNOSIS — N40.1 BENIGN PROSTATIC HYPERPLASIA WITH URINARY OBSTRUCTION: ICD-10-CM

## 2023-08-24 DIAGNOSIS — M48.061 SPINAL STENOSIS OF LUMBAR REGION WITH RADICULOPATHY: ICD-10-CM

## 2023-08-24 DIAGNOSIS — L98.9 SKIN LESION: ICD-10-CM

## 2023-08-24 DIAGNOSIS — E66.09 CLASS 1 OBESITY DUE TO EXCESS CALORIES WITH SERIOUS COMORBIDITY AND BODY MASS INDEX (BMI) OF 31.0 TO 31.9 IN ADULT: ICD-10-CM

## 2023-08-24 DIAGNOSIS — I48.0 PAROXYSMAL ATRIAL FIBRILLATION: ICD-10-CM

## 2023-08-24 DIAGNOSIS — E53.8 B12 DEFICIENCY: ICD-10-CM

## 2023-08-24 DIAGNOSIS — R05.9 COUGH, UNSPECIFIED TYPE: ICD-10-CM

## 2023-08-24 DIAGNOSIS — I10 PRIMARY HYPERTENSION: ICD-10-CM

## 2023-08-24 DIAGNOSIS — M54.16 SPINAL STENOSIS OF LUMBAR REGION WITH RADICULOPATHY: ICD-10-CM

## 2023-08-24 DIAGNOSIS — Z00.01 ENCOUNTER FOR ANNUAL GENERAL MEDICAL EXAMINATION WITH ABNORMAL FINDINGS IN ADULT: Primary | ICD-10-CM

## 2023-08-24 DIAGNOSIS — R73.9 HYPERGLYCEMIA: ICD-10-CM

## 2023-08-24 DIAGNOSIS — Z95.1 S/P CABG X 3: ICD-10-CM

## 2023-08-24 DIAGNOSIS — E78.5 DYSLIPIDEMIA: ICD-10-CM

## 2023-08-30 ENCOUNTER — TELEPHONE (OUTPATIENT)
Dept: FAMILY MEDICINE CLINIC | Facility: CLINIC | Age: 78
End: 2023-08-30
Payer: MEDICARE

## 2023-09-05 ENCOUNTER — CLINICAL SUPPORT (OUTPATIENT)
Dept: FAMILY MEDICINE CLINIC | Facility: CLINIC | Age: 78
End: 2023-09-05
Payer: MEDICARE

## 2023-09-05 DIAGNOSIS — R73.9 HYPERGLYCEMIA: Primary | ICD-10-CM

## 2023-09-05 DIAGNOSIS — I10 PRIMARY HYPERTENSION: ICD-10-CM

## 2023-09-05 DIAGNOSIS — E78.5 DYSLIPIDEMIA: ICD-10-CM

## 2023-09-05 DIAGNOSIS — I48.0 PAROXYSMAL ATRIAL FIBRILLATION: ICD-10-CM

## 2023-09-05 DIAGNOSIS — E53.8 B12 DEFICIENCY: ICD-10-CM

## 2023-09-05 LAB
ALBUMIN SERPL-MCNC: 4.1 G/DL (ref 3.5–5.2)
ALBUMIN UR-MCNC: <1.2 MG/DL
ALBUMIN/GLOB SERPL: 1.5 G/DL
ALP SERPL-CCNC: 51 U/L (ref 39–117)
ALT SERPL W P-5'-P-CCNC: 17 U/L (ref 1–41)
ANION GAP SERPL CALCULATED.3IONS-SCNC: 11.6 MMOL/L (ref 5–15)
AST SERPL-CCNC: 22 U/L (ref 1–40)
BASOPHILS # BLD AUTO: 0.04 10*3/MM3 (ref 0–0.2)
BASOPHILS NFR BLD AUTO: 0.7 % (ref 0–1.5)
BILIRUB SERPL-MCNC: 0.5 MG/DL (ref 0–1.2)
BUN SERPL-MCNC: 20 MG/DL (ref 8–23)
BUN/CREAT SERPL: 25.6 (ref 7–25)
CALCIUM SPEC-SCNC: 9.3 MG/DL (ref 8.6–10.5)
CHLORIDE SERPL-SCNC: 106 MMOL/L (ref 98–107)
CHOLEST SERPL-MCNC: 130 MG/DL (ref 0–200)
CO2 SERPL-SCNC: 24.4 MMOL/L (ref 22–29)
CREAT SERPL-MCNC: 0.78 MG/DL (ref 0.76–1.27)
CREAT UR-MCNC: 99.7 MG/DL
DEPRECATED RDW RBC AUTO: 41.3 FL (ref 37–54)
EGFRCR SERPLBLD CKD-EPI 2021: 92.4 ML/MIN/1.73
EOSINOPHIL # BLD AUTO: 0.24 10*3/MM3 (ref 0–0.4)
EOSINOPHIL NFR BLD AUTO: 4.2 % (ref 0.3–6.2)
ERYTHROCYTE [DISTWIDTH] IN BLOOD BY AUTOMATED COUNT: 12.9 % (ref 12.3–15.4)
GLOBULIN UR ELPH-MCNC: 2.7 GM/DL
GLUCOSE SERPL-MCNC: 90 MG/DL (ref 65–99)
HBA1C MFR BLD: 5.7 % (ref 4.8–5.6)
HCT VFR BLD AUTO: 42 % (ref 37.5–51)
HDLC SERPL-MCNC: 48 MG/DL (ref 40–60)
HGB BLD-MCNC: 14.2 G/DL (ref 13–17.7)
IMM GRANULOCYTES # BLD AUTO: 0.01 10*3/MM3 (ref 0–0.05)
IMM GRANULOCYTES NFR BLD AUTO: 0.2 % (ref 0–0.5)
LDLC SERPL CALC-MCNC: 69 MG/DL (ref 0–100)
LDLC/HDLC SERPL: 1.45 {RATIO}
LYMPHOCYTES # BLD AUTO: 1.07 10*3/MM3 (ref 0.7–3.1)
LYMPHOCYTES NFR BLD AUTO: 18.8 % (ref 19.6–45.3)
MAGNESIUM SERPL-MCNC: 2.3 MG/DL (ref 1.6–2.4)
MCH RBC QN AUTO: 30 PG (ref 26.6–33)
MCHC RBC AUTO-ENTMCNC: 33.8 G/DL (ref 31.5–35.7)
MCV RBC AUTO: 88.6 FL (ref 79–97)
MICROALBUMIN/CREAT UR: NORMAL MG/G{CREAT}
MONOCYTES # BLD AUTO: 0.76 10*3/MM3 (ref 0.1–0.9)
MONOCYTES NFR BLD AUTO: 13.4 % (ref 5–12)
NEUTROPHILS NFR BLD AUTO: 3.56 10*3/MM3 (ref 1.7–7)
NEUTROPHILS NFR BLD AUTO: 62.7 % (ref 42.7–76)
NRBC BLD AUTO-RTO: 0 /100 WBC (ref 0–0.2)
PLATELET # BLD AUTO: 250 10*3/MM3 (ref 140–450)
PMV BLD AUTO: 12.6 FL (ref 6–12)
POTASSIUM SERPL-SCNC: 4.1 MMOL/L (ref 3.5–5.2)
PROT SERPL-MCNC: 6.8 G/DL (ref 6–8.5)
RBC # BLD AUTO: 4.74 10*6/MM3 (ref 4.14–5.8)
SODIUM SERPL-SCNC: 142 MMOL/L (ref 136–145)
TRIGL SERPL-MCNC: 63 MG/DL (ref 0–150)
TSH SERPL DL<=0.05 MIU/L-ACNC: 2.76 UIU/ML (ref 0.27–4.2)
VIT B12 BLD-MCNC: 782 PG/ML (ref 211–946)
VLDLC SERPL-MCNC: 13 MG/DL (ref 5–40)
WBC NRBC COR # BLD: 5.68 10*3/MM3 (ref 3.4–10.8)

## 2023-09-05 PROCEDURE — 80061 LIPID PANEL: CPT | Performed by: PREVENTIVE MEDICINE

## 2023-09-05 PROCEDURE — 82570 ASSAY OF URINE CREATININE: CPT | Performed by: PREVENTIVE MEDICINE

## 2023-09-05 PROCEDURE — 82607 VITAMIN B-12: CPT | Performed by: PREVENTIVE MEDICINE

## 2023-09-05 PROCEDURE — 80053 COMPREHEN METABOLIC PANEL: CPT | Performed by: PREVENTIVE MEDICINE

## 2023-09-05 PROCEDURE — 82043 UR ALBUMIN QUANTITATIVE: CPT | Performed by: PREVENTIVE MEDICINE

## 2023-09-05 PROCEDURE — 83735 ASSAY OF MAGNESIUM: CPT | Performed by: PREVENTIVE MEDICINE

## 2023-09-05 PROCEDURE — 83036 HEMOGLOBIN GLYCOSYLATED A1C: CPT | Performed by: PREVENTIVE MEDICINE

## 2023-09-05 PROCEDURE — 36415 COLL VENOUS BLD VENIPUNCTURE: CPT | Performed by: PREVENTIVE MEDICINE

## 2023-09-05 PROCEDURE — 85025 COMPLETE CBC W/AUTO DIFF WBC: CPT | Performed by: PREVENTIVE MEDICINE

## 2023-09-05 PROCEDURE — 84443 ASSAY THYROID STIM HORMONE: CPT | Performed by: PREVENTIVE MEDICINE

## 2023-09-05 NOTE — PROGRESS NOTES
Venipuncture performed on Right Arm by Sybil Hung MA  with good hemostasis. Patient tolerated well. 09/05/23  Kortney Ferrera MD

## 2023-09-06 ENCOUNTER — TELEPHONE (OUTPATIENT)
Dept: FAMILY MEDICINE CLINIC | Facility: CLINIC | Age: 78
End: 2023-09-06
Payer: MEDICARE

## 2023-09-06 NOTE — PROGRESS NOTES
Glucose was normal at 90 but A1c still shows slight increase in risk for diabetes at 5.7.  Please continue to watch carbs and continue exercising call if any other questions or concerns

## 2023-09-06 NOTE — TELEPHONE ENCOUNTER
HUB TO READ:  ----- Message from Kortney Ferrera MD sent at 9/6/2023  9:45 AM EDT -----  Glucose was normal at 90 but A1c still shows slight increase in risk for diabetes at 5.7.  Please continue to watch carbs and continue exercising call if any other questions or concerns

## 2023-09-11 RX ORDER — CARVEDILOL 3.12 MG/1
3.12 TABLET ORAL EVERY 12 HOURS SCHEDULED
Qty: 180 TABLET | Refills: 1 | Status: SHIPPED | OUTPATIENT
Start: 2023-09-11

## 2023-10-10 ENCOUNTER — DOCUMENTATION (OUTPATIENT)
Dept: PHYSICAL THERAPY | Facility: CLINIC | Age: 78
End: 2023-10-10
Payer: MEDICARE

## 2023-10-10 NOTE — PROGRESS NOTES
Discharge Summary  Discharge Summary from Physical Therapy Report      Dates  PT visit: 6/27/23 - 8/15/23  Number of Visits: 10     Discharge Status of Patient: See MD Note dated 8/15/23    Goals: Partially Met    Discharge Plan: Continue with current home exercise program as instructed    Comments Patient progressed well with PT for R sided low back pain.  He reported intermittent back pain/hip pain in the morning but felt like he could manage at home with his HEP.  Patient able to sleep in bed at night without waking due to pain.  His hip strength had also improved with improved tolerance to community ambulation.  Patient to d/c from PT at this time.    Date of Discharge 10/10/23        Julia Pal, PT  Physical Therapist

## 2023-12-06 RX ORDER — ATORVASTATIN CALCIUM 40 MG/1
TABLET, FILM COATED ORAL
Qty: 90 TABLET | Refills: 0 | Status: SHIPPED | OUTPATIENT
Start: 2023-12-06

## 2023-12-18 RX ORDER — LOSARTAN POTASSIUM 100 MG/1
100 TABLET ORAL DAILY
Qty: 90 TABLET | Refills: 2 | Status: SHIPPED | OUTPATIENT
Start: 2023-12-18

## 2024-01-24 ENCOUNTER — OFFICE VISIT (OUTPATIENT)
Dept: CARDIOLOGY | Facility: CLINIC | Age: 79
End: 2024-01-24
Payer: MEDICARE

## 2024-01-24 VITALS
DIASTOLIC BLOOD PRESSURE: 85 MMHG | WEIGHT: 222 LBS | HEIGHT: 70 IN | OXYGEN SATURATION: 98 % | BODY MASS INDEX: 31.78 KG/M2 | SYSTOLIC BLOOD PRESSURE: 153 MMHG | HEART RATE: 68 BPM

## 2024-01-24 DIAGNOSIS — I47.29 NONSUSTAINED VENTRICULAR TACHYCARDIA: ICD-10-CM

## 2024-01-24 DIAGNOSIS — Z98.890 S/P MVR (MITRAL VALVE REPAIR): Primary | ICD-10-CM

## 2024-01-24 DIAGNOSIS — Z86.79 S/P MAZE OPERATION FOR ATRIAL FIBRILLATION: ICD-10-CM

## 2024-01-24 DIAGNOSIS — Z95.1 S/P CABG X 3: ICD-10-CM

## 2024-01-24 DIAGNOSIS — Z98.890 S/P MAZE OPERATION FOR ATRIAL FIBRILLATION: ICD-10-CM

## 2024-01-24 DIAGNOSIS — I25.10 CORONARY ARTERY DISEASE INVOLVING NATIVE CORONARY ARTERY OF NATIVE HEART WITHOUT ANGINA PECTORIS: ICD-10-CM

## 2024-01-24 DIAGNOSIS — Z98.890 S/P LEFT ATRIAL APPENDAGE LIGATION: ICD-10-CM

## 2024-01-24 NOTE — PROGRESS NOTES
Cardiology Office Visit      Encounter Date:  01/24/2024    Patient ID:   Salnias Dill is a 78 y.o. male.      Reason For Followup:  Chronic atrial fibrillation  Mitral regurgitation  Hypertension  Status post coronary artery bypass surgery and mitral valve repair    Brief Clinical History:  Dear Dr. Ferrera, Kortney Infante MD    I had the pleasure of seeing Salinas Dill today. As you are well aware, this is a 78 y.o. male with past medical history significant for history of hypertension and benign prostatic hypertrophy and recent hospitalization for coronary artery disease and coronary artery bypass surgery here for further evaluation treatment options      Interpretation Summary    Left ventricular ejection fraction appears to be 56 - 60%. Left ventricular systolic function is normal.  There is a mitral valve ring present.  Saline test results are negative.  Left atrial appendage ligation noted.  Left atrial appendage is ligated and demonstrates continued closure.  No evidence of communication with left atrial cavity.           Interval History:  Denies any new complaints  Complaint on medical therapy  Denies any exertional symptoms of chest pain shortness of breath dizziness or syncope  Denies any new cardiac symptoms  Assessment & Plan    Impressions:  Chronic atrial fibrillation/currently in sinus rhythm  Hypertension  Ilan Vascor of 2  stress test with no evidence of any inducible ischemia  echocardiogram with normal LV systolic function and moderate mitral regurgitation  Severe multivessel coronary artery disease status post CABG x3 with a LIMA to the mid LAD and reverse individual saphenous vein graft to the obtuse marginal 1 and PLB branch of the right coronary artery  Severe mitral valve insufficiency status post ring annuloplasty  Atrial fibrillation with right and left cryomaze procedure and left atrial appendage ligation  Postop atrial fibrillation status post surgical maze currently in sinus  "rhythm  Essential hypertension  Dyslipidemia  BPH  Extended Holter monitor with nonsustained ventricular tachycardia episodes  Multiple episodes of brief salvos of supraventricular tachycardia  Status post EP study with no inducible arrhythmia    Recommendations:    Continue aspirin for anticoagulation therapy  Patient is currently on low-dose beta-blockers but cannot tolerate any higher doses secondary to blood pressure issues  Recent echocardiogram and also AVERY showing no significant residual valve pathology only residual mild mitral regurgitation no significant mitral stenosis and a complete seal of the left atrial appendage  Continue risk factor modification  Recent labs reviewed and discussed with the patient  Lipids at goal  AVERY and echocardiogram with normal functioning of the valve  Echocardiogram with normal LV systolic function normal functioning of the valve  EP study with no induction of SVT or ventricular tachycardia  Repeat extended Holter monitor to further evaluate the cardiac arrhythmias  Continue current medical therapy with aspirin 81 mg p.o. once a day Lipitor 40 mg p.o. once a day Coreg 3.125 mg p.o. twice daily  Recent labs and work-up reviewed and discussed with patient  Repeat Holter monitor results are pending at this time  Continue regular exercise continue aggressive risk factor modification  Workup and labs reviewed and discussed with the patient and family  follow-up in office in 6 months        Vitals:  Vitals:    01/24/24 1302   BP: 153/85   BP Location: Left arm   Patient Position: Sitting   Pulse: 68   SpO2: 98%   Weight: 101 kg (222 lb)   Height: 177.8 cm (70\")       Physical Exam:    General: Alert, cooperative, no distress, appears stated age  Head:  Normocephalic, atraumatic, mucous membranes moist  Eyes:  Conjunctiva/corneas clear, EOM's intact     Neck:  Supple,  no adenopathy;      Lungs: Clear to auscultation bilaterally, no wheezes rhonchi rales are noted  Chest wall: No " tenderness  Heart::  Regular rate and rhythm, S1 and S2 normal, no murmur, rub or gallop  Abdomen: Soft, non-tender, nondistended bowel sounds active  Extremities: No cyanosis, clubbing, or edema  Pulses: 2+ and symmetric all extremities  Skin:  No rashes or lesions  Neuro/psych: A&O x3. CN II through XII are grossly intact with appropriate affect              Lab Results   Component Value Date    GLUCOSE 90 09/05/2023    BUN 20 09/05/2023    CREATININE 0.78 09/05/2023    EGFR 92.4 09/05/2023    BCR 25.6 (H) 09/05/2023    K 4.1 09/05/2023    CO2 24.4 09/05/2023    CALCIUM 9.3 09/05/2023    ALBUMIN 4.1 09/05/2023    BILITOT 0.5 09/05/2023    AST 22 09/05/2023    ALT 17 09/05/2023     Results for orders placed during the hospital encounter of 02/22/22    Adult Transesophageal Echo (AVERY) W/ Cont if Necessary Per Protocol    Interpretation Summary  · Left ventricular ejection fraction appears to be 56 - 60%. Left ventricular systolic function is normal.  · There is a mitral valve ring present.  · Saline test results are negative.  · Left atrial appendage ligation noted.  · Left atrial appendage is ligated and demonstrates continued closure.  · No evidence of communication with left atrial cavity.     Results for orders placed during the hospital encounter of 10/13/21    Cardiac Catheterization/Vascular Study    Narrative  Table formatting from the original result was not included.  Cardiac Catheterization Operative Report    Salinas LOOMIS Aniyah  [id number]  10/13/2021  @PCP@    He underwent cardiac catheterization.    Indications for the procedure include: shortness of breath.    Procedure Details:  The risks, benefits, complications, treatment options, and expected outcomes were discussed with the patient. The patient and/or family concurred with the proposed plan, giving informed consent.    After informed consent the patient was brought to the cath lab after appropriate IV hydration was begun and oral premedication was  given. He was further sedated with fentanyl. He was prepped and draped in the usual manner. Using the modified Seldinger access technique, a 6 Belgian sheath was placed in the femoral artery. A left heart catheterization with coronary arteriography was performed. Findings are discussed below.    For the IFR evaluation we used a  guide catheter with a [place] pressure wire to measure the IFR value of the mid LAD and also midportion of the ramus branch of the circumflex and also the left main coronary artery.  Patient tolerated procedure well with no immediate possible complications plan to obtain hemostasis by manual pressure.  Procedural anticoagulation was heparin.    After the procedure was completed, sedation was stopped and the sheaths and catheters were all removed. Hemostasis was achieved per established hospital protocols.    Conscious sedation:  Conscious sedation was performed according to protocol.  I supervised and directed an independent trained observer with the assistance of monitoring the patient's level of consciousness and physiologic status throughout the procedure.  Intraoperative service time was 60 minutes.    Findings:    Hemodynamics Central aortic pressure systolic 102 and diastolic 60 with a mean pressure of 79 mmHg  LV end-diastolic pressure was 4 mmHg  There was no gradient across the aortic valve on the pullback of the pigtail catheter  Left Main  left main coronary artery is large caliber vessel extensively calcified distal left main coronary artery has angiographic 50% stenosis before it trifurcates into left anterior descending ramus intermediate and left circumflex artery  RCA  large-caliber dominant vessel.  Right coronary artery has a proximal angiographic 30 to 40% stenosis mid angiographic 30 to 40% stenosis  Distal right coronary artery before the PDA PLB branches is angiographically 60% stenosis provides large-caliber PDA and PLB branch  PLB branch of the right coronary  artery has a mid focal area of 90% stenosis  PDA branch of the right coronary artery has apical angiographic 70% stenosis  LAD  large-caliber vessel.  Mid LAD has a focal area of angiographic 70% stenosis that is calcified after the diagonal branch  First diagonal branch is a small to moderate-sized vessel has a mid focal area of 99% stenosis  Second diagonal branch is a moderate-sized vessel angiographically normal  Circ  moderate to large caliber vessel has a proximal ostial angiographic 80% stenosis  Marginal branch of the circumflex has a proximal ostial angiographic calcified 90% stenosis  There is a large caliber ramus intermediate branch that has made a focal area of angiographic 70% stenosis and mid to distal second area of angiographic 80% stenosis before the bifurcation  LV  normal LV systolic function normal wall motion estimate LV ejection fraction of 60%  Coronary Dominance  right coronary artery    IFR evaluation of the mid LAD showing IFR value of 0.89 suggestive of physiologically significant stenosis involving the mid LAD  IFR evaluation of the ramus branch of the circumflex showing IFR value of 0.88 suggestive of physiologically significant stenosis involving the ramus branch of the circumflex  IFR evaluation of the left main showing IFR value of 0.94 suggestive of no physiologically significant stenosis involving the distal left main        Estimated Blood Loss:  Minimal    Specimens: None    Complications:  None; patient tolerated the procedure well.    Disposition: PACU - hemodynamically stable.    Condition: stable    Impressions:  IFR evaluation of the mid LAD showing IFR value of 0.89 suggestive of physiologically significant stenosis involving the mid LAD  IFR evaluation of the ramus branch of the circumflex showing IFR value of 0.88 suggestive of physiologically significant stenosis involving the ramus branch of the circumflex  IFR evaluation of the left main showing IFR value of 0.94  suggestive of no physiologically significant stenosis involving the distal left main  Severe three-vessel coronary artery disease  Extensive calcification of the multiple coronary arteries segments  Normal LV systolic function normal wall motion normal left-sided filling pressures estimate LV ejection fraction of 60%  Chronic atrial fibrillation    Recommendations:  Surgical evaluation for consideration for coronary artery bypass surgery surgical maze and ligation of the left atrial appendage  Test results reviewed and discussed with the patient and family     Lab Results   Component Value Date    CHOL 130 09/05/2023    TRIG 63 09/05/2023    HDL 48 09/05/2023    LDL 69 09/05/2023       Results for orders placed during the hospital encounter of 03/20/23    Mobile Cardiac Outpatient Telemetry    Interpretation Summary  Extended Holter monitor study for prior history of atrial arrhythmias  Underlying rhythm is sinus rhythm with a minimal heart rate of 48 bpm maximal heart rate of 176 bpm average heart rate of 72 bpm  No atrial fibrillation  No sustained ventricular or supraventricular tachyarrhythmia  Clinically significant pauses  No AV block  PAC burden of 2%  VC burden of 1%  8 episodes of nonsustained ventricular tachycardia fastest episode lasting for 4 beats at rate of 180 bpm longest episode of 12 beats at rate of 150 bpm  Abnormal Holter monitor study  Clinical correlation is recommended           Objective:          Allergies:  Allergies   Allergen Reactions    Lisinopril Cough       Medication Review:     Current Outpatient Medications:     aspirin (aspirin) 81 MG EC tablet, Take 1 tablet by mouth Daily., Disp: 30 tablet, Rfl: 2    atorvastatin (LIPITOR) 40 MG tablet, TAKE 1 TABLET DAILY AT BEDTIME., Disp: 90 tablet, Rfl: 0    B Complex Vitamins (vitamin b complex) capsule capsule, Take 1 capsule by mouth Daily. LD 11/4, Disp: , Rfl:     BLACK ELDERBERRY PO, Take 158 mg by mouth Every Night., Disp: , Rfl:      carvedilol (COREG) 3.125 MG tablet, TAKE 1 TABLET BY MOUTH EVERY 12 (TWELVE) HOURS., Disp: 180 tablet, Rfl: 1    Cholecalciferol (VITAMIN D) 2000 units capsule, Take 25 capsules by mouth 1 (One) Time Per Week. mondays, Disp: , Rfl:     losartan (COZAAR) 100 MG tablet, TAKE 1 TABLET BY MOUTH DAILY., Disp: 90 tablet, Rfl: 2    melatonin 5 MG tablet tablet, Take 1 tablet by mouth Every Night., Disp: , Rfl:     SILDENAFIL CITRATE PO, Take 20 mg by mouth Daily. For enlarged prostate, Disp: , Rfl:     tamsulosin (FLOMAX) 0.4 MG capsule 24 hr capsule, Take 2 capsules by mouth Daily., Disp: , Rfl:     Turmeric 500 MG capsule, Take 1 capsule by mouth Daily., Disp: , Rfl:     acetaminophen (TYLENOL) 325 MG tablet, Take 2 tablets by mouth Every 6 (Six) Hours As Needed for Mild Pain . (Patient not taking: Reported on 1/24/2024), Disp: , Rfl:     Family History:  Family History   Problem Relation Age of Onset    Diabetes Mother     Heart disease Mother     Hypertension Sister     Hyperlipidemia Sister     Kidney disease Sister     Cancer Sister     Other Sister         weight disorder    Diabetes Sister     Stroke Brother     Hypertension Other        Past Medical History:  Past Medical History:   Diagnosis Date    Abnormal ECG may 2022    Arthritis     Asthma april 2022    Atrial fibrillation     Bulging lumbar disc     Coronary artery disease     Difficulty maintaining body in lying position     NEEDS PILLOW UNDER KNEES IN SURGERY D/T LUMBAR ISSUE    Difficulty walking april 2023    Heart valve disease     Hyperglycemia     Hyperlipidemia     Hypertension     Pulmonary arterial hypertension     Skin mole     Snoring     Vitamin D deficiency        Past surgical History:  Past Surgical History:   Procedure Laterality Date    ABLATION OF DYSRHYTHMIC FOCUS  11/11/2021    CARDIAC CATHETERIZATION Right 10/13/2021    Procedure: Left Heart Cath;  Surgeon: Renato Knight MD;  Location: [facility] CATH INVASIVE LOCATION;  Service:  Cardiovascular;  Laterality: Right;    CARDIAC CATHETERIZATION  10/13/2021    Procedure: Functional Flow [place];  Surgeon: Renato Knight MD;  Location: [facility] CATH INVASIVE LOCATION;  Service: Cardiovascular;;    CARDIAC ELECTROPHYSIOLOGY PROCEDURE Right 2022    Procedure: EP/CRM Study [place] aware;  Surgeon: Darren Phan MD;  Location: [facility] CATH INVASIVE LOCATION;  Service: Cardiovascular;  Laterality: Right;    CATARACT EXTRACTION  2022    COLONOSCOPY      CORONARY ARTERY BYPASS GRAFT N/A 2021    Procedure: CORONARY ARTERY BYPASS WITH INTERNAL MAMMARY ARTERY GRAFT AND MAZE PROCEDURE;  Surgeon: Johnathan Hernandez MD;  Location: [facility] CVOR;  Service: Cardiothoracic;  Laterality: N/A;  CABG x 3  2 vein grafts  1 LIMA  with intraoperative AVERY    FINGER SURGERY Right     repair right pointer finger    JOINT REPLACEMENT      MAZE PROCEDURE Bilateral 2021    done with CABG + mintral ring placement + L atrial appendage ligation    MITRAL VALVE REPAIR/REPLACEMENT N/A 2021    Procedure: MITRAL VALVE REPAIR/REPLACEMENT;  Surgeon: Johnathan Hernandez MD;  Location: [facility];  Service: Cardiothoracic;  Laterality: N/A;  mitral valve repair with physio II annuloplasty ring 28mm    MITRAL VALVE REPAIR/REPLACEMENT      TOTAL SHOULDER ARTHROPLASTY      shoulder replacement       Social History:  Social History     Socioeconomic History    Marital status:    Tobacco Use    Smoking status: Former     Packs/day: 1.00     Years: 12.00     Additional pack years: 0.00     Total pack years: 12.00     Types: Cigarettes     Quit date: 1978     Years since quittin.0    Smokeless tobacco: Former     Quit date:    Vaping Use    Vaping Use: Never used   Substance and Sexual Activity    Alcohol use: Yes     Alcohol/week: 1.0 standard drink of alcohol     Types: 1 Shots of liquor per week     Comment: [place] on Monday nights    Drug use: No    Sexual activity: Yes      Partners: Female     Birth control/protection: None       Review of Systems:  The following systems were reviewed as they relate to the cardiovascular system: Constitutional, Eyes, ENT, Cardiovascular, Respiratory, Gastrointestinal, Integumentary, Neurological, Psychiatric, Hematologic, Endocrine, Musculoskeletal, and Genitourinary. The pertinent cardiovascular findings are reported above with all other cardiovascular points within those systems being negative.    Diagnostic Study Review:     Current Electrocardiogram:    ECG 12 Lead    Date/Time: 1/24/2024 2:56 PM  Performed by: Renato Knight MD    Authorized by: Renato Knight MD  Comparison: compared with previous ECG   Similar to previous ECG  Rhythm: atrial fibrillation  Rate: normal  BPM: 67  Conduction: conduction normal  QRS axis: normal  Other findings: non-specific ST-T wave changes    Clinical impression: abnormal EKG                NOTE: The following portions of the patient's history were reviewed and updated this visit as appropriate: allergies, current medications, past family history, past medical history, past social history, past surgical history and problem list.

## 2024-02-19 ENCOUNTER — TELEPHONE (OUTPATIENT)
Dept: FAMILY MEDICINE CLINIC | Facility: CLINIC | Age: 79
End: 2024-02-19
Payer: MEDICARE

## 2024-02-19 DIAGNOSIS — R97.20 PSA ELEVATION: ICD-10-CM

## 2024-02-19 DIAGNOSIS — E78.5 DYSLIPIDEMIA: ICD-10-CM

## 2024-02-19 DIAGNOSIS — R73.9 HYPERGLYCEMIA: ICD-10-CM

## 2024-02-19 DIAGNOSIS — E55.9 VITAMIN D DEFICIENCY: ICD-10-CM

## 2024-02-19 DIAGNOSIS — I10 PRIMARY HYPERTENSION: Primary | ICD-10-CM

## 2024-02-19 NOTE — TELEPHONE ENCOUNTER
Patient stopped in to see if you wanted any lab work completed before his annual wellness exam on 2/27.

## 2024-02-22 ENCOUNTER — CLINICAL SUPPORT (OUTPATIENT)
Dept: FAMILY MEDICINE CLINIC | Facility: CLINIC | Age: 79
End: 2024-02-22
Payer: MEDICARE

## 2024-02-22 DIAGNOSIS — E78.5 DYSLIPIDEMIA: ICD-10-CM

## 2024-02-22 DIAGNOSIS — R73.9 HYPERGLYCEMIA: ICD-10-CM

## 2024-02-22 DIAGNOSIS — R97.20 PSA ELEVATION: ICD-10-CM

## 2024-02-22 DIAGNOSIS — E55.9 VITAMIN D DEFICIENCY: ICD-10-CM

## 2024-02-22 DIAGNOSIS — I10 PRIMARY HYPERTENSION: ICD-10-CM

## 2024-02-22 LAB
25(OH)D3 SERPL-MCNC: 35.4 NG/ML (ref 30–100)
ALBUMIN SERPL-MCNC: 3.7 G/DL (ref 3.5–5.2)
ALBUMIN/GLOB SERPL: 1.5 G/DL
ALP SERPL-CCNC: 53 U/L (ref 39–117)
ALT SERPL W P-5'-P-CCNC: 18 U/L (ref 1–41)
ANION GAP SERPL CALCULATED.3IONS-SCNC: 13.4 MMOL/L (ref 5–15)
AST SERPL-CCNC: 28 U/L (ref 1–40)
BASOPHILS # BLD AUTO: 0.03 10*3/MM3 (ref 0–0.2)
BASOPHILS NFR BLD AUTO: 0.5 % (ref 0–1.5)
BILIRUB SERPL-MCNC: 0.5 MG/DL (ref 0–1.2)
BUN SERPL-MCNC: 18 MG/DL (ref 8–23)
BUN/CREAT SERPL: 25.7 (ref 7–25)
CALCIUM SPEC-SCNC: 9.1 MG/DL (ref 8.6–10.5)
CHLORIDE SERPL-SCNC: 107 MMOL/L (ref 98–107)
CHOLEST SERPL-MCNC: 120 MG/DL (ref 0–200)
CO2 SERPL-SCNC: 21.6 MMOL/L (ref 22–29)
CREAT SERPL-MCNC: 0.7 MG/DL (ref 0.76–1.27)
DEPRECATED RDW RBC AUTO: 44.2 FL (ref 37–54)
EGFRCR SERPLBLD CKD-EPI 2021: 94.9 ML/MIN/1.73
EOSINOPHIL # BLD AUTO: 0.36 10*3/MM3 (ref 0–0.4)
EOSINOPHIL NFR BLD AUTO: 6.5 % (ref 0.3–6.2)
ERYTHROCYTE [DISTWIDTH] IN BLOOD BY AUTOMATED COUNT: 13.6 % (ref 12.3–15.4)
GLOBULIN UR ELPH-MCNC: 2.4 GM/DL
GLUCOSE SERPL-MCNC: 100 MG/DL (ref 65–99)
HBA1C MFR BLD: 5.6 % (ref 4.8–5.6)
HCT VFR BLD AUTO: 41.5 % (ref 37.5–51)
HDLC SERPL-MCNC: 47 MG/DL (ref 40–60)
HGB BLD-MCNC: 13.9 G/DL (ref 13–17.7)
IMM GRANULOCYTES # BLD AUTO: 0.01 10*3/MM3 (ref 0–0.05)
IMM GRANULOCYTES NFR BLD AUTO: 0.2 % (ref 0–0.5)
LDLC SERPL CALC-MCNC: 60 MG/DL (ref 0–100)
LDLC/HDLC SERPL: 1.31 {RATIO}
LYMPHOCYTES # BLD AUTO: 0.89 10*3/MM3 (ref 0.7–3.1)
LYMPHOCYTES NFR BLD AUTO: 16.2 % (ref 19.6–45.3)
MCH RBC QN AUTO: 30.2 PG (ref 26.6–33)
MCHC RBC AUTO-ENTMCNC: 33.5 G/DL (ref 31.5–35.7)
MCV RBC AUTO: 90.2 FL (ref 79–97)
MONOCYTES # BLD AUTO: 0.69 10*3/MM3 (ref 0.1–0.9)
MONOCYTES NFR BLD AUTO: 12.5 % (ref 5–12)
NEUTROPHILS NFR BLD AUTO: 3.52 10*3/MM3 (ref 1.7–7)
NEUTROPHILS NFR BLD AUTO: 64.1 % (ref 42.7–76)
NRBC BLD AUTO-RTO: 0 /100 WBC (ref 0–0.2)
PLATELET # BLD AUTO: 205 10*3/MM3 (ref 140–450)
PMV BLD AUTO: 12.4 FL (ref 6–12)
POTASSIUM SERPL-SCNC: 4.6 MMOL/L (ref 3.5–5.2)
PROT SERPL-MCNC: 6.1 G/DL (ref 6–8.5)
PSA SERPL-MCNC: 2.26 NG/ML (ref 0–4)
RBC # BLD AUTO: 4.6 10*6/MM3 (ref 4.14–5.8)
SODIUM SERPL-SCNC: 142 MMOL/L (ref 136–145)
TRIGL SERPL-MCNC: 56 MG/DL (ref 0–150)
VLDLC SERPL-MCNC: 13 MG/DL (ref 5–40)
WBC NRBC COR # BLD AUTO: 5.5 10*3/MM3 (ref 3.4–10.8)

## 2024-02-22 PROCEDURE — 83036 HEMOGLOBIN GLYCOSYLATED A1C: CPT | Performed by: PREVENTIVE MEDICINE

## 2024-02-22 PROCEDURE — 80061 LIPID PANEL: CPT | Performed by: PREVENTIVE MEDICINE

## 2024-02-22 PROCEDURE — 82306 VITAMIN D 25 HYDROXY: CPT | Performed by: PREVENTIVE MEDICINE

## 2024-02-22 PROCEDURE — 85025 COMPLETE CBC W/AUTO DIFF WBC: CPT | Performed by: PREVENTIVE MEDICINE

## 2024-02-22 PROCEDURE — 84153 ASSAY OF PSA TOTAL: CPT | Performed by: PREVENTIVE MEDICINE

## 2024-02-22 PROCEDURE — 80053 COMPREHEN METABOLIC PANEL: CPT | Performed by: PREVENTIVE MEDICINE

## 2024-02-22 PROCEDURE — 36415 COLL VENOUS BLD VENIPUNCTURE: CPT | Performed by: PREVENTIVE MEDICINE

## 2024-02-22 NOTE — PROGRESS NOTES
Venipuncture performed on left hand by Sybil Hung MA  with good hemostasis. Patient tolerated well. 02/22/24  Kortney Ferrera MD

## 2024-02-23 NOTE — PROGRESS NOTES
Glucose was 100 and goal is below that so watch your carbs and keep up your exercise.  Call if any other questions or concerns

## 2024-02-26 NOTE — PATIENT INSTRUCTIONS
There are no preventive care reminders to display for this patient.   Check blood pressure cuff for accuracy and send 10 blood pressures over 2 weeks.  Watch sodium, alcohol and weight

## 2024-02-26 NOTE — ASSESSMENT & PLAN NOTE
Patient's (There is no height or weight on file to calculate BMI.) indicates that they are obese (BMI >30) with health conditions that include hypertension, coronary heart disease, and dyslipidemias . Weight is unchanged. BMI  is above average; BMI management plan is completed. We discussed portion control and increasing exercise.

## 2024-02-27 ENCOUNTER — OFFICE VISIT (OUTPATIENT)
Dept: FAMILY MEDICINE CLINIC | Facility: CLINIC | Age: 79
End: 2024-02-27
Payer: MEDICARE

## 2024-02-27 VITALS
SYSTOLIC BLOOD PRESSURE: 147 MMHG | HEART RATE: 69 BPM | WEIGHT: 230.4 LBS | OXYGEN SATURATION: 94 % | DIASTOLIC BLOOD PRESSURE: 87 MMHG | BODY MASS INDEX: 32.99 KG/M2 | HEIGHT: 70 IN | TEMPERATURE: 98.4 F

## 2024-02-27 DIAGNOSIS — I10 PRIMARY HYPERTENSION: ICD-10-CM

## 2024-02-27 DIAGNOSIS — E66.09 CLASS 1 OBESITY DUE TO EXCESS CALORIES WITH SERIOUS COMORBIDITY AND BODY MASS INDEX (BMI) OF 33.0 TO 33.9 IN ADULT: ICD-10-CM

## 2024-02-27 DIAGNOSIS — R06.83 SNORING: ICD-10-CM

## 2024-02-27 DIAGNOSIS — N13.8 BENIGN PROSTATIC HYPERPLASIA WITH URINARY OBSTRUCTION: ICD-10-CM

## 2024-02-27 DIAGNOSIS — L98.9 SKIN LESION: ICD-10-CM

## 2024-02-27 DIAGNOSIS — N40.1 BENIGN PROSTATIC HYPERPLASIA WITH URINARY OBSTRUCTION: ICD-10-CM

## 2024-02-27 DIAGNOSIS — E53.8 B12 DEFICIENCY: ICD-10-CM

## 2024-02-27 DIAGNOSIS — R05.9 COUGH, UNSPECIFIED TYPE: ICD-10-CM

## 2024-02-27 DIAGNOSIS — R73.9 HYPERGLYCEMIA: ICD-10-CM

## 2024-02-27 DIAGNOSIS — E78.5 DYSLIPIDEMIA: ICD-10-CM

## 2024-02-27 DIAGNOSIS — Z98.890 S/P MVR (MITRAL VALVE REPAIR): ICD-10-CM

## 2024-02-27 DIAGNOSIS — Z12.11 SCREENING FOR COLON CANCER: ICD-10-CM

## 2024-02-27 DIAGNOSIS — E55.9 VITAMIN D DEFICIENCY: Primary | ICD-10-CM

## 2024-02-27 NOTE — ASSESSMENT & PLAN NOTE
He was advised to check his bloos pressure at home and log 10 readings to bring to his next appointment.

## 2024-02-27 NOTE — PROGRESS NOTES
"Subjective   Salinas Dill is a 78 y.o. male presents for   Chief Complaint   Patient presents with    Hypertension     6 months    Numbness     Right foot, off and on for a year       Health Maintenance Due   Topic Date Due    COLORECTAL CANCER SCREENING  03/09/2023       DAXI    The patient is a 78-year-old male who is here today to follow up on vitamin D deficiency, status post mitral valve repair, hypertension, hyperglycemia, dyslipidemia, cough, unspecified, class 1 obesity due to excessive calories with serious comorbidities and body mass index of 33, benign prostatic hyperplasia with urinary obstruction, B12 deficiency, snoring, and a skin lesion.    His blood pressure is slightly elevated today. He checks his blood pressure at home. When he saw Dr. Hernandez  in 01/2024, his blood pressure was around 147/80 mmHg He watches his sodium intake. He sees Dr. Hernandez every 6 months for his mitral valve repair.    He watches his carbohydrate and cholesterol intake. He admits he \"slugged\" this winter. He did a [employer] workout at home and had to jump. He could barely move the following week due to his knees.    He did a Cologuard test, which was normal. He has not had a colonoscopy in a long time.  He denies any issues with urination.    He watches his snoring. He is not aware if he snores. He is taking vitamin B complex.    He has a skin lesion on his foot. He got it checked by the dermatologist. He has scabs on his ears. He has an upcoming appointment with the dermatologist. He had a total body skin check the last time he saw the dermatologist.    He has numbness in his right foot. It is more pronounced when he goes to bed, sits, or drives for a long time. It is a tingling. It does not impact him. His toe is rotated. He denies any pain from the back down into the foot. He wonders if one of his x-rays showed scoliosis. He uses a pillow to sleep. He saw Dr. Davila for a wart on his left foot.    He has been to the eye " "doctor and the dentist within the last year. His bowel movements have been okay.     He denies a family history of colon cancer.     The patient has no known drug allergies.    He is current on his vaccines. He received his RSV and influenza vaccines on 09/26/2023. He received his pneumonia vaccine 2 weeks ago.     Vitals:    02/27/24 0813 02/27/24 0815   BP: 152/88 147/87   BP Location: Right arm Left arm   Patient Position: Sitting Sitting   Cuff Size: Adult Adult   Pulse: 69    Temp: 98.4 °F (36.9 °C)    TempSrc: Temporal    SpO2: 94%    Weight: 105 kg (230 lb 6.4 oz)    Height: 177.8 cm (70\")      Body mass index is 33.06 kg/m².    Current Outpatient Medications on File Prior to Visit   Medication Sig Dispense Refill    aspirin (aspirin) 81 MG EC tablet Take 1 tablet by mouth Daily. 30 tablet 2    atorvastatin (LIPITOR) 40 MG tablet TAKE 1 TABLET DAILY AT BEDTIME. 90 tablet 0    B Complex Vitamins (vitamin b complex) capsule capsule Take 1 capsule by mouth Daily. LD 11/4      BLACK ELDERBERRY PO Take 158 mg by mouth Every Night.      carvedilol (COREG) 3.125 MG tablet TAKE 1 TABLET BY MOUTH EVERY 12 (TWELVE) HOURS. 180 tablet 1    Cholecalciferol (VITAMIN D) 2000 units capsule Take 25 capsules by mouth 1 (One) Time Per Week. mondays      losartan (COZAAR) 100 MG tablet TAKE 1 TABLET BY MOUTH DAILY. 90 tablet 2    melatonin 5 MG tablet tablet Take 1 tablet by mouth Every Night.      SILDENAFIL CITRATE PO Take 20 mg by mouth Daily. For enlarged prostate      tamsulosin (FLOMAX) 0.4 MG capsule 24 hr capsule Take 2 capsules by mouth Daily.      Turmeric 500 MG capsule Take 1 capsule by mouth Daily.      [DISCONTINUED] acetaminophen (TYLENOL) 325 MG tablet Take 2 tablets by mouth Every 6 (Six) Hours As Needed for Mild Pain .       No current facility-administered medications on file prior to visit.       The following portions of the patient's history were reviewed and updated as appropriate: allergies, current " medications, past family history, past medical history, past social history, past surgical history, and problem list.    Review of Systems   Constitutional:  Negative for chills, diaphoresis, fever and unexpected weight loss.   HENT:  Negative for hearing loss.    Respiratory:  Negative for shortness of breath and wheezing.    Cardiovascular:  Negative for chest pain and palpitations.   Genitourinary:  Negative for dysuria, hematuria and erectile dysfunction.     DAXROS    Objective   Physical Exam  HENT:      Ears:      Comments: Tympanic membranes are normal.      Mouth/Throat:      Comments:  Throat looks normal.  Eyes:      Pupils: Pupils are equal, round, and reactive to light.   Neck:      Vascular: No carotid bruit.      Comments: No thyromegaly, thyroid masses, cervical or suboccipital adenopathy.   Cardiovascular:      Rate and Rhythm: Normal rate and regular rhythm.      Comments:  No heart murmurs.  Pulmonary:      Breath sounds: No wheezing, rhonchi or rales.      Comments: Breath sounds are normal and equal in all six lung fields.   Abdominal:      Comments: Bowel sounds are normoactive. No masses, tenderness, or organomegaly.   Musculoskeletal:      Comments: No pretibial edema.   Feet:      Comments: The patient does have some numbness at the base of the plantar surface of his right foot and some twisting of the right third toe, so we are wondering if he does have some peripheral neuropathy at that area.   If his symptoms worsen, we will consider an EMG nerve velocity conduction study.  Skin:     Comments: A 1 cm round, scaly lesion on the right instep.       DAXEXAM  PHQ-9 Total Score: 0    Assessment & Plan   Diagnoses and all orders for this visit:    1. Vitamin D deficiency (Primary)    2. S/P MVR (mitral valve repair) per Dr. Hernandez 11/11/2021    3. Primary hypertension  Assessment & Plan:  He was advised to check his bloos pressure at home and log 10 readings to bring to his next appointment.          4. Hyperglycemia    5. Dyslipidemia  Assessment & Plan:  He was encouraged to watch his saturated fats intake.       6. Cough, unspecified type    7. Class 1 obesity due to excess calories with serious comorbidity and body mass index (BMI) of 33.0 to 33.9 in adult  Assessment & Plan:  Patient's (There is no height or weight on file to calculate BMI.) indicates that they are obese (BMI >30) with health conditions that include hypertension, coronary heart disease, and dyslipidemias . Weight is unchanged. BMI  is above average; BMI management plan is completed. We discussed portion control and increasing exercise.       8. Benign prostatic hyperplasia with urinary obstruction  Assessment & Plan:  Well controlled.      9. B12 deficiency  Assessment & Plan:  He will continue B complex.       10. Snoring  Assessment & Plan:  Well controlled.      11. Skin lesion  Comments:  He will follow up with dermatology.    12. Screening for colon cancer  -     Cologuard - Stool, Per Rectum; Future        DAXPLAN  DAXRESULTS    Patient Instructions   There are no preventive care reminders to display for this patient.   Check blood pressure cuff for accuracy and send 10 blood pressures over 2 weeks.  Watch sodium, alcohol and weight        Transcribed from ambient dictation for Kortney Ferrera MD by Sita Musa.  02/27/24   09:25 EST    Patient or patient representative verbalized consent to the visit recording.  I have personally performed the services described in this document as transcribed by the above individual, and it is both accurate and complete.    Answers submitted by the patient for this visit:  Primary Reason for Visit (Submitted on 2/21/2024)  What is the primary reason for your visit?: Other  Other (Submitted on 2/21/2024)  Please describe your symptoms.: routine annual follow up  Have you had these symptoms before?: No  How long have you been having these symptoms?: 1-4 days

## 2024-02-29 ENCOUNTER — OFFICE VISIT (OUTPATIENT)
Dept: CARDIOLOGY | Facility: CLINIC | Age: 79
End: 2024-02-29
Payer: MEDICARE

## 2024-02-29 VITALS
SYSTOLIC BLOOD PRESSURE: 176 MMHG | DIASTOLIC BLOOD PRESSURE: 100 MMHG | BODY MASS INDEX: 32.93 KG/M2 | WEIGHT: 230 LBS | HEIGHT: 70 IN | HEART RATE: 61 BPM | OXYGEN SATURATION: 98 %

## 2024-02-29 DIAGNOSIS — I34.0 NONRHEUMATIC MITRAL VALVE REGURGITATION: ICD-10-CM

## 2024-02-29 DIAGNOSIS — I48.0 PAROXYSMAL ATRIAL FIBRILLATION: ICD-10-CM

## 2024-02-29 DIAGNOSIS — I10 PRIMARY HYPERTENSION: ICD-10-CM

## 2024-02-29 DIAGNOSIS — I25.10 CORONARY ARTERY DISEASE INVOLVING NATIVE CORONARY ARTERY OF NATIVE HEART WITHOUT ANGINA PECTORIS: ICD-10-CM

## 2024-02-29 DIAGNOSIS — Z86.79 S/P MAZE OPERATION FOR ATRIAL FIBRILLATION: ICD-10-CM

## 2024-02-29 DIAGNOSIS — Z95.1 S/P CABG X 3: ICD-10-CM

## 2024-02-29 DIAGNOSIS — Z98.890 S/P LEFT ATRIAL APPENDAGE LIGATION: ICD-10-CM

## 2024-02-29 DIAGNOSIS — I47.29 NONSUSTAINED VENTRICULAR TACHYCARDIA: ICD-10-CM

## 2024-02-29 DIAGNOSIS — Z98.890 S/P MVR (MITRAL VALVE REPAIR): Primary | ICD-10-CM

## 2024-02-29 DIAGNOSIS — Z98.890 S/P MAZE OPERATION FOR ATRIAL FIBRILLATION: ICD-10-CM

## 2024-02-29 RX ORDER — CARVEDILOL 6.25 MG/1
6.25 TABLET ORAL EVERY 12 HOURS SCHEDULED
Qty: 180 TABLET | Refills: 1 | Status: SHIPPED | OUTPATIENT
Start: 2024-02-29

## 2024-02-29 NOTE — PROGRESS NOTES
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    Postprocedure he had nonsustained ventricular arrhythmias intermittent atrial arrhythmias.  AVERY revealed left atrial appendage ligation without any MR and had COVID infection in April 2022.    He had a previous EP study negative for inducible VT.            Medical History        Past Medical History:   Diagnosis Date    Arthritis      Atrial fibrillation (HCC)      BPH (benign prostatic hyperplasia)      Bulging lumbar disc      Difficulty maintaining body in lying position       NEEDS PILLOW UNDER KNEES IN SURGERY D/T LUMBAR ISSUE    Hyperglycemia      Hypertension      OAB (overactive bladder)      Skin mole      Snoring      Urinary urgency      Vitamin D deficiency           Surgical History         Past Surgical History:   Procedure Laterality Date    CARDIAC CATHETERIZATION Right 10/13/2021     Procedure: Left Heart Cath;  Surgeon: Renato Knight MD;  Location: [facility] CATH INVASIVE LOCATION;  Service: Cardiovascular;  Laterality: Right;    CARDIAC CATHETERIZATION   10/13/2021     Procedure: Functional Flow [place];  Surgeon: Renato Knight MD;  Location: [facility] CATH INVASIVE LOCATION;  Service: Cardiovascular;;    COLONOSCOPY        FINGER SURGERY Right       repair right pointer finger    JOINT REPLACEMENT        TOTAL SHOULDER ARTHROPLASTY         shoulder replacement               Family History   Problem Relation Age of Onset    Diabetes Mother      Heart disease Mother      Hypertension Sister      Hyperlipidemia Sister      Kidney disease Sister      Cancer Sister      Other Sister           weight disorder    Diabetes Sister      Stroke Brother      Hypertension Other              Physical Exam    General:      well developed, well nourished, in no acute distress.    Head:      normocephalic and atraumatic.    Eyes:      PERRL/EOM intact, conjunctivae and sclerae clear without nystagmus.    Neck:      no  thyromegaly, trachea central with normal respiratory effort  Lungs:      clear bilaterally to auscultation.    Heart:       regular rate and rhythm, S1, S2 without murmurs, rubs, or gallops  Skin:      intact without lesions or rashes.    Psych:      alert and cooperative; normal mood and affect; normal attention span and concentration.             Assessment and plan    Recent potassium was 4.6 with normal creatinine.  Hemoglobin and platelets are normal  History of multivessel coronary artery disease and severe MR needing prior surgery  Longstanding AF with prior Maze procedure left atrial appendage ligation sinus rhythm  History of mitral annuloplasty ring placement in the past  History of nonsustained ventricular arrhythmias without significant induction of sustained arrhythmias during prior EP study done in 2022  Coronary artery disease stable without angina on aspirin  Hyperlipidemia on atorvastatin  Hypertension on carvedilol and losartan and blood pressure evaluation revealed increased blood pressure even after checking 2 times in the office--- continue home monitoring  Increase coreg to 6.25 mg twice daily---prescription given  Normal LV systolic function  Medications reviewed and follow-up appointments made    Prescription for increased dose of carvedilol given.  Patient was also recommended to take losartan 50 mg twice daily history of 100 mg daily for better blood pressure control.  Blood pressure to be monitored closely to keep the systolic blood pressure closer to 130 and diastolic pressure 80 or below which was discussed with the patient and family    Low impact exercises and exercise like walking and stretching discussed with the patient and patient educated to avoid  heavy weightlifting       Electronically signed by Darren Phan MD, 02/29/24, 1:27 PM EST.

## 2024-02-29 NOTE — LETTER
February 29, 2024       No Recipients    Patient: Salinas Dill   YOB: 1945   Date of Visit: 2/29/2024     Dear Kortney Ferrera MD:       Thank you for referring Salinas Dill to me for evaluation. Below are the relevant portions of my assessment and plan of care.    If you have questions, please do not hesitate to call me. I look forward to following Salinas along with you.         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    Postprocedure he had nonsustained ventricular arrhythmias intermittent atrial arrhythmias.  AVERY revealed left atrial appendage ligation without any MR and had COVID infection in April 2022.    He had a previous EP study negative for inducible VT.            Medical History        Past Medical History:   Diagnosis Date   • Arthritis     • Atrial fibrillation (HCC)     • BPH (benign prostatic hyperplasia)     • Bulging lumbar disc     • Difficulty maintaining body in lying position       NEEDS PILLOW UNDER KNEES IN SURGERY D/T LUMBAR ISSUE   • Hyperglycemia     • Hypertension     • OAB (overactive bladder)     • Skin mole     • Snoring     • Urinary urgency     • Vitamin D deficiency           Surgical History         Past Surgical History:   Procedure Laterality Date   • CARDIAC CATHETERIZATION Right 10/13/2021     Procedure: Left Heart Cath;  Surgeon: Renato Knight MD;  Location: [facility] INVASIVE LOC[facility];  Service: Cardiovascular;  Laterality: Right;   • CARDIAC CATHETERIZATION   10/13/2021     Procedure: Functional Flow [place];  Surgeon: Renato Knight MD;   Location: [facility] INVASIVE LOCATION;  Service: Cardiovascular;;   • COLONOSCOPY       • FINGER SURGERY Right       repair right pointer finger   • JOINT REPLACEMENT       • TOTAL SHOULDER ARTHROPLASTY         shoulder replacement               Family History   Problem Relation Age of Onset   • Diabetes Mother     • Heart disease Mother     • Hypertension Sister     • Hyperlipidemia Sister     • Kidney disease Sister     • Cancer Sister     • Other Sister           weight disorder   • Diabetes Sister     • Stroke Brother     • Hypertension Other             Physical Exam    General:      well developed, well nourished, in no acute distress.    Head:      normocephalic and atraumatic.    Eyes:      PERRL/EOM intact, conjunctivae and sclerae clear without nystagmus.    Neck:      no  thyromegaly, trachea central with normal respiratory effort  Lungs:      clear bilaterally to auscultation.    Heart:       regular rate and rhythm, S1, S2 without murmurs, rubs, or gallops  Skin:      intact without lesions or rashes.    Psych:      alert and cooperative; normal mood and affect; normal attention span and concentration.             Assessment and plan    Recent potassium was 4.6 with normal creatinine.  Hemoglobin and platelets are normal  History of multivessel coronary artery disease and severe MR needing prior surgery  Longstanding AF with prior Maze procedure left atrial appendage ligation sinus rhythm  History of mitral annuloplasty ring placement in the past  History of nonsustained ventricular arrhythmias without significant induction of sustained arrhythmias during prior EP study done in 2022  Coronary artery disease stable without angina on aspirin  Hyperlipidemia on atorvastatin  Hypertension on carvedilol and losartan and blood pressure evaluation revealed increased blood pressure even after checking 2 times in the office--- continue home monitoring  Increase coreg to 6.25 mg twice daily---prescription  given  Normal LV systolic function  Medications reviewed and follow-up appointments made      Electronically signed by Darren Phan MD, 02/29/24, 1:27 PM EST.

## 2024-03-11 RX ORDER — ATORVASTATIN CALCIUM 40 MG/1
TABLET, FILM COATED ORAL
Qty: 90 TABLET | Refills: 0 | Status: SHIPPED | OUTPATIENT
Start: 2024-03-11

## 2024-03-15 ENCOUNTER — TELEPHONE (OUTPATIENT)
Dept: FAMILY MEDICINE CLINIC | Facility: CLINIC | Age: 79
End: 2024-03-15
Payer: MEDICARE

## 2024-03-15 NOTE — TELEPHONE ENCOUNTER
RELAY----- Message from Kortney Ferrera MD sent at 3/15/2024  9:08 AM EDT -----  Cologuard negative.  Repeat 3 years unless bowels change

## 2024-04-04 NOTE — TELEPHONE ENCOUNTER
Staff to call patient and advise needs at least plain films at [place] before starts PT-order placed   04-Apr-2024 17:51

## 2024-06-12 ENCOUNTER — TELEPHONE (OUTPATIENT)
Dept: CARDIOLOGY | Facility: CLINIC | Age: 79
End: 2024-06-12
Payer: MEDICARE

## 2024-06-12 RX ORDER — LOSARTAN POTASSIUM 50 MG/1
50 TABLET ORAL 2 TIMES DAILY
Qty: 180 TABLET | Refills: 0 | Status: SHIPPED | OUTPATIENT
Start: 2024-06-12

## 2024-06-12 NOTE — TELEPHONE ENCOUNTER
Caller: [email or web address]    Relationship: Emergency Contact    Best call back number: [phone]     Requested Prescriptions:   Requested Prescriptions     Pending Prescriptions Disp Refills    losartan (COZAAR) 100 MG tablet 90 tablet 2     Sig: Take 1 tablet by mouth Daily.        Pharmacy where request should be sent: [facility] - [place], [place] - [address] [facility] [phone] [facility] [phone]      Last office visit with prescribing clinician: 2/29/2024   Last telemedicine visit with prescribing clinician: Visit date not found   Next office visit with prescribing clinician: 2/27/2025     Additional details provided by patient: PTS WIFE REACHING OUT AS THEY WERE TOLD DURING LAST OV TO START CUTTING HIS PILLS IN HALF AND HE WROTE THE NOTE BY HAND ON PTS PAPERWORK - PT AND WIFE ARE TRYING TO CUT PILLS IN HALF BUT THEY ARE TURNING TO DUST AND THEY ARE ASKING IF POSSIBLE TO CALL IN 50MG TABLET INSTEAD TO PHARMACY - THEY WOULD PREFER 90 DAY REFILLS IF POSSIBLE     Does the patient have less than a 3 day supply:  [] Yes  [x] No    Gary Charles Rep   06/12/24 12:22 EDT

## 2024-07-31 ENCOUNTER — OFFICE VISIT (OUTPATIENT)
Dept: CARDIOLOGY | Facility: CLINIC | Age: 79
End: 2024-07-31
Payer: MEDICARE

## 2024-07-31 VITALS
HEART RATE: 71 BPM | OXYGEN SATURATION: 96 % | BODY MASS INDEX: 32.93 KG/M2 | WEIGHT: 230 LBS | HEIGHT: 70 IN | DIASTOLIC BLOOD PRESSURE: 83 MMHG | SYSTOLIC BLOOD PRESSURE: 132 MMHG

## 2024-07-31 DIAGNOSIS — Z98.890 S/P MAZE OPERATION FOR ATRIAL FIBRILLATION: ICD-10-CM

## 2024-07-31 DIAGNOSIS — I47.29 NONSUSTAINED VENTRICULAR TACHYCARDIA: ICD-10-CM

## 2024-07-31 DIAGNOSIS — Z95.1 S/P CABG X 3: ICD-10-CM

## 2024-07-31 DIAGNOSIS — Z98.890 S/P LEFT ATRIAL APPENDAGE LIGATION: ICD-10-CM

## 2024-07-31 DIAGNOSIS — Z98.890 S/P MVR (MITRAL VALVE REPAIR): Primary | ICD-10-CM

## 2024-07-31 DIAGNOSIS — Z86.79 S/P MAZE OPERATION FOR ATRIAL FIBRILLATION: ICD-10-CM

## 2024-07-31 NOTE — PROGRESS NOTES
Cardiology Office Visit      Encounter Date:  07/31/2024    Patient ID:   Salinas Dill is a 78 y.o. male.    Reason For Followup:  Chronic atrial fibrillation  Mitral regurgitation  Hypertension  Status post coronary artery bypass surgery and mitral valve repair    Brief Clinical History:  Dear Dr. Ferrera, Kortney Infante MD    I had the pleasure of seeing Salinas Dill today. As you are well aware, this is a 78 y.o. male with past medical history significant for history of hypertension and benign prostatic hypertrophy and recent hospitalization for coronary artery disease and coronary artery bypass surgery here for further evaluation treatment options      Interpretation Summary    Left ventricular ejection fraction appears to be 56 - 60%. Left ventricular systolic function is normal.  There is a mitral valve ring present.  Saline test results are negative.  Left atrial appendage ligation noted.  Left atrial appendage is ligated and demonstrates continued closure.  No evidence of communication with left atrial cavity.           Interval History:  Denies any new complaints  Complaint on medical therapy  Denies any exertional symptoms of chest pain shortness of breath dizziness or syncope  Denies any new cardiac symptoms  No further episodes of atrial fibrillation  Assessment & Plan    Impressions:  Chronic atrial fibrillation/currently in sinus rhythm  Hypertension  Ilan Vascor of 2  stress test with no evidence of any inducible ischemia  echocardiogram with normal LV systolic function and moderate mitral regurgitation  Severe multivessel coronary artery disease status post CABG x3 with a LIMA to the mid LAD and reverse individual saphenous vein graft to the obtuse marginal 1 and PLB branch of the right coronary artery  Severe mitral valve insufficiency status post ring annuloplasty  Atrial fibrillation with right and left cryomaze procedure and left atrial appendage ligation  Postop atrial fibrillation status post  "surgical maze currently in sinus rhythm  Essential hypertension  Dyslipidemia  BPH  Extended Holter monitor with nonsustained ventricular tachycardia episodes  Multiple episodes of brief salvos of supraventricular tachycardia  Status post EP study with no inducible arrhythmia  Some chronic dry cough could be secondary to seasonal allergies and may be contributed by gastroesophageal reflux disease    Recommendations:    Continue aspirin for anticoagulation therapy  Patient is currently on low-dose beta-blockers but cannot tolerate any higher doses secondary to blood pressure issues  Recent echocardiogram and also AVERY showing no significant residual valve pathology only residual mild mitral regurgitation no significant mitral stenosis and a complete seal of the left atrial appendage  Continue risk factor modification  Recent labs reviewed and discussed with the patient  Lipids at goal  AVERY and echocardiogram with normal functioning of the valve  Echocardiogram with normal LV systolic function normal functioning of the valve  EP study with no induction of SVT or ventricular tachycardia  Repeat extended Holter monitor to further evaluate the cardiac arrhythmias  Continue current medical therapy with aspirin 81 mg p.o. once a day Lipitor 40 mg p.o. once a day Coreg 3.125 mg p.o. twice daily  Recent labs and work-up reviewed and discussed with patient  Repeat Holter monitor results are pending at this time  Continue regular exercise continue aggressive risk factor modification  Workup and labs reviewed and discussed with the patient and family  follow-up in office in 6 months        Vitals:  Vitals:    07/31/24 1257   BP: 132/83   Pulse: 71   SpO2: 96%   Weight: 104 kg (230 lb)   Height: 177.8 cm (70\")       Physical Exam:    General: Alert, cooperative, no distress, appears stated age  Head:  Normocephalic, atraumatic, mucous membranes moist  Eyes:  Conjunctiva/corneas clear, EOM's intact     Neck:  Supple,  no " adenopathy;      Lungs: Clear to auscultation bilaterally, no wheezes rhonchi rales are noted  Chest wall: No tenderness  Heart::  Regular rate and rhythm, S1 and S2 normal, no murmur, rub or gallop  Abdomen: Soft, non-tender, nondistended bowel sounds active  Extremities: No cyanosis, clubbing, or edema  Pulses: 2+ and symmetric all extremities  Skin:  No rashes or lesions  Neuro/psych: A&O x3. CN II through XII are grossly intact with appropriate affect              Lab Results   Component Value Date    GLUCOSE 100 (H) 02/22/2024    BUN 18 02/22/2024    CREATININE 0.70 (L) 02/22/2024    EGFR 94.9 02/22/2024    BCR 25.7 (H) 02/22/2024    K 4.6 02/22/2024    CO2 21.6 (L) 02/22/2024    CALCIUM 9.1 02/22/2024    ALBUMIN 3.7 02/22/2024    BILITOT 0.5 02/22/2024    AST 28 02/22/2024    ALT 18 02/22/2024     Results for orders placed during the hospital encounter of 02/22/22    Adult Transesophageal Echo (AVERY) W/ Cont if Necessary Per Protocol    Interpretation Summary  · Left ventricular ejection fraction appears to be 56 - 60%. Left ventricular systolic function is normal.  · There is a mitral valve ring present.  · Saline test results are negative.  · Left atrial appendage ligation noted.  · Left atrial appendage is ligated and demonstrates continued closure.  · No evidence of communication with left atrial cavity.     Results for orders placed during the hospital encounter of 10/13/21    Cardiac Catheterization/Vascular Study    Narrative  Table formatting from the original result was not included.  Cardiac Catheterization Operative Report    Salinas LOOMIS Aniyah  [id number]  10/13/2021  @PCP@    He underwent cardiac catheterization.    Indications for the procedure include: shortness of breath.    Procedure Details:  The risks, benefits, complications, treatment options, and expected outcomes were discussed with the patient. The patient and/or family concurred with the proposed plan, giving informed consent.    After  informed consent the patient was brought to the cath lab after appropriate IV hydration was begun and oral premedication was given. He was further sedated with fentanyl. He was prepped and draped in the usual manner. Using the modified Seldinger access technique, a 6 Uzbek sheath was placed in the femoral artery. A left heart catheterization with coronary arteriography was performed. Findings are discussed below.    For the IFR evaluation we used a  guide catheter with a Volcano pressure wire to measure the IFR value of the mid LAD and also midportion of the ramus branch of the circumflex and also the left main coronary artery.  Patient tolerated procedure well with no immediate possible complications plan to obtain hemostasis by manual pressure.  Procedural anticoagulation was heparin.    After the procedure was completed, sedation was stopped and the sheaths and catheters were all removed. Hemostasis was achieved per established hospital protocols.    Conscious sedation:  Conscious sedation was performed according to protocol.  I supervised and directed an independent trained observer with the assistance of monitoring the patient's level of consciousness and physiologic status throughout the procedure.  Intraoperative service time was 60 minutes.    Findings:    Hemodynamics Central aortic pressure systolic 102 and diastolic 60 with a mean pressure of 79 mmHg  LV end-diastolic pressure was 4 mmHg  There was no gradient across the aortic valve on the pullback of the pigtail catheter  Left Main  left main coronary artery is large caliber vessel extensively calcified distal left main coronary artery has angiographic 50% stenosis before it trifurcates into left anterior descending ramus intermediate and left circumflex artery  RCA  large-caliber dominant vessel.  Right coronary artery has a proximal angiographic 30 to 40% stenosis mid angiographic 30 to 40% stenosis  Distal right coronary artery before the PDA  PLB branches is angiographically 60% stenosis provides large-caliber PDA and PLB branch  PLB branch of the right coronary artery has a mid focal area of 90% stenosis  PDA branch of the right coronary artery has apical angiographic 70% stenosis  LAD  large-caliber vessel.  Mid LAD has a focal area of angiographic 70% stenosis that is calcified after the diagonal branch  First diagonal branch is a small to moderate-sized vessel has a mid focal area of 99% stenosis  Second diagonal branch is a moderate-sized vessel angiographically normal  Circ  moderate to large caliber vessel has a proximal ostial angiographic 80% stenosis  Marginal branch of the circumflex has a proximal ostial angiographic calcified 90% stenosis  There is a large caliber ramus intermediate branch that has made a focal area of angiographic 70% stenosis and mid to distal second area of angiographic 80% stenosis before the bifurcation  LV  normal LV systolic function normal wall motion estimate LV ejection fraction of 60%  Coronary Dominance  right coronary artery    IFR evaluation of the mid LAD showing IFR value of 0.89 suggestive of physiologically significant stenosis involving the mid LAD  IFR evaluation of the ramus branch of the circumflex showing IFR value of 0.88 suggestive of physiologically significant stenosis involving the ramus branch of the circumflex  IFR evaluation of the left main showing IFR value of 0.94 suggestive of no physiologically significant stenosis involving the distal left main        Estimated Blood Loss:  Minimal    Specimens: None    Complications:  None; patient tolerated the procedure well.    Disposition: PACU - hemodynamically stable.    Condition: stable    Impressions:  IFR evaluation of the mid LAD showing IFR value of 0.89 suggestive of physiologically significant stenosis involving the mid LAD  IFR evaluation of the ramus branch of the circumflex showing IFR value of 0.88 suggestive of physiologically  significant stenosis involving the ramus branch of the circumflex  IFR evaluation of the left main showing IFR value of 0.94 suggestive of no physiologically significant stenosis involving the distal left main  Severe three-vessel coronary artery disease  Extensive calcification of the multiple coronary arteries segments  Normal LV systolic function normal wall motion normal left-sided filling pressures estimate LV ejection fraction of 60%  Chronic atrial fibrillation    Recommendations:  Surgical evaluation for consideration for coronary artery bypass surgery surgical maze and ligation of the left atrial appendage  Test results reviewed and discussed with the patient and family     Lab Results   Component Value Date    CHOL 120 02/22/2024    TRIG 56 02/22/2024    HDL 47 02/22/2024    LDL 60 02/22/2024       Results for orders placed during the hospital encounter of 03/20/23    Mobile Cardiac Outpatient Telemetry    Interpretation Summary  Extended Holter monitor study for prior history of atrial arrhythmias  Underlying rhythm is sinus rhythm with a minimal heart rate of 48 bpm maximal heart rate of 176 bpm average heart rate of 72 bpm  No atrial fibrillation  No sustained ventricular or supraventricular tachyarrhythmia  Clinically significant pauses  No AV block  PAC burden of 2%  VC burden of 1%  8 episodes of nonsustained ventricular tachycardia fastest episode lasting for 4 beats at rate of 180 bpm longest episode of 12 beats at rate of 150 bpm  Abnormal Holter monitor study  Clinical correlation is recommended           Objective:          Allergies:  Allergies   Allergen Reactions    Lisinopril Cough       Medication Review:     Current Outpatient Medications:     aspirin (aspirin) 81 MG EC tablet, Take 1 tablet by mouth Daily., Disp: 30 tablet, Rfl: 2    atorvastatin (LIPITOR) 40 MG tablet, TAKE 1 TABLET DAILY AT BEDTIME., Disp: 90 tablet, Rfl: 0    B Complex Vitamins (vitamin b complex) capsule capsule, Take  1 capsule by mouth Daily. LD 11/4, Disp: , Rfl:     BLACK ELDERBERRY PO, Take 158 mg by mouth Every Night., Disp: , Rfl:     carvedilol (COREG) 6.25 MG tablet, Take 1 tablet by mouth Every 12 (Twelve) Hours., Disp: 180 tablet, Rfl: 1    Cholecalciferol (VITAMIN D) 2000 units capsule, Take 25 capsules by mouth 1 (One) Time Per Week. mondays, Disp: , Rfl:     losartan (COZAAR) 50 MG tablet, Take 1 tablet by mouth 2 (Two) Times a Day., Disp: 180 tablet, Rfl: 0    melatonin 5 MG tablet tablet, Take 1 tablet by mouth Every Night., Disp: , Rfl:     SILDENAFIL CITRATE PO, Take 20 mg by mouth Daily. For enlarged prostate, Disp: , Rfl:     tamsulosin (FLOMAX) 0.4 MG capsule 24 hr capsule, Take 2 capsules by mouth Daily., Disp: , Rfl:     Turmeric 500 MG capsule, Take 1 capsule by mouth Daily., Disp: , Rfl:     Family History:  Family History   Problem Relation Age of Onset    Diabetes Mother     Heart disease Mother     Hypertension Sister     Hyperlipidemia Sister     Kidney disease Sister     Cancer Sister     Other Sister         weight disorder    Diabetes Sister     Stroke Brother     Hypertension Other        Past Medical History:  Past Medical History:   Diagnosis Date    Abnormal ECG may 2022    Arthritis     Asthma april 2022    Atrial fibrillation     Bulging lumbar disc     Coronary artery disease     Difficulty maintaining body in lying position     NEEDS PILLOW UNDER KNEES IN SURGERY D/T LUMBAR ISSUE    Difficulty walking april 2023    Heart valve disease     Hyperglycemia     Hyperlipidemia     Hypertension     Pulmonary arterial hypertension     Scoliosis june2023    Skin mole     Snoring     Vitamin D deficiency        Past surgical History:  Past Surgical History:   Procedure Laterality Date    ABLATION OF DYSRHYTHMIC FOCUS  11/11/2021    CARDIAC CATHETERIZATION Right 10/13/2021    Procedure: Left Heart Cath;  Surgeon: Renato Knight MD;  [facility];  Service:  Cardiovascular;  Laterality: Right;    CARDIAC CATHETERIZATION  10/13/2021    Procedure: Functional Flow Opp;  Surgeon: Renato Knight MD;  Location: [facility] CATH INVASIVE LOCATION;  Service: Cardiovascular;;    CARDIAC ELECTROPHYSIOLOGY PROCEDURE Right 2022    Procedure: EP/CRM Study Utong aware;  Surgeon: Darren Phan MD;  Location: [facility] CATH INVASIVE LOCATION;  Service: Cardiovascular;  Laterality: Right;    CATARACT EXTRACTION  2022    COLONOSCOPY      CORONARY ARTERY BYPASS GRAFT N/A 2021    Procedure: CORONARY ARTERY BYPASS WITH INTERNAL MAMMARY ARTERY GRAFT AND MAZE PROCEDURE;  Surgeon: Johnathan Hernandez MD;  Location: [facility] CVOR;  Service: Cardiothoracic;  Laterality: N/A;  CABG x 3  2 vein grafts  1 LIMA  with intraoperative AVERY    FINGER SURGERY Right     repair right pointer finger    JOINT REPLACEMENT      MAZE PROCEDURE Bilateral 2021    done with CABG + mintral ring placement + L atrial appendage ligation    MITRAL VALVE REPAIR/REPLACEMENT N/A 2021    Procedure: MITRAL VALVE REPAIR/REPLACEMENT;  Surgeon: Johnathan Hernandez MD;  Location: [facility];  Service: Cardiothoracic;  Laterality: N/A;  mitral valve repair with physio II annuloplasty ring 28mm    MITRAL VALVE REPAIR/REPLACEMENT      TOTAL SHOULDER ARTHROPLASTY      shoulder replacement       Social History:  Social History     Socioeconomic History    Marital status:    Tobacco Use    Smoking status: Former     Current packs/day: 0.00     Average packs/day: 1 pack/day for 12.0 years (12.0 ttl pk-yrs)     Types: Cigarettes     Start date: 1966     Quit date: 1978     Years since quittin.6    Smokeless tobacco: Former     Quit date:    Vaping Use    Vaping status: Never Used   Substance and Sexual Activity    Alcohol use: Yes     Alcohol/week: 1.0 standard drink of alcohol     Types: 1 Shots of liquor per week     Comment: Tumalixweed jefftas on Monday nights    Drug use:  No    Sexual activity: Yes     Partners: Female     Birth control/protection: None       Review of Systems:  The following systems were reviewed as they relate to the cardiovascular system: Constitutional, Eyes, ENT, Cardiovascular, Respiratory, Gastrointestinal, Integumentary, Neurological, Psychiatric, Hematologic, Endocrine, Musculoskeletal, and Genitourinary. The pertinent cardiovascular findings are reported above with all other cardiovascular points within those systems being negative.    Diagnostic Study Review:     Current Electrocardiogram:    ECG 12 Lead    Date/Time: 7/31/2024 1:39 PM  Performed by: Renato Knight MD    Authorized by: Renato Knight MD  Comparison: compared with previous ECG   Similar to previous ECG  Rhythm: sinus rhythm  Rate: normal  BPM: 71  Conduction: incomplete right bundle branch block  QRS axis: normal    Clinical impression: normal ECG                NOTE: The following portions of the patient's history were reviewed and updated this visit as appropriate: allergies, current medications, past family history, past medical history, past social history, past surgical history and problem list.

## 2024-08-25 NOTE — PROGRESS NOTES
Subjective   The ABCs of the Annual Wellness Visit  Medicare Wellness Visit      Salinas Dill is a 78 y.o. patient who presents for a Medicare Wellness Visit.    The following portions of the patient's history were reviewed and   updated as appropriate: allergies, current medications, past family history, past medical history, past social history, past surgical history, and problem list.    Compared to one year ago, the patient's physical   health is the same.  Compared to one year ago, the patient's mental   health is the same.    Recent Hospitalizations:  He was not admitted to the hospital during the last year.     Current Medical Providers:  Patient Care Team:  Kortney Ferrera MD as PCP - General  Bharti Asencio MD (Dermatology)  Sagar Chen MD as Consulting Physician (Urology)    Outpatient Medications Prior to Visit   Medication Sig Dispense Refill    aspirin (aspirin) 81 MG EC tablet Take 1 tablet by mouth Daily. 30 tablet 2    atorvastatin (LIPITOR) 40 MG tablet TAKE 1 TABLET DAILY AT BEDTIME. 90 tablet 0    B Complex Vitamins (vitamin b complex) capsule capsule Take 1 capsule by mouth Daily. LD 11/4      BLACK ELDERBERRY PO Take 158 mg by mouth Every Night.      carvedilol (COREG) 6.25 MG tablet Take 1 tablet by mouth Every 12 (Twelve) Hours. 180 tablet 1    Cholecalciferol (VITAMIN D) 2000 units capsule Take 25 capsules by mouth 1 (One) Time Per Week. mondays      losartan (COZAAR) 50 MG tablet Take 1 tablet by mouth 2 (Two) Times a Day. 180 tablet 0    melatonin 5 MG tablet tablet Take 1 tablet by mouth Every Night.      SILDENAFIL CITRATE PO Take 20 mg by mouth Daily. For enlarged prostate      tamsulosin (FLOMAX) 0.4 MG capsule 24 hr capsule Take 2 capsules by mouth Daily.      Turmeric 500 MG capsule Take 1 capsule by mouth Daily.       No facility-administered medications prior to visit.     No opioid medication identified on active medication list. I have reviewed chart for other  potential  high risk medication/s and harmful drug interactions in the elderly.      Aspirin is on active medication list. Aspirin use is indicated based on review of current medical condition/s. Pros and cons of this therapy have been discussed today. Benefits of this medication outweigh potential harm.  Patient has been encouraged to continue taking this medication.  .      Patient Active Problem List   Diagnosis    Plantar fasciitis    Atrial fibrillation    B12 deficiency    Benign prostatic hyperplasia with urinary obstruction    Drug-induced impotence    Hyperglycemia    Hypertension    Increased frequency of urination    Low back pain    Presence of artificial shoulder joint    Snoring    Vitamin D deficiency    Right wrist pain    Localized swelling on hand    Elbow pain, left    Nuclear senile cataract    Presbyopia    Dyslipidemia    Obesity    Class 1 obesity due to excess calories with serious comorbidity and body mass index (BMI) of 32.0 to 32.9 in adult    Nonrheumatic mitral valve regurgitation    Coronary artery disease involving native coronary artery of native heart without angina pectoris    S/P CABG x 3 with CHOPRA per Dr. 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 Hernandez 11/11/2021    S/P Maze operation for atrial fibrillation per Dr. Hernandez 11/11/2021    S/P left atrial appendage ligation    Arthritis    Bulging lumbar disc    Difficulty maintaining body in lying position    Spinal stenosis of lumbar region with radiculopathy    Nonsustained ventricular tachycardia    Cough    Dupuytren's contracture of left hand     Advance Care Planning Advance Directive is on file.  ACP discussion was held with the patient during this visit. Patient has an advance directive in EMR which is still valid.             Objective   Vitals:    08/27/24 0909 08/27/24 0912   BP: 147/84 152/76   BP Location: Left arm Right arm   Patient Position: Sitting Sitting   Cuff Size: Adult Adult   Pulse: 59 59  "  Temp: 97 °F (36.1 °C)    TempSrc: Temporal    SpO2: 97%    Weight: 103 kg (227 lb 9.6 oz)    Height: 177.8 cm (70\")    PainSc:   2    PainLoc: Generalized        Estimated body mass index is 32.66 kg/m² as calculated from the following:    Height as of this encounter: 177.8 cm (70\").    Weight as of this encounter: 103 kg (227 lb 9.6 oz).            Does the patient have evidence of cognitive impairment? No                                                                                                Health  Risk Assessment    Smoking Status:  Social History     Tobacco Use   Smoking Status Former    Current packs/day: 0.00    Average packs/day: 1 pack/day for 12.0 years (12.0 ttl pk-yrs)    Types: Cigarettes    Start date: 1966    Quit date: 1978    Years since quittin.6   Smokeless Tobacco Former    Quit date:      Alcohol Consumption:  Social History     Substance and Sexual Activity   Alcohol Use Yes    Alcohol/week: 1.0 standard drink of alcohol    Types: 1 Shots of liquor per week    Comment: [employer] margaritas on        Fall Risk Screen  STEADI Fall Risk Assessment was completed, and patient is at LOW risk for falls.Assessment completed on:2024    Depression Screenin/27/2024     9:11 AM   PHQ-2/PHQ-9 Depression Screening   Little Interest or Pleasure in Doing Things 0-->not at all   Feeling Down, Depressed or Hopeless 0-->not at all   PHQ-9: Brief Depression Severity Measure Score 0     Health Habits and Functional and Cognitive Screenin/27/2024     9:10 AM   Functional & Cognitive Status   Do you have difficulty preparing food and eating? No   Do you have difficulty bathing yourself, getting dressed or grooming yourself? No   Do you have difficulty using the toilet? No   Do you have difficulty moving around from place to place? No   Do you have trouble with steps or getting out of a bed or a chair? No   Current Diet Limited Junk Food   Dental Exam Up " to date   Eye Exam Up to date   Exercise (times per week) 5 times per week   Current Exercises Include Bicycling Outdoors;Treadmill;Yard Work   Do you need help using the phone?  Yes   Are you deaf or do you have serious difficulty hearing?  No   Do you need help to go to places out of walking distance? No   Do you need help shopping? No   Do you need help preparing meals?  No   Do you need help with housework?  No   Do you need help with laundry? No   Do you need help taking your medications? No   Do you need help managing money? No   Do you ever drive or ride in a car without wearing a seat belt? No   Have you felt unusual stress, anger or loneliness in the last month? No   Who do you live with? Spouse   If you need help, do you have trouble finding someone available to you? No   Have you been bothered in the last four weeks by sexual problems? No   Do you have difficulty concentrating, remembering or making decisions? No           Age-appropriate Screening Schedule:  Refer to the list below for future screening recommendations based on patient's age, sex and/or medical conditions. Orders for these recommended tests are listed in the plan section. The patient has been provided with a written plan.    Health Maintenance List  Health Maintenance   Topic Date Due    COVID-19 Vaccine (7 - 2023-24 season) 02/10/2024    INFLUENZA VACCINE  08/01/2024    PROSTATE CANCER SCREENING  02/22/2025    LIPID PANEL  02/22/2025    BMI FOLLOWUP  02/27/2025    ANNUAL WELLNESS VISIT  08/27/2025    COLORECTAL CANCER SCREENING  03/05/2027    TDAP/TD VACCINES (3 - Td or Tdap) 08/25/2032    HEPATITIS C SCREENING  Completed    RSV Vaccine - Adults  Completed    Pneumococcal Vaccine 65+  Completed    ZOSTER VACCINE  Completed                                                                                                                                                CMS Preventative Services Quick Reference  Risk Factors Identified During  Encounter  None Identified    The above risks/problems have been discussed with the patient.  Pertinent information has been shared with the patient in the After Visit Summary.  An After Visit Summary and PPPS were made available to the patient.    Follow Up:   Next Medicare Wellness visit to be scheduled in 1 year.         Additional E&M Note during same encounter follows:  Patient has additional, significant, and separately identifiable condition(s)/problem(s) that require work above and beyond the Medicare Wellness Visit     Chief Complaint  Medicare Wellness-subsequent (Patient is fasting. ) and Hypertension    Subjective    HPI         The patient is a 78-year-old male here today for his annual wellness exam for Medicare.  Patient had not noted any palpitations at home however he had more than 6 irregular beats when auscultated in the clinic.  EKG did show multiple premature atrial contractions along with other chronic findings.  Presently patient is asymptomatic so no Holter monitor was ordered.  He has a history of mitral valve repair, primary hypertension, hyperglycemia, hyperlipidemia, coronary artery disease, class 1 obesity due to excess calories with a body mass index of 32, benign prostatic hyperplasia, B12 deficiency, paroxysmal atrial fibrillation, and a skin lesion. He reports no changes in hearing or vision since the last visit and has an upcoming appointment with an ophthalmologist.    He does not experience any difficulty with swallowing or indigestion but occasionally has acid reflux, which he manages by drinking milk. This occurs more than once a week, but he has not experienced it in several days. He does not report any chest pressure, heart flutters, or irregular beats. His bowel movements are regular, and he has not noticed any abnormal coloration in his stools or urine.    He has been experiencing issues with erection and ejaculation, which he attributes to aging. He takes sildenafil daily  "to aid in urination. He had a consultation with Dr. White within the last month and believes he saw Dr. Flores in January or February 2024. He is currently taking losartan 50 mg twice daily and consulted a urologist within the last two months.    He still has a cough, which does not significantly bother him. Dr. White suggested that the cough might be related to his acid reflux and recommended elevating the head of his bed. He has seen a dermatologist for a skin lesion.    He feels his physical health is about the same as last year and his mental health is good. He has not required hospitalization overnight. He regularly takes aspirin 81 mg. He has an advanced directive in place and there have been no changes to it. He reports no cognitive dysfunction and does not require additional information on alcohol misuse, chronic pain, depression, drug use, fall risk, glaucoma, hearing problems, immunizations, loneliness, inactivity, polypharmacy, high-risk sexual behavior, tobacco use, or urinary incontinence.    He has Dupuytren's contracture in his left hand for years, but only lately he noticed it does not straighten out as much as it did.    ALLERGIES  He is allergic to LISINOPRIL, which causes cough.    IMMUNIZATIONS  He has had the pneumonia and RSV vaccines.  Review of Systems   Cardiovascular:  Negative for chest pain, palpitations and leg swelling.   Musculoskeletal:  Positive for back pain, gait problem and myalgias.        Left hand little finger Dupuytren's contracture.          Objective   Vital Signs:  /76 (BP Location: Right arm, Patient Position: Sitting, Cuff Size: Adult)   Pulse 59   Temp 97 °F (36.1 °C) (Temporal)   Ht 177.8 cm (70\")   Wt 103 kg (227 lb 9.6 oz)   SpO2 97%   BMI 32.66 kg/m²   Physical Exam  Vitals reviewed.   Constitutional:       General: He is not in acute distress.     Appearance: Normal appearance. He is well-developed. He is not ill-appearing or toxic-appearing.   HENT: "      Head: Normocephalic and atraumatic.      Right Ear: Tympanic membrane, ear canal and external ear normal.      Left Ear: Tympanic membrane, ear canal and external ear normal.      Nose: Nose normal.      Mouth/Throat:      Mouth: Mucous membranes are moist.      Pharynx: No posterior oropharyngeal erythema.   Eyes:      Extraocular Movements: Extraocular movements intact.      Conjunctiva/sclera: Conjunctivae normal.      Pupils: Pupils are equal, round, and reactive to light.   Cardiovascular:      Rate and Rhythm: Normal rate. Rhythm irregular.      Pulses: Normal pulses.      Heart sounds: Normal heart sounds.   Pulmonary:      Effort: Pulmonary effort is normal.      Breath sounds: Normal breath sounds.   Abdominal:      General: Bowel sounds are normal. There is no distension.      Palpations: Abdomen is soft. There is no mass.      Tenderness: There is no abdominal tenderness.   Musculoskeletal:         General: Normal range of motion.      Cervical back: Neck supple.   Skin:     General: Skin is warm.   Neurological:      General: No focal deficit present.      Mental Status: He is alert and oriented to person, place, and time.   Psychiatric:         Mood and Affect: Mood normal.         Behavior: Behavior normal.           Ears appear normal. Throat appears normal.  Carotid arteries in the neck sound good.  Lungs sound good.    Vital Signs  Blood pressure readings were 147/84 and 152/76.             Assessment and Plan           1. Annual Wellness Exam.  His blood pressure was slightly elevated today, with readings of 147/84 and 152/76. He is currently on losartan 50 mg twice daily. He is advised to verify the accuracy of his blood pressure cuff and provide 10 readings over the next 2 weeks. Depending on these readings, his losartan dosage may be adjusted. He is advised to receive the influenza and new COVID-19 vaccines in October 2024.    2. Atrial Fibrillation.  He has a history of atrial fibrillation  and is taking vitamin B12 complex. His last telemetry was over a year ago. An EKG revealed more than 6 irregular beats per minute. An EKG will be repeated today and another Holter monitor will be ordered to check for any developing rhythm problems.    3. Hyperlipidemia.  He is advised to avoid using milk for acid reflux as it can increase cholesterol levels. Over-the-counter Pepcid is recommended for use as needed if he consumes a lot of acidic food or experiences heartburn. If Pepcid does not alleviate his cough, a protein pump inhibitor will be considered.    4. Benign Prostatic Hyperplasia.  A urine test will be conducted to investigate the cause of his slow urination. He reports taking sildenafil as needed to help with urination.    5. Skin Lesion.  A referral to Dr. Blum for a skin check has been made.    6. Dupuytren's Contracture.  He reports having Dupuytren's contracture in his left hand. He is advised to monitor the condition and inform if he starts bumping his finger due to it not extending fully.      Orders Placed This Encounter   Procedures    CBC Auto Differential     Order Specific Question:   Release to patient     Answer:   Routine Release [[id number]]    Comprehensive Metabolic Panel     Order Specific Question:   Release to patient     Answer:   Routine Release [[id number]]    Hemoglobin A1c     Order Specific Question:   Release to patient     Answer:   Routine Release [[id number]]    Lipid Panel     Order Specific Question:   Release to patient     Answer:   Routine Release [[id number]]    Magnesium     Order Specific Question:   Release to patient     Answer:   Routine Release [[id number]]    Vitamin B12     Order Specific Question:   Release to patient     Answer:   Routine Release [[id number]]    TSH Rfx On Abnormal To Free T4     Order Specific Question:   Release to patient     Answer:   Routine Release [[id number]]    Urinalysis With Culture If Indicated - Urine, Clean Catch     Order  Specific Question:   Release to patient     Answer:   Routine Release [[id number]]    [place] Urine Culture Tube - Urine, Clean Catch     Order Specific Question:   Release to patient     Answer:   Routine Release [[id number]]    ECG 12 Lead     This order was created via procedure documentation     Order Specific Question:   Release to patient     Answer:   Routine Release [[id number]]             Follow Up   No follow-ups on file.  Patient was given instructions and counseling regarding his condition or for health maintenance advice. Please see specific information pulled into the AVS if appropriate.  Patient or patient representative verbalized consent for the use of Ambient Listening during the visit with  Kortney Ferrera MD for chart documentation. 8/27/2024  17:18 EDT

## 2024-08-25 NOTE — PATIENT INSTRUCTIONS
Health Maintenance Due   Topic Date Due    COVID-19 Vaccine (7 - 2023-24 season) 02/10/2024    ANNUAL WELLNESS VISIT  08/24/2024    INFLUENZA VACCINE  08/01/2024    Check blood pressure cuff for accuracy and send 10 blood pressures over 2 weeks.  Watch sodium, alcohol and weight.    Pepcid otc take if eats a lot of acid or gets heartburn

## 2024-08-27 ENCOUNTER — OFFICE VISIT (OUTPATIENT)
Dept: FAMILY MEDICINE CLINIC | Facility: CLINIC | Age: 79
End: 2024-08-27
Payer: MEDICARE

## 2024-08-27 ENCOUNTER — LAB (OUTPATIENT)
Dept: FAMILY MEDICINE CLINIC | Facility: CLINIC | Age: 79
End: 2024-08-27
Payer: MEDICARE

## 2024-08-27 VITALS
WEIGHT: 227.6 LBS | OXYGEN SATURATION: 97 % | HEART RATE: 59 BPM | DIASTOLIC BLOOD PRESSURE: 76 MMHG | BODY MASS INDEX: 32.58 KG/M2 | TEMPERATURE: 97 F | HEIGHT: 70 IN | SYSTOLIC BLOOD PRESSURE: 152 MMHG

## 2024-08-27 DIAGNOSIS — I10 PRIMARY HYPERTENSION: ICD-10-CM

## 2024-08-27 DIAGNOSIS — Z00.01 ENCOUNTER FOR ANNUAL GENERAL MEDICAL EXAMINATION WITH ABNORMAL FINDINGS IN ADULT: Primary | ICD-10-CM

## 2024-08-27 DIAGNOSIS — N40.1 BENIGN PROSTATIC HYPERPLASIA WITH URINARY OBSTRUCTION: ICD-10-CM

## 2024-08-27 DIAGNOSIS — Z98.890 S/P MVR (MITRAL VALVE REPAIR): ICD-10-CM

## 2024-08-27 DIAGNOSIS — R73.9 HYPERGLYCEMIA: ICD-10-CM

## 2024-08-27 DIAGNOSIS — E53.8 B12 DEFICIENCY: ICD-10-CM

## 2024-08-27 DIAGNOSIS — I25.10 CORONARY ARTERY DISEASE INVOLVING NATIVE CORONARY ARTERY OF NATIVE HEART WITHOUT ANGINA PECTORIS: ICD-10-CM

## 2024-08-27 DIAGNOSIS — N13.8 BENIGN PROSTATIC HYPERPLASIA WITH URINARY OBSTRUCTION: ICD-10-CM

## 2024-08-27 DIAGNOSIS — L98.9 SKIN LESION: ICD-10-CM

## 2024-08-27 DIAGNOSIS — E66.09 CLASS 1 OBESITY DUE TO EXCESS CALORIES WITH SERIOUS COMORBIDITY AND BODY MASS INDEX (BMI) OF 32.0 TO 32.9 IN ADULT: ICD-10-CM

## 2024-08-27 DIAGNOSIS — M72.0 DUPUYTREN'S CONTRACTURE OF LEFT HAND: ICD-10-CM

## 2024-08-27 DIAGNOSIS — E78.5 DYSLIPIDEMIA: ICD-10-CM

## 2024-08-27 DIAGNOSIS — R05.9 COUGH, UNSPECIFIED TYPE: ICD-10-CM

## 2024-08-27 DIAGNOSIS — I49.9 IRREGULARLY IRREGULAR PULSE RHYTHM: ICD-10-CM

## 2024-08-27 DIAGNOSIS — I48.0 PAROXYSMAL ATRIAL FIBRILLATION: ICD-10-CM

## 2024-08-27 LAB
ALBUMIN SERPL-MCNC: 4.3 G/DL (ref 3.5–5.2)
ALBUMIN/GLOB SERPL: 1.7 G/DL
ALP SERPL-CCNC: 67 U/L (ref 39–117)
ALT SERPL W P-5'-P-CCNC: 19 U/L (ref 1–41)
ANION GAP SERPL CALCULATED.3IONS-SCNC: 8 MMOL/L (ref 5–15)
AST SERPL-CCNC: 22 U/L (ref 1–40)
BASOPHILS # BLD AUTO: 0.03 10*3/MM3 (ref 0–0.2)
BASOPHILS NFR BLD AUTO: 0.5 % (ref 0–1.5)
BILIRUB SERPL-MCNC: 0.7 MG/DL (ref 0–1.2)
BILIRUB UR QL STRIP: NEGATIVE
BUN SERPL-MCNC: 17 MG/DL (ref 8–23)
BUN/CREAT SERPL: 23.6 (ref 7–25)
CALCIUM SPEC-SCNC: 9.7 MG/DL (ref 8.6–10.5)
CHLORIDE SERPL-SCNC: 103 MMOL/L (ref 98–107)
CHOLEST SERPL-MCNC: 127 MG/DL (ref 0–200)
CLARITY UR: CLEAR
CO2 SERPL-SCNC: 27 MMOL/L (ref 22–29)
COLOR UR: ABNORMAL
CREAT SERPL-MCNC: 0.72 MG/DL (ref 0.76–1.27)
DEPRECATED RDW RBC AUTO: 43 FL (ref 37–54)
EGFRCR SERPLBLD CKD-EPI 2021: 94.1 ML/MIN/1.73
EOSINOPHIL # BLD AUTO: 0.31 10*3/MM3 (ref 0–0.4)
EOSINOPHIL NFR BLD AUTO: 5.6 % (ref 0.3–6.2)
ERYTHROCYTE [DISTWIDTH] IN BLOOD BY AUTOMATED COUNT: 12.7 % (ref 12.3–15.4)
GLOBULIN UR ELPH-MCNC: 2.6 GM/DL
GLUCOSE SERPL-MCNC: 95 MG/DL (ref 65–99)
GLUCOSE UR STRIP-MCNC: NEGATIVE MG/DL
HBA1C MFR BLD: 5.6 % (ref 4.8–5.6)
HCT VFR BLD AUTO: 42.9 % (ref 37.5–51)
HDLC SERPL-MCNC: 46 MG/DL (ref 40–60)
HGB BLD-MCNC: 14.2 G/DL (ref 13–17.7)
HGB UR QL STRIP.AUTO: NEGATIVE
HOLD SPECIMEN: NORMAL
IMM GRANULOCYTES # BLD AUTO: 0.02 10*3/MM3 (ref 0–0.05)
IMM GRANULOCYTES NFR BLD AUTO: 0.4 % (ref 0–0.5)
KETONES UR QL STRIP: NEGATIVE
LDLC SERPL CALC-MCNC: 68 MG/DL (ref 0–100)
LDLC/HDLC SERPL: 1.51 {RATIO}
LEUKOCYTE ESTERASE UR QL STRIP.AUTO: NEGATIVE
LYMPHOCYTES # BLD AUTO: 0.81 10*3/MM3 (ref 0.7–3.1)
LYMPHOCYTES NFR BLD AUTO: 14.5 % (ref 19.6–45.3)
MAGNESIUM SERPL-MCNC: 2.4 MG/DL (ref 1.6–2.4)
MCH RBC QN AUTO: 30.8 PG (ref 26.6–33)
MCHC RBC AUTO-ENTMCNC: 33.1 G/DL (ref 31.5–35.7)
MCV RBC AUTO: 93.1 FL (ref 79–97)
MONOCYTES # BLD AUTO: 0.69 10*3/MM3 (ref 0.1–0.9)
MONOCYTES NFR BLD AUTO: 12.4 % (ref 5–12)
NEUTROPHILS NFR BLD AUTO: 3.71 10*3/MM3 (ref 1.7–7)
NEUTROPHILS NFR BLD AUTO: 66.6 % (ref 42.7–76)
NITRITE UR QL STRIP: NEGATIVE
NRBC BLD AUTO-RTO: 0 /100 WBC (ref 0–0.2)
PH UR STRIP.AUTO: 6.5 [PH] (ref 5–8)
PLATELET # BLD AUTO: 205 10*3/MM3 (ref 140–450)
PMV BLD AUTO: 12.5 FL (ref 6–12)
POTASSIUM SERPL-SCNC: 4.3 MMOL/L (ref 3.5–5.2)
PROT SERPL-MCNC: 6.9 G/DL (ref 6–8.5)
PROT UR QL STRIP: NEGATIVE
RBC # BLD AUTO: 4.61 10*6/MM3 (ref 4.14–5.8)
SODIUM SERPL-SCNC: 138 MMOL/L (ref 136–145)
SP GR UR STRIP: 1.02 (ref 1–1.03)
TRIGL SERPL-MCNC: 58 MG/DL (ref 0–150)
TSH SERPL DL<=0.05 MIU/L-ACNC: 2.31 UIU/ML (ref 0.27–4.2)
UROBILINOGEN UR QL STRIP: ABNORMAL
VIT B12 BLD-MCNC: 905 PG/ML (ref 211–946)
VLDLC SERPL-MCNC: 13 MG/DL (ref 5–40)
WBC NRBC COR # BLD AUTO: 5.57 10*3/MM3 (ref 3.4–10.8)

## 2024-08-27 PROCEDURE — 81003 URINALYSIS AUTO W/O SCOPE: CPT | Performed by: PREVENTIVE MEDICINE

## 2024-08-27 PROCEDURE — 80061 LIPID PANEL: CPT | Performed by: PREVENTIVE MEDICINE

## 2024-08-27 PROCEDURE — 99214 OFFICE O/P EST MOD 30 MIN: CPT | Performed by: PREVENTIVE MEDICINE

## 2024-08-27 PROCEDURE — 93000 ELECTROCARDIOGRAM COMPLETE: CPT | Performed by: PREVENTIVE MEDICINE

## 2024-08-27 PROCEDURE — 82607 VITAMIN B-12: CPT | Performed by: PREVENTIVE MEDICINE

## 2024-08-27 PROCEDURE — 83735 ASSAY OF MAGNESIUM: CPT | Performed by: PREVENTIVE MEDICINE

## 2024-08-27 PROCEDURE — 1160F RVW MEDS BY RX/DR IN RCRD: CPT | Performed by: PREVENTIVE MEDICINE

## 2024-08-27 PROCEDURE — 36415 COLL VENOUS BLD VENIPUNCTURE: CPT | Performed by: PREVENTIVE MEDICINE

## 2024-08-27 PROCEDURE — 3078F DIAST BP <80 MM HG: CPT | Performed by: PREVENTIVE MEDICINE

## 2024-08-27 PROCEDURE — 1159F MED LIST DOCD IN RCRD: CPT | Performed by: PREVENTIVE MEDICINE

## 2024-08-27 PROCEDURE — 3077F SYST BP >= 140 MM HG: CPT | Performed by: PREVENTIVE MEDICINE

## 2024-08-27 PROCEDURE — G0439 PPPS, SUBSEQ VISIT: HCPCS | Performed by: PREVENTIVE MEDICINE

## 2024-08-27 PROCEDURE — 83036 HEMOGLOBIN GLYCOSYLATED A1C: CPT | Performed by: PREVENTIVE MEDICINE

## 2024-08-27 PROCEDURE — 84443 ASSAY THYROID STIM HORMONE: CPT | Performed by: PREVENTIVE MEDICINE

## 2024-08-27 PROCEDURE — 1125F AMNT PAIN NOTED PAIN PRSNT: CPT | Performed by: PREVENTIVE MEDICINE

## 2024-08-27 PROCEDURE — 1170F FXNL STATUS ASSESSED: CPT | Performed by: PREVENTIVE MEDICINE

## 2024-08-27 PROCEDURE — 80053 COMPREHEN METABOLIC PANEL: CPT | Performed by: PREVENTIVE MEDICINE

## 2024-08-27 PROCEDURE — 85025 COMPLETE CBC W/AUTO DIFF WBC: CPT | Performed by: PREVENTIVE MEDICINE

## 2024-08-27 NOTE — PROGRESS NOTES
Procedure     ECG 12 Lead    Date/Time: 8/27/2024 5:12 PM  Performed by: Kortney Ferrera MD    Authorized by: Kortney Ferrera MD  Comparison: compared with previous ECG from 7/31/2024  Similar to previous ECG  Rhythm: sinus rhythm  Ectopy: atrial premature contractions  Rate: normal  Conduction: conduction normal  Conduction: non-specific intraventricular conduction delay  ST Segments: ST segments normal  T Waves: T waves normal  QRS axis: normal  Other: no other findings    Clinical impression: abnormal EKG

## 2024-08-28 ENCOUNTER — TELEPHONE (OUTPATIENT)
Dept: FAMILY MEDICINE CLINIC | Facility: CLINIC | Age: 79
End: 2024-08-28
Payer: MEDICARE

## 2024-08-28 NOTE — TELEPHONE ENCOUNTER
RELAY----- Message from Kortney Ferrera sent at 8/28/2024 12:38 PM EDT -----  Labs look normal- keep up good work

## 2024-09-04 RX ORDER — ATORVASTATIN CALCIUM 40 MG/1
TABLET, FILM COATED ORAL
Qty: 90 TABLET | Refills: 2 | Status: SHIPPED | OUTPATIENT
Start: 2024-09-04

## 2024-09-16 RX ORDER — LOSARTAN POTASSIUM 50 MG/1
50 TABLET ORAL 2 TIMES DAILY
Qty: 180 TABLET | Refills: 0 | Status: SHIPPED | OUTPATIENT
Start: 2024-09-16

## 2024-09-16 RX ORDER — CARVEDILOL 6.25 MG/1
6.25 TABLET ORAL EVERY 12 HOURS SCHEDULED
Qty: 180 TABLET | Refills: 0 | Status: SHIPPED | OUTPATIENT
Start: 2024-09-16

## 2024-09-17 ENCOUNTER — TELEPHONE (OUTPATIENT)
Dept: FAMILY MEDICINE CLINIC | Facility: CLINIC | Age: 79
End: 2024-09-17
Payer: MEDICARE

## 2024-10-15 ENCOUNTER — CLINICAL SUPPORT (OUTPATIENT)
Dept: FAMILY MEDICINE CLINIC | Facility: CLINIC | Age: 79
End: 2024-10-15
Payer: MEDICARE

## 2024-10-15 PROCEDURE — 90662 IIV NO PRSV INCREASED AG IM: CPT | Performed by: PREVENTIVE MEDICINE

## 2024-10-15 PROCEDURE — 90480 ADMN SARSCOV2 VAC 1/ONLY CMP: CPT | Performed by: PREVENTIVE MEDICINE

## 2024-10-15 PROCEDURE — G0008 ADMIN INFLUENZA VIRUS VAC: HCPCS | Performed by: PREVENTIVE MEDICINE

## 2024-10-15 PROCEDURE — 91320 SARSCV2 VAC 30MCG TRS-SUC IM: CPT | Performed by: PREVENTIVE MEDICINE

## 2024-12-09 RX ORDER — CARVEDILOL 6.25 MG/1
6.25 TABLET ORAL EVERY 12 HOURS SCHEDULED
Qty: 180 TABLET | Refills: 2 | Status: SHIPPED | OUTPATIENT
Start: 2024-12-09

## 2024-12-23 RX ORDER — LOSARTAN POTASSIUM 50 MG/1
50 TABLET ORAL 2 TIMES DAILY
Qty: 180 TABLET | Refills: 0 | Status: SHIPPED | OUTPATIENT
Start: 2024-12-23

## 2025-02-23 PROBLEM — H53.2 DIPLOPIA: Status: ACTIVE | Noted: 2023-08-31

## 2025-02-23 PROBLEM — I47.29 NONSUSTAINED VENTRICULAR TACHYCARDIA: Status: RESOLVED | Noted: 2022-06-07 | Resolved: 2025-02-23

## 2025-02-23 PROBLEM — H52.221 REGULAR ASTIGMATISM OF RIGHT EYE: Status: ACTIVE | Noted: 2023-02-22

## 2025-02-23 NOTE — ASSESSMENT & PLAN NOTE
Patient's (There is no height or weight on file to calculate BMI.) indicates that they are obese (BMI >30) with health conditions that include hypertension, coronary heart disease, impaired fasting glucose, and dyslipidemias . Weight is unchanged. BMI  is above average; BMI management plan is completed. We discussed portion control and increasing exercise.

## 2025-02-23 NOTE — PATIENT INSTRUCTIONS
Will cover  Health Maintenance Due   Topic Date Due    PROSTATE CANCER SCREENING  02/22/2025    Check blood pressure cuff for accuracy and send 10 blood pressures over 2 weeks.  Watch sodium, alcohol and weight     Patint advised to ask Dr. LOOMIS about Mitral valve evaluation and since no symptoms.  Cardiac calcium

## 2025-02-24 ENCOUNTER — LAB (OUTPATIENT)
Dept: FAMILY MEDICINE CLINIC | Facility: CLINIC | Age: 80
End: 2025-02-24
Payer: MEDICARE

## 2025-02-24 ENCOUNTER — OFFICE VISIT (OUTPATIENT)
Dept: FAMILY MEDICINE CLINIC | Facility: CLINIC | Age: 80
End: 2025-02-24
Payer: MEDICARE

## 2025-02-24 VITALS
BODY MASS INDEX: 32.18 KG/M2 | OXYGEN SATURATION: 96 % | WEIGHT: 224.8 LBS | DIASTOLIC BLOOD PRESSURE: 84 MMHG | SYSTOLIC BLOOD PRESSURE: 143 MMHG | HEART RATE: 85 BPM | HEIGHT: 70 IN

## 2025-02-24 DIAGNOSIS — R05.9 COUGH, UNSPECIFIED TYPE: ICD-10-CM

## 2025-02-24 DIAGNOSIS — N40.1 BENIGN PROSTATIC HYPERPLASIA WITH URINARY OBSTRUCTION: ICD-10-CM

## 2025-02-24 DIAGNOSIS — M79.671 RIGHT FOOT PAIN: ICD-10-CM

## 2025-02-24 DIAGNOSIS — R06.83 SNORING: ICD-10-CM

## 2025-02-24 DIAGNOSIS — E66.811 CLASS 1 OBESITY DUE TO EXCESS CALORIES WITH SERIOUS COMORBIDITY AND BODY MASS INDEX (BMI) OF 32.0 TO 32.9 IN ADULT: ICD-10-CM

## 2025-02-24 DIAGNOSIS — I25.10 CORONARY ARTERY DISEASE INVOLVING NATIVE CORONARY ARTERY OF NATIVE HEART WITHOUT ANGINA PECTORIS: ICD-10-CM

## 2025-02-24 DIAGNOSIS — E53.8 B12 DEFICIENCY: ICD-10-CM

## 2025-02-24 DIAGNOSIS — I34.0 NONRHEUMATIC MITRAL VALVE REGURGITATION: ICD-10-CM

## 2025-02-24 DIAGNOSIS — E55.9 VITAMIN D DEFICIENCY: ICD-10-CM

## 2025-02-24 DIAGNOSIS — E66.09 CLASS 1 OBESITY DUE TO EXCESS CALORIES WITH SERIOUS COMORBIDITY AND BODY MASS INDEX (BMI) OF 32.0 TO 32.9 IN ADULT: ICD-10-CM

## 2025-02-24 DIAGNOSIS — E78.5 DYSLIPIDEMIA: ICD-10-CM

## 2025-02-24 DIAGNOSIS — N13.8 BENIGN PROSTATIC HYPERPLASIA WITH URINARY OBSTRUCTION: ICD-10-CM

## 2025-02-24 DIAGNOSIS — I48.0 PAROXYSMAL ATRIAL FIBRILLATION: Primary | ICD-10-CM

## 2025-02-24 DIAGNOSIS — I10 PRIMARY HYPERTENSION: ICD-10-CM

## 2025-02-24 DIAGNOSIS — I48.0 PAROXYSMAL ATRIAL FIBRILLATION: ICD-10-CM

## 2025-02-24 DIAGNOSIS — R73.9 HYPERGLYCEMIA: ICD-10-CM

## 2025-02-24 DIAGNOSIS — M72.0 DUPUYTREN'S CONTRACTURE OF LEFT HAND: ICD-10-CM

## 2025-02-24 LAB
25(OH)D3 SERPL-MCNC: 60.8 NG/ML (ref 30–100)
ALBUMIN SERPL-MCNC: 4 G/DL (ref 3.5–5.2)
ALBUMIN/GLOB SERPL: 1.5 G/DL
ALP SERPL-CCNC: 54 U/L (ref 39–117)
ALT SERPL W P-5'-P-CCNC: 18 U/L (ref 1–41)
ANION GAP SERPL CALCULATED.3IONS-SCNC: 10 MMOL/L (ref 5–15)
AST SERPL-CCNC: 22 U/L (ref 1–40)
BASOPHILS # BLD AUTO: 0.04 10*3/MM3 (ref 0–0.2)
BASOPHILS NFR BLD AUTO: 0.6 % (ref 0–1.5)
BILIRUB SERPL-MCNC: 0.5 MG/DL (ref 0–1.2)
BILIRUB UR QL STRIP: NEGATIVE
BUN SERPL-MCNC: 16 MG/DL (ref 8–23)
BUN/CREAT SERPL: 25.4 (ref 7–25)
CALCIUM SPEC-SCNC: 9.2 MG/DL (ref 8.6–10.5)
CHLORIDE SERPL-SCNC: 105 MMOL/L (ref 98–107)
CHOLEST SERPL-MCNC: 116 MG/DL (ref 0–200)
CLARITY UR: CLEAR
CO2 SERPL-SCNC: 24 MMOL/L (ref 22–29)
COLOR UR: YELLOW
CREAT SERPL-MCNC: 0.63 MG/DL (ref 0.76–1.27)
DEPRECATED RDW RBC AUTO: 41.9 FL (ref 37–54)
EGFRCR SERPLBLD CKD-EPI 2021: 97.4 ML/MIN/1.73
EOSINOPHIL # BLD AUTO: 0.27 10*3/MM3 (ref 0–0.4)
EOSINOPHIL NFR BLD AUTO: 4.4 % (ref 0.3–6.2)
ERYTHROCYTE [DISTWIDTH] IN BLOOD BY AUTOMATED COUNT: 12.6 % (ref 12.3–15.4)
GLOBULIN UR ELPH-MCNC: 2.6 GM/DL
GLUCOSE SERPL-MCNC: 104 MG/DL (ref 65–99)
GLUCOSE UR STRIP-MCNC: NEGATIVE MG/DL
HBA1C MFR BLD: 5.6 % (ref 4.8–5.6)
HCT VFR BLD AUTO: 44.4 % (ref 37.5–51)
HDLC SERPL-MCNC: 45 MG/DL (ref 40–60)
HGB BLD-MCNC: 14.9 G/DL (ref 13–17.7)
HGB UR QL STRIP.AUTO: NEGATIVE
HOLD SPECIMEN: NORMAL
IMM GRANULOCYTES # BLD AUTO: 0.01 10*3/MM3 (ref 0–0.05)
IMM GRANULOCYTES NFR BLD AUTO: 0.2 % (ref 0–0.5)
KETONES UR QL STRIP: NEGATIVE
LDLC SERPL CALC-MCNC: 61 MG/DL (ref 0–100)
LDLC/HDLC SERPL: 1.4 {RATIO}
LEUKOCYTE ESTERASE UR QL STRIP.AUTO: NEGATIVE
LYMPHOCYTES # BLD AUTO: 0.94 10*3/MM3 (ref 0.7–3.1)
LYMPHOCYTES NFR BLD AUTO: 15.2 % (ref 19.6–45.3)
MAGNESIUM SERPL-MCNC: 2.6 MG/DL (ref 1.6–2.4)
MCH RBC QN AUTO: 30.9 PG (ref 26.6–33)
MCHC RBC AUTO-ENTMCNC: 33.6 G/DL (ref 31.5–35.7)
MCV RBC AUTO: 92.1 FL (ref 79–97)
MONOCYTES # BLD AUTO: 0.83 10*3/MM3 (ref 0.1–0.9)
MONOCYTES NFR BLD AUTO: 13.4 % (ref 5–12)
NEUTROPHILS NFR BLD AUTO: 4.11 10*3/MM3 (ref 1.7–7)
NEUTROPHILS NFR BLD AUTO: 66.2 % (ref 42.7–76)
NITRITE UR QL STRIP: NEGATIVE
PH UR STRIP.AUTO: 6 [PH] (ref 5–8)
PLATELET # BLD AUTO: 249 10*3/MM3 (ref 140–450)
PMV BLD AUTO: 13.3 FL (ref 6–12)
POTASSIUM SERPL-SCNC: 4.1 MMOL/L (ref 3.5–5.2)
PROT SERPL-MCNC: 6.6 G/DL (ref 6–8.5)
PROT UR QL STRIP: NEGATIVE
RBC # BLD AUTO: 4.82 10*6/MM3 (ref 4.14–5.8)
SODIUM SERPL-SCNC: 139 MMOL/L (ref 136–145)
SP GR UR STRIP: 1.02 (ref 1–1.03)
TRIGL SERPL-MCNC: 40 MG/DL (ref 0–150)
TSH SERPL DL<=0.05 MIU/L-ACNC: 2.25 UIU/ML (ref 0.27–4.2)
UROBILINOGEN UR QL STRIP: NORMAL
VIT B12 BLD-MCNC: 817 PG/ML (ref 211–946)
VLDLC SERPL-MCNC: 10 MG/DL (ref 5–40)
WBC NRBC COR # BLD AUTO: 6.2 10*3/MM3 (ref 3.4–10.8)

## 2025-02-24 PROCEDURE — 36415 COLL VENOUS BLD VENIPUNCTURE: CPT

## 2025-02-24 PROCEDURE — 3077F SYST BP >= 140 MM HG: CPT | Performed by: PREVENTIVE MEDICINE

## 2025-02-24 PROCEDURE — 1125F AMNT PAIN NOTED PAIN PRSNT: CPT | Performed by: PREVENTIVE MEDICINE

## 2025-02-24 PROCEDURE — 83036 HEMOGLOBIN GLYCOSYLATED A1C: CPT | Performed by: PREVENTIVE MEDICINE

## 2025-02-24 PROCEDURE — 3079F DIAST BP 80-89 MM HG: CPT | Performed by: PREVENTIVE MEDICINE

## 2025-02-24 PROCEDURE — 80061 LIPID PANEL: CPT | Performed by: PREVENTIVE MEDICINE

## 2025-02-24 PROCEDURE — 80053 COMPREHEN METABOLIC PANEL: CPT | Performed by: PREVENTIVE MEDICINE

## 2025-02-24 PROCEDURE — 84443 ASSAY THYROID STIM HORMONE: CPT | Performed by: PREVENTIVE MEDICINE

## 2025-02-24 PROCEDURE — 1160F RVW MEDS BY RX/DR IN RCRD: CPT | Performed by: PREVENTIVE MEDICINE

## 2025-02-24 PROCEDURE — 82306 VITAMIN D 25 HYDROXY: CPT | Performed by: PREVENTIVE MEDICINE

## 2025-02-24 PROCEDURE — 85025 COMPLETE CBC W/AUTO DIFF WBC: CPT | Performed by: PREVENTIVE MEDICINE

## 2025-02-24 PROCEDURE — 83735 ASSAY OF MAGNESIUM: CPT | Performed by: PREVENTIVE MEDICINE

## 2025-02-24 PROCEDURE — 82607 VITAMIN B-12: CPT | Performed by: PREVENTIVE MEDICINE

## 2025-02-24 PROCEDURE — 1159F MED LIST DOCD IN RCRD: CPT | Performed by: PREVENTIVE MEDICINE

## 2025-02-24 PROCEDURE — 99214 OFFICE O/P EST MOD 30 MIN: CPT | Performed by: PREVENTIVE MEDICINE

## 2025-02-24 PROCEDURE — 81003 URINALYSIS AUTO W/O SCOPE: CPT | Performed by: PREVENTIVE MEDICINE

## 2025-02-24 NOTE — PROGRESS NOTES
Subjective   Salinas Dill is a 79 y.o. male presents for   Chief Complaint   Patient presents with    Hypertension     6 month     Hyperlipidemia     6 month     Hand Pain     Left hand little finger    Foot Injury     Right foot       Health Maintenance Due   Topic Date Due    PROSTATE CANCER SCREENING  02/22/2025       Hypertension  Associated symptoms include shortness of breath. Pertinent negatives include no chest pain, neck pain or palpitations.   Hyperlipidemia  Associated symptoms include shortness of breath. Pertinent negatives include no chest pain or myalgias.   Hand Pain   Associated symptoms include numbness. Pertinent negatives include no chest pain.   Foot Injury   Associated symptoms include numbness.      History of Present Illness  The patient is a 79-year-old male who comes in today for paroxysmal atrial fibrillation, B12 deficiency, benign prostatic hyperplasia with urinary obstruction, hyperglycemia, hypertension, vitamin D deficiency, dyslipidemia, class I obesity due to excess calories, nonrheumatic mitral valve regurgitation, coronary artery disease, cough (unspecified), Dupuytren's contracture, and right foot pain.    He reports overall good health but acknowledges the natural decline associated with aging. He has maintained regular ophthalmological and dental check-ups within the past year. He has an upcoming appointment with Dr. Flores on 02/27/2025, following the postponement of his scheduled visit with Dr. Smith until the summer. He experiences shortness of breath during transitions between activities, such as when initiating treadmill exercise at the gym. This symptom, which he attributes to a slow heart rate, has been present since his COVID-19 infection. He does not recall experiencing shortness of breath immediately post-cardiac surgery. He reports no chest pressure or palpitations. He has not been monitoring his blood pressure at home since last year. He reports no changes in  "hearing or vision, and no issues with swallowing or digestion. He admits to occasional snoring but is uncertain about his ability to tolerate a sleep mask. He reports no hemoptysis, sputum production, or wheezing. He reports no bowel or bladder issues but mentions occasional urinary leakage, which does not necessitate the use of a pad. His urologist has suggested a new procedure for this issue, but he is hesitant due to his age. He has been managing his hyperglycemia by reducing sodium intake but admits to struggling with carbohydrate control. He has not been using his recumbent bike for exercise. He reports no back pain unless strained. He has a history of scoliosis.    He reports experiencing discomfort in his left little finger, which he describes as more of an annoyance than pain. This has not significantly impacted his daily life.    He reports numbness in his right foot, which is more noticeable when he lies down at night. This symptom does not disrupt his sleep or cause pain. He also reports occasional slipping of his foot when standing up to ride an air dyne. He has noticed an improvement in his leg strength.    MEDICATIONS  Current: cholecalciferol, B complex    Vitals:    02/24/25 0812 02/24/25 0813   BP: 141/85 143/84   BP Location:  Left arm   Patient Position:  Sitting   Cuff Size:  Large Adult   Pulse: 85    SpO2: 96%    Weight: 102 kg (224 lb 12.8 oz)    Height: 177.8 cm (70\")      Body mass index is 32.26 kg/m².    Current Outpatient Medications on File Prior to Visit   Medication Sig Dispense Refill    aspirin (aspirin) 81 MG EC tablet Take 1 tablet by mouth Daily. 30 tablet 2    atorvastatin (LIPITOR) 40 MG tablet TAKE 1 TABLET DAILY AT BEDTIME. 90 tablet 2    B Complex Vitamins (vitamin b complex) capsule capsule Take 1 capsule by mouth Daily. LD 11/4      BLACK ELDERBERRY PO Take 158 mg by mouth Every Night.      carvedilol (COREG) 6.25 MG tablet TAKE 1 TABLET BY MOUTH EVERY 12 (TWELVE) HOURS. " 180 tablet 2    Cholecalciferol (VITAMIN D) 2000 units capsule Take 25 capsules by mouth 1 (One) Time Per Week. mondays      losartan (COZAAR) 50 MG tablet TAKE 1 TABLET BY MOUTH 2 (TWO) TIMES A DAY. 180 tablet 0    melatonin 5 MG tablet tablet Take 1 tablet by mouth Every Night.      SILDENAFIL CITRATE PO Take 20 mg by mouth Daily. For enlarged prostate      tamsulosin (FLOMAX) 0.4 MG capsule 24 hr capsule Take 2 capsules by mouth Daily.      Turmeric 500 MG capsule Take 1 capsule by mouth Daily.       No current facility-administered medications on file prior to visit.       The following portions of the patient's history were reviewed and updated as appropriate: allergies, current medications, past family history, past medical history, past social history, past surgical history, and problem list.    Review of Systems   Constitutional:  Negative for chills, diaphoresis, fatigue and fever.   HENT:  Negative for congestion, sore throat and swollen glands.    Respiratory:  Positive for cough and shortness of breath.    Cardiovascular:  Negative for chest pain, palpitations and leg swelling.   Gastrointestinal:  Negative for abdominal pain, nausea and vomiting.   Genitourinary:  Negative for dysuria.   Musculoskeletal:  Positive for arthralgias. Negative for myalgias and neck pain.   Skin:  Negative for rash.   Neurological:  Positive for numbness. Negative for weakness.       Objective   Physical Exam  Vitals reviewed.   Constitutional:       General: He is not in acute distress.     Appearance: He is well-developed. He is obese. He is not ill-appearing or toxic-appearing.   HENT:      Head: Normocephalic and atraumatic.      Right Ear: Tympanic membrane, ear canal and external ear normal.      Left Ear: Tympanic membrane, ear canal and external ear normal.      Nose: Nose normal.      Mouth/Throat:      Mouth: Mucous membranes are moist.      Pharynx: No posterior oropharyngeal erythema.   Eyes:      Extraocular  Movements: Extraocular movements intact.      Conjunctiva/sclera: Conjunctivae normal.      Pupils: Pupils are equal, round, and reactive to light.   Neck:      Vascular: No carotid bruit.   Cardiovascular:      Rate and Rhythm: Normal rate and regular rhythm.      Heart sounds: Normal heart sounds.   Pulmonary:      Effort: Pulmonary effort is normal.      Breath sounds: Normal breath sounds.   Abdominal:      General: Bowel sounds are normal. There is no distension.      Palpations: Abdomen is soft. There is no mass.      Tenderness: There is no abdominal tenderness. There is no right CVA tenderness or left CVA tenderness.   Musculoskeletal:         General: Tenderness and deformity present. Normal range of motion.      Cervical back: Neck supple. No tenderness.   Lymphadenopathy:      Cervical: No cervical adenopathy.   Skin:     General: Skin is warm.   Neurological:      General: No focal deficit present.      Mental Status: He is alert and oriented to person, place, and time.      Sensory: Sensory deficit present.      Motor: No weakness.      Gait: Gait normal.      Deep Tendon Reflexes: Reflexes normal.      Comments: Slight decrease in soft touch throughout the right great toe and plantar surface over the first metatarsal.  Knee reflexes were plus trace right ankle reflex was 2+ left ankle reflex was absent.    Patient does have obvious scoliosis however straight leg raising with  right foot dorsiflexion was negative.    Patient agreed if symptoms worsened we will do an EMG nerve velocity conduction study test he is still has good control of his bowels does dribble occasionally with urine but does not even wear any sort of pad.  There is Dupuytren's contractions in the palm of the left fifth metacarpal and left little finger proximal phalanx however patient can flex into his palm and can almost extend fully compared to the right little finger that is uninvolved.  Patient does wish to get evaluated by the  hand surgeon for possible injection.   Psychiatric:         Mood and Affect: Mood normal.         Behavior: Behavior normal.       Physical Exam  Throat appears normal.  Carotids in the neck sound normal.  Lungs sound normal.  Heart rate is 82 and regular. No mitral valve problems detected.    Vital Signs  Body mass index of 32.    PHQ-9 Total Score:    Results           Assessment & Plan   Diagnoses and all orders for this visit:    1. Paroxysmal atrial fibrillation (Primary)  -     Magnesium; Future  -     TSH Rfx On Abnormal To Free T4; Future    2. B12 deficiency  -     CBC Auto Differential; Future  -     Vitamin B12; Future    3. Benign prostatic hyperplasia with urinary obstruction  -     Urinalysis With Culture If Indicated - Urine, Clean Catch; Future    4. Hyperglycemia  -     Comprehensive Metabolic Panel; Future  -     Hemoglobin A1c; Future    5. Primary hypertension    6. Vitamin D deficiency  -     Vitamin D,25-Hydroxy; Future    7. Dyslipidemia  -     Lipid Panel; Future    8. Class 1 obesity due to excess calories with serious comorbidity and body mass index (BMI) of 32.0 to 32.9 in adult  Assessment & Plan:  Patient's (There is no height or weight on file to calculate BMI.) indicates that they are obese (BMI >30) with health conditions that include hypertension, coronary heart disease, impaired fasting glucose, and dyslipidemias . Weight is unchanged. BMI  is above average; BMI management plan is completed. We discussed portion control and increasing exercise.       9. Nonrheumatic mitral valve regurgitation    10. Coronary artery disease involving native coronary artery of native heart without angina pectoris    11. Cough, unspecified type  -     Magnesium; Future    12. Dupuytren's contracture of left hand  -     Ambulatory Referral to Hand Surgery    13. Right foot pain    14. Snoring  -     Ambulatory Referral to Sleep Medicine      Assessment & Plan  1. Paroxysmal Atrial Fibrillation.  He  reports experiencing shortness of breath during physical activities but recovers after a period of adjustment. He is advised to monitor his blood pressure 10 times over the next 2 weeks and report the readings via MyChart. A home sleep study will be conducted to evaluate for obstructive sleep apnea, which may help in controlling his blood pressure. He is also advised to discuss the possibility of cardiac screening and mitral valve evaluation with Dr. Flores during their upcoming appointment.    2. B12 Deficiency.  He is currently taking vitamin B12 supplements. A B12 level test will be conducted today to monitor his status.    3. Benign Prostatic Hyperplasia with Urinary Obstruction.  He reports occasional urinary dribbling but does not find it severe enough to require a procedure. A urine test will be conducted today to rule out any infection that might be causing the dribbling.    4. Hyperglycemia.  He admits to being weak in managing his carbohydrate intake. An A1c test will be conducted today to assess his glucose control.    5. Hypertension.  His blood pressure was slightly elevated during the visit. He is advised to monitor his blood pressure 10 times over the next 2 weeks and report the readings via [facility]. A home sleep study will be conducted to evaluate for obstructive sleep apnea, which may help in controlling his blood pressure.    6. Vitamin D Deficiency.  He is currently taking cholecalciferol 50,000 units once weekly. A vitamin D level test will be conducted today to monitor his status.    7. Dyslipidemia.  He is advised to continue watching his saturated fat intake and portion sizes. A cholesterol test will be conducted today to monitor his lipid levels.    8. Class I Obesity due to Excess Calories.  He is advised to continue walking and consider using his recumbent bike to help manage his weight.    9. Nonrheumatic Mitral Valve Regurgitation.  He is advised to discuss the possibility of mitral  valve evaluation with Dr. Flores during their upcoming appointment.    10. Coronary Artery Disease.  He is advised to discuss the possibility of cardiac screening with Dr. Flores during their upcoming appointment.    11. Cough, Unspecified.  He reports no current issues with coughing.    12. Dupuytren's Contracture.  He reports aggravation in his left little finger due to Dupuytren's contracture. He is advised to consider seeing a hand surgeon for a possible injection to alleviate the symptoms.    13. Right Foot Pain.  He reports numbness in his right foot, which is more pronounced at night. The numbness could be due to neuropathy or radicular pain from his scoliosis. He is advised to monitor the symptoms and report any worsening. If the numbness becomes bothersome, an EMG nerve velocity conduction study test will be ordered to determine the cause.    Follow-up  The patient will follow up in 6 months.    PROCEDURE  The patient underwent a triple bypass surgery and an ablation procedure in the past.    Patient Instructions   Will cover  Health Maintenance Due   Topic Date Due    PROSTATE CANCER SCREENING  02/22/2025    Check blood pressure cuff for accuracy and send 10 blood pressures over 2 weeks.  Watch sodium, alcohol and weight     Patint advised to ask Dr. LOOMIS about Mitral valve evaluation and since no symptoms.  Cardiac calcium         Patient or patient representative verbalized consent for the use of Ambient Listening during the visit with  Kortney Ferrera MD for chart documentation. 2/24/2025  10:57 EST

## 2025-02-25 ENCOUNTER — TELEPHONE (OUTPATIENT)
Dept: FAMILY MEDICINE CLINIC | Facility: CLINIC | Age: 80
End: 2025-02-25
Payer: MEDICARE

## 2025-02-25 NOTE — TELEPHONE ENCOUNTER
PT returning call from [place] regarding results. Advised I tried routing call but she is unavailable, said I would note his call. He said he will be home the rest of the afternoon, should be available for a call back.

## 2025-02-25 NOTE — PROGRESS NOTES
Also was 104 so continue to watch her carbs and increase your walking your A1c was normal so I do not think you are at any excess risk presently for diabetes.  Magnesium was slightly elevated if you are taking by mouth I would decrease the dose a day or 2/week call if any other questions or concerns

## 2025-02-25 NOTE — TELEPHONE ENCOUNTER
----- Message from Kortney Ferrera sent at 2/25/2025  8:12 AM EST -----  Also was 104 so continue to watch her carbs and increase your walking your A1c was normal so I do not think you are at any excess risk presently for diabetes.  Magnesium was slightly elevated if you are taking by mouth I would decrease the dose a day or 2/week call if any other questions or concerns

## 2025-02-25 NOTE — TELEPHONE ENCOUNTER
Tried calling pt with results/orders and had to LVM to call the office back. 2/25/25 1123 MH    ----- Message from Kortney Ferrera sent at 2/25/2025  8:12 AM EST -----  Also was 104 so continue to watch her carbs and increase your walking your A1c was normal so I do not think you are at any excess risk presently for diabetes.  Magnesium was slightly elevated if you are taking by mouth I would decrease the dose a day or 2/week call if any other questions or concerns

## 2025-02-26 RX ORDER — LOSARTAN POTASSIUM 50 MG/1
50 TABLET ORAL 2 TIMES DAILY
Qty: 180 TABLET | Refills: 0 | Status: SHIPPED | OUTPATIENT
Start: 2025-02-26

## 2025-02-27 ENCOUNTER — OFFICE VISIT (OUTPATIENT)
Dept: CARDIOLOGY | Facility: CLINIC | Age: 80
End: 2025-02-27
Payer: MEDICARE

## 2025-02-27 VITALS
HEIGHT: 70 IN | WEIGHT: 228 LBS | OXYGEN SATURATION: 96 % | DIASTOLIC BLOOD PRESSURE: 70 MMHG | HEART RATE: 66 BPM | BODY MASS INDEX: 32.64 KG/M2 | SYSTOLIC BLOOD PRESSURE: 128 MMHG

## 2025-02-27 DIAGNOSIS — Z98.890 S/P MVR (MITRAL VALVE REPAIR): ICD-10-CM

## 2025-02-27 DIAGNOSIS — Z98.890 S/P LEFT ATRIAL APPENDAGE LIGATION: ICD-10-CM

## 2025-02-27 DIAGNOSIS — I47.29 NONSUSTAINED VENTRICULAR TACHYCARDIA: Primary | ICD-10-CM

## 2025-02-27 DIAGNOSIS — I25.10 CORONARY ARTERY DISEASE INVOLVING NATIVE CORONARY ARTERY OF NATIVE HEART WITHOUT ANGINA PECTORIS: ICD-10-CM

## 2025-02-27 DIAGNOSIS — Z86.79 S/P MAZE OPERATION FOR ATRIAL FIBRILLATION: ICD-10-CM

## 2025-02-27 DIAGNOSIS — Z98.890 S/P MAZE OPERATION FOR ATRIAL FIBRILLATION: ICD-10-CM

## 2025-02-27 DIAGNOSIS — I10 PRIMARY HYPERTENSION: ICD-10-CM

## 2025-02-27 DIAGNOSIS — I48.0 PAROXYSMAL ATRIAL FIBRILLATION: ICD-10-CM

## 2025-02-27 DIAGNOSIS — Z95.1 S/P CABG X 3: ICD-10-CM

## 2025-02-27 PROCEDURE — 93000 ELECTROCARDIOGRAM COMPLETE: CPT | Performed by: INTERNAL MEDICINE

## 2025-02-27 PROCEDURE — 99214 OFFICE O/P EST MOD 30 MIN: CPT | Performed by: INTERNAL MEDICINE

## 2025-02-27 PROCEDURE — 3074F SYST BP LT 130 MM HG: CPT | Performed by: INTERNAL MEDICINE

## 2025-02-27 PROCEDURE — 3078F DIAST BP <80 MM HG: CPT | Performed by: INTERNAL MEDICINE

## 2025-02-27 PROCEDURE — 1159F MED LIST DOCD IN RCRD: CPT | Performed by: INTERNAL MEDICINE

## 2025-02-27 PROCEDURE — 1160F RVW MEDS BY RX/DR IN RCRD: CPT | Performed by: INTERNAL MEDICINE

## 2025-02-27 NOTE — LETTER
February 27, 2025     Kortney Ferrera MD  [address] [facility] [place] [address]    Patient: Salinas Dill   YOB: 1945   Date of Visit: 2/27/2025     Dear Kortney Ferrera MD:       Thank you for referring Salinas Dill to me for evaluation. Below are the relevant portions of my assessment and plan of care.    If you have questions, please do not hesitate to call me. I look forward to following Salinas along with you.         Sincerely,        Darren Phan MD        CC: No Recipients    Darren Phan MD  02/27/25 1347  Sign when Signing Visit  CC--- history of mitral valve surgery and nonsustained ventricular tachycardia           Sub--79-year-old male patient comes in for annual follow-up.  Patient has history of multivessel coronary artery disease and severe MR and permanent AF and underwent multivessel bypass surgery with mitral annuloplasty and left atrial appendage ligation and maze procedure.  Postprocedure he had nonsustained ventricular arrhythmias and intermittent atrial arrhythmias and AVERY revealed left atrial appendage ligation without any MR and had COVID infection back in April 2022.  He did undergo an EP study in the past for restratification without induction of any VT    Patient had cardiothoracic surgery in 2021 and details are attached below       1.  CABG x3 with a LIMA to the mid LAD and reverse individual saphenous vein graft to the obtuse marginal 1 and PLB branch of the right coronary artery (CPT code 37414, 30334)   2.  Mitral valve repair with a 28 mm physiotwo ring annuloplasty (CPT code 69567)   3.  Right and left cryo-maze procedure with full set of lesions (CPT code 11081)  4.  Endoscopic vein harvest of the right lower extremity (CPT code 79093)     Medical History        Past Medical History:   Diagnosis Date   • Arthritis     • Atrial fibrillation (HCC)     • BPH (benign prostatic hyperplasia)     • Bulging lumbar disc     • Difficulty maintaining body in  lying position       NEEDS PILLOW UNDER KNEES IN SURGERY D/T LUMBAR ISSUE   • Hyperglycemia     • Hypertension     • OAB (overactive bladder)     • Skin mole     • Snoring     • Urinary urgency     • Vitamin D deficiency           Surgical History         Past Surgical History:   Procedure Laterality Date   • CARDIAC CATHETERIZATION Right 10/13/2021     Procedure: Left Heart Cath;  Surgeon: Renato Knight MD;  Location: [facility] CATH INVASIVE LOCATION;  Service: Cardiovascular;  Laterality: Right;   • CARDIAC CATHETERIZATION   10/13/2021     Procedure: Functional Flow [place];  Surgeon: Renato Knight MD;  Location: [facility] CATH INVASIVE LOCATION;  Service: Cardiovascular;;   • COLONOSCOPY       • FINGER SURGERY Right       repair right pointer finger   • JOINT REPLACEMENT       • TOTAL SHOULDER ARTHROPLASTY         shoulder replacement               Family History   Problem Relation Age of Onset   • Diabetes Mother     • Heart disease Mother     • Hypertension Sister     • Hyperlipidemia Sister     • Kidney disease Sister     • Cancer Sister     • Other Sister           weight disorder   • Diabetes Sister     • Stroke Brother     • Hypertension Other             Physical Exam    General:      well developed, well nourished, in no acute distress.    Head:      normocephalic and atraumatic.    Eyes:      PERRL/EOM intact, conjunctivae and sclerae clear without nystagmus.    Neck:      no  thyromegaly, trachea central with normal respiratory effort  Lungs:      clear bilaterally to auscultation.    Heart:       regular rate and rhythm, S1, S2 without murmurs, rubs, or gallops  Skin:      intact without lesions or rashes.    Psych:      alert and cooperative; normal mood and affect; normal attention span and concentration.                 Assessment and plan    LDL is 61.  Recent creatinine potassium hemoglobin platelets and TSH normal  Longstanding AF with prior Maze procedure with left atrial appendage  ligation in sinus rhythm on aspirin  Hyperlipidemia on atorvastatin  Multivessel coronary artery disease with severe MR needing surgery in 2021  Hypertension well-controlled with carvedilol/losartan  History of nonsustained VT in the past without inducible VT with normal EF   medications reviewed and follow-up appointments made      ECG 12 Lead    Date/Time: 2/27/2025 1:44 PM  Performed by: Darren Phan MD    Authorized by: Darren Phan MD  Comparison: compared with previous ECG   Similar to previous ECG  Rhythm: sinus rhythm  Rate: normal  Conduction: conduction normal      Electronically signed by Darren Phan MD, 02/27/25, 1:44 PM EST.

## 2025-02-27 NOTE — PROGRESS NOTES
CC--- history of mitral valve surgery and nonsustained ventricular tachycardia           Sub--79-year-old male patient comes in for annual follow-up.  Patient has history of multivessel coronary artery disease and severe MR and permanent AF and underwent multivessel bypass surgery with mitral annuloplasty and left atrial appendage ligation and maze procedure.  Postprocedure he had nonsustained ventricular arrhythmias and intermittent atrial arrhythmias and AVERY revealed left atrial appendage ligation without any MR and had COVID infection back in April 2022.  He did undergo an EP study in the past for restratification without induction of any VT    Patient had cardiothoracic surgery in 2021 and details are attached below       1.  CABG x3 with a LIMA to the mid LAD and reverse individual saphenous vein graft to the obtuse marginal 1 and PLB branch of the right coronary artery (CPT code 11456, 29235)   2.  Mitral valve repair with a 28 mm physiotwo ring annuloplasty (CPT code 21924)   3.  Right and left cryo-maze procedure with full set of lesions (CPT code 17471)  4.  Endoscopic vein harvest of the right lower extremity (CPT code 53472)     Medical History        Past Medical History:   Diagnosis Date   • Arthritis     • Atrial fibrillation (HCC)     • BPH (benign prostatic hyperplasia)     • Bulging lumbar disc     • Difficulty maintaining body in lying position       NEEDS PILLOW UNDER KNEES IN SURGERY D/T LUMBAR ISSUE   • Hyperglycemia     • Hypertension     • OAB (overactive bladder)     • Skin mole     • Snoring     • Urinary urgency     • Vitamin D deficiency           Surgical History         Past Surgical History:   Procedure Laterality Date   • CARDIAC CATHETERIZATION Right 10/13/2021     Procedure: Left Heart Cath;  Surgeon: Renato Knight MD;  Location: [facility] CATH INVASIVE LOCATION;  Service: Cardiovascular;  Laterality: Right;   • CARDIAC CATHETERIZATION   10/13/2021     Procedure: Functional  Flow [place];  Surgeon: Renato Knight MD;  Location: [facility] CATH INVASIVE LOCATION;  Service: Cardiovascular;;   • COLONOSCOPY       • FINGER SURGERY Right       repair right pointer finger   • JOINT REPLACEMENT       • TOTAL SHOULDER ARTHROPLASTY         shoulder replacement               Family History   Problem Relation Age of Onset   • Diabetes Mother     • Heart disease Mother     • Hypertension Sister     • Hyperlipidemia Sister     • Kidney disease Sister     • Cancer Sister     • Other Sister           weight disorder   • Diabetes Sister     • Stroke Brother     • Hypertension Other             Physical Exam    General:      well developed, well nourished, in no acute distress.    Head:      normocephalic and atraumatic.    Eyes:      PERRL/EOM intact, conjunctivae and sclerae clear without nystagmus.    Neck:      no  thyromegaly, trachea central with normal respiratory effort  Lungs:      clear bilaterally to auscultation.    Heart:       regular rate and rhythm, S1, S2 without murmurs, rubs, or gallops  Skin:      intact without lesions or rashes.    Psych:      alert and cooperative; normal mood and affect; normal attention span and concentration.                 Assessment and plan    LDL is 61.  Recent creatinine potassium hemoglobin platelets and TSH normal  Longstanding AF with prior Maze procedure with left atrial appendage ligation in sinus rhythm on aspirin  Hyperlipidemia on atorvastatin  Multivessel coronary artery disease with severe MR needing surgery in 2021  Hypertension well-controlled with carvedilol/losartan  History of nonsustained VT in the past without inducible VT with normal EF   medications reviewed and follow-up appointments made      ECG 12 Lead    Date/Time: 2/27/2025 1:44 PM  Performed by: Darren Phan MD    Authorized by: Darren Phan MD  Comparison: compared with previous ECG   Similar to previous ECG  Rhythm: sinus rhythm  Rate: normal  Conduction:  conduction normal      Electronically signed by Darren Phan MD, 02/27/25, 1:44 PM EST.

## 2025-03-21 NOTE — PROGRESS NOTES
Physical Therapy Daily Treatment Note  [address], [place] [address]      Patient: Salinas Dill   : 1945  Diagnosis/ICD-10 Code:  Pain of right hip [M25.551]   Problems Addressed this Visit          Musculoskeletal and Injuries    Low back pain     Other Visit Diagnoses       Pain of right hip    -  Primary          Diagnoses         Codes Comments    Pain of right hip    -  Primary ICD-10-CM: M25.551  ICD-9-CM: 719.45     Chronic right-sided low back pain with right-sided sciatica     ICD-10-CM: M54.41, G89.29  ICD-9-CM: 724.2, 724.3, 338.29            Referring practitioner: Kortney Ferrera MD  Date of Initial Visit: Type: THERAPY  Noted: 2023  Today's Date: 2023    VISIT#: 7    Subjective Patient reports he has been able to sleep in bed but still wakes frequently at night due to pain.  He reports he is able to sleep in bed when he has his feet hanging off the edge of the bed.      Objective     See Exercise, Manual, and Modality Logs for complete treatment.     Assessment/Plan Patient tolerated progression of exercise without complaints of pain.  He did note a dull ache into his hip with RLE LAD and with mechanical traction this date but it improved after he was able to move around.  Patient with no complaints of pain at end of session.     Progress per Plan of Care and Progress strengthening /stabilization /functional activity         Timed:         Manual Therapy:    12     mins  56844;     Therapeutic Exercise:    16     mins  91450;     Neuromuscular Ritu:        mins  84450;    Therapeutic Activity:          mins  65653;     Gait Training:           mins  59743;     Ultrasound:          mins  51986;    Ionto                                   mins   65882    Un-Timed:  Electrical Stimulation:         mins  21404 ( );  Dry Needling          mins self-pay ;   Traction     15     mins 55218      Timed Treatment:   28   mins   Total Treatment:     43    Provider:  Dr Soria  Medication Name:  albuteral inhaler  Dosage:  108 MCG/  Pharmacy Name:  [facility]  Pharmacy City:  [place]  Is this a Pharmacy change: no  How many doses remain:  out  Additional Comments:  has been using alot    PSR:  Inform patient to allow 72 business hours for refills.   mins    Julia Pal, PT  [place] License # [id number]  Physical Therapist

## 2025-04-28 RX ORDER — ATORVASTATIN CALCIUM 40 MG/1
40 TABLET, FILM COATED ORAL
Qty: 90 TABLET | Refills: 1 | Status: SHIPPED | OUTPATIENT
Start: 2025-04-28

## 2025-05-20 ENCOUNTER — LAB (OUTPATIENT)
Dept: FAMILY MEDICINE CLINIC | Facility: CLINIC | Age: 80
End: 2025-05-20
Payer: MEDICARE

## 2025-05-20 DIAGNOSIS — N13.8 ENLARGED PROSTATE WITH URINARY OBSTRUCTION: ICD-10-CM

## 2025-05-20 DIAGNOSIS — N13.8 BENIGN PROSTATIC HYPERPLASIA WITH URINARY OBSTRUCTION: Primary | ICD-10-CM

## 2025-05-20 DIAGNOSIS — N40.1 ENLARGED PROSTATE WITH URINARY OBSTRUCTION: ICD-10-CM

## 2025-05-20 DIAGNOSIS — N40.1 BENIGN PROSTATIC HYPERPLASIA WITH URINARY OBSTRUCTION: Primary | ICD-10-CM

## 2025-05-20 LAB — PSA SERPL-MCNC: 1.82 NG/ML (ref 0–4)

## 2025-05-20 PROCEDURE — 84153 ASSAY OF PSA TOTAL: CPT | Performed by: UROLOGY

## 2025-05-20 PROCEDURE — 36415 COLL VENOUS BLD VENIPUNCTURE: CPT

## 2025-06-12 ENCOUNTER — OFFICE VISIT (OUTPATIENT)
Dept: CARDIOLOGY | Facility: CLINIC | Age: 80
End: 2025-06-12
Payer: MEDICARE

## 2025-06-12 VITALS
OXYGEN SATURATION: 98 % | DIASTOLIC BLOOD PRESSURE: 91 MMHG | SYSTOLIC BLOOD PRESSURE: 138 MMHG | HEIGHT: 70 IN | BODY MASS INDEX: 32.75 KG/M2 | WEIGHT: 228.8 LBS | HEART RATE: 73 BPM

## 2025-06-12 DIAGNOSIS — Z98.890 S/P LEFT ATRIAL APPENDAGE LIGATION: ICD-10-CM

## 2025-06-12 DIAGNOSIS — Z86.79 S/P MAZE OPERATION FOR ATRIAL FIBRILLATION: ICD-10-CM

## 2025-06-12 DIAGNOSIS — I34.0 NONRHEUMATIC MITRAL VALVE REGURGITATION: ICD-10-CM

## 2025-06-12 DIAGNOSIS — I10 PRIMARY HYPERTENSION: ICD-10-CM

## 2025-06-12 DIAGNOSIS — Z98.890 S/P MVR (MITRAL VALVE REPAIR): Primary | ICD-10-CM

## 2025-06-12 DIAGNOSIS — Z95.1 S/P CABG X 3: ICD-10-CM

## 2025-06-12 DIAGNOSIS — Z98.890 S/P MAZE OPERATION FOR ATRIAL FIBRILLATION: ICD-10-CM

## 2025-06-12 NOTE — PROGRESS NOTES
Cardiology Office Visit      Encounter Date:  06/12/2025    Patient ID:   Salinas Dill is a 79 y.o. male.      Reason For Followup:  Chronic atrial fibrillation  Mitral regurgitation  Hypertension  Status post coronary artery bypass surgery and mitral valve repair    Brief Clinical History:  Dear Dr. Ferrera, Kortney Infante MD    I had the pleasure of seeing Salinas Dill today. As you are well aware, this is a 79 y.o. male with past medical history significant for history of hypertension and benign prostatic hypertrophy and recent hospitalization for coronary artery disease and coronary artery bypass surgery here for further evaluation treatment options      Interpretation Summary    Left ventricular ejection fraction appears to be 56 - 60%. Left ventricular systolic function is normal.  There is a mitral valve ring present.  Saline test results are negative.  Left atrial appendage ligation noted.  Left atrial appendage is ligated and demonstrates continued closure.  No evidence of communication with left atrial cavity.           Interval History:  Denies any new complaints  Complaint on medical therapy  Denies any exertional symptoms of chest pain shortness of breath dizziness or syncope  Denies any new cardiac symptoms  No further episodes of atrial fibrillation  Assessment & Plan    Impressions:  Chronic atrial fibrillation/currently in sinus rhythm  Hypertension  Ilan Vascor of 2  stress test with no evidence of any inducible ischemia  echocardiogram with normal LV systolic function and moderate mitral regurgitation  Severe multivessel coronary artery disease status post CABG x3 with a LIMA to the mid LAD and reverse individual saphenous vein graft to the obtuse marginal 1 and PLB branch of the right coronary artery  Severe mitral valve insufficiency status post ring annuloplasty  Atrial fibrillation with right and left cryomaze procedure and left atrial appendage ligation  Postop atrial fibrillation status post  "surgical maze currently in sinus rhythm  Essential hypertension  Dyslipidemia  BPH  Extended Holter monitor with nonsustained ventricular tachycardia episodes  Multiple episodes of brief salvos of supraventricular tachycardia  Status post EP study with no inducible arrhythmia    Recommendations:    Continue aspirin for anticoagulation therapy  Patient is currently on low-dose beta-blockers but cannot tolerate any higher doses secondary to blood pressure issues  Recent echocardiogram and also AVERY showing no significant residual valve pathology only residual mild mitral regurgitation no significant mitral stenosis and a complete seal of the left atrial appendage  Continue risk factor modification  Recent labs reviewed and discussed with the patient  Lipids at goal  AVERY and echocardiogram with normal functioning of the valve  Echocardiogram with normal LV systolic function normal functioning of the valve  EP study with no induction of SVT or ventricular tachycardia  Repeat extended Holter monitor to further evaluate the cardiac arrhythmias  Continue current medical therapy with aspirin 81 mg p.o. once a day Lipitor 40 mg p.o. once a day Coreg 6.25 mg p.o. twice daily losartan 50 mg p.o. once a day  Recent labs and work-up reviewed and discussed with patient  Continue regular exercise continue aggressive risk factor modification  Workup and labs reviewed and discussed with the patient and family  follow-up in office in 6 months        Vitals:  Vitals:    06/12/25 0844   BP: 138/91   BP Location: Left arm   Patient Position: Sitting   Cuff Size: Large Adult   Pulse: 73   SpO2: 98%   Weight: 104 kg (228 lb 12.8 oz)   Height: 177.8 cm (70\")       Physical Exam:    General: Alert, cooperative, no distress, appears stated age  Head:  Normocephalic, atraumatic, mucous membranes moist  Eyes:  Conjunctiva/corneas clear, EOM's intact     Neck:  Supple,  no adenopathy;      Lungs: Clear to auscultation bilaterally, no wheezes " rhonchi rales are noted  Chest wall: No tenderness  Heart::  Regular rate and rhythm, S1 and S2 normal, no murmur, rub or gallop  Abdomen: Soft, non-tender, nondistended bowel sounds active  Extremities: No cyanosis, clubbing, or edema  Pulses: 2+ and symmetric all extremities  Skin:  No rashes or lesions  Neuro/psych: A&O x3. CN II through XII are grossly intact with appropriate affect              Lab Results   Component Value Date    GLUCOSE 104 (H) 02/24/2025    BUN 16 02/24/2025    CREATININE 0.63 (L) 02/24/2025    EGFR 97.4 02/24/2025    BCR 25.4 (H) 02/24/2025    K 4.1 02/24/2025    CO2 24.0 02/24/2025    CALCIUM 9.2 02/24/2025    ALBUMIN 4.0 02/24/2025    BILITOT 0.5 02/24/2025    AST 22 02/24/2025    ALT 18 02/24/2025     Results for orders placed during the hospital encounter of 02/22/22    Adult Transesophageal Echo (AVERY) W/ Cont if Necessary Per Protocol    Interpretation Summary  · Left ventricular ejection fraction appears to be 56 - 60%. Left ventricular systolic function is normal.  · There is a mitral valve ring present.  · Saline test results are negative.  · Left atrial appendage ligation noted.  · Left atrial appendage is ligated and demonstrates continued closure.  · No evidence of communication with left atrial cavity.     Results for orders placed during the hospital encounter of 10/13/21    Cardiac Catheterization/Vascular Study    Narrative  Table formatting from the original result was not included.  Cardiac Catheterization Operative Report    Salinas LOOMIS Aniyah  [id number]  10/13/2021  @PCP@    He underwent cardiac catheterization.    Indications for the procedure include: shortness of breath.    Procedure Details:  The risks, benefits, complications, treatment options, and expected outcomes were discussed with the patient. The patient and/or family concurred with the proposed plan, giving informed consent.    After informed consent the patient was brought to the cath lab after appropriate IV  hydration was begun and oral premedication was given. He was further sedated with fentanyl. He was prepped and draped in the usual manner. Using the modified Seldinger access technique, a 6 Mauritian sheath was placed in the femoral artery. A left heart catheterization with coronary arteriography was performed. Findings are discussed below.    For the IFR evaluation we used a  guide catheter with a Volcano pressure wire to measure the IFR value of the mid LAD and also midportion of the ramus branch of the circumflex and also the left main coronary artery.  Patient tolerated procedure well with no immediate possible complications plan to obtain hemostasis by manual pressure.  Procedural anticoagulation was heparin.    After the procedure was completed, sedation was stopped and the sheaths and catheters were all removed. Hemostasis was achieved per established hospital protocols.    Conscious sedation:  Conscious sedation was performed according to protocol.  I supervised and directed an independent trained observer with the assistance of monitoring the patient's level of consciousness and physiologic status throughout the procedure.  Intraoperative service time was 60 minutes.    Findings:    Hemodynamics Central aortic pressure systolic 102 and diastolic 60 with a mean pressure of 79 mmHg  LV end-diastolic pressure was 4 mmHg  There was no gradient across the aortic valve on the pullback of the pigtail catheter  Left Main  left main coronary artery is large caliber vessel extensively calcified distal left main coronary artery has angiographic 50% stenosis before it trifurcates into left anterior descending ramus intermediate and left circumflex artery  RCA  large-caliber dominant vessel.  Right coronary artery has a proximal angiographic 30 to 40% stenosis mid angiographic 30 to 40% stenosis  Distal right coronary artery before the PDA PLB branches is angiographically 60% stenosis provides large-caliber PDA and  PLB branch  PLB branch of the right coronary artery has a mid focal area of 90% stenosis  PDA branch of the right coronary artery has apical angiographic 70% stenosis  LAD  large-caliber vessel.  Mid LAD has a focal area of angiographic 70% stenosis that is calcified after the diagonal branch  First diagonal branch is a small to moderate-sized vessel has a mid focal area of 99% stenosis  Second diagonal branch is a moderate-sized vessel angiographically normal  Circ  moderate to large caliber vessel has a proximal ostial angiographic 80% stenosis  Marginal branch of the circumflex has a proximal ostial angiographic calcified 90% stenosis  There is a large caliber ramus intermediate branch that has made a focal area of angiographic 70% stenosis and mid to distal second area of angiographic 80% stenosis before the bifurcation  LV  normal LV systolic function normal wall motion estimate LV ejection fraction of 60%  Coronary Dominance  right coronary artery    IFR evaluation of the mid LAD showing IFR value of 0.89 suggestive of physiologically significant stenosis involving the mid LAD  IFR evaluation of the ramus branch of the circumflex showing IFR value of 0.88 suggestive of physiologically significant stenosis involving the ramus branch of the circumflex  IFR evaluation of the left main showing IFR value of 0.94 suggestive of no physiologically significant stenosis involving the distal left main        Estimated Blood Loss:  Minimal    Specimens: None    Complications:  None; patient tolerated the procedure well.    Disposition: PACU - hemodynamically stable.    Condition: stable    Impressions:  IFR evaluation of the mid LAD showing IFR value of 0.89 suggestive of physiologically significant stenosis involving the mid LAD  IFR evaluation of the ramus branch of the circumflex showing IFR value of 0.88 suggestive of physiologically significant stenosis involving the ramus branch of the circumflex  IFR evaluation of  the left main showing IFR value of 0.94 suggestive of no physiologically significant stenosis involving the distal left main  Severe three-vessel coronary artery disease  Extensive calcification of the multiple coronary arteries segments  Normal LV systolic function normal wall motion normal left-sided filling pressures estimate LV ejection fraction of 60%  Chronic atrial fibrillation    Recommendations:  Surgical evaluation for consideration for coronary artery bypass surgery surgical maze and ligation of the left atrial appendage  Test results reviewed and discussed with the patient and family     Lab Results   Component Value Date    CHOL 116 02/24/2025    TRIG 40 02/24/2025    HDL 45 02/24/2025    LDL 61 02/24/2025       Results for orders placed during the hospital encounter of 03/20/23    Mobile Cardiac Outpatient Telemetry    Interpretation Summary  Extended Holter monitor study for prior history of atrial arrhythmias  Underlying rhythm is sinus rhythm with a minimal heart rate of 48 bpm maximal heart rate of 176 bpm average heart rate of 72 bpm  No atrial fibrillation  No sustained ventricular or supraventricular tachyarrhythmia  Clinically significant pauses  No AV block  PAC burden of 2%  VC burden of 1%  8 episodes of nonsustained ventricular tachycardia fastest episode lasting for 4 beats at rate of 180 bpm longest episode of 12 beats at rate of 150 bpm  Abnormal Holter monitor study  Clinical correlation is recommended           Objective:          Allergies:  Allergies   Allergen Reactions    Lisinopril Cough       Medication Review:     Current Outpatient Medications:     aspirin (aspirin) 81 MG EC tablet, Take 1 tablet by mouth Daily., Disp: 30 tablet, Rfl: 2    atorvastatin (LIPITOR) 40 MG tablet, TAKE 1 TABLET DAILY AT BEDTIME., Disp: 90 tablet, Rfl: 1    B Complex Vitamins (vitamin b complex) capsule capsule, Take 1 capsule by mouth Daily. LD 11/4, Disp: , Rfl:     BLACK ELDERBERRY PO, Take 158 mg  by mouth Every Night., Disp: , Rfl:     carvedilol (COREG) 6.25 MG tablet, TAKE 1 TABLET BY MOUTH EVERY 12 (TWELVE) HOURS., Disp: 180 tablet, Rfl: 2    Cholecalciferol (VITAMIN D) 2000 units capsule, Take 25 capsules by mouth 1 (One) Time Per Week. mondays, Disp: , Rfl:     losartan (COZAAR) 50 MG tablet, TAKE 1 TABLET BY MOUTH 2 (TWO) TIMES A DAY., Disp: 180 tablet, Rfl: 0    melatonin 5 MG tablet tablet, Take 1 tablet by mouth Every Night., Disp: , Rfl:     SILDENAFIL CITRATE PO, Take 20 mg by mouth Daily. For enlarged prostate, Disp: , Rfl:     tamsulosin (FLOMAX) 0.4 MG capsule 24 hr capsule, Take 2 capsules by mouth Daily., Disp: , Rfl:     Turmeric 500 MG capsule, Take 1 capsule by mouth Daily., Disp: , Rfl:     Family History:  Family History   Problem Relation Age of Onset    Diabetes Mother     Heart disease Mother     Early death Mother     Hypertension Sister     Hyperlipidemia Sister     Kidney disease Sister     Cancer Sister     Diabetes Sister     Other Sister     Stroke Brother     Hypertension Other     Arthritis Sister     Arthritis Sister        Past Medical History:  Past Medical History:   Diagnosis Date    Abnormal ECG may 2022    Arthritis     Asthma april 2022    Atrial fibrillation     Bulging lumbar disc     Coronary artery disease     Difficulty maintaining body in lying position     NEEDS PILLOW UNDER KNEES IN SURGERY D/T LUMBAR ISSUE    Difficulty walking april 2023    GERD (gastroesophageal reflux disease)     Hyperglycemia     Hyperlipidemia     Hypertension     Obesity     Pulmonary arterial hypertension     Scoliosis june2023    Shortness of breath     Skin mole     Snoring     Thoracic disc disorder     Visual impairment     Vitamin D deficiency        Past surgical History:  Past Surgical History:   Procedure Laterality Date    ABLATION OF DYSRHYTHMIC FOCUS  11/11/2021    CARDIAC CATHETERIZATION Right 10/13/2021    Procedure: Left Heart Cath;  Surgeon: Renato Knight MD;   Location: [facility] CATH INVASIVE LOCATION;  Service: Cardiovascular;  Laterality: Right;    CARDIAC CATHETERIZATION  10/13/2021    Procedure: Functional Flow Gravette;  Surgeon: Renato Knight MD;  Location: [facility] CATH INVASIVE LOCATION;  Service: Cardiovascular;;    CARDIAC ELECTROPHYSIOLOGY PROCEDURE Right 2022    Procedure: EP/CRM Study Rubi aware;  Surgeon: Darren Phan MD;  Location: [facility] CATH INVASIVE LOCATION;  Service: Cardiovascular;  Laterality: Right;    CATARACT EXTRACTION  2022    COLONOSCOPY      CORONARY ARTERY BYPASS GRAFT N/A 2021    Procedure: CORONARY ARTERY BYPASS WITH INTERNAL MAMMARY ARTERY GRAFT AND MAZE PROCEDURE;  Surgeon: Johnathan Hernandez MD;  Location: [facility] CVOR;  Service: Cardiothoracic;  Laterality: N/A;  CABG x 3  2 vein grafts  1 LIMA  with intraoperative AVERY    FINGER SURGERY Right     repair right pointer finger    JOINT REPLACEMENT      MAZE PROCEDURE Bilateral 2021    done with CABG + mintral ring placement + L atrial appendage ligation    MITRAL VALVE REPAIR/REPLACEMENT N/A 2021    Procedure: MITRAL VALVE REPAIR/REPLACEMENT;  Surgeon: Johnathan Hernandez MD;  Location: [facility];  Service: Cardiothoracic;  Laterality: N/A;  mitral valve repair with physio II annuloplasty ring 28mm    MITRAL VALVE REPAIR/REPLACEMENT      TOTAL SHOULDER ARTHROPLASTY      shoulder replacement       Social History:  Social History     Socioeconomic History    Marital status:    Tobacco Use    Smoking status: Former     Current packs/day: 0.00     Average packs/day: 1 pack/day for 12.0 years (12.0 ttl pk-yrs)     Types: Cigarettes     Start date: 1966     Quit date: 1978     Years since quittin.4    Smokeless tobacco: Former     Quit date:    Vaping Use    Vaping status: Never Used   Substance and Sexual Activity    Alcohol use: Not Currently     Alcohol/week: 1.0 standard drink of alcohol     Comment: Catherine bernardo on  Monday nights    Drug use: No    Sexual activity: Yes     Partners: Female     Birth control/protection: None       Review of Systems:  The following systems were reviewed as they relate to the cardiovascular system: Constitutional, Eyes, ENT, Cardiovascular, Respiratory, Gastrointestinal, Integumentary, Neurological, Psychiatric, Hematologic, Endocrine, Musculoskeletal, and Genitourinary. The pertinent cardiovascular findings are reported above with all other cardiovascular points within those systems being negative.    Diagnostic Study Review:     Current Electrocardiogram:  Procedures          NOTE: The following portions of the patient's history were reviewed and updated this visit as appropriate: allergies, current medications, past family history, past medical history, past social history, past surgical history and problem list.

## 2025-06-23 RX ORDER — LOSARTAN POTASSIUM 50 MG/1
50 TABLET ORAL 2 TIMES DAILY
Qty: 180 TABLET | Refills: 0 | Status: SHIPPED | OUTPATIENT
Start: 2025-06-23

## 2025-07-07 RX ORDER — CARVEDILOL 6.25 MG/1
6.25 TABLET ORAL EVERY 12 HOURS SCHEDULED
Qty: 180 TABLET | Refills: 1 | Status: SHIPPED | OUTPATIENT
Start: 2025-07-07

## (undated) DEVICE — SENSR CERBRL O2 PK/2

## (undated) DEVICE — CATH DIAG IMPULSE FR4 6F 100CM

## (undated) DEVICE — ROTATING SURGICAL PUNCHES, 1 PER POUCH: Brand: A&E MEDICAL / ROTATING SURGICAL PUNCHES

## (undated) DEVICE — SUT SILK 2 SUTUPAK TIE 60IN SA8H 2STRAND

## (undated) DEVICE — SUT PROLN 4/0 V7 36IN 8975H

## (undated) DEVICE — GAUZE,SPONGE,4"X4",32PLY,XRAY,STRL,LF: Brand: MEDLINE

## (undated) DEVICE — CONNECT Y INTERSEPT W/LL 3/8 X 3/8 X 3/8IN

## (undated) DEVICE — SUT SILK 2/0 SH CR8 18IN CR8 C012D

## (undated) DEVICE — BLOOD TRANSFUSION FILTER: Brand: HAEMONETICS

## (undated) DEVICE — TP UMB COTN 1/8X36 U12T

## (undated) DEVICE — SUT SILK 4/0 TIES 18IN A183H

## (undated) DEVICE — TEMP PACING WIRE: Brand: MYO/WIRE

## (undated) DEVICE — SUT SILK 2/0 TIES 18IN A185H

## (undated) DEVICE — Device: Brand: WEBSTER CS

## (undated) DEVICE — Device

## (undated) DEVICE — CATHETER,URETHRAL,REDRUBBER,STERILE,20FR: Brand: MEDLINE

## (undated) DEVICE — SUT PROLN 5/0 V5 36IN 8934H

## (undated) DEVICE — SUT PROLN 4/0 V5 36IN 8935H

## (undated) DEVICE — CANN ART EOPA 3D NV W/CONN 20F

## (undated) DEVICE — ELECTRD DEFIB M/FUNC PROPADZ STRL 2PK

## (undated) DEVICE — SYS PERFUS SEP PLATLT W TIPS CUST

## (undated) DEVICE — TUBING, SUCTION, 1/4" X 12', STRAIGHT: Brand: MEDLINE

## (undated) DEVICE — Device: Brand: CARTO 3

## (undated) DEVICE — SUT PROLENE PP 7/0 BV175-6 24IN

## (undated) DEVICE — CATH GUIDE ZUMA2 EBU 6F 3.75X100CM

## (undated) DEVICE — TBG INSUFF MALE L/L W 12MM CON: Brand: MEDLINE INDUSTRIES, INC.

## (undated) DEVICE — ELECTRD BLD EZ CLN STD 6.5IN

## (undated) DEVICE — 28 FR STRAIGHT – SOFT PVC CATHETER: Brand: PVC THORACIC CATHETERS

## (undated) DEVICE — INTENDED FOR TISSUE SEPARATION, AND OTHER PROCEDURES THAT REQUIRE A SHARP SURGICAL BLADE TO PUNCTURE OR CUT.: Brand: BARD-PARKER ® CARBON RIB-BACK BLADES

## (undated) DEVICE — ELECTRD DEFIB M/FUNC PROPADZ RADIOL 2PK

## (undated) DEVICE — RADIFOCUS OBTURATOR: Brand: RADIFOCUS

## (undated) DEVICE — DRAPE SHEET ULTRAGARD: Brand: MEDLINE

## (undated) DEVICE — PK ATS CUST W CARDIOTOMY RESEVOIR

## (undated) DEVICE — BNDG ELAS ELITE V/CLOSE 6IN 5YD LF STRL

## (undated) DEVICE — SUT PDS 0 CT-1 Z340H

## (undated) DEVICE — ST ACC MICROPUNCTURE STFF/CANN PLAT/TP 4F 21G 40CM

## (undated) DEVICE — PK TRY HEART CATH 50

## (undated) DEVICE — 6F .070 XB 3.5 100CM: Brand: VISTA BRITE TIP

## (undated) DEVICE — BG BLD SYS

## (undated) DEVICE — CONTAINER,SPECIMEN,OR STERILE,4OZ: Brand: MEDLINE

## (undated) DEVICE — CABL BIPOL W/ALLGTR CLIP/SM 12FT

## (undated) DEVICE — CATH DIAG IMPULSE FL4 6F 100CM

## (undated) DEVICE — GW PTFE EMERALD HEPCOAT FC J TIP STD .035 3MM 150CM

## (undated) DEVICE — BLOWER MISTER CLEARVIEW W/TBG

## (undated) DEVICE — TBG PRESS/MONITOR FIX M/F LL A/ 24IN STRL

## (undated) DEVICE — IRRIGATOR BULB ASEPTO 60CC STRL

## (undated) DEVICE — KT PK ANGIOPLASTY ACC 9 MERIT

## (undated) DEVICE — SUCTION CANISTER, 1500CC,SAFELINER: Brand: DEROYAL

## (undated) DEVICE — SYS VASOVIEW HEMOPRO ENDOSCOPIC HARVST VESL

## (undated) DEVICE — PINNACLE INTRODUCER SHEATH: Brand: PINNACLE

## (undated) DEVICE — SUT SILK 0 CT1 CR8 18IN C021D

## (undated) DEVICE — SUT PROLN 6/0 RB2 D/A 30IN 8711H

## (undated) DEVICE — PROB CRYOABL CARD CARDIOBLATE/CRYOFLEX 25TO100MM 10CM 1P/U

## (undated) DEVICE — HEMOCONCENTRATOR PERFUS LPS06

## (undated) DEVICE — GLV SURG BIOGEL LTX PF 7 1/2

## (undated) DEVICE — COUNT NDL MAG XLG

## (undated) DEVICE — CATH DIAG IMPULSE PIG .056 6F 110CM

## (undated) DEVICE — BNDG ELAS ELITE V/CLOSE 4IN 5YD LF STRL

## (undated) DEVICE — SUT PROLN 8/0 BV130/5 D/A 24IN 8732H

## (undated) DEVICE — GLV SURG BIOGEL LTX PF 8

## (undated) DEVICE — TOWEL,OR,DSP,ST,WHITE,DLX,4/PK,20PK/CS: Brand: MEDLINE

## (undated) DEVICE — BLD SCLPL BEAVR MINI STR 2BVL 180D LF

## (undated) DEVICE — Device: Brand: REFERENCE PATCH CARTO 3

## (undated) DEVICE — PRESSURE TUBING: Brand: TRUWAVE

## (undated) DEVICE — SOL NACL 0.9PCT 1000ML

## (undated) DEVICE — SUT PROLN 7/0 BV1 D/A 30IN 8703H

## (undated) DEVICE — ADAPT ANTEGRADE RETRGR

## (undated) DEVICE — CORONARY ARTERY BYPASS GRAFT MARKERS, STAINLESS STEEL, DISTAL, WITHOUT HOLDER: Brand: ANASTOMARK CORONARY ARTERY BYPASS GRAFT MARKERS, STAINLESS STEEL, DISTAL

## (undated) DEVICE — BOOT SUT XRAY DETECT STD YEL/BLU CA/50

## (undated) DEVICE — SOL IRR NACL 0.9PCT BT 1000ML

## (undated) DEVICE — TBG NAMIC PRESS MONTR A/ F/M 12IN

## (undated) DEVICE — SPONGE,DISSECTOR,ROUND CHERRY,XR,ST,5/PK: Brand: MEDLINE

## (undated) DEVICE — SOL IRR H2O BTL 1000ML STRL

## (undated) DEVICE — CANN RETRGR STYLET RSCP 15F

## (undated) DEVICE — SUT PROLN 3/0 V7 D/A 36IN 8976H

## (undated) DEVICE — CANN AORT ROOT DLP VNT/8IN 14G 7F

## (undated) DEVICE — CANN VENI DLP SGL/STAGE RT/ANGL MTL/TP 28F

## (undated) DEVICE — SINGLE STAGE VENOUS RETURN CANNULA: Brand: THIN-FLEX SINGLE STAGE VENOUS DRAINAGE CANNULA

## (undated) DEVICE — PK PERFUS CUST W/CARDIOPLEGIA

## (undated) DEVICE — Device: Brand: OMNIWIRE PRESSURE GUIDE WIRE

## (undated) DEVICE — SUT ETHIB 2/0 CV V7 30IN 10X72

## (undated) DEVICE — STPCK 3WY HP ROT

## (undated) DEVICE — PK OPN HEART WHT WRP 50

## (undated) DEVICE — ANTIBACTERIAL UNDYED BRAIDED (POLYGLACTIN 910), SYNTHETIC ABSORBABLE SUTURE: Brand: COATED VICRYL

## (undated) DEVICE — PAD E/S GRND SGL/FOIL 9FT/CORD DISP